# Patient Record
Sex: FEMALE | Race: WHITE | NOT HISPANIC OR LATINO | Employment: OTHER | URBAN - METROPOLITAN AREA
[De-identification: names, ages, dates, MRNs, and addresses within clinical notes are randomized per-mention and may not be internally consistent; named-entity substitution may affect disease eponyms.]

---

## 2017-01-02 DIAGNOSIS — G89.21 CHRONIC PAIN DUE TO INJURY: ICD-10-CM

## 2017-01-02 DIAGNOSIS — G82.20 PARAPLEGIA (H): Primary | ICD-10-CM

## 2017-01-02 NOTE — TELEPHONE ENCOUNTER
Lyrica 200mg   Last Written Prescription Date:  7/12/16  Last Fill Quantity: 270,   # refills: 1  Last Office Visit with [facility], [facility] or  Health prescribing provider: 11/25/16  Future Office visit:       Routing refill request to provider for review/approval because:  Drug not on the [facility], [facility] or [facility] refill protocol or controlled substance

## 2017-01-03 RX ORDER — PREGABALIN 200 MG/1
CAPSULE ORAL
Qty: 270 CAPSULE | Refills: 1 | Status: SHIPPED | OUTPATIENT
Start: 2017-01-03 | End: 2017-06-20

## 2017-01-05 ENCOUNTER — TELEPHONE (OUTPATIENT)
Dept: FAMILY MEDICINE | Facility: CLINIC | Age: 47
End: 2017-01-05

## 2017-01-05 NOTE — TELEPHONE ENCOUNTER
Reason for call:  Form  Reason for Call:  Form, our goal is to have forms completed with 72 hours, however, some forms may require a visit or additional information.    Type of letter, form or note:  medical    Who is the form from?:  [facility]    Where did the form come from: form was faxed in    What clinic location was the form placed at?: [facility] - [phone]    Where the form was placed: 's Box    What number is listed as a contact on the form?:  [phone]       Additional comments:  Fax to [phone]    created by Kelli Sortoalesia

## 2017-01-09 ENCOUNTER — TELEPHONE (OUTPATIENT)
Dept: FAMILY MEDICINE | Facility: CLINIC | Age: 47
End: 2017-01-09

## 2017-01-09 NOTE — TELEPHONE ENCOUNTER
Reason for call:  Form  Reason for Call:  Form, our goal is to have forms completed with 72 hours, however, some forms may require a visit or additional information.    Type of letter, form or note:  Medical    Who is the form from?:  [facility]     Where did the form come from: form was faxed in    What clinic location was the form placed at?: [facility] - [phone]    Where the form was placed: 's in box     What number is listed as a contact on the form?: [phone]        Additional comments: Fax back to [phone]    Call taken on 1/9/2017 at 9:55 AM by Leland Gaines

## 2017-01-18 DIAGNOSIS — Z53.9 DIAGNOSIS NOT YET DEFINED: Primary | ICD-10-CM

## 2017-01-18 DIAGNOSIS — Z00.00 ROUTINE GENERAL MEDICAL EXAMINATION AT A HEALTH CARE FACILITY: Primary | ICD-10-CM

## 2017-01-18 PROCEDURE — 99207 C MD RECERTIFICATION HHA PT: CPT | Performed by: FAMILY MEDICINE

## 2017-01-18 RX ORDER — FOLIC ACID/MV,IRON,MIN/LUTEIN 0.4-18-25
TABLET ORAL
Qty: 30 TABLET | Refills: 5 | Status: SHIPPED | OUTPATIENT
Start: 2017-01-18 | End: 2017-11-07

## 2017-01-18 NOTE — TELEPHONE ENCOUNTER
Certavite antioxidants 18mg      Last Written Prescription Date:  06/20/2016  Last Fill Quantity: 30,   # refills: 5  Last Office Visit with [facility] or  Health prescribing provider: 11/25/2016  Future Office visit:       Routing refill request to provider for review/approval because:  Drug not on the [facility], [facility] or  Health refill protocol or controlled substance

## 2017-01-25 ENCOUNTER — TRANSFERRED RECORDS (OUTPATIENT)
Dept: HEALTH INFORMATION MANAGEMENT | Facility: CLINIC | Age: 47
End: 2017-01-25

## 2017-01-25 ENCOUNTER — TELEPHONE (OUTPATIENT)
Dept: FAMILY MEDICINE | Facility: CLINIC | Age: 47
End: 2017-01-25

## 2017-01-25 DIAGNOSIS — G89.21 CHRONIC PAIN DUE TO INJURY: ICD-10-CM

## 2017-01-25 DIAGNOSIS — T83.511A URINARY TRACT INFECTION ASSOCIATED WITH INDWELLING URETHRAL CATHETER, INITIAL ENCOUNTER (H): ICD-10-CM

## 2017-01-25 DIAGNOSIS — L89.309: ICD-10-CM

## 2017-01-25 DIAGNOSIS — N39.0 URINARY TRACT INFECTION ASSOCIATED WITH INDWELLING URETHRAL CATHETER, INITIAL ENCOUNTER (H): ICD-10-CM

## 2017-01-25 DIAGNOSIS — G82.20 PARAPLEGIA (H): ICD-10-CM

## 2017-01-25 DIAGNOSIS — R30.0 DYSURIA: Primary | ICD-10-CM

## 2017-01-25 NOTE — TELEPHONE ENCOUNTER
Summary:    Patient is due/failing the following:   PAP    Action needed:   Patient needs office visit for PAP.    Type of outreach:    Sent letter.    Questions for provider review:    None                                                                                                                                    Cheryl Tapia       Chart routed to Care Team .      Panel Management Review      Patient has the following on her problem list: None      Composite cancer screening  Chart review shows that this patient is due/due soon for the following Pap Smear

## 2017-01-25 NOTE — TELEPHONE ENCOUNTER
Nurse laura:   States that patient possible has a UTI, started yesterday, cramping, odor, 99.0   call back [phone]    Huddled with SF.  Ok to place order.    Naseem Melendez RN      [phone]  Fax    Can we get a standing order for when she has symptoms  Huddled with SF>  Ok for standing order.    Orders placed and faxed.    Naseem Melendez RN

## 2017-01-25 NOTE — Clinical Note
[facility]  [address], [address]  [place] [address]  Phone: [phone]  Fax: [phone]  January 25, 2017      Agatha Canas  [address]  [place] [address]      Dear Agatha,    We care about your health and have reviewed your health plan including your medical conditions, medications, and lab results.  Based on this review, it is recommended that you follow up regarding the following health topic(s):  -Cervical Cancer Screening    We recommend you take the following action(s):  -schedule a PAP SMEAR EXAM which is due.  Please disregard this reminder if you have had this exam elsewhere within the last year.  It would be helpful for us to have a copy of your recent pap smear report to update your records.     Please call us at the [facility] - [phone] (or use [employer]) to address the above recommendations.     Thank you for trusting [facility] and we appreciate the opportunity to serve you.  We look forward to supporting your healthcare needs in the future.    Healthy Regards,    Your Health Care Team  [facility]

## 2017-01-25 NOTE — TELEPHONE ENCOUNTER
[facility] phone call message- patient request for an order or referral:    Order or referral being requested: orders  Reason for request: lab  Date needed: as soon as possible  Has the patient been seen by the PCP for this problem? YES    Additional comments: Home health Care is at the patient's home right now and is wanting to get orders to collect a urine on the patient. The patient thinks she has a UTI.    OK to leave the result message on voice mail or with a family member? YES    Call taken on 1/25/2017 at 10:07 AM by Michelle Jennings

## 2017-01-27 RX ORDER — NITROFURANTOIN 25; 75 MG/1; MG/1
100 CAPSULE ORAL 2 TIMES DAILY
Qty: 20 CAPSULE | Refills: 0 | Status: SHIPPED | OUTPATIENT
Start: 2017-01-27 | End: 2017-02-06

## 2017-01-27 NOTE — TELEPHONE ENCOUNTER
Nitrofurantoin sent to pharmacy for UTI. This is the only oral antibiotic that the bacteria is susceptible to.     Recheck UA and culture when medication course is completed to be sure this is resolved.

## 2017-01-27 NOTE — TELEPHONE ENCOUNTER
Patient called to check on the results of the UA and UC. She states she is not feeling well as a result of the symptoms.    Called [facility] for records - placed records on Providers desk for review.

## 2017-01-30 DIAGNOSIS — R30.0 DYSURIA: Primary | ICD-10-CM

## 2017-01-30 RX ORDER — OXYCODONE AND ACETAMINOPHEN 7.5; 325 MG/1; MG/1
2 TABLET ORAL EVERY 8 HOURS PRN
Qty: 180 TABLET | Refills: 0 | Status: SHIPPED | OUTPATIENT
Start: 2017-02-04 | End: 2017-02-22

## 2017-01-30 RX ORDER — METHADONE HYDROCHLORIDE 5 MG/1
TABLET ORAL
Qty: 300 TABLET | Refills: 0 | Status: SHIPPED | OUTPATIENT
Start: 2017-02-04 | End: 2017-02-22

## 2017-01-30 NOTE — TELEPHONE ENCOUNTER
Pt informed    She is requesting med refills (for February) to be mailed to [facility] in [place]      methadone (DOLOPHINE) 5 MG tablet      Last Written Prescription Date:  1/5/2017  Last Fill Quantity: 300,   # refills: 0  Last Office Visit with [facility], [facility] or  Health prescribing provider: 11/25/2016  Future Office visit:       Routing refill request to provider for review/approval because:  Drug not on the [facility], P or  Health refill protocol or controlled substance    oxyCODONE-acetaminophen (PERCOCET) 7.5-325 MG per tablet      Last Written Prescription Date:  1/5/2017  Last Fill Quantity: 180,   # refills: 0  Last Office Visit with [facility], [facility] or  Health prescribing provider: 11/25/2016  Future Office visit:       Routing refill request to provider for review/approval because:  Drug not on the [facility], P or  Health refill protocol or controlled substance

## 2017-01-30 NOTE — TELEPHONE ENCOUNTER
Nitrofurantoin mono      Last Written Prescription Date:  12/22/2016  Last Fill Quantity: 14,   # refills: 0  Last Office Visit with [facility], [facility] or  Health prescribing provider: 11/25/2016  Future Office visit:       Routing refill request to provider for review/approval because:  Drug not on the [facility], [facility] or  Health refill protocol or controlled substance

## 2017-01-31 RX ORDER — NITROFURANTOIN 25; 75 MG/1; MG/1
CAPSULE ORAL
Qty: 20 CAPSULE | Refills: 0 | OUTPATIENT
Start: 2017-01-31

## 2017-02-02 ENCOUNTER — TELEPHONE (OUTPATIENT)
Dept: FAMILY MEDICINE | Facility: CLINIC | Age: 47
End: 2017-02-02

## 2017-02-02 NOTE — TELEPHONE ENCOUNTER
Reason for call:  Form  Reason for Call:  Form, our goal is to have forms completed with 72 hours, however, some forms may require a visit or additional information.    Type of letter, form or note:  medical    Who is the form from?: [facility] [employer]    Where did the form come from: form was faxed in    What clinic location was the form placed at?: [facility] - [phone]    Where the form was placed:  in box     What number is listed as a contact on the form?: [phone]       Additional comments: Fax to [phone]    Call taken on 11/8/2016 at 3:08 PM by Daiana Barajas

## 2017-02-02 NOTE — TELEPHONE ENCOUNTER
Nettie from [facility] is calling, patient is due to have a repeat UA on Wednesday next week and the patient finishes her antibiotics on Tuesday next week wanting to know if it is ok to do the repeat UA on Wednesday? Please call Nettie at [phone].    Thank you Michelle

## 2017-02-02 NOTE — TELEPHONE ENCOUNTER
Faxed form to [facility] to complete the form - requesting wound descriptions, etc. (information we do not have).  Asked them to fax to [facility] when completed and fax copy to us when completed.

## 2017-02-03 ENCOUNTER — TELEPHONE (OUTPATIENT)
Dept: FAMILY MEDICINE | Facility: CLINIC | Age: 47
End: 2017-02-03

## 2017-02-03 ENCOUNTER — MEDICAL CORRESPONDENCE (OUTPATIENT)
Dept: HEALTH INFORMATION MANAGEMENT | Facility: CLINIC | Age: 47
End: 2017-02-03

## 2017-02-03 DIAGNOSIS — F41.9 ANXIETY: Primary | ICD-10-CM

## 2017-02-03 DIAGNOSIS — G89.21 CHRONIC PAIN DUE TO INJURY: ICD-10-CM

## 2017-02-03 NOTE — TELEPHONE ENCOUNTER
Reason for call:  Form  Reason for Call:  Form, our goal is to have forms completed with 72 hours, however, some forms may require a visit or additional information.    Type of letter, form or note:  medical    Who is the form from?: [facility]    Where did the form come from: form was faxed in    What clinic location was the form placed at?: [facility] - [phone]    Where the form was placed:  in box     What number is listed as a contact on the form?: [phone]       Additional comments: Fax to [phone]    Call taken on 11/8/2016 at 3:08 PM by Daiana Barajas

## 2017-02-03 NOTE — TELEPHONE ENCOUNTER
Panel Management Review      Patient has the following on her problem list:     Depression / Dysthymia review  PHQ-9 SCORE 10/13/2015 7/6/2016 11/25/2016   Total Score - - -   Total Score 0 5 3      Patient is due for:  None      Composite cancer screening  Chart review shows that this patient is due/due soon for the following Pap Smear  Summary:    Patient is due/failing the following:   TSH, Pap    Action needed:   Patient needs office visit for pap. and Patient needs non-fasting lab only appointment    Type of outreach:    Phone, spoke to patient.  pt states that she will schedule the appointment when she return home.     Questions for provider review:    None                                                                                                                                    Juvenal Orourke MA       Chart routed to Care Team .

## 2017-02-03 NOTE — TELEPHONE ENCOUNTER
Received completed form from [facility] re: patient's wound updates    Provider, please sign, then will fax to [facility]    Form placed in Priv[facility] inbox.

## 2017-02-03 NOTE — TELEPHONE ENCOUNTER
Venlafaxine 75 mg  Last Written Prescription Date: 07-6-16  Last Fill Quantity: 180, # refills: 1  Last Office Visit with [facility], [facility] or  [facility] prescribing provider: 11-25-16        BP Readings from Last 3 Encounters:   11/25/16 132/72   07/06/16 138/88   10/13/15 110/76     Pulse: (for Fetzima)  CREATININE   Date Value Ref Range Status   12/02/2016 1.0 0.6 - 1.0 mg/dL Final   ]    Last PHQ-9 score on record=   PHQ-9 SCORE 11/25/2016   Total Score -   Total Score 3       diazepam (VALIUM) 5 MG tablet      Last Written Prescription Date:  11-29-16  Last Fill Quantity: 60,   # refills: 0  Last Office Visit with [facility], [facility] or  [facility] prescribing provider: 11-25-16  Future Office visit:       Routing refill request to provider for review/approval because:  Drug not on the [facility], [facility] or  [facility] refill protocol or controlled substance

## 2017-02-07 DIAGNOSIS — G82.20 PARAPLEGIA (H): Primary | ICD-10-CM

## 2017-02-07 RX ORDER — VENLAFAXINE 75 MG/1
75 TABLET ORAL 2 TIMES DAILY
Qty: 180 TABLET | Refills: 0 | Status: SHIPPED | OUTPATIENT
Start: 2017-02-07 | End: 2017-05-18

## 2017-02-07 RX ORDER — DIAZEPAM 5 MG
TABLET ORAL
Qty: 60 TABLET | Refills: 0 | Status: SHIPPED | OUTPATIENT
Start: 2017-02-07 | End: 2017-04-03

## 2017-02-07 NOTE — TELEPHONE ENCOUNTER
Pt use's [facility]. Pharmacy has been selected.    please review pt request.   Lauri Quintanilla MA  February 7, 2017

## 2017-02-07 NOTE — TELEPHONE ENCOUNTER
Please call patient and find out which pharmacy she uses then flag for provider for refill.    Naseem Melendez RN

## 2017-02-07 NOTE — TELEPHONE ENCOUNTER
baclofen (LIORESAL) 20 MG tablet      Last Written Prescription Date: 11/8/2016  Last Fill Quantity: 90,  # refills: 1   Last Office Visit with [facility], [facility] or [facility] prescribing provider: 11/25/2016

## 2017-02-09 RX ORDER — BACLOFEN 20 MG/1
TABLET ORAL
Qty: 90 TABLET | Refills: 1 | Status: SHIPPED | OUTPATIENT
Start: 2017-02-09 | End: 2017-03-14

## 2017-02-09 NOTE — TELEPHONE ENCOUNTER
Routing refill request to provider for review/approval because:  Drug not on the [facility] refill protocol     Ila Santana, RN, BSN

## 2017-02-15 ENCOUNTER — TRANSFERRED RECORDS (OUTPATIENT)
Dept: HEALTH INFORMATION MANAGEMENT | Facility: CLINIC | Age: 47
End: 2017-02-15

## 2017-02-17 ENCOUNTER — TELEPHONE (OUTPATIENT)
Dept: FAMILY MEDICINE | Facility: CLINIC | Age: 47
End: 2017-02-17

## 2017-02-17 DIAGNOSIS — T83.511A URINARY TRACT INFECTION ASSOCIATED WITH INDWELLING URETHRAL CATHETER, INITIAL ENCOUNTER (H): Primary | ICD-10-CM

## 2017-02-17 DIAGNOSIS — N39.0 URINARY TRACT INFECTION ASSOCIATED WITH INDWELLING URETHRAL CATHETER, INITIAL ENCOUNTER (H): Primary | ICD-10-CM

## 2017-02-17 NOTE — TELEPHONE ENCOUNTER
Reason for call:  Results  Reason for Call:  Request for results:    Name of test or procedure: Urine test for UTI     Date of test of procedure: 2/15/17     Location of the test or procedure:  [facility] [place]     OK to leave the result message on voice mail or with a family member? YES    Phone number Patient can be reached at:  Home number on file [phone] (home)    Additional comments: She was very confused on where to get these results . She was arguing with me saying [facility] has the results . Please call and advice     Call taken on 2/17/2017 at 1:25 PM by Rhina Roberto

## 2017-02-22 ENCOUNTER — MEDICAL CORRESPONDENCE (OUTPATIENT)
Dept: HEALTH INFORMATION MANAGEMENT | Facility: CLINIC | Age: 47
End: 2017-02-22

## 2017-02-22 DIAGNOSIS — L89.309: ICD-10-CM

## 2017-02-22 DIAGNOSIS — G89.21 CHRONIC PAIN DUE TO INJURY: ICD-10-CM

## 2017-02-22 DIAGNOSIS — G82.20 PARAPLEGIA (H): ICD-10-CM

## 2017-02-22 RX ORDER — NITROFURANTOIN 25; 75 MG/1; MG/1
100 CAPSULE ORAL 2 TIMES DAILY
Qty: 20 CAPSULE | Refills: 0 | Status: SHIPPED | OUTPATIENT
Start: 2017-02-22 | End: 2017-03-04

## 2017-02-22 NOTE — TELEPHONE ENCOUNTER
Prescription sent to pharmacy for nitrofurantoin for 10 days.     If not improved with this, would need to try an IV antibiotic.

## 2017-02-22 NOTE — TELEPHONE ENCOUNTER
Nettie is calling back she states she was just speaking to a nurse about faxing over lab results and her fax is not working she would like to talk to someone about possibly getting an email from someone. Please call her at [phone], she isn't going to be in the office much longer and is hoping to speak to someone soon.    Thank you Michelle

## 2017-02-22 NOTE — TELEPHONE ENCOUNTER
Patient request refill/script for 2 medications be mailed to the [facility] in [place]    methadone (DOLOPHINE) 5 MG tablet      Last Written Prescription Date:  2/4/2017  Last Fill Quantity: 300,   # refills: 0  Last Office Visit with [facility] or  Health prescribing provider: 11/25/2016  Future Office visit:       Routing refill request to provider for review/approval because:  Drug not on the [facility], [facility] or  Health refill protocol or controlled substance      oxyCODONE-acetaminophen (PERCOCET) 7.5-325 MG per tablet      Last Written Prescription Date:  2/4/2017  Last Fill Quantity: 180,   # refills: 0  Last Office Visit with [facility], [facility] or  Health prescribing provider: 11/25/2016  Future Office visit:       Routing refill request to provider for review/approval because:  Drug not on the [facility], [facility] or  [facility] refill protocol or controlled substance

## 2017-02-22 NOTE — TELEPHONE ENCOUNTER
Reason for call:  Nettie from [facility], calls reporting there is ecoli in urine, please call [phone] if any questions or did not receive faxed results. These were faxed this am.

## 2017-02-24 RX ORDER — METHADONE HYDROCHLORIDE 5 MG/1
TABLET ORAL
Qty: 300 TABLET | Refills: 0 | Status: SHIPPED | OUTPATIENT
Start: 2017-03-04 | End: 2017-03-27

## 2017-02-24 RX ORDER — OXYCODONE AND ACETAMINOPHEN 7.5; 325 MG/1; MG/1
2 TABLET ORAL EVERY 8 HOURS PRN
Qty: 180 TABLET | Refills: 0 | Status: SHIPPED | OUTPATIENT
Start: 2017-03-04 | End: 2017-03-27

## 2017-02-27 ENCOUNTER — TELEPHONE (OUTPATIENT)
Dept: FAMILY MEDICINE | Facility: CLINIC | Age: 47
End: 2017-02-27

## 2017-02-27 ENCOUNTER — MEDICAL CORRESPONDENCE (OUTPATIENT)
Dept: HEALTH INFORMATION MANAGEMENT | Facility: CLINIC | Age: 47
End: 2017-02-27

## 2017-02-27 NOTE — TELEPHONE ENCOUNTER
Reason for call:  Form  Reason for Call:  Form, our goal is to have forms completed with 72 hours, however, some forms may require a visit or additional information.    Type of letter, form or note:  Medical    Who is the form from?: [facility]      Where did the form come from: form was faxed in    What clinic location was the form placed at?: [facility] - [phone]    Where the form was placed: 's in box     What number is listed as a contact on the form?: [phone]       Additional comments: Fax back to [phone]    Call taken on 2/27/2017 at 12:26 PM by Leland Gaines

## 2017-03-01 ENCOUNTER — MEDICAL CORRESPONDENCE (OUTPATIENT)
Dept: HEALTH INFORMATION MANAGEMENT | Facility: CLINIC | Age: 47
End: 2017-03-01

## 2017-03-01 DIAGNOSIS — G89.21 CHRONIC PAIN DUE TO INJURY: ICD-10-CM

## 2017-03-01 NOTE — TELEPHONE ENCOUNTER
IBUPROFEN 200 MG tablet     Last Written Prescription Date: 6-20-16  Last Quantity: 120, # refills: 5  Last [facility], [facility] or [facility] prescribing provider: 11-25-16       Creatinine   Date Value Ref Range Status   12/02/2016 1.0 0.6 - 1.0 mg/dL Final     Lab Results   Component Value Date    AST 16 12/02/2016     Lab Results   Component Value Date    ALT 17 12/02/2016     BP Readings from Last 3 Encounters:   11/25/16 132/72   07/06/16 138/88   10/13/15 110/76

## 2017-03-02 ENCOUNTER — TELEPHONE (OUTPATIENT)
Dept: FAMILY MEDICINE | Facility: CLINIC | Age: 47
End: 2017-03-02

## 2017-03-02 RX ORDER — IBUPROFEN 200 MG
TABLET ORAL
Qty: 120 TABLET | Refills: 5 | Status: SHIPPED | OUTPATIENT
Start: 2017-03-02 | End: 2017-09-29

## 2017-03-02 NOTE — TELEPHONE ENCOUNTER
Reason for call:  Form  Reason for Call:  Form, our goal is to have forms completed with 72 hours, however, some forms may require a visit or additional information.    Type of letter, form or note:  medical    Who is the form from?:  [facility]     Where did the form come from: form was faxed in    What clinic location was the form placed at?: [facility] - [phone]    Where the form was placed: 's Box    What number is listed as a contact on the form?:  [phone]       Additional comments:  Fax to [phone]     created by Kelli Sortoalesia

## 2017-03-02 NOTE — TELEPHONE ENCOUNTER
[facility] RN calling to see if she should do a repeat UA for patient and when PCP would like that completed. Patient will be out of medication/ MACROBID for last UA this weekend.    RN: Ligia   Telephone: [phone]    Nathalie Hayes  Reception/ Scheduling

## 2017-03-03 ENCOUNTER — TELEPHONE (OUTPATIENT)
Dept: FAMILY MEDICINE | Facility: CLINIC | Age: 47
End: 2017-03-03

## 2017-03-03 NOTE — TELEPHONE ENCOUNTER
Received form from [employer]  Phone - [phone]  Fax - [phone]  Requesting: wound evaluation and updates for surgical dressing prescriptions.    Faxed this form to [facility] to complete - asked them to fax back to us to review and Provider will sign at that time.  Then final fax to [employer].    Awaiting form to be returned to us from [facility]

## 2017-03-06 ENCOUNTER — MEDICAL CORRESPONDENCE (OUTPATIENT)
Dept: HEALTH INFORMATION MANAGEMENT | Facility: CLINIC | Age: 47
End: 2017-03-06

## 2017-03-06 ENCOUNTER — TRANSFERRED RECORDS (OUTPATIENT)
Dept: HEALTH INFORMATION MANAGEMENT | Facility: CLINIC | Age: 47
End: 2017-03-06

## 2017-03-07 ENCOUNTER — TELEPHONE (OUTPATIENT)
Dept: FAMILY MEDICINE | Facility: CLINIC | Age: 47
End: 2017-03-07

## 2017-03-07 DIAGNOSIS — Z53.9 DIAGNOSIS NOT YET DEFINED: Primary | ICD-10-CM

## 2017-03-07 PROCEDURE — G0179 MD RECERTIFICATION HHA PT: HCPCS | Performed by: FAMILY MEDICINE

## 2017-03-07 NOTE — TELEPHONE ENCOUNTER
Reason for call:  Form  Reason for Call:  Form, our goal is to have forms completed with 72 hours, however, some forms may require a visit or additional information.    Type of letter, form or note:  Medical    Who is the form from?: [facility]      Where did the form come from: form was faxed in    What clinic location was the form placed at?: [facility] - [phone]    Where the form was placed: 's in box     What number is listed as a contact on the form?: [phone]       Additional comments: Fax back to [phone]    Call taken on 3/7/2017 at 7:41 AM by Leland Gaines

## 2017-03-08 NOTE — TELEPHONE ENCOUNTER
Called [facility] to follow up on the form - did they receive it?  Phone- [phone]  Fax - [phone]    Left detailed message informing HARDEEP Sheffield - asked her to call me back.

## 2017-03-09 ENCOUNTER — TELEPHONE (OUTPATIENT)
Dept: FAMILY MEDICINE | Facility: CLINIC | Age: 47
End: 2017-03-09

## 2017-03-09 DIAGNOSIS — T83.511A URINARY TRACT INFECTION ASSOCIATED WITH INDWELLING URETHRAL CATHETER, INITIAL ENCOUNTER (H): Primary | ICD-10-CM

## 2017-03-09 DIAGNOSIS — N39.0 URINARY TRACT INFECTION ASSOCIATED WITH INDWELLING URETHRAL CATHETER, INITIAL ENCOUNTER (H): Primary | ICD-10-CM

## 2017-03-09 RX ORDER — CEFTRIAXONE 1 G/1
2000 INJECTION, POWDER, FOR SOLUTION INTRAMUSCULAR; INTRAVENOUS DAILY
Qty: 200 ML | Refills: 0 | OUTPATIENT
Start: 2017-03-09 | End: 2017-03-13

## 2017-03-09 NOTE — TELEPHONE ENCOUNTER
Ligia calling from [facility]. She faxed UA/UC results about an hour ago. She would like a call back ASAP.  Thank you,  Agatha Fair- Pt Rep.

## 2017-03-09 NOTE — TELEPHONE ENCOUNTER
Spoke with Ligia. Start Rocephin 2 g IV q 24 hours for 10 doses.     Ligia recommended sending to [facility].     They will do teaching.

## 2017-03-13 ENCOUNTER — TELEPHONE (OUTPATIENT)
Dept: FAMILY MEDICINE | Facility: CLINIC | Age: 47
End: 2017-03-13

## 2017-03-13 DIAGNOSIS — N39.0 URINARY TRACT INFECTION ASSOCIATED WITH INDWELLING URETHRAL CATHETER, INITIAL ENCOUNTER (H): ICD-10-CM

## 2017-03-13 DIAGNOSIS — T83.511A URINARY TRACT INFECTION ASSOCIATED WITH INDWELLING URETHRAL CATHETER, INITIAL ENCOUNTER (H): ICD-10-CM

## 2017-03-13 RX ORDER — CEFTRIAXONE 1 G/1
2000 INJECTION, POWDER, FOR SOLUTION INTRAMUSCULAR; INTRAVENOUS DAILY
Qty: 200 ML | Refills: 0 | Status: SHIPPED | OUTPATIENT
Start: 2017-03-13 | End: 2017-06-27

## 2017-03-13 NOTE — TELEPHONE ENCOUNTER
Orders sent to [facility]. Was not received.     Please call and check with pharmacy that this is received and that patient is working with [facility].

## 2017-03-13 NOTE — TELEPHONE ENCOUNTER
Spoke to pharmacy tech. They have received the prescription.  They contacted both patient and [facility] home care.      Clarified that the Rx is for 10 days

## 2017-03-17 DIAGNOSIS — L20.9 ATOPIC DERMATITIS, UNSPECIFIED: ICD-10-CM

## 2017-03-20 ENCOUNTER — TELEPHONE (OUTPATIENT)
Dept: FAMILY MEDICINE | Facility: CLINIC | Age: 47
End: 2017-03-20

## 2017-03-20 ENCOUNTER — MEDICAL CORRESPONDENCE (OUTPATIENT)
Dept: HEALTH INFORMATION MANAGEMENT | Facility: CLINIC | Age: 47
End: 2017-03-20

## 2017-03-20 DIAGNOSIS — I10 ESSENTIAL HYPERTENSION: Primary | ICD-10-CM

## 2017-03-20 RX ORDER — TRIAMCINOLONE ACETONIDE 5 MG/G
CREAM TOPICAL
Qty: 30 G | Refills: 1 | Status: SHIPPED | OUTPATIENT
Start: 2017-03-20 | End: 2019-05-20

## 2017-03-20 NOTE — TELEPHONE ENCOUNTER
Prescription approved per [facility] Refill Protocol.  Ila Santana, RN, BSN

## 2017-03-20 NOTE — TELEPHONE ENCOUNTER
Triamcinolone 0.5% Cream      Last Written Prescription Date: 09/23/2016  Last Fill Quantity: 30g,  # refills: 1   Last Office Visit with [facility], [facility] or [facility] prescribing provider: 11/25/2016

## 2017-03-20 NOTE — TELEPHONE ENCOUNTER
Reason for call:  Ligia from home care would like to leave her blood pressure results.  Today 174/102  Rechecked 172/98.  Ok to leave a message.

## 2017-03-21 ENCOUNTER — TELEPHONE (OUTPATIENT)
Dept: FAMILY MEDICINE | Facility: CLINIC | Age: 47
End: 2017-03-21

## 2017-03-21 RX ORDER — LISINOPRIL 10 MG/1
10 TABLET ORAL DAILY
Qty: 30 TABLET | Refills: 1 | Status: SHIPPED | OUTPATIENT
Start: 2017-03-21 | End: 2017-06-05

## 2017-03-21 NOTE — TELEPHONE ENCOUNTER
.Reason for Call:  Form, our goal is to have forms completed with 72 hours, however, some forms may require a visit or additional information.    Type of letter, form or note:  medical    Who is the form from?: Home care    Where did the form come from: form was faxed in    What clinic location was the form placed at?: [facility] - [phone]    Where the form was placed: Dr's Box    What number is listed as a contact on the form?: [phone]       Additional comments: n/a    Call taken on 3/21/2017 at 3:13 PM by Virgie Dudley

## 2017-03-21 NOTE — TELEPHONE ENCOUNTER
Called [facility] and informed Dr. Kasper message.   Also called Ligia and informed her the information.

## 2017-03-21 NOTE — TELEPHONE ENCOUNTER
Recommend start lisinopril 10 mg daily. If blood pressure still over 140/90 in one week, then increase lisinopril to 20 mg daily and notify so that new prescription can be sent.     Prescription sent to [facility]    Also recommend avoidance of ibuprofen as NSAIDs can increase blood pressure.

## 2017-03-22 ENCOUNTER — TELEPHONE (OUTPATIENT)
Dept: FAMILY MEDICINE | Facility: CLINIC | Age: 47
End: 2017-03-22

## 2017-03-22 NOTE — TELEPHONE ENCOUNTER
Reason for call:  Form  Reason for Call:  Form, our goal is to have forms completed with 72 hours, however, some forms may require a visit or additional information.    Type of letter, form or note:  Medical    Who is the form from?: [facility]       Where did the form come from: form was faxed in    What clinic location was the form placed at?: [facility] - [phone]    Where the form was placed: 's in box     What number is listed as a contact on the form?: [phone]       Additional comments: Fax back to [phone]    Call taken on 3/22/2017 at 7:42 AM by Leland Gaines

## 2017-03-23 ENCOUNTER — TELEPHONE (OUTPATIENT)
Dept: FAMILY MEDICINE | Facility: CLINIC | Age: 47
End: 2017-03-23

## 2017-03-23 ENCOUNTER — TRANSFERRED RECORDS (OUTPATIENT)
Dept: HEALTH INFORMATION MANAGEMENT | Facility: CLINIC | Age: 47
End: 2017-03-23

## 2017-03-23 ENCOUNTER — MEDICAL CORRESPONDENCE (OUTPATIENT)
Dept: HEALTH INFORMATION MANAGEMENT | Facility: CLINIC | Age: 47
End: 2017-03-23

## 2017-03-23 DIAGNOSIS — G82.20 PARAPLEGIA (H): ICD-10-CM

## 2017-03-23 DIAGNOSIS — G89.21 CHRONIC PAIN DUE TO INJURY: ICD-10-CM

## 2017-03-23 DIAGNOSIS — L89.309: ICD-10-CM

## 2017-03-23 NOTE — TELEPHONE ENCOUNTER
"Pt calling. She would like to speak to Dr. Kasper personally in regards to \"medical issues\". Would not go into detail further with me. She is aware that Dr. Kasper is not in today. Please call.   Thank you,  Agatha Fair- Pt Rep.     "

## 2017-03-23 NOTE — TELEPHONE ENCOUNTER
"Pt calling. She would like a call from Dr. Kasper personally. She states it is about \" medical issues \". Would not go into detail further with me. She is aware that SF is not in today.   Thank you,  Agatha Fair- Pt Rep.     "

## 2017-03-23 NOTE — TELEPHONE ENCOUNTER
Spoke with Larisa from home care. Reports elevated bp. States patient did mention to her that she was quite upset before Larisa's arrival. Also unsure if she took her bp medication today. Rocephin was completed yesterday, port was de-accessed today. Temp of 99.3F today, UA was dropped off with standing order. Will touch base with patient as she has request to discuss issues with provider, however provider is out today. Larisa will call upon receiving UA results to make sure we received as well. Will f/u as needed.     Jayne Pavon, RN, BSN

## 2017-03-23 NOTE — TELEPHONE ENCOUNTER
Dr. Kasper-see below. Patient requesting to speak with you personally. Patient was upset that Home Care had to call and report findings. Tried to contact patient, but unsuccessful at this time. Will try again later to try and touch base with patient.     Jayne Pavon, RN, BSN

## 2017-03-23 NOTE — TELEPHONE ENCOUNTER
"Called patient to discuss \"concerns-see other encounter from today\". Phone line was busy x3, will try again later.      Jayne Pavon, RN, BSN    "

## 2017-03-23 NOTE — TELEPHONE ENCOUNTER
Larisa from [facility] was out at Agatha's home today and cleaned her port she finished her antibiotics, her BP was 188/96 and her temp was 99.3. She collected a UA today and will be bring that to the clinic. Patient would like you to call and talk to her but Larisa also wants to talk with you please call her at [phone].    Thank you Michelle

## 2017-03-24 ENCOUNTER — TELEPHONE (OUTPATIENT)
Dept: FAMILY MEDICINE | Facility: CLINIC | Age: 47
End: 2017-03-24

## 2017-03-27 RX ORDER — OXYCODONE AND ACETAMINOPHEN 7.5; 325 MG/1; MG/1
2 TABLET ORAL EVERY 8 HOURS PRN
Qty: 180 TABLET | Refills: 0 | Status: SHIPPED | OUTPATIENT
Start: 2017-04-04 | End: 2017-04-12

## 2017-03-27 RX ORDER — METHADONE HYDROCHLORIDE 5 MG/1
TABLET ORAL
Qty: 300 TABLET | Refills: 0 | Status: SHIPPED | OUTPATIENT
Start: 2017-04-04 | End: 2017-04-12

## 2017-03-27 NOTE — TELEPHONE ENCOUNTER
Provider-patient is requesting refills of percocet and Methodone. States these are usually filled now to be sent via mail so they arrive by the  date. Does have pain contract signed.  No issues per .     methodone      Last Written Prescription Date:  3/4/17  Last Fill Quantity: 300,   # refills: 0  Last Office Visit with [facility] prescribing provider: 11/25/2016  Future Office visit:       Routing refill request to provider for review/approval because:  Drug not on the [facility] refill protocol or controlled substance    percocet      Last Written Prescription Date:  3/4/2017  Last Fill Quantity: 180,   # refills: 0  Last Office Visit with [facility] prescribing provider: 11/25/2016  Future Office visit:       Routing refill request to provider for review/approval because:  Drug not on the [facility] refill protocol or controlled substance

## 2017-03-27 NOTE — TELEPHONE ENCOUNTER
Called patient to make sure she received results of UA. States she did receive them. She is still having some pelvic pressure. No issues with the catheter. Has not had a BM in a few days though. States is not drinking much water. Denies pain, fever, nausea, vomiting, blood or odor to urine. Home care measures given for constipation. Will f/u in a couple days if no BM achieved/symptoms still present by making appt in clinic. In agreement with plan, will go to ED with new/worsening symptoms.

## 2017-03-27 NOTE — TELEPHONE ENCOUNTER
Two prescriptions completed and to be sent as requested for filling on 4/4/2017.    Jake Singleton MD  Please close encounter when call/work completed.

## 2017-03-29 ENCOUNTER — MEDICAL CORRESPONDENCE (OUTPATIENT)
Dept: HEALTH INFORMATION MANAGEMENT | Facility: CLINIC | Age: 47
End: 2017-03-29

## 2017-04-03 ENCOUNTER — TELEPHONE (OUTPATIENT)
Dept: FAMILY MEDICINE | Facility: CLINIC | Age: 47
End: 2017-04-03

## 2017-04-03 DIAGNOSIS — F41.9 ANXIETY: ICD-10-CM

## 2017-04-03 DIAGNOSIS — G89.21 CHRONIC PAIN DUE TO INJURY: ICD-10-CM

## 2017-04-03 RX ORDER — DIAZEPAM 5 MG
TABLET ORAL
Qty: 60 TABLET | Refills: 0 | Status: SHIPPED | OUTPATIENT
Start: 2017-04-03 | End: 2017-06-14

## 2017-04-03 NOTE — TELEPHONE ENCOUNTER
Script faxed.    I have contacted pharmacy and she is taking wellbutrin 12 hr tablet.     Evangelina Rdz CMA ([facility])

## 2017-04-03 NOTE — TELEPHONE ENCOUNTER
diazepam (VALIUM) 5 MG tablet      Last Written Prescription Date:  2/7/2017  Last Fill Quantity: 60,   # refills: 0  Last Office Visit with [facility], [facility] or [facility] prescribing provider: 11/25/2016  Future Office visit:       Routing refill request to provider for review/approval because:  Drug not on the [facility], [facility] or [facility] refill protocol or controlled substance      buPROPion (WELLBUTRIN SR) 150 MG 12 hr tablet       Last Written Prescription Date: 9/10/2015 pt reported  Last Fill Quantity:    # refills:    Last Office Visit with [facility], [facility] or [facility] prescribing provider:    11/25/2016       Last PHQ-9 score on record=   PHQ-9 SCORE 11/25/2016   Total Score -   Total Score 3       Lab Results   Component Value Date    AST 16 12/02/2016     Lab Results   Component Value Date    ALT 17 12/02/2016

## 2017-04-03 NOTE — TELEPHONE ENCOUNTER
Prescription for valium printed and signed.     Please verify with pharmacy what wellbutrin she is taking 24 hr tablet or the 12 hr tablet.

## 2017-04-04 RX ORDER — BUPROPION HYDROCHLORIDE 150 MG/1
150 TABLET, EXTENDED RELEASE ORAL 2 TIMES DAILY
Qty: 60 TABLET | Refills: 2 | Status: SHIPPED | OUTPATIENT
Start: 2017-04-04 | End: 2017-10-23

## 2017-04-05 ENCOUNTER — MEDICAL CORRESPONDENCE (OUTPATIENT)
Dept: HEALTH INFORMATION MANAGEMENT | Facility: CLINIC | Age: 47
End: 2017-04-05

## 2017-04-06 ENCOUNTER — TELEPHONE (OUTPATIENT)
Dept: FAMILY MEDICINE | Facility: CLINIC | Age: 47
End: 2017-04-06

## 2017-04-06 NOTE — TELEPHONE ENCOUNTER
Reason for call:  Form  Reason for Call:  Form, our goal is to have forms completed with 72 hours, however, some forms may require a visit or additional information.    Type of letter, form or note:  Medical    Who is the form from?:  [facility]     Where did the form come from: form was faxed in    What clinic location was the form placed at?: [facility] - [phone]    Where the form was placed: 's in box     What number is listed as a contact on the form?: [phone]       Additional comments: Fax back to [phone]    Call taken on 4/6/2017 at 11:22 AM by Leland Gaines

## 2017-04-10 ENCOUNTER — TELEPHONE (OUTPATIENT)
Dept: FAMILY MEDICINE | Facility: CLINIC | Age: 47
End: 2017-04-10

## 2017-04-10 NOTE — TELEPHONE ENCOUNTER
Panel Management Review      Patient has the following on her problem list:     Depression / Dysthymia review  PHQ-9 SCORE 10/13/2015 7/6/2016 11/25/2016   Total Score - - -   Total Score 0 5 3      Patient is due for:  DAP      Composite cancer screening  Chart review shows that this patient is due/due soon for the following Pap Smear  Summary:    Patient is due/failing the following:   TSH, T 4 free per Dr. Ridley    Action needed:   Patient needs non-fasting lab only appointment    Type of outreach:    Phone, spoke to patient.  Pt states that she not able to do lab at this time, maybe next time.     Questions for provider review:    None                                                                                                                                    Juvenal Orourke MA       Chart routed to Care Team .

## 2017-04-10 NOTE — TELEPHONE ENCOUNTER
Patient called stating that she needs a PA completed for both methadone and percocet.  She purchased #10 pills of each on 4/2/2017 because insurance would not cover it.  We did not receive a PA request from insurance or pharmacy re: needing a PA.  Called [facility] to verify.     ID # [id number]  Phone - [phone]    Printed previous PA form/application from encounter dated 9/13/2016.      PA submitted as urgent for both medications using previous information  Awaiting decision

## 2017-04-11 ENCOUNTER — TELEPHONE (OUTPATIENT)
Dept: FAMILY MEDICINE | Facility: CLINIC | Age: 47
End: 2017-04-11

## 2017-04-11 DIAGNOSIS — G82.20 PARAPLEGIA (H): ICD-10-CM

## 2017-04-11 DIAGNOSIS — L89.309: ICD-10-CM

## 2017-04-11 DIAGNOSIS — G89.21 CHRONIC PAIN DUE TO INJURY: ICD-10-CM

## 2017-04-11 NOTE — TELEPHONE ENCOUNTER
PA for methadone approved - effective 4/10/2017 through 4/10/2018  PA from percocet / oxycodone approved - effective 4/10/2017 through 4/10/2018  Pharmacy informed

## 2017-04-11 NOTE — TELEPHONE ENCOUNTER
Please check with the pharmacy if they can fill the remainder of the prescriptions or if they need new ones.

## 2017-04-11 NOTE — TELEPHONE ENCOUNTER
Patient would like to know if her prescriptions were mailed to her pharmacy once the PA was approved?    Thank you Michelle

## 2017-04-11 NOTE — TELEPHONE ENCOUNTER
Provider, the patient stated she filled a quantity of #10 percocet and methadone on 4/2/2017 to get her through until the PA's were completed.  The PA's are approved.    Do we need to send a new Rx for an updated quantity? Or can she just go tot he pharmacy to request the remainder of the original Rx dated 4/4/2017?

## 2017-04-11 NOTE — TELEPHONE ENCOUNTER
Notified pt that the the pharmacy was notified of PA approval. Kelli were these mailed to the pharmacy?    Uyen Trevino CMA

## 2017-04-12 ENCOUNTER — TELEPHONE (OUTPATIENT)
Dept: FAMILY MEDICINE | Facility: CLINIC | Age: 47
End: 2017-04-12

## 2017-04-12 ENCOUNTER — MEDICAL CORRESPONDENCE (OUTPATIENT)
Dept: HEALTH INFORMATION MANAGEMENT | Facility: CLINIC | Age: 47
End: 2017-04-12

## 2017-04-12 DIAGNOSIS — E03.4 HYPOTHYROIDISM DUE TO ACQUIRED ATROPHY OF THYROID: Primary | ICD-10-CM

## 2017-04-12 RX ORDER — METHADONE HYDROCHLORIDE 5 MG/1
TABLET ORAL
Qty: 200 TABLET | Refills: 0 | Status: SHIPPED | OUTPATIENT
Start: 2017-04-12 | End: 2017-04-25

## 2017-04-12 RX ORDER — OXYCODONE AND ACETAMINOPHEN 7.5; 325 MG/1; MG/1
2 TABLET ORAL EVERY 8 HOURS PRN
Qty: 120 TABLET | Refills: 0 | Status: SHIPPED | OUTPATIENT
Start: 2017-04-12 | End: 2017-04-25

## 2017-04-12 NOTE — TELEPHONE ENCOUNTER
Pharmacist stated that we need to send new scripts to the, in order for them to be filled.    methadone (DOLOPHINE) 5 MG tablet quantity needed #200  (patient picked up 10 days worth #100 on 4/2/2017)    oxyCODONE-acetaminophen (PERCOCET) 7.5-325 MG per tablet quantity needed #120  (patient picked up 10 days worth #60 on 4/2/2017)    Ideally, if we can send them electronically and then mail them this would prevent the patient from running out.

## 2017-04-12 NOTE — TELEPHONE ENCOUNTER
Left a message for Ligia, please ask Ligia for the fax number to fax the lab order.     The order printed and placed in TC inbasket.

## 2017-04-12 NOTE — TELEPHONE ENCOUNTER
If we have labs drawn this week for thyroid, then should not need thyroid labs rechecked next week.

## 2017-04-12 NOTE — TELEPHONE ENCOUNTER
Ligia from home care called back.  She will do the thyroid labs today with the patient.  Can she also do another thyroid lab check next Wednesday 4/19/2017?    Also, patient would like another UA/UC done today.  She is experiencing symptoms.  Katherine noted that there is a standing order for this. She will use the standing order.

## 2017-04-12 NOTE — TELEPHONE ENCOUNTER
Clarified with Ligia.  She wants to know if it is ok to check the patient's thyroid NEXT Wednesday, not today?

## 2017-04-13 ENCOUNTER — TELEPHONE (OUTPATIENT)
Dept: FAMILY MEDICINE | Facility: CLINIC | Age: 47
End: 2017-04-13

## 2017-04-13 ENCOUNTER — TRANSFERRED RECORDS (OUTPATIENT)
Dept: HEALTH INFORMATION MANAGEMENT | Facility: CLINIC | Age: 47
End: 2017-04-13

## 2017-04-13 NOTE — TELEPHONE ENCOUNTER
Reviewed results of urine test. No significant abnormality is seen.  Would like a urine culture from the lab. They should have a standing order.     Please let the patient know that no antibiotics at this point.     Also reviewed her blood pressure reading and see that it is still elevated. Started medication 4 days ago. Recommend that she continue with lisinopril 10 mg daily. If still elevated, then increase to 20 mg.

## 2017-04-13 NOTE — TELEPHONE ENCOUNTER
Pt informed and understood    Called [facility] in [place] [phone] to request UC results.  The  stated the UC is still in process.

## 2017-04-14 ENCOUNTER — TELEPHONE (OUTPATIENT)
Dept: FAMILY MEDICINE | Facility: CLINIC | Age: 47
End: 2017-04-14

## 2017-04-14 NOTE — TELEPHONE ENCOUNTER
Reason for Call:  Form, our goal is to have forms completed with 72 hours, however, some forms may require a visit or additional information.    Type of letter, form or note:  medical    Who is the form from?: Home care    Where did the form come from: form was faxed in    What clinic location was the form placed at?: [facility] - [phone]    Where the form was placed: Dr's Box    What number is listed as a contact on the form?: [phone]       Additional comments: N/A  Call taken on 4/14/2017 at 3:11 PM by Virgie Dudley

## 2017-04-19 ENCOUNTER — TELEPHONE (OUTPATIENT)
Dept: FAMILY MEDICINE | Facility: CLINIC | Age: 47
End: 2017-04-19

## 2017-04-19 ENCOUNTER — TRANSFERRED RECORDS (OUTPATIENT)
Dept: HEALTH INFORMATION MANAGEMENT | Facility: CLINIC | Age: 47
End: 2017-04-19

## 2017-04-19 DIAGNOSIS — E03.4 HYPOTHYROIDISM DUE TO ACQUIRED ATROPHY OF THYROID: Primary | ICD-10-CM

## 2017-04-19 LAB — TSH SERPL-ACNC: 5.58 UIU/ML (ref 0.36–3.74)

## 2017-04-19 RX ORDER — LEVOTHYROXINE SODIUM 75 UG/1
75 TABLET ORAL DAILY
Qty: 90 TABLET | Refills: 1 | Status: SHIPPED | OUTPATIENT
Start: 2017-04-19 | End: 2017-09-29

## 2017-04-19 NOTE — TELEPHONE ENCOUNTER
Please notify of results. TSH elevated at 5.558. An increased dose of levothyroxine 75 mcg daily sent to the pharmacy. Recheck labs in 6-8 weeks.     Urine culture is negative. No growth. No infection.

## 2017-04-19 NOTE — TELEPHONE ENCOUNTER
Please just have them add a free T4 to her labs.  Printed. Please call and then will need to fax.

## 2017-04-19 NOTE — TELEPHONE ENCOUNTER
Kandice from [facility] notified that it is okay to add free T4. Evangelina Rdz CMA ([facility])

## 2017-04-19 NOTE — TELEPHONE ENCOUNTER
Reason for Call:  Lab question    Detailed comments: Ash from [facility] called indicating that Home Care dropped of a specimen with request for a TSH-which was completed and a 3T4 reflux-unable to complete as they don't have guidelines.  Lab would like a return call clarifying what tests need to be run on the specimen.    Phone Number Patient can be reached at: Other phone number: [phone] ask for Ash or Kandice    Best Time: asap    Can we leave a detailed message on this number? YES    Call taken on 4/19/2017 at 11:10 AM by Melania Muller

## 2017-04-25 ENCOUNTER — TELEPHONE (OUTPATIENT)
Dept: FAMILY MEDICINE | Facility: CLINIC | Age: 47
End: 2017-04-25

## 2017-04-25 DIAGNOSIS — G89.21 CHRONIC PAIN DUE TO INJURY: ICD-10-CM

## 2017-04-25 DIAGNOSIS — G82.20 PARAPLEGIA (H): ICD-10-CM

## 2017-04-25 DIAGNOSIS — L89.309: ICD-10-CM

## 2017-04-25 RX ORDER — OXYCODONE AND ACETAMINOPHEN 7.5; 325 MG/1; MG/1
2 TABLET ORAL EVERY 8 HOURS PRN
Qty: 120 TABLET | Refills: 0 | Status: SHIPPED | OUTPATIENT
Start: 2017-05-12 | End: 2017-05-03

## 2017-04-25 RX ORDER — METHADONE HYDROCHLORIDE 5 MG/1
TABLET ORAL
Qty: 200 TABLET | Refills: 0 | Status: SHIPPED | OUTPATIENT
Start: 2017-05-12 | End: 2017-05-03

## 2017-04-25 NOTE — TELEPHONE ENCOUNTER
Reason for call:  Medication  Reason for Call:  Medication or medication refill:    Do you use a [facility]?  Name of the pharmacy and phone number for the current request:  [facility]    Name of the medication requested: methadone (DOLOPHINE) 5 MG tablet and oxyCODONE-acetaminophen (PERCOCET) 7.5-325 MG per tablet    Other request: please mail to pharmacy.  Please call patient when this is done    Can we leave a detailed message on this number? YES    Phone number patient can be reached at: Home number on file [phone] (home)    Best Time: any    Call taken on 4/25/2017 at 4:15 PM by Hillary Mcgraw

## 2017-04-25 NOTE — TELEPHONE ENCOUNTER
methadone (DOLOPHINE) 5 MG tablet      Last Written Prescription Date:  4/12/2017  Last Fill Quantity: 200,   # refills: 0  Last Office Visit with [facility], [facility] or [facility] prescribing provider: 11/25/2016  Future Office visit:       Routing refill request to provider for review/approval because:  Drug not on the [facility], [facility] or [facility] refill protocol or controlled substance      oxyCODONE-acetaminophen (PERCOCET) 7.5-325 MG per tablet      Last Written Prescription Date:  4/12/2017  Last Fill Quantity: 120,   # refills: 0  Last Office Visit with [facility], [facility] or [facility] prescribing provider: 11/25/2016  Future Office visit:       Routing refill request to provider for review/approval because:  Drug not on the [facility], [facility] or  [facility] refill protocol or controlled substance

## 2017-04-25 NOTE — TELEPHONE ENCOUNTER
Pt notified of message. Will call back to schedule. Evangelina Rdz CMA ([facility])

## 2017-04-26 ENCOUNTER — TRANSFERRED RECORDS (OUTPATIENT)
Dept: HEALTH INFORMATION MANAGEMENT | Facility: CLINIC | Age: 47
End: 2017-04-26

## 2017-04-26 ENCOUNTER — MEDICAL CORRESPONDENCE (OUTPATIENT)
Dept: HEALTH INFORMATION MANAGEMENT | Facility: CLINIC | Age: 47
End: 2017-04-26

## 2017-04-28 ENCOUNTER — TELEPHONE (OUTPATIENT)
Dept: FAMILY MEDICINE | Facility: CLINIC | Age: 47
End: 2017-04-28

## 2017-04-28 DIAGNOSIS — R30.0 DYSURIA: Primary | ICD-10-CM

## 2017-04-28 RX ORDER — SULFAMETHOXAZOLE/TRIMETHOPRIM 800-160 MG
1 TABLET ORAL 2 TIMES DAILY
Qty: 14 TABLET | Refills: 0 | Status: SHIPPED | OUTPATIENT
Start: 2017-04-28 | End: 2017-05-24

## 2017-04-28 NOTE — TELEPHONE ENCOUNTER
Pt calling. She would like to know if you reviewed the UA results yet and if you are going to call anything in for her? She states the results were faxed to you on Wednesday. Please call.   Thank you,  Agatha Fair- Pt Rep.

## 2017-04-28 NOTE — TELEPHONE ENCOUNTER
Urine culture is still pending and likely not back until the weekend.     Please triage for symptoms and see if she may need antibiotics. Reviewed UA and it isn't clear that these are needed.

## 2017-04-28 NOTE — TELEPHONE ENCOUNTER
Dr. Kasper-please review and advise if you would like to prescribe medication or would like to wait for culture results first.     S-(situation): UTI concerns    B-(background): UA back, patient still having symptoms    A-(assessment): pressure in pelvic area, unbearable; states this is always her first sign of a UTI. Intermittent. Cramping in stomach and back. Lots of sediment in urine and strong odor. States her temp has been running 99.8F.  Denies dizziness, changes to stool, blood in urine or other color, increased weakness/fatigue, dehydration, nausea, vomiting    R-(recommendations): will have provider review information. Patient states if nothing is needed, she is ok with that, but just doesn't want it to turn sepsis as she feels this will happen if something was missed.    Jayne Pavon, RN, BSN

## 2017-05-01 ENCOUNTER — TELEPHONE (OUTPATIENT)
Dept: FAMILY MEDICINE | Facility: CLINIC | Age: 47
End: 2017-05-01

## 2017-05-01 NOTE — TELEPHONE ENCOUNTER
Reason for call:  Form  Reason for Call:  Form, our goal is to have forms completed with 72 hours, however, some forms may require a visit or additional information.    Type of letter, form or note:  Medical    Who is the form from?: [facility]      Where did the form come from: form was faxed in    What clinic location was the form placed at?: [facility] [address] - [phone]    Where the form was placed: 's in box     What number is listed as a contact on the form?: [phone]        Additional comments: Fax back to [phone]    Call taken on 5/1/2017 at 9:24 AM by Leland Gaines

## 2017-05-02 ENCOUNTER — TELEPHONE (OUTPATIENT)
Dept: FAMILY MEDICINE | Facility: CLINIC | Age: 47
End: 2017-05-02

## 2017-05-02 DIAGNOSIS — Z53.9 DIAGNOSIS NOT YET DEFINED: Primary | ICD-10-CM

## 2017-05-02 PROCEDURE — G0179 MD RECERTIFICATION HHA PT: HCPCS | Performed by: FAMILY MEDICINE

## 2017-05-02 NOTE — TELEPHONE ENCOUNTER
Received a fax back from [employer] requesting a letter of necessity (why pt is required to use  dressing aquacel) pertaining to the prescription form dated 2/3/2017 - this form is in TC inbox for review, if needed.    Provider, please fax letter to [employer] at [phone]

## 2017-05-02 NOTE — TELEPHONE ENCOUNTER
Another form received from [facility] to be completed  Re: new medication added - sulfamethoxazole-trimethoprim oral for UTI    Form placed in providers inbox  Fax back to [phone]

## 2017-05-02 NOTE — TELEPHONE ENCOUNTER
Reason for call:  Form  Reason for Call:  Form, our goal is to have forms completed with 72 hours, however, some forms may require a visit or additional information.    Type of letter, form or note:  Medical    Who is the form from?: [facility] [employer] [facility]      Where did the form come from: form was faxed in    What clinic location was the form placed at?: [facility] - [phone]    Where the form was placed: 's in box     What number is listed as a contact on the form?: [phone]       Additional comments: Fax back to [phone]    Call taken on 5/2/2017 at 9:55 AM by Leland Gaines

## 2017-05-02 NOTE — TELEPHONE ENCOUNTER
Reviewed results of urine culture.    Urine culture shows MRSA and is susceptible to bactrim.  Please finish full course of medication.

## 2017-05-02 NOTE — TELEPHONE ENCOUNTER
This form is requesting information about the patient's wound location, size, drainage, etc.  Faxed form to [facility] at [phone]  to complete and asked them to fax it back to us when complete for the Provider to sign. Then we can fax it to [facility].

## 2017-05-02 NOTE — TELEPHONE ENCOUNTER
Forms completed by [facility] - placed in providers inbox to complete.    Fax to [facility]

## 2017-05-03 ENCOUNTER — TELEPHONE (OUTPATIENT)
Dept: FAMILY MEDICINE | Facility: CLINIC | Age: 47
End: 2017-05-03

## 2017-05-03 DIAGNOSIS — L89.309: ICD-10-CM

## 2017-05-03 DIAGNOSIS — G89.21 CHRONIC PAIN DUE TO INJURY: ICD-10-CM

## 2017-05-03 DIAGNOSIS — G82.20 PARAPLEGIA (H): ICD-10-CM

## 2017-05-03 RX ORDER — OXYCODONE AND ACETAMINOPHEN 7.5; 325 MG/1; MG/1
2 TABLET ORAL EVERY 8 HOURS PRN
Qty: 180 TABLET | Refills: 0 | Status: SHIPPED | OUTPATIENT
Start: 2017-05-03 | End: 2017-05-23

## 2017-05-03 RX ORDER — METHADONE HYDROCHLORIDE 5 MG/1
TABLET ORAL
Qty: 300 TABLET | Refills: 0 | Status: SHIPPED | OUTPATIENT
Start: 2017-05-03 | End: 2017-05-23

## 2017-05-03 NOTE — TELEPHONE ENCOUNTER
Reason for call:  Patient is calling to let us know that her percocet and methadone both have wrong dates and quantities.  Quantity of the methadone is suppose to be 300 and the percocet should be 180.  She uses [facility] in [place].

## 2017-05-04 ENCOUNTER — TELEPHONE (OUTPATIENT)
Dept: FAMILY MEDICINE | Facility: CLINIC | Age: 47
End: 2017-05-04

## 2017-05-12 ENCOUNTER — TELEPHONE (OUTPATIENT)
Dept: FAMILY MEDICINE | Facility: CLINIC | Age: 47
End: 2017-05-12

## 2017-05-12 NOTE — TELEPHONE ENCOUNTER
Form received and placed in provider's box in the nurses station. Evangelina Rdz CMA ([facility])

## 2017-05-18 DIAGNOSIS — F41.9 ANXIETY: ICD-10-CM

## 2017-05-18 NOTE — TELEPHONE ENCOUNTER
effexor    Last Written Prescription Date: 02/07/17  Last Fill Quantity: 180, # refills: 0  Last Office Visit with [facility], [facility] or  [facility] prescribing provider: 05/24/17   Next 5 appointments (look out 90 days)     May 24, 2017 10:40 AM CDT   Office Visit with Dipti Kasper MD   [facility] [place] ([facility])    [address]  [place] [address] [phone]                   BP Readings from Last 3 Encounters:   11/25/16 132/72   07/06/16 138/88   10/13/15 110/76     Pulse: (for F[facility])  Creatinine   Date Value Ref Range Status   12/02/2016 1.0 0.6 - 1.0 mg/dL Final   ]    Last PHQ-9 score on record=   PHQ-9 SCORE 11/25/2016   Total Score -   Total Score 3

## 2017-05-19 RX ORDER — VENLAFAXINE 75 MG/1
TABLET ORAL
Qty: 180 TABLET | Refills: 0 | Status: SHIPPED | OUTPATIENT
Start: 2017-05-19 | End: 2017-08-31

## 2017-05-22 ENCOUNTER — TELEPHONE (OUTPATIENT)
Dept: FAMILY MEDICINE | Facility: CLINIC | Age: 47
End: 2017-05-22

## 2017-05-22 NOTE — TELEPHONE ENCOUNTER
Reason for Call:  Other appointment    Detailed comments: patient is calling asking to speak to Kelli about her appointment she has on wednesday    Phone Number Patient can be reached at: Home number on file [phone] (home) or Cell number on file:    Telephone Information:   Mobile [phone]       Best Time: anytime    Can we leave a detailed message on this number? YES    Call taken on 5/22/2017 at 3:11 PM by Michelle Jennings
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
## 2017-05-23 ENCOUNTER — TELEPHONE (OUTPATIENT)
Dept: FAMILY MEDICINE | Facility: CLINIC | Age: 47
End: 2017-05-23

## 2017-05-23 DIAGNOSIS — G82.20 PARAPLEGIA (H): ICD-10-CM

## 2017-05-23 DIAGNOSIS — G89.21 CHRONIC PAIN DUE TO INJURY: ICD-10-CM

## 2017-05-23 DIAGNOSIS — L89.309: ICD-10-CM

## 2017-05-23 RX ORDER — OXYCODONE AND ACETAMINOPHEN 7.5; 325 MG/1; MG/1
2 TABLET ORAL EVERY 8 HOURS PRN
Qty: 180 TABLET | Refills: 0 | Status: SHIPPED | OUTPATIENT
Start: 2017-06-03 | End: 2017-06-27

## 2017-05-23 RX ORDER — METHADONE HYDROCHLORIDE 5 MG/1
TABLET ORAL
Qty: 300 TABLET | Refills: 0 | Status: SHIPPED | OUTPATIENT
Start: 2017-06-03 | End: 2017-06-27

## 2017-05-23 NOTE — TELEPHONE ENCOUNTER
Reason for call:  Pt calling to request her rx's for percocet and methadone be mailed to the [facility] in [place].  Please call with questions and ok to leave message.  thanks

## 2017-05-23 NOTE — TELEPHONE ENCOUNTER
oxyCODONE-acetaminophen (PERCOCET) 7.5-325 MG per tablet      Last Written Prescription Date:  5/3/2017  Last Fill Quantity: 180,   # refills: 0  Last Office Visit with [facility], [facility] or [facility] prescribing provider: 11/25/2016  Future Office visit:       Routing refill request to provider for review/approval because:  Drug not on the [facility], [facility] or  [facility] refill protocol or controlled substance    methadone (DOLOPHINE) 5 MG tablet      Last Written Prescription Date:  5/3/2017  Last Fill Quantity: 300,   # refills: 0  Last Office Visit with [facility], [facility] or [facility] prescribing provider: 11/25/2016  Future Office visit:       Routing refill request to provider for review/approval because:  Drug not on the [facility], [facility] or  [place] refill protocol or controlled substance

## 2017-05-24 ENCOUNTER — TRANSFERRED RECORDS (OUTPATIENT)
Dept: HEALTH INFORMATION MANAGEMENT | Facility: CLINIC | Age: 47
End: 2017-05-24

## 2017-05-24 ENCOUNTER — TELEPHONE (OUTPATIENT)
Dept: FAMILY MEDICINE | Facility: CLINIC | Age: 47
End: 2017-05-24

## 2017-05-24 DIAGNOSIS — Z87.440 H/O RECURRENT URINARY TRACT INFECTION: Primary | ICD-10-CM

## 2017-05-24 DIAGNOSIS — Z97.8 CHRONIC INDWELLING FOLEY CATHETER: ICD-10-CM

## 2017-05-24 DIAGNOSIS — R30.0 DYSURIA: ICD-10-CM

## 2017-05-24 RX ORDER — SULFAMETHOXAZOLE/TRIMETHOPRIM 800-160 MG
1 TABLET ORAL 2 TIMES DAILY
Qty: 14 TABLET | Refills: 0 | Status: SHIPPED | OUTPATIENT
Start: 2017-05-24 | End: 2017-06-27

## 2017-05-24 NOTE — LETTER
[facility]  [address]  [place] [address]  [phone]      May 26, 2017      Agatha Canas  [address]   [place] [address]        Dear Agatha,    A referral to urology has been placed for you.  You can call at your leisure to schedule an appointment with them at [phone].  Have a great day!      Sincerely,    [facility] Support Staff / kusum

## 2017-05-24 NOTE — TELEPHONE ENCOUNTER
Reason for call:  Patient reporting a symptom    Symptom or request: UTI sx's     Duration (how long have symptoms been present): states last few days    Have you been treated for this before? Yes    Additional comments: Home Care nurse calls to report these sx's, states Agatha has been having recurrent issues with this, Celena is asking for a call back to discuss.  Also  is bringing in a UA.   Phone Number patient can be reached at:  Other phone number:  Home Care Nurse Ligia, [phone]    Best Time:  any    Can we leave a detailed message on this number:  YES    Call taken on 5/24/2017 at 10:50 AM by Sheryl Barker

## 2017-05-24 NOTE — TELEPHONE ENCOUNTER
Spoke with pt. Advised her prescriptions were approved and I mailed them to the [facility] in [place], [place].    Uyen Ramirez CMA
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
## 2017-05-24 NOTE — TELEPHONE ENCOUNTER
Received results of UA today.     Patient has a few white blood cells. Will placed back on antibiotic per last culture results and will await this culture.     Please notify.

## 2017-05-24 NOTE — TELEPHONE ENCOUNTER
Left message on machine to call back to discuss. Patient cancelled today's appt for med check/anxiety/depression/chronic pain f/u. LOV 11/25/16.     Jayne Pavon, RN, BSN

## 2017-05-25 NOTE — TELEPHONE ENCOUNTER
Patient called back form the missed call yesterday.  Informed her of the note from Provider.      Continued to read the note of the report from the home nurse Ligia; without having pre-read it.      Patient stated that the perspective of Ligia is not true.  Patient also has not been given a recent urology referral and would consider it.

## 2017-05-31 ENCOUNTER — TELEPHONE (OUTPATIENT)
Dept: FAMILY MEDICINE | Facility: CLINIC | Age: 47
End: 2017-05-31

## 2017-05-31 ENCOUNTER — MEDICAL CORRESPONDENCE (OUTPATIENT)
Dept: HEALTH INFORMATION MANAGEMENT | Facility: CLINIC | Age: 47
End: 2017-05-31

## 2017-05-31 NOTE — TELEPHONE ENCOUNTER
Urine culture results are back and MRSA is back in the urine. Should respond to the bactrim that she is on. Complete the course.     Would like her to see urology - referral placed previously.

## 2017-05-31 NOTE — TELEPHONE ENCOUNTER
Left message asking patient to return call.    Please inform patient of message from Provider below.  Patient can call to schedule a urology consult -  [facility] [phone]

## 2017-06-01 ENCOUNTER — TELEPHONE (OUTPATIENT)
Dept: FAMILY MEDICINE | Facility: CLINIC | Age: 47
End: 2017-06-01

## 2017-06-01 NOTE — TELEPHONE ENCOUNTER
Reason for call:  Form  Reason for Call:  Form, our goal is to have forms completed with 72 hours, however, some forms may require a visit or additional information.    Type of letter, form or note:  medical    Who is the form from?:  [facility]    Where did the form come from: form was faxed in    What clinic location was the form placed at?: [facility] - [phone]    Where the form was placed: 's Box    What number is listed as a contact on the form?:  [phone]       Additional comments:  Fax to [phone]    created by Kelli Sortoalesia

## 2017-06-01 NOTE — TELEPHONE ENCOUNTER
Called patient and informed her of message below. Patient states she is going to see a Urologist at [facility] in [place] and will call if she needs a referral. No further questions or concerns at this time. Robin Nick MA

## 2017-06-02 ENCOUNTER — TELEPHONE (OUTPATIENT)
Dept: FAMILY MEDICINE | Facility: CLINIC | Age: 47
End: 2017-06-02

## 2017-06-02 NOTE — TELEPHONE ENCOUNTER
Reason for Call:  Medication or medication refill:    Do you use a [facility]?  Name of the pharmacy and phone number for the current request:  Will pick at the     Name of the medication requested: methadone (DOLOPHINE) 5 MG tablet and   oxyCODONE-acetaminophen (PERCOCET) 7.5-325 MG per tablet  Other request: patient is wondering if they will be ready for her to  today.  Please call    Can we leave a detailed message on this number? YES    Phone number patient can be reached at: Home number on file [phone] (home)    Best Time: any    Call taken on 6/2/2017 at 10:56 AM by Hillary Mcgraw

## 2017-06-02 NOTE — TELEPHONE ENCOUNTER
Received a call from the pharmacist.  Provider, can you give verbal ok it patient can fill the methadone and percocet today (instead of tomorrow) when they are written for?  She takes the methadone at 5am and the pharmacy is not open at that hour.

## 2017-06-02 NOTE — TELEPHONE ENCOUNTER
Reason for call:  Form  Reason for Call:  Form, our goal is to have forms completed with 72 hours, however, some forms may require a visit or additional information.    Type of letter, form or note:  medical    Who is the form from?: [facility]    Where did the form come from: form was faxed in    What clinic location was the form placed at?: [facility] - [phone]    Where the form was placed: 's Box    What number is listed as a contact on the form?:  [phone]       Additional comments:  Fax to [phone]    created by Kelli Sortoalesia

## 2017-06-05 ENCOUNTER — TELEPHONE (OUTPATIENT)
Dept: FAMILY MEDICINE | Facility: CLINIC | Age: 47
End: 2017-06-05

## 2017-06-05 NOTE — TELEPHONE ENCOUNTER
Spoke with patient.   Informed of message below from provider.   She states and understanding and says that she feels a little better currently.   Would like an appt to see provider to follow up medications and follow up with her urine if needed.   Declined wanting to be seen at an urgent care or ED  Scheduled for 6/27/17- earliest available for her mom to bring her.   No further questions or concerns.   Alfreda Calderón RN

## 2017-06-05 NOTE — TELEPHONE ENCOUNTER
Reason for call:  Please call johnnie busch [facility] [phone]  Just finished antibiotics on 6-1 and called and said having a lot of pressure and pain and sediments in her urine and sick to her stomach. Do you want another UA done?

## 2017-06-05 NOTE — TELEPHONE ENCOUNTER
Received fax back - please add valid frequency of change per day, please note that PRN is not acceptable,  then intial and date changes.    Required for insurance to consider this valid.    Form placed in provider inbox

## 2017-06-05 NOTE — TELEPHONE ENCOUNTER
Agatha Canas is a 47 year old female   [facility], home care nurse calls.   Patient called her this am with complaint of abdominal pressure and mild pain. Urine continues to have sediment and patient reports not feeling well this weekend.  Denies fevers, chills or blood in urine.   Home care nurse is wondering if a different antibiotic is needed or if UA should be repeated?  She has not been out to see patient yet, but patient is really good at knowing her symptoms.   Will route to provider to review.   Pharmacy- [facility], [place] Nicole Haataja, RN

## 2017-06-05 NOTE — TELEPHONE ENCOUNTER
Contacted Ligia, patient's home care nurse and informed of message below from provider.   She is asking that we contact patient to let her know Dr. Kasper's recommendations.   Left message for patient to return call at her earliest convenience.   Alfreda Calderón RN

## 2017-06-05 NOTE — TELEPHONE ENCOUNTER
Based on the culture results received, the infection should be cleared with the current antibiotic.     Recommend that she be seen if having abdominal pain at this time and repeat of urine. May require urgent care visit or ED visit in her area if unable to be seen in clinic.

## 2017-06-07 ENCOUNTER — TELEPHONE (OUTPATIENT)
Dept: FAMILY MEDICINE | Facility: CLINIC | Age: 47
End: 2017-06-07

## 2017-06-07 NOTE — TELEPHONE ENCOUNTER
Reason for call:  Form  Reason for Call:  Form, our goal is to have forms completed with 72 hours, however, some forms may require a visit or additional information.    Type of letter, form or note:  medical    Who is the form from?: [employer]/ [employer]     Where did the form come from: form was faxed in    What clinic location was the form placed at?: [facility] [phone]    Where the form was placed:  in box     What number is listed as a contact on the form?: [phone]       Additional comments: Fax to [phone]    Call taken on 11/8/2016 at 3:08 PM by Daiana Barajas

## 2017-06-08 NOTE — TELEPHONE ENCOUNTER
"Received the form back from [facility].    Due to current insurance requirements, \"PRN, ARROW, as needed, as listed above, or left blank\" are no longer acceptable.    Please cross out your origonal note, indicate the quanttity and frequency of change that is medically necessary (daily) for your patient.  Please initial and date changes  Fax back to [phone]    Form placed in providers inbox to update.  "

## 2017-06-14 DIAGNOSIS — F41.9 ANXIETY: ICD-10-CM

## 2017-06-14 DIAGNOSIS — G89.21 CHRONIC PAIN DUE TO INJURY: ICD-10-CM

## 2017-06-14 NOTE — TELEPHONE ENCOUNTER
Pending Prescriptions:                       Disp   Refills    diazepam (VALIUM) 5 MG tablet             60 tab*0            Sig: TAKE ONE TABLET BY MOUTH EVERY 12 HOURS AS NEEDED           FOR ANXIETY.          Last Written Prescription Date:  4.3.17  Last Fill Quantity: 60,   # refills: 0  [facility] Office Visit [facility] prescribing provider: 11.25.16  Future Office visit:    Next 5 appointments (look out 90 days)     Jun 27, 2017 11:20 AM CDT   Office Visit with Dipti Kasper MD   [facility] ([facility])    [address], [address]  [facility] [address]   [phone]                   Routing refill request to provider for review/approval because:  Drug not on the [facility], [facility] or  [facility] refill protocol or controlled substance

## 2017-06-15 RX ORDER — DIAZEPAM 5 MG
TABLET ORAL
Qty: 60 TABLET | Refills: 0 | Status: SHIPPED | OUTPATIENT
Start: 2017-06-15 | End: 2017-06-27 | Stop reason: DRUGHIGH

## 2017-06-20 ENCOUNTER — TELEPHONE (OUTPATIENT)
Dept: FAMILY MEDICINE | Facility: CLINIC | Age: 47
End: 2017-06-20

## 2017-06-20 DIAGNOSIS — G82.20 PARAPLEGIA (H): ICD-10-CM

## 2017-06-20 DIAGNOSIS — G89.21 CHRONIC PAIN DUE TO INJURY: ICD-10-CM

## 2017-06-20 RX ORDER — PREGABALIN 200 MG/1
CAPSULE ORAL
Qty: 270 CAPSULE | Refills: 1 | Status: SHIPPED | OUTPATIENT
Start: 2017-06-20 | End: 2017-12-04

## 2017-06-20 NOTE — TELEPHONE ENCOUNTER
LYRICA 200 MG capsule      Last Written Prescription Date:  1/3/2017  Last Fill Quantity: 270,   # refills: 1  Last Office Visit with [facility] prescribing provider: 11/25/2016  Future Office visit:    Next 5 appointments (look out 90 days)     Jun 27, 2017 11:20 AM CDT   Office Visit with Di[facility]i [facility] MD   [facility]    [address] [place] [address], [address]  [facility] [id number] [phone]                   Routing refill request to provider for review/approval because:  Drug not on the [facility], [facility] or [facility] refill protocol or controlled substance

## 2017-06-21 NOTE — TELEPHONE ENCOUNTER
Patient is calling checking on status of this.  She is out of the medication.  She would like Kelli to call her today

## 2017-06-22 ENCOUNTER — TELEPHONE (OUTPATIENT)
Dept: FAMILY MEDICINE | Facility: CLINIC | Age: 47
End: 2017-06-22

## 2017-06-22 DIAGNOSIS — S31.809S: ICD-10-CM

## 2017-06-22 DIAGNOSIS — G82.20 PARAPLEGIA (H): Primary | ICD-10-CM

## 2017-06-22 NOTE — TELEPHONE ENCOUNTER
Reason for Call: Request for an order or referral:Order   Order or referral being requested: Order     Date needed: as soon as possible    Has the patient been seen by the PCP for this problem? Not Applicable    Additional comments: [employer] Therapeutic mattress     Phone number Patient can be reached at:  Other phone number:  Contact Coral from [employer], Fax#[phone]    Best Time:  Anytime    Can we leave a detailed message on this number?  n/a    Call taken on 6/22/2017 at 12:51 PM by Virgie Dudley

## 2017-06-23 ENCOUNTER — TELEPHONE (OUTPATIENT)
Dept: FAMILY MEDICINE | Facility: CLINIC | Age: 47
End: 2017-06-23

## 2017-06-23 DIAGNOSIS — Z53.9 DIAGNOSIS NOT YET DEFINED: Primary | ICD-10-CM

## 2017-06-23 PROCEDURE — G0179 MD RECERTIFICATION HHA PT: HCPCS | Performed by: FAMILY MEDICINE

## 2017-06-23 NOTE — TELEPHONE ENCOUNTER
Reason for call:  Form  Reason for Call:  Form, our goal is to have forms completed with 72 hours, however, some forms may require a visit or additional information.    Type of letter, form or note:  medical    Who is the form from?: [facility]    Where did the form come from: form was faxed in    What clinic location was the form placed at?: [facility] - [phone]    Where the form was placed:  in box     What number is listed as a contact on the form?: [phone]       Additional comments: Fax to [phone]  Call taken on 11/8/2016 at 3:08 PM by Daiana Barajas

## 2017-06-23 NOTE — TELEPHONE ENCOUNTER
Form placed in provider's box in nurse's station. Evangelina Rdz CMA ([facility])

## 2017-06-25 ENCOUNTER — MEDICAL CORRESPONDENCE (OUTPATIENT)
Dept: HEALTH INFORMATION MANAGEMENT | Facility: CLINIC | Age: 47
End: 2017-06-25

## 2017-06-26 ENCOUNTER — TELEPHONE (OUTPATIENT)
Dept: FAMILY MEDICINE | Facility: CLINIC | Age: 47
End: 2017-06-26

## 2017-06-26 NOTE — TELEPHONE ENCOUNTER
Reason for call:  Form  Reason for Call:  Form, our goal is to have forms completed with 72 hours, however, some forms may require a visit or additional information.    Type of letter, form or note:  medical    Who is the form from?: [employer]    Where did the form come from: form was faxed in    What clinic location was the form placed at?: [facility] - [phone]    Where the form was placed: 's Box    What number is listed as a contact on the form?:  [phone]       Additional comments:  Fax to [phone]    This form is requesting information about the patient's wound location, size, drainage, etc.  Faxed form to [facility] Care staff at [phone]  to complete  Asked them to fax it back to us when complete for the Provider to sign.   Then we can fax it to [employer].      created by Kelli Hardy

## 2017-06-27 ENCOUNTER — TELEPHONE (OUTPATIENT)
Dept: FAMILY MEDICINE | Facility: CLINIC | Age: 47
End: 2017-06-27

## 2017-06-27 ENCOUNTER — OFFICE VISIT (OUTPATIENT)
Dept: FAMILY MEDICINE | Facility: CLINIC | Age: 47
End: 2017-06-27
Payer: COMMERCIAL

## 2017-06-27 VITALS
RESPIRATION RATE: 20 BRPM | SYSTOLIC BLOOD PRESSURE: 110 MMHG | TEMPERATURE: 100.1 F | HEART RATE: 84 BPM | DIASTOLIC BLOOD PRESSURE: 60 MMHG

## 2017-06-27 DIAGNOSIS — E03.4 HYPOTHYROIDISM DUE TO ACQUIRED ATROPHY OF THYROID: ICD-10-CM

## 2017-06-27 DIAGNOSIS — G89.21 CHRONIC PAIN DUE TO INJURY: ICD-10-CM

## 2017-06-27 DIAGNOSIS — G89.4 CHRONIC PAIN SYNDROME: ICD-10-CM

## 2017-06-27 DIAGNOSIS — N39.0 URINARY TRACT INFECTION ASSOCIATED WITH INDWELLING URETHRAL CATHETER, INITIAL ENCOUNTER (H): Primary | ICD-10-CM

## 2017-06-27 DIAGNOSIS — G82.20 PARAPLEGIA (H): ICD-10-CM

## 2017-06-27 DIAGNOSIS — S31.809S: ICD-10-CM

## 2017-06-27 DIAGNOSIS — T83.511A URINARY TRACT INFECTION ASSOCIATED WITH INDWELLING URETHRAL CATHETER, INITIAL ENCOUNTER (H): Primary | ICD-10-CM

## 2017-06-27 DIAGNOSIS — F41.8 ANXIETY ASSOCIATED WITH DEPRESSION: ICD-10-CM

## 2017-06-27 LAB
ALBUMIN UR-MCNC: NEGATIVE MG/DL
AMORPH CRY #/AREA URNS HPF: ABNORMAL /HPF
APPEARANCE UR: CLEAR
BACTERIA #/AREA URNS HPF: ABNORMAL /HPF
BILIRUB UR QL STRIP: NEGATIVE
COLOR UR AUTO: YELLOW
GLUCOSE UR STRIP-MCNC: NEGATIVE MG/DL
HGB UR QL STRIP: ABNORMAL
KETONES UR STRIP-MCNC: NEGATIVE MG/DL
LEUKOCYTE ESTERASE UR QL STRIP: ABNORMAL
MUCOUS THREADS #/AREA URNS LPF: PRESENT /LPF
NITRATE UR QL: POSITIVE
NON-SQ EPI CELLS #/AREA URNS LPF: ABNORMAL /LPF
PH UR STRIP: 5.5 PH (ref 5–7)
RBC #/AREA URNS AUTO: ABNORMAL /HPF (ref 0–2)
SP GR UR STRIP: <=1.005 (ref 1–1.03)
TSH SERPL DL<=0.005 MIU/L-ACNC: 3.49 MU/L (ref 0.4–4)
URN SPEC COLLECT METH UR: ABNORMAL
UROBILINOGEN UR STRIP-ACNC: 0.2 EU/DL (ref 0.2–1)
WBC #/AREA URNS AUTO: ABNORMAL /HPF (ref 0–2)

## 2017-06-27 PROCEDURE — 87086 URINE CULTURE/COLONY COUNT: CPT | Performed by: FAMILY MEDICINE

## 2017-06-27 PROCEDURE — 87088 URINE BACTERIA CULTURE: CPT | Performed by: FAMILY MEDICINE

## 2017-06-27 PROCEDURE — 87186 SC STD MICRODIL/AGAR DIL: CPT | Performed by: FAMILY MEDICINE

## 2017-06-27 PROCEDURE — 84443 ASSAY THYROID STIM HORMONE: CPT | Performed by: FAMILY MEDICINE

## 2017-06-27 PROCEDURE — 36415 COLL VENOUS BLD VENIPUNCTURE: CPT | Performed by: FAMILY MEDICINE

## 2017-06-27 PROCEDURE — 99215 OFFICE O/P EST HI 40 MIN: CPT | Performed by: FAMILY MEDICINE

## 2017-06-27 PROCEDURE — 81001 URINALYSIS AUTO W/SCOPE: CPT | Performed by: FAMILY MEDICINE

## 2017-06-27 RX ORDER — DIAZEPAM 2 MG
TABLET ORAL
Qty: 30 TABLET | Refills: 0 | Status: SHIPPED | OUTPATIENT
Start: 2017-06-27 | End: 2017-09-05

## 2017-06-27 RX ORDER — OXYCODONE AND ACETAMINOPHEN 7.5; 325 MG/1; MG/1
2 TABLET ORAL EVERY 8 HOURS PRN
Qty: 180 TABLET | Refills: 0 | Status: SHIPPED | OUTPATIENT
Start: 2017-07-02 | End: 2017-07-27

## 2017-06-27 RX ORDER — METHADONE HYDROCHLORIDE 5 MG/1
TABLET ORAL
Qty: 300 TABLET | Refills: 0 | Status: SHIPPED | OUTPATIENT
Start: 2017-07-02 | End: 2017-07-27

## 2017-06-27 RX ORDER — DOXYCYCLINE HYCLATE 100 MG
100 TABLET ORAL 2 TIMES DAILY
Qty: 28 TABLET | Refills: 0 | Status: SHIPPED | OUTPATIENT
Start: 2017-06-27 | End: 2017-08-09

## 2017-06-27 ASSESSMENT — ANXIETY QUESTIONNAIRES
7. FEELING AFRAID AS IF SOMETHING AWFUL MIGHT HAPPEN: SEVERAL DAYS
3. WORRYING TOO MUCH ABOUT DIFFERENT THINGS: SEVERAL DAYS
GAD7 TOTAL SCORE: 4
2. NOT BEING ABLE TO STOP OR CONTROL WORRYING: NOT AT ALL
5. BEING SO RESTLESS THAT IT IS HARD TO SIT STILL: SEVERAL DAYS
6. BECOMING EASILY ANNOYED OR IRRITABLE: NOT AT ALL
1. FEELING NERVOUS, ANXIOUS, OR ON EDGE: NOT AT ALL

## 2017-06-27 ASSESSMENT — PAIN SCALES - GENERAL: PAINLEVEL: SEVERE PAIN (6)

## 2017-06-27 ASSESSMENT — PATIENT HEALTH QUESTIONNAIRE - PHQ9: 5. POOR APPETITE OR OVEREATING: SEVERAL DAYS

## 2017-06-27 NOTE — TELEPHONE ENCOUNTER
Reason for call:  Form  Reason for Call:  Form, our goal is to have forms completed with 72 hours, however, some forms may require a visit or additional information.    Type of letter, form or note:  medical    Who is the form from?: [facility]    Where did the form come from: form was faxed in    What clinic location was the form placed at?: [facility] - [phone]    Where the form was placed:  in box     What number is listed as a contact on the form?: [phone]       Additional comments: Fax to [phone]    Call taken on 11/8/2016 at 3:08 PM by Daiana Barajas

## 2017-06-27 NOTE — TELEPHONE ENCOUNTER
[facility] called to check on the status of this request below.  Please call back at [phone] to advise.

## 2017-06-27 NOTE — NURSING NOTE
"Chief Complaint   Patient presents with     Recheck Medication     Panel Management     Pap, SHYLA, PHQ        Initial /60  Pulse 84  Temp 100.1  F (37.8  C) (Temporal)  Resp 20 Estimated body mass index is 22.81 kg/(m^2) as calculated from the following:    Height as of 5/3/11: 5' 8\" (1.727 m).    Weight as of 9/30/11: 150 lb (68 kg).  Medication Reconciliation: complete     Uyen Ramirez CMA  "

## 2017-06-27 NOTE — MR AVS SNAPSHOT
After Visit Summary   6/27/2017    Agatha Canas    MRN: [id number]           Patient Information     Date Of Birth          1970        Visit Information        Provider Department      6/27/2017 11:20 AM Dipti Kasper MD [facility]        Today's Diagnoses     Dysuria    -  1    Nonspecific finding on examination of urine        Urinary tract infection associated with indwelling urethral catheter, initial encounter (H)        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        Paraplegia (H)        Chronic pain syndrome        Anxiety associated with depression        Open wound of buttock, complicated, unspecified laterality, sequela        Recurrent UTI        Hypothyroidism due to acquired atrophy of thyroid          Care Instructions      Please schedule with your urologist.     Wean down the Valium as discussed.           Follow-ups after your visit        Who to contact     If you have questions or need follow up information about today's clinic visit or your schedule please contact [facility] directly at [phone].  Normal or non-critical lab and imaging results will be communicated to you by [employer], letter or phone within 4 business days after the clinic has received the results. If you do not hear from us within 7 days, please contact the clinic through [employer] or phone. If you have a critical or abnormal lab result, we will notify you by phone as soon as possible.  Submit refill requests through [employer] or call your pharmacy and they will forward the refill request to us. Please allow 3 business days for your refill to be completed.          Additional Information About Your Visit        MyChart Information     [employer] gives you secure access to your electronic health record. If you see a primary care provider, you can also send messages to your care team and make appointments. If you have questions, please call your primary care clinic.  If you do not have a  primary care provider, please call [phone] and they will assist you.        Care EveryWhere ID     This is your Care EveryWhere ID. This could be used by other organizations to access your [facility] medical records  [id number]        Your Vitals Were     Pulse Temperature Respirations             84 100.1  F (37.8  C) (Temporal) 20          Blood Pressure from Last 3 Encounters:   06/27/17 110/60   11/25/16 132/72   07/06/16 138/88    Weight from Last 3 Encounters:   09/30/11 150 lb (68 kg)   06/27/11 145 lb (65.8 kg)   10/18/10 171 lb 1.6 oz (77.6 kg)              We Performed the Following     *UA reflex to Microscopic and Culture ([facility] (except [place] and [place])     TSH with free T4 reflex     Urine Culture Aerobic Bacterial     Urine Microscopic          Today's Medication Changes          These changes are accurate as of: 6/27/17 12:24 PM.  If you have any questions, ask your nurse or doctor.               Start taking these medicines.        Dose/Directions    doxycycline 100 MG tablet   Commonly known as:  VIBRA-TABS   Used for:  Urinary tract infection associated with indwelling urethral catheter, initial encounter (H)   Started by:  Dipti Kasper MD        Dose:  100 mg   Take 1 tablet (100 mg) by mouth 2 times daily   Quantity:  28 tablet   Refills:  0         These medicines have changed or have updated prescriptions.        Dose/Directions    diazepam 2 MG tablet   Commonly known as:  VALIUM   This may have changed:    - medication strength  - additional instructions   Used for:  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   Changed by:  Dipti Kasper MD        2 mg daily for 2 weeks then once daily as needed.   Quantity:  30 tablet   Refills:  0         Stop taking these medicines if you haven't already. Please contact your care team if you have questions.     cefTRIAXone 1 GM vial   Commonly known as:  ROCEPHIN   Stopped by:  Dipti Kasper MD           nitrofurantoin  (macrocrystal-monohydrate) 100 MG capsule   Commonly known as:  MACROBID   Stopped by:  Dipti Kasper MD           ondansetron 4 MG tablet   Commonly known as:  ZOFRAN   Stopped by:  Dipti Kasper MD           sulfamethoxazole-trimethoprim 800-160 MG per tablet   Commonly known as:  BACTRIM DS/SEPTRA DS   Stopped by:  Dipti Kasper MD           terbinafine 1 % cream   Commonly known as:  lamISIL AT   Stopped by:  Dipti Kasper MD                Where to get your medicines      These medications were sent to [facility] - [place] - [address]  [address], [place] [address]     Phone:  [phone]     doxycycline 100 MG tablet         Some of these will need a paper prescription and others can be bought over the counter.  Ask your nurse if you have questions.     Bring a paper prescription for each of these medications     diazepam 2 MG tablet    methadone 5 MG tablet    oxyCODONE-acetaminophen 7.5-325 MG per tablet                Primary Care Provider Office Phone # Fax #    Dipti Kasper [phone]       [facility] [id number] [place] [address]        Equal Access to Services     LILIAM EUBANKS AH: Hadii aad ku hadasho Soomaali, waaxda luqadaha, qaybta kaalmada adeegyada, waxay idiin hayaan adeeg shelia latresa ah. So [facility] [phone].    ATENCIÓN: Si habla español, tiene a amaya disposición servicios gratuitos de asistencia lingüística. Llame al [phone].    We comply with applicable federal civil rights laws and [place] laws. We do not discriminate on the basis of race, color, national origin, age, disability sex, sexual orientation or gender identity.            Thank you!     Thank you for choosing [facility]  for your care. Our goal is always to provide you with excellent care. Hearing back from our patients is one way we can continue to improve our services. Please take a few minutes to complete the written survey that  you may receive in the mail after your visit with us. Thank you!             Your Updated Medication List - Protect others around you: Learn how to safely use, store and throw away your medicines at www.disposemymeds.org.          This list is accurate as of: 6/27/17 12:24 PM.  Always use your most recent med list.                   Brand Name Dispense Instructions for use Diagnosis    baclofen 20 MG tablet    LIORESAL    90 tablet    TAKE 1 TABLET (20 MG) BY MOUTH 3 TIMES DAILY    Paraplegia (H)       buPROPion 150 MG 12 hr tablet    WELLBUTRIN SR    60 tablet    Take 1 tablet (150 mg) by mouth 2 times daily    Anxiety       CALCIUM 600+D 600-200 MG-UNIT Tabs   Generic drug:  calcium carbonate-vitamin D           CERTAVITE/ANTIOXIDANTS Tabs tablet   Generic drug:  multivitamin, therapeutic with minerals     30 tablet    TAKE ONE TABLET BY MOUTH DAILY    Routine general medical examination at a health care facility       CRANBERRY PO      twice daily        diazepam 2 MG tablet    VALIUM    30 tablet    2 mg daily for 2 weeks then once daily as needed.    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       doxycycline 100 MG tablet    VIBRA-TABS    28 tablet    Take 1 tablet (100 mg) by mouth 2 times daily    Urinary tract infection associated with indwelling urethral catheter, initial encounter (H)       FISH OIL PO      once daily        hydrocortisone 0.2 % cream    WESTCORT    15 g    Apply sparingly to affected area twice daily as needed. For up to two weeks.    Eczema, unspecified type       ibuprofen 200 MG tablet    ibuprofen    120 tablet    TAKE 1 TABLET (200 MG) BY MOUTH EVERY 4 HOURS AS NEEDED FOR PAIN    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       levothyroxine 75 MCG tablet    SYNTHROID/LEVOTHROID    90 tablet    Take 1 tablet (75 mcg) by mouth daily    Hypothyroidism due to acquired atrophy of thyroid       lisinopril 10 MG tablet    PRINIVIL/ZESTRIL    30 tablet    TAKE 1 TABLET (10 MG) BY MOUTH DAILY    Essential hypertension        * methadone 10 MG tablet    DOLOPHINE     Take 5 mg by mouth Takes 10 pills a day        * methadone 5 MG tablet   Start taking on:  7/2/2017    DOLOPHINE    300 tablet    20 mg po qam, 15 mg at 1 pm daily and 15 mg at bedtime. To last 30 days.    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    Paraplegia (H)       nicotine 21 MG/24HR 24 hr patch    NICODERM CQ    30 patch    Place 1 patch onto the skin every 24 hours    Tobacco use disorder       nystatin cream    MYCOSTATIN    60 g    APPLY TOPICALLY 3 TIMES DAILY FOR 14 DAYS    Yeast infection of the skin       order for DME     200 each     LARGE DISPOSABLE UNDERPAD to use as needed.    Urinary incontinence       * order for DME     1 each    Equipment being ordered:irrigation kit for bladder catheter    UTI symptoms       * order for DME     2 each    Equipment being ordered: urinary catheter - recommend silicone catheter not latex due to sensitivity. 2 per month    Latex sensitivity, Urinary incontinence, unspecified incontinence type       * order for DME     28 each    Equipment being ordered: AG Batool 4X8, 28 per month.    Open wound of buttock, complicated, unspecified laterality, sequela       * order for DME     28 each    Equipment being ordered: aquafill silver 4x10, 28 per month    Open wound of buttock, complicated, unspecified laterality, sequela       * order for DME     28 each    Equipment being ordered: Aquaphil AG? 4x10? For 28 day supply monthly    Open wound of buttock, complicated, unspecified laterality, sequela       * order for DME     1 each    Therapeutic mattress    Paraplegia (H), Open wound of buttock, complicated, unspecified laterality, sequela       oxybutynin 10 MG 24 hr tablet    DITROPAN XL    90 tablet    Take 1 tablet (10 mg) by mouth daily     Urinary incontinence, unspecified type       oxyCODONE-acetaminophen 7.5-325 MG per tablet   Start taking on:  7/2/2017    PERCOCET    180 tablet    Take 2 tablets by mouth every 8 hours as needed for pain Max 6 per day    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, Paraplegia (H)       Pregabalin 200 MG capsule    LYRICA    270 capsule    TAKE ONE CAPSULE THREE TIMES A DAY    Paraplegia (H), 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       * triamcinolone 0.1 % cream    KENALOG    30 g    Apply  topically 3 times daily. Apply sparingly to affected area.    Dyshidrotic eczema       * triamcinolone 0.5 % cream    KENALOG    30 g    APPLY SPARINGLY TO AFFECTED AREA THREE TIMES DAILY.    Atopic dermatitis, unspecified       venlafaxine 75 MG tablet    EFFEXOR    180 tablet    TAKE 1 TABLET (75 MG) BY MOUTH 2 TIMES DAILY    Anxiety       * Notice:  This list has 10 medication(s) that are the same as other medications prescribed for you. Read the directions carefully, and ask your doctor or other care provider to review them with you.

## 2017-06-27 NOTE — TELEPHONE ENCOUNTER
Received a fax from [facility].  Note states that insurance will only cover doxycycline mono 100.  Is this ok with Provider?    Please call pharmacy

## 2017-06-28 LAB
BACTERIA SPEC CULT: ABNORMAL
MICRO REPORT STATUS: ABNORMAL
MICROORGANISM SPEC CULT: ABNORMAL
SPECIMEN SOURCE: ABNORMAL

## 2017-06-28 ASSESSMENT — ANXIETY QUESTIONNAIRES: GAD7 TOTAL SCORE: 4

## 2017-06-28 ASSESSMENT — PATIENT HEALTH QUESTIONNAIRE - PHQ9: SUM OF ALL RESPONSES TO PHQ QUESTIONS 1-9: 8

## 2017-06-29 ENCOUNTER — TELEPHONE (OUTPATIENT)
Dept: FAMILY MEDICINE | Facility: CLINIC | Age: 47
End: 2017-06-29

## 2017-06-29 DIAGNOSIS — T83.511A URINARY TRACT INFECTION ASSOCIATED WITH INDWELLING URETHRAL CATHETER, INITIAL ENCOUNTER (H): Primary | ICD-10-CM

## 2017-06-29 DIAGNOSIS — N39.0 URINARY TRACT INFECTION ASSOCIATED WITH INDWELLING URETHRAL CATHETER, INITIAL ENCOUNTER (H): Primary | ICD-10-CM

## 2017-06-29 RX ORDER — NITROFURANTOIN 25; 75 MG/1; MG/1
100 CAPSULE ORAL 2 TIMES DAILY
Qty: 20 CAPSULE | Refills: 0 | Status: SHIPPED | OUTPATIENT
Start: 2017-06-29 | End: 2017-07-09

## 2017-06-29 NOTE — TELEPHONE ENCOUNTER
Urine culture shows E.coli.  Need a change in antibiotic.     Have sent a prescription for nitrofurantoin 100 mg bid x 10 days.     Stop doxycycline.

## 2017-06-30 NOTE — TELEPHONE ENCOUNTER
Called [facility] [phone]  to follow up on status of form.     They stated they received the form and will have the Nurse that sees patient call us later to inform us on the status of the form.

## 2017-07-05 ENCOUNTER — TELEPHONE (OUTPATIENT)
Dept: FAMILY MEDICINE | Facility: CLINIC | Age: 47
End: 2017-07-05

## 2017-07-05 NOTE — TELEPHONE ENCOUNTER
Reason for call:  Form  Reason for Call:  Form, our goal is to have forms completed with 72 hours, however, some forms may require a visit or additional information.    Type of letter, form or note:  medical    Who is the form from?: [facility]    Where did the form come from: form was faxed in    What clinic location was the form placed at?: [facility] - [phone]    Where the form was placed:  in box     What number is listed as a contact on the for [phone]       Additional comments: Fax to [phone]    Call taken on 11/8/2016 at 3:08 PM by Daiana Barajas

## 2017-07-05 NOTE — TELEPHONE ENCOUNTER
Reason for call:  Form  Reason for Call:  Form, our goal is to have forms completed with 72 hours, however, some forms may require a visit or additional information.    Type of letter, form or note:  medical    Who is the form from?: [employer]- [employer]    Where did the form come from: form was faxed in    What clinic location was the form placed at?: [facility] - [phone]    Where the form was placed:  in box     What number is listed as a contact on the form?: [phone]       Additional comments: Fax to [phone]    Call taken on 11/8/2016 at 3:08 PM by Daiana Barajas

## 2017-07-08 ENCOUNTER — HEALTH MAINTENANCE LETTER (OUTPATIENT)
Age: 47
End: 2017-07-08

## 2017-07-10 NOTE — TELEPHONE ENCOUNTER
LOV: 11/25/16    Prescription approved per [facility] Refill Protocol.  Ila Santana, RN, BSN     We hope that we have addressed all of your medical concerns. The examination and treatment you received in the Emergency Department were for an emergent problem and were not intended as complete care. It is important that you follow up with your healthcare provider(s) for ongoing care. If your symptoms worsen or do not improve as expected, and you are unable to reach your usual health care provider(s), you should return to the Emergency Department. Today's healthcare is undergoing tremendous change, and patient satisfaction surveys are one of the many tools to assess the quality of medical care. You may receive a survey from the [employer] regarding your experience in the Emergency Department. I hope that your experience has been completely positive, particularly the medical care that I provided. As such, please participate in the survey; anything less than excellent does not meet my expectations or intentions. [address] [facility] and [address] [place] [address] participate in nationally recognized quality of care measures. If your blood pressure is greater than 120/80, as reported below, we urge that you seek medical care to address the potential of high blood pressure, commonly known as hypertension. Hypertension can be hereditary or can be caused by certain medical conditions, pain, stress, or \"white coat syndrome. \"       Please make an appointment with your health care provider(s) for follow up of your Emergency Department visit. VITALS:   Patient Vitals for the past 8 hrs:   Temp Pulse Resp BP SpO2   07/09/17 2126 98.3 °F (36.8 °C) 100 20 (!) 155/97 98 %          Thank you for allowing us to provide you with medical care today. We realize that you have many choices for your emergency care needs. Please choose us in the future for any continued health care needs. Mari Jordan, [address] [facility] [address] Office: [phone]           Back Pain: Care Instructions  Your Care Instructions    Back pain has many possible causes. It is often related to problems with muscles and ligaments of the back. It may also be related to problems with the nerves, discs, or bones of the back. Moving, lifting, standing, sitting, or sleeping in an awkward way can strain the back. Sometimes you don't notice the injury until later. Arthritis is another common cause of back pain. Although it may hurt a lot, back pain usually improves on its own within several weeks. Most people recover in 12 weeks or less. Using good home treatment and being careful not to stress your back can help you feel better sooner. Follow-up care is a key part of your treatment and safety. Be sure to make and go to all appointments, and call your doctor if you are having problems. Its also a good idea to know your test results and keep a list of the medicines you take. How can you care for yourself at home? · Sit or lie in positions that are most comfortable and reduce your pain. Try one of these positions when you lie down:  ¨ Lie on your back with your knees bent and supported by large pillows. ¨ Lie on the floor with your legs on the seat of a sofa or chair. Gareld Bertha on your side with your knees and hips bent and a pillow between your legs. ¨ Lie on your stomach if it does not make pain worse. · Do not sit up in bed, and avoid soft couches and twisted positions. Bed rest can help relieve pain at first, but it delays healing. Avoid bed rest after the first day of back pain. · Change positions every 30 minutes. If you must sit for long periods of time, take breaks from sitting. Get up and walk around, or lie in a comfortable position. · Try using a heating pad on a low or medium setting for 15 to 20 minutes every 2 or 3 hours. Try a warm shower in place of one session with the heating pad.   · You can also try an ice pack for 10 to 15 minutes every 2 to 3 hours. Put a thin cloth between the ice pack and your skin. · Take pain medicines exactly as directed. ¨ If the doctor gave you a prescription medicine for pain, take it as prescribed. ¨ If you are not taking a prescription pain medicine, ask your doctor if you can take an over-the-counter medicine. · Take short walks several times a day. You can start with 5 to 10 minutes, 3 or 4 times a day, and work up to longer walks. Walk on level surfaces and avoid hills and stairs until your back is better. · Return to work and other activities as soon as you can. Continued rest without activity is usually not good for your back. · To prevent future back pain, do exercises to stretch and strengthen your back and stomach. Learn how to use good posture, safe lifting techniques, and proper body mechanics. When should you call for help? Call your doctor now or seek immediate medical care if:  · You have new or worsening numbness in your legs. · You have new or worsening weakness in your legs. (This could make it hard to stand up.)  · You lose control of your bladder or bowels. Watch closely for changes in your health, and be sure to contact your doctor if:  · Your pain gets worse. · You are not getting better after 2 weeks. Where can you learn more? Go to http://eusebio-jess.info/. Enter B945 in the search box to learn more about \"Back Pain: Care Instructions. \"  Current as of: March 21, 2017  Content Version: 11.3  © 2208-0910 [employer]. Care instructions adapted under license by [employer] (which disclaims liability or warranty for this information). If you have questions about a medical condition or this instruction, always ask your healthcare professional. [place] [address] any warranty or liability for your use of this information.     Back Pain, Emergency or Urgent Symptoms: Care Instructions  Your Care Instructions  Many people have back pain at one time or another. In most cases, pain gets better with self-care that includes over-the-counter pain medicine, ice, heat, and exercises. Unless you have symptoms of a severe injury or heart attack, you may be able to give yourself a few days before you call a doctor. But some back problems are very serious. Do not ignore symptoms that need to be checked right away. Follow-up care is a key part of your treatment and safety. Be sure to make and go to all appointments, and call your doctor if you are having problems. It's also a good idea to know your test results and keep a list of the medicines you take. How can you care for yourself at home? · Sit or lie in positions that are most comfortable and that reduce your pain. Try one of these positions when you lie down:  ¨ Lie on your back with your knees bent and supported by large pillows. ¨ Lie on the floor with your legs on the seat of a sofa or chair. Clement Vignesh on your side with your knees and hips bent and a pillow between your legs. ¨ Lie on your stomach if it does not make pain worse. · Do not sit up in bed, and avoid soft couches and twisted positions. Bed rest can help relieve pain at first, but it delays healing. Avoid bed rest after the first day. · Change positions every 30 minutes. If you must sit for long periods of time, take breaks from sitting. Get up and walk around, or lie flat. · Try using a heating pad on a low or medium setting, for 15 to 20 minutes every 2 or 3 hours. Try a warm shower in place of one session with the heating pad. You can also buy single-use heat wraps that last up to 8 hours. You can also try ice or cold packs on your back for 10 to 20 minutes at a time, several times a day. (Put a thin cloth between the ice pack and your skin.) This reduces pain and makes it easier to be active and exercise. · Take pain medicines exactly as directed. ¨ If the doctor gave you a prescription medicine for pain, take it as prescribed.   ¨ If you are not taking a prescription pain medicine, ask your doctor if you can take an over-the-counter medicine. When should you call for help? Call 911 anytime you think you may need emergency care. For example, call if:  · You are unable to move a leg at all. · You have back pain with severe belly pain. · You have symptoms of a heart attack. These may include:  ¨ Chest pain or pressure, or a strange feeling in the chest.  ¨ Sweating. ¨ Shortness of breath. ¨ Nausea or vomiting. ¨ Pain, pressure, or a strange feeling in the back, neck, jaw, or upper belly or in one or both shoulders or arms. ¨ Lightheadedness or sudden weakness. ¨ A fast or irregular heartbeat. After you call 911, the  may tell you to chew 1 adult-strength or 2 to 4 low-dose aspirin. Wait for an ambulance. Do not try to drive yourself. Call your doctor now or seek immediate medical care if:  · You have new or worse symptoms in your arms, legs, chest, belly, or buttocks. Symptoms may include:  ¨ Numbness or tingling. ¨ Weakness. ¨ Pain. · You lose bladder or bowel control. · You have back pain and:  ¨ You have injured your back while lifting or doing some other activity. Call if the pain is severe, has not gone away after 1 or 2 days, and you cannot do your normal daily activities. ¨ You have had a back injury before that needed treatment. ¨ Your pain has lasted longer than 4 weeks. ¨ You have had weight loss you cannot explain. ¨ You are age 48 or older. ¨ You have cancer now or have had it before. Watch closely for changes in your health, and be sure to contact your doctor if you are not getting better as expected. Where can you learn more? Go to http://eusebio-jess.info/. Enter J507 in the search box to learn more about \"Back Pain, Emergency or Urgent Symptoms: Care Instructions. \"  Current as of: March 20, 2017  Content Version: 11.3  © 0616-0752 [email or web address].  Care instructions adapted under license by [employer] (which disclaims liability or warranty for this information). If you have questions about a medical condition or this instruction, always ask your healthcare professional. [address] any warranty or liability for your use of this information. Numbness and Tingling: Care Instructions  Your Care Instructions  Many things can cause numbness or tingling. Swelling may put pressure on a nerve. This could cause you to lose feeling or have a pins-and-needles sensation on part of your body. Nerves may be damaged from trauma, toxins, or diseases, such as diabetes or multiple sclerosis (MS). Sometimes, though, the cause is not clear. If there is no clear reason for your symptoms, and you are not having any other symptoms, your doctor may suggest watching and waiting for a while to see if the numbness or tingling goes away on its own. Your doctor may want you to have blood or nerve tests to find the cause of your symptoms. Follow-up care is a key part of your treatment and safety. Be sure to make and go to all appointments, and call your doctor if you are having problems. It's also a good idea to know your test results and keep a list of the medicines you take. How can you care for yourself at home? · If your doctor prescribes medicine, take it exactly as directed. Call your doctor if you think you are having a problem with your medicine. · If you have any swelling, put ice or a cold pack on the area for 10 to 20 minutes at a time. Put a thin cloth between the ice and your skin. When should you call for help? Call 911 anytime you think you may need emergency care. For example, call if:  · You have weakness, numbness, or tingling in both legs. · You lose bowel or bladder control. · You have symptoms of a stroke.  These may include:  ¨ Sudden numbness, tingling, weakness, or loss of movement in your face, arm, or leg, especially on only one side of your body. ¨ Sudden vision changes. ¨ Sudden trouble speaking. ¨ Sudden confusion or trouble understanding simple statements. ¨ Sudden problems with walking or balance. ¨ A sudden, severe headache that is different from past headaches. Watch closely for changes in your health, and be sure to contact your doctor if you have any problems, or if:  · You do not get better as expected. Where can you learn more? Go to http://eusebio-jess.info/. Enter C145 in the search box to learn more about \"Numbness and Tingling: Care Instructions. \"  Current as of: October 14, 2016  Content Version: 11.3  © 1272-4537 [employer]. Care instructions adapted under license by [employer] (which disclaims liability or warranty for this information). If you have questions about a medical condition or this instruction, always ask your healthcare professional. [address] any warranty or liability for your use of this information. Skin Abscess: Care Instructions  Your Care Instructions    A skin abscess is a bacterial infection that forms a pocket of pus. A boil is a kind of skin abscess. The doctor may have cut an opening in the abscess so that the pus can drain out. You may have gauze in the cut so that the abscess will stay open and keep draining. You may need antibiotics. You will need to follow up with your doctor to make sure the infection has gone away. The doctor has checked you carefully, but problems can develop later. If you notice any problems or new symptoms, get medical treatment right away. Follow-up care is a key part of your treatment and safety. Be sure to make and go to all appointments, and call your doctor if you are having problems. It's also a good idea to know your test results and keep a list of the medicines you take. How can you care for yourself at home?   · Apply warm and dry compresses, a heating pad set on low, or a hot water bottle 3 or 4 times a day for pain. Keep a cloth between the heat source and your skin. · If your doctor prescribed antibiotics, take them as directed. Do not stop taking them just because you feel better. You need to take the full course of antibiotics. · Take pain medicines exactly as directed. ¨ If the doctor gave you a prescription medicine for pain, take it as prescribed. ¨ If you are not taking a prescription pain medicine, ask your doctor if you can take an over-the-counter medicine. · Keep your bandage clean and dry. Change the bandage whenever it gets wet or dirty, or at least one time a day. · If the abscess was packed with gauze:  ¨ Keep follow-up appointments to have the gauze changed or removed. If the doctor instructed you to remove the gauze, gently pull out all of the gauze when your doctor tells you to. ¨ After the gauze is removed, soak the area in warm water for 15 to 20 minutes 2 times a day, until the wound closes. When should you call for help? Call your doctor now or seek immediate medical care if:  · You have signs of worsening infection, such as:  ¨ Increased pain, swelling, warmth, or redness. ¨ Red streaks leading from the infected skin. ¨ Pus draining from the wound. ¨ A fever. Watch closely for changes in your health, and be sure to contact your doctor if:  · You do not get better as expected. Where can you learn more? Go to http://eusebio-jess.info/. Enter H669 in the search box to learn more about \"Skin Abscess: Care Instructions. \"  Current as of: October 13, 2016  Content Version: 11.3  © 0096-7670 [employer]. Care instructions adapted under license by [employer] (which disclaims liability or warranty for this information).  If you have questions about a medical condition or this instruction, always ask your healthcare professional. [address]  any warranty or liability for your use of this information. MyChart Activation    Thank you for requesting access to Meilimei. Please follow the instructions below to securely access and download your online medical record. Meilimei allows you to send messages to your doctor, view your test results, renew your prescriptions, schedule appointments, and more. How Do I Sign Up? 1. In your internet browser, go to [email or web address]  2. Click on the First Time User? Click Here link in the Sign In box. You will be redirect to the New Member Sign Up page. 3. Enter your Meilimei Access Code exactly as it appears below. You will not need to use this code after youve completed the sign-up process. If you do not sign up before the expiration date, you must request a new code. Meilimei Access Code: [email or web address]  Expires: 10/7/2017 11:14 PM (This is the date your Meilimei access code will )    4. Enter the last four digits of your Social Security Number (xxxx) and Date of Birth (mm/dd/yyyy) as indicated and click Submit. You will be taken to the next sign-up page. 5. Create a Meilimei ID. This will be your Meilimei login ID and cannot be changed, so think of one that is secure and easy to remember. 6. Create a Meilimei password. You can change your password at any time. 7. Enter your Password Reset Question and Answer. This can be used at a later time if you forget your password. 8. Enter your e-mail address. You will receive e-mail notification when new information is available in [address]. 9. Click Sign Up. You can now view and download portions of your medical record. 10. Click the Download Summary menu link to download a portable copy of your medical information. Additional Information    If you have questions, please visit the Frequently Asked Questions section of the Meilimei website at [email or web address] Remember, Meilimei is NOT to be used for urgent needs. For medical emergencies, dial 911.

## 2017-07-11 DIAGNOSIS — R32 URINARY INCONTINENCE, UNSPECIFIED TYPE: ICD-10-CM

## 2017-07-11 RX ORDER — OXYBUTYNIN CHLORIDE 10 MG/1
10 TABLET, EXTENDED RELEASE ORAL DAILY
Qty: 90 TABLET | Refills: 2 | Status: SHIPPED | OUTPATIENT
Start: 2017-07-11 | End: 2018-04-03

## 2017-07-11 NOTE — TELEPHONE ENCOUNTER
Prescription approved per [facility] Refill Protocol.  Susan Lopez, RN, BSN

## 2017-07-11 NOTE — TELEPHONE ENCOUNTER
oxybutynin (DITROPAN XL) 10 MG 24 hr tablet      Last Written Prescription Date: 12/30/2016  Last Fill Quantity: 90,  # refills: 1   Last Office Visit with FMMARIIA, MARIAJOSEP or [facility] prescribing provider: 6/27/2017

## 2017-07-12 ENCOUNTER — TELEPHONE (OUTPATIENT)
Dept: FAMILY MEDICINE | Facility: CLINIC | Age: 47
End: 2017-07-12

## 2017-07-12 NOTE — TELEPHONE ENCOUNTER
Reason for call:  Form  Reason for Call:  Form, our goal is to have forms completed with 72 hours, however, some forms may require a visit or additional information.    Type of letter, form or note:  medical    Who is the form from?: [facility] [employer]    Where did the form come from: form was faxed in    What clinic location was the form placed at?: [facility] - [phone]    Where the form was placed:  in box     What number is listed as a contact on the form?: [phone]       Additional comments: Fax to [phone]    Call taken on 11/8/2016 at 3:08 PM by Daiana Barajas

## 2017-07-12 NOTE — TELEPHONE ENCOUNTER
Called [facility] to see if this form is a duplicate to the one received on 6/26/2017 (that needed to be completed by [facility] first)  This is not a duplicate form.  Orders are for: Tape, hypafix non woven fabric and abd pad, sterile dressing 5x5    Faxed form to [facility] [phone] - to complete and fax back to PCP to sign.  Then will fax to [facility].

## 2017-07-13 ENCOUNTER — TELEPHONE (OUTPATIENT)
Dept: FAMILY MEDICINE | Facility: CLINIC | Age: 47
End: 2017-07-13

## 2017-07-13 NOTE — TELEPHONE ENCOUNTER
Reason for call:  Form  Reason for Call:  Form, our goal is to have forms completed with 72 hours, however, some forms may require a visit or additional information.    Type of letter, form or note:  Medical    Who is the form from?: [facility]      Where did the form come from: form was faxed in    What clinic location was the form placed at?: [facility] - [phone]    Where the form was placed: 's in box     What number is listed as a contact on the form?: [phone]       Additional comments: Fax back to [phone]    Call taken on 7/13/2017 at 9:43 AM by Leland Gaines

## 2017-07-14 NOTE — TELEPHONE ENCOUNTER
[facility] calling to let you know that they received the form but patients nurse is out until Monday.   fyi

## 2017-07-21 ENCOUNTER — TELEPHONE (OUTPATIENT)
Dept: FAMILY MEDICINE | Facility: CLINIC | Age: 47
End: 2017-07-21

## 2017-07-21 ENCOUNTER — TRANSFERRED RECORDS (OUTPATIENT)
Dept: HEALTH INFORMATION MANAGEMENT | Facility: CLINIC | Age: 47
End: 2017-07-21

## 2017-07-21 NOTE — TELEPHONE ENCOUNTER
Reason for call:  [facility]. [facility] [phone]  Reported today signs of uti. Went to do a ua collection using standing order. agustina

## 2017-07-24 ENCOUNTER — TELEPHONE (OUTPATIENT)
Dept: FAMILY MEDICINE | Facility: CLINIC | Age: 47
End: 2017-07-24

## 2017-07-24 NOTE — TELEPHONE ENCOUNTER
Reason for call:  Form  Reason for Call:  Form, our goal is to have forms completed with 72 hours, however, some forms may require a visit or additional information.    Type of letter, form or note:  Medical    Who is the form from?:  [facility]     Where did the form come from: form was faxed in    What clinic location was the form placed at?: [facility] - [phone]    Where the form was placed: 's in box     What number is listed as a contact on the form?: [phone]       Additional comments: Fax back to [phone]    Call taken on 7/24/2017 at 12:33 PM by Leland Gaines

## 2017-07-24 NOTE — TELEPHONE ENCOUNTER
Urine results show no red or white blood cells in the urine.  Do not see if there is a culture pending or not.     Please notify of results.     Please see if she has scheduled with urology yet.

## 2017-07-25 ENCOUNTER — TELEPHONE (OUTPATIENT)
Dept: FAMILY MEDICINE | Facility: CLINIC | Age: 47
End: 2017-07-25

## 2017-07-25 NOTE — TELEPHONE ENCOUNTER
You placed a referral for patient to urology on 5/26/17.  Patient has not scheduled as of yet.      Please review and forward to team if follow up with the patient is needed.     Thank you!  Chrissy/Clinic Referrals Dyad II

## 2017-07-25 NOTE — TELEPHONE ENCOUNTER
Called [facility] to f/u on the forms.    They will have darren call us tomorrow about the status of the wound forms.

## 2017-07-27 ENCOUNTER — TELEPHONE (OUTPATIENT)
Dept: FAMILY MEDICINE | Facility: CLINIC | Age: 47
End: 2017-07-27

## 2017-07-27 DIAGNOSIS — G82.20 PARAPLEGIA (H): ICD-10-CM

## 2017-07-27 DIAGNOSIS — G89.21 CHRONIC PAIN DUE TO INJURY: ICD-10-CM

## 2017-07-27 NOTE — TELEPHONE ENCOUNTER
methadone (DOLOPHINE) 5 MG tablet      Last Written Prescription Date:  7/2/2017  Last Fill Quantity: 300,   # refills: 0  Last Office Visit with [facility] or  Health prescribing provider: 6/27/2017  Future Office visit:       Routing refill request to provider for review/approval because:  Drug not on the [facility], [facility] or  [employer] refill protocol or controlled substance    oxyCODONE-acetaminophen (PERCOCET) 7.5-325 MG per tablet      Last Written Prescription Date:  7/2/2017  Last Fill Quantity: 180,   # refills: 0  Last Office Visit with [facility], [facility] or [facility] prescribing provider: 6/27/2017  Future Office visit:       Routing refill request to provider for review/approval because:  Drug not on the [facility], [facility] or  [employer] refill protocol or controlled substance

## 2017-07-27 NOTE — TELEPHONE ENCOUNTER
Reason for call:  Medication  Reason for Call:  Medication or medication refill:    Do you use a [facility]?  Name of the pharmacy and phone number for the current request:  [facility] [place]/ she said it is on file    Name of the medication requested: percocet, methodone    Other request:     Can we leave a detailed message on this number? YES    Phone number patient can be reached at: Home number on file [phone] (home)    Best Time: any    Call taken on 7/27/2017 at 11:33 AM by Daiana Barajas

## 2017-07-27 NOTE — TELEPHONE ENCOUNTER
Faxed the forms to them again yesterday with an urgent request.    Spoke to Ligia, patient's nurse.  She stated she faxed it to us 2 hours ago.  We have not received it yet.  Asked her to fax it to us again.

## 2017-07-28 RX ORDER — METHADONE HYDROCHLORIDE 5 MG/1
TABLET ORAL
Qty: 300 TABLET | Refills: 0 | Status: SHIPPED | OUTPATIENT
Start: 2017-09-01 | End: 2017-09-26

## 2017-07-28 RX ORDER — OXYCODONE AND ACETAMINOPHEN 7.5; 325 MG/1; MG/1
2 TABLET ORAL EVERY 8 HOURS PRN
Qty: 180 TABLET | Refills: 0 | Status: SHIPPED | OUTPATIENT
Start: 2017-09-01 | End: 2017-09-26

## 2017-07-28 RX ORDER — METHADONE HYDROCHLORIDE 5 MG/1
TABLET ORAL
Qty: 300 TABLET | Refills: 0 | Status: SHIPPED | OUTPATIENT
Start: 2017-08-01 | End: 2018-02-15

## 2017-07-28 RX ORDER — OXYCODONE AND ACETAMINOPHEN 7.5; 325 MG/1; MG/1
2 TABLET ORAL EVERY 8 HOURS PRN
Qty: 180 TABLET | Refills: 0 | Status: SHIPPED | OUTPATIENT
Start: 2017-08-01 | End: 2018-01-17

## 2017-07-28 NOTE — TELEPHONE ENCOUNTER
Spoke to pt - informed her that I will drop these scripts in the post office box at the post office so hopefully they get to the pharmacy at least a day earlier than if we were to mail them from our clinic mail box.

## 2017-07-28 NOTE — TELEPHONE ENCOUNTER
Patient is calling asking to speak to Kelli about this message, please give her a call.      Thank you Michelle

## 2017-07-28 NOTE — TELEPHONE ENCOUNTER
Reason for call:  Pt is calling because she wants to know if the medication listed below has been mailed out yet. She would like a call back from kusum. Please advise.

## 2017-07-28 NOTE — TELEPHONE ENCOUNTER
Forms received - placed in Providers inbox to complete  Then fax to [facility]

## 2017-08-02 ENCOUNTER — MEDICAL CORRESPONDENCE (OUTPATIENT)
Dept: HEALTH INFORMATION MANAGEMENT | Facility: CLINIC | Age: 47
End: 2017-08-02

## 2017-08-02 ENCOUNTER — TELEPHONE (OUTPATIENT)
Dept: FAMILY MEDICINE | Facility: CLINIC | Age: 47
End: 2017-08-02

## 2017-08-02 ENCOUNTER — TRANSFERRED RECORDS (OUTPATIENT)
Dept: HEALTH INFORMATION MANAGEMENT | Facility: CLINIC | Age: 47
End: 2017-08-02

## 2017-08-02 DIAGNOSIS — T83.511A URINARY TRACT INFECTION ASSOCIATED WITH INDWELLING URETHRAL CATHETER, INITIAL ENCOUNTER (H): Primary | ICD-10-CM

## 2017-08-02 DIAGNOSIS — N39.0 URINARY TRACT INFECTION ASSOCIATED WITH INDWELLING URETHRAL CATHETER, INITIAL ENCOUNTER (H): Primary | ICD-10-CM

## 2017-08-02 RX ORDER — SULFAMETHOXAZOLE/TRIMETHOPRIM 800-160 MG
1 TABLET ORAL 2 TIMES DAILY
Qty: 20 TABLET | Refills: 0 | Status: SHIPPED | OUTPATIENT
Start: 2017-08-02 | End: 2018-01-17

## 2017-08-02 NOTE — TELEPHONE ENCOUNTER
Left message asking patient to return call.  Please inform patient of message from Provider below.

## 2017-08-02 NOTE — TELEPHONE ENCOUNTER
Please notify that received results from [facility] in [place].     Urinalysis shows likely infection.     Prescription for Bactrim sent to [facility]     Please be sure that culture is being performed for this sample.

## 2017-08-03 ENCOUNTER — TELEPHONE (OUTPATIENT)
Dept: FAMILY MEDICINE | Facility: CLINIC | Age: 47
End: 2017-08-03

## 2017-08-03 DIAGNOSIS — S31.809S: ICD-10-CM

## 2017-08-03 DIAGNOSIS — G82.20 PARAPLEGIA (H): ICD-10-CM

## 2017-08-03 NOTE — TELEPHONE ENCOUNTER
Patient stated we should be receiving a fax regarding a new bed for her from the supply company. They also faxed one two weeks ago, and she is wondering if we needed more information.I advised SF is not in today.

## 2017-08-04 NOTE — TELEPHONE ENCOUNTER
Reviewed document. There is a DME prescription from 6/22/2017 which can be reprinted, signed by another provider if willing and then faxed to number in document.     Can also do that on Monday if needed.

## 2017-08-04 NOTE — TELEPHONE ENCOUNTER
Re-printed and signed by     Faxed DME order and letter from [facility] back to them at [phone]

## 2017-08-08 DIAGNOSIS — I10 ESSENTIAL HYPERTENSION: ICD-10-CM

## 2017-08-08 DIAGNOSIS — G82.20 PARAPLEGIA (H): ICD-10-CM

## 2017-08-08 NOTE — TELEPHONE ENCOUNTER
LISINOPRIL        Last Written Prescription Date: 6/6/17  Last Fill Quantity: 30, # refills: 1  Last Office Visit with [facility], [facility] or [facility] prescribing provider: 6/27/17       Potassium   Date Value Ref Range Status   12/02/2016 4.2 3.6 - 5.2 mmol/L Final     Creatinine   Date Value Ref Range Status   12/02/2016 1.0 0.6 - 1.0 mg/dL Final     BP Readings from Last 3 Encounters:   06/27/17 110/60   11/25/16 132/72   07/06/16 138/88       BACLOFEN      Last Written Prescription Date: 6/6/17  Last Fill Quantity: 90,  # refills: 1   Last Office Visit with [facility], [facility] or [facility] prescribing provider: 6/27/17

## 2017-08-09 ENCOUNTER — TELEPHONE (OUTPATIENT)
Dept: FAMILY MEDICINE | Facility: CLINIC | Age: 47
End: 2017-08-09

## 2017-08-09 ENCOUNTER — MEDICAL CORRESPONDENCE (OUTPATIENT)
Dept: HEALTH INFORMATION MANAGEMENT | Facility: CLINIC | Age: 47
End: 2017-08-09

## 2017-08-09 DIAGNOSIS — G82.20 PARAPLEGIA (H): ICD-10-CM

## 2017-08-09 DIAGNOSIS — N39.0 URINARY TRACT INFECTION ASSOCIATED WITH INDWELLING URETHRAL CATHETER, INITIAL ENCOUNTER (H): ICD-10-CM

## 2017-08-09 DIAGNOSIS — T83.511A URINARY TRACT INFECTION ASSOCIATED WITH INDWELLING URETHRAL CATHETER, INITIAL ENCOUNTER (H): ICD-10-CM

## 2017-08-09 RX ORDER — LISINOPRIL 10 MG/1
TABLET ORAL
Qty: 90 TABLET | Refills: 1 | Status: SHIPPED | OUTPATIENT
Start: 2017-08-09 | End: 2017-11-16 | Stop reason: DRUGHIGH

## 2017-08-09 RX ORDER — BACLOFEN 20 MG/1
TABLET ORAL
Qty: 90 TABLET | Refills: 1 | Status: SHIPPED | OUTPATIENT
Start: 2017-08-09 | End: 2017-09-29

## 2017-08-09 RX ORDER — BACLOFEN 20 MG/1
TABLET ORAL
Qty: 90 TABLET | Refills: 1 | OUTPATIENT
Start: 2017-08-09

## 2017-08-09 RX ORDER — DOXYCYCLINE HYCLATE 100 MG
100 TABLET ORAL 2 TIMES DAILY
Qty: 28 TABLET | Refills: 0 | Status: SHIPPED | OUTPATIENT
Start: 2017-08-09 | End: 2017-08-11

## 2017-08-09 NOTE — TELEPHONE ENCOUNTER
Pt calling to check the status of the baclofen medication refill.  She is out of the medication.

## 2017-08-09 NOTE — TELEPHONE ENCOUNTER
Pt following up on medication doxycycline (VIBRA-TABS) 100 MG tablet states out of medication and needing it filled. Please contact pt in regards [phone]

## 2017-08-09 NOTE — TELEPHONE ENCOUNTER
Lisinopril:  Prescription approved per [facility] Refill Protocol.    Baclofen:  Routing refill request to provider for review/approval because:  Drug not on the [facility] refill protocol     Ila Santana RN, BSN

## 2017-08-09 NOTE — TELEPHONE ENCOUNTER
doxycycline (VIBRA-TABS) 100 MG tablet      Last Written Prescription Date: 6/27/2017  Last Fill Quantity: 280,  # refills: 0   Last Office Visit with [facility], [facility] or [facility] prescribing provider: 6/27/2017

## 2017-08-09 NOTE — TELEPHONE ENCOUNTER
Pt calling for follow up on medication below. Please advise and contact pt in regards [phone]

## 2017-08-09 NOTE — TELEPHONE ENCOUNTER
Reason for call:  Form  Reason for Call:  Form, our goal is to have forms completed with 72 hours, however, some forms may require a visit or additional information.    Type of letter, form or note:  medical    Who is the form from?: [employer]/     Where did the form come from: form was faxed in    What clinic location was the form placed at?: [facility] - [phone]    Where the form was placed:  in box     What number is listed as a contact on the form?: [phone]       Additional comments: Fax to [phone]    Call taken on 11/8/2016 at 3:08 PM by Daiana Barajas

## 2017-08-09 NOTE — TELEPHONE ENCOUNTER
Routing refill request to provider for review/approval because:  Appears to be a break in medication - should have been out around 7/17/17  Cannot approve with associated diagnosis.     Ila Santana, RN, BSN

## 2017-08-10 ENCOUNTER — TELEPHONE (OUTPATIENT)
Dept: FAMILY MEDICINE | Facility: CLINIC | Age: 47
End: 2017-08-10

## 2017-08-10 NOTE — TELEPHONE ENCOUNTER
Reason for Call:  Other prescription    Detailed comments: Ligia calling from [facility] states needing to speak with [place] or nurse regarding pt UTI. Ligia states needs to know what medication pt is supposed to be on for this UTI. Ligia states pharmacy called and said they have an antibiotic for pt and its different than the one pt currently is taking.  Please advise and contact Ligia [phone]    Phone Number Patient can be reached at: Home number on file [phone] (home)    Best Time: ANY    Can we leave a detailed message on this number? YES    Call taken on 8/10/2017 at 1:42 PM by Tanisha Lara

## 2017-08-10 NOTE — TELEPHONE ENCOUNTER
Reason for call:  Form  Reason for Call:  Form, our goal is to have forms completed with 72 hours, however, some forms may require a visit or additional information.    Type of letter, form or note:  Medical    Who is the form from?:  [facility]     Where did the form come from: form was faxed in    What clinic location was the form placed at?: [facility] - [phone]    Where the form was placed: 's in box     What number is listed as a contact on the form?: [phone]       Additional comments: Fax back to [phone]    Call taken on 8/10/2017 at 7:25 AM by Leland Gaines

## 2017-08-10 NOTE — TELEPHONE ENCOUNTER
Spoke to pharmacy.  They were wondering (due to insurance reasons) if they can use the monohydrate version instead of the hyclate    Per KL, yes.  Pharmacy informed

## 2017-08-10 NOTE — TELEPHONE ENCOUNTER
Spoke with Ligia from [facility].   Informed the patient has  been taking Bactrim for her UTI since 08/02/2017. She currently has one day left of the Bactrim.    Pharmacy called the patient and stated they had a refill for Doxycycline available for her since 08/09/2017.     Please review and clarify if you would like the patient taking Doxycyline at this time. Informed that [facility]ia from [facility] will have the patient continue with Bactrim until this has been clarified.     Please review and advise on what antibiotic the patient should have taking for her UTI. Snehal Geiger, HARDEEP, BSN

## 2017-08-11 ENCOUNTER — TELEPHONE (OUTPATIENT)
Dept: FAMILY MEDICINE | Facility: CLINIC | Age: 47
End: 2017-08-11

## 2017-08-11 DIAGNOSIS — N39.0 URINARY TRACT INFECTION ASSOCIATED WITH INDWELLING URETHRAL CATHETER, INITIAL ENCOUNTER (H): Primary | ICD-10-CM

## 2017-08-11 DIAGNOSIS — S31.809S: ICD-10-CM

## 2017-08-11 DIAGNOSIS — G82.20 PARAPLEGIA (H): Primary | ICD-10-CM

## 2017-08-11 DIAGNOSIS — T83.511A URINARY TRACT INFECTION ASSOCIATED WITH INDWELLING URETHRAL CATHETER, INITIAL ENCOUNTER (H): Primary | ICD-10-CM

## 2017-08-11 RX ORDER — CEPHALEXIN 500 MG/1
500 CAPSULE ORAL 3 TIMES DAILY
Qty: 30 CAPSULE | Refills: 0 | Status: SHIPPED | OUTPATIENT
Start: 2017-08-11 | End: 2017-09-26

## 2017-08-11 NOTE — TELEPHONE ENCOUNTER
Margaret from [facility] called and they would like a rx for a hospital bed faxed to them at [phone].

## 2017-08-11 NOTE — TELEPHONE ENCOUNTER
Clarified with patient (through multiple encounters) that she should not fill/take the doxycycline and bactrim.  She needs to get the keflex filled and take that.  She understood.

## 2017-08-11 NOTE — TELEPHONE ENCOUNTER
Agatha states that she was taking sulfamethoxitmp and never has taken bactrim.  Please call her back.

## 2017-08-11 NOTE — TELEPHONE ENCOUNTER
Received the culture results.     Bacteria is resistant to bactrim - she should stop taking that.     Change to Keflex 500 mg three times per day for 10 days.     Prescription sent to the pharmacy.

## 2017-08-15 ENCOUNTER — TELEPHONE (OUTPATIENT)
Dept: FAMILY MEDICINE | Facility: CLINIC | Age: 47
End: 2017-08-15

## 2017-08-15 DIAGNOSIS — S31.809S: ICD-10-CM

## 2017-08-15 DIAGNOSIS — G82.20 PARAPLEGIA (H): Primary | ICD-10-CM

## 2017-08-15 NOTE — TELEPHONE ENCOUNTER
New order generated--appeared to have an order on 8/11/2017 which specified a hospital bed and not just a mattress.    Jake Singleton MD  Please close encounter when call/work completed.

## 2017-08-15 NOTE — TELEPHONE ENCOUNTER
Reason for call:  Other  Reason for Call: Request for an order or referral:    Order or referral being requested: A full hospital bed , Not just the mattress     Date needed: as soon as possible    Has the patient been seen by the PCP for this problem? YES    Additional comments:A order was placed with [facility] for a hospital bed.But that was just the mattress she needs the full bed   Phone number Patient can be reached at:  Home number on file [phone] (home)    Best Time:  anytime    Can we leave a detailed message on this number?  YES    Call taken on 8/15/2017 at 12:47 PM by Rhina Roberto

## 2017-08-16 ENCOUNTER — TELEPHONE (OUTPATIENT)
Dept: FAMILY MEDICINE | Facility: CLINIC | Age: 47
End: 2017-08-16

## 2017-08-16 NOTE — TELEPHONE ENCOUNTER
Reason for call:  Form  Reason for Call:  Form, our goal is to have forms completed with 72 hours, however, some forms may require a visit or additional information.    Type of letter, form or note:  medical    Who is the form from?:  [employer] - Medicare Customer Equipment Eligibility    Where did the form come from: form was faxed in    What clinic location was the form placed at?: [facility] - [phone]    Where the form was placed: 's Box    What number is listed as a contact on the form?:  [phone]       Additional comments:  Fax to [phone]    created by Kelli Hardy

## 2017-08-17 ENCOUNTER — TELEPHONE (OUTPATIENT)
Dept: FAMILY MEDICINE | Facility: CLINIC | Age: 47
End: 2017-08-17

## 2017-08-17 NOTE — TELEPHONE ENCOUNTER
Reason for call:  Form  Reason for Call:  Form, our goal is to have forms completed with 72 hours, however, some forms may require a visit or additional information.    Type of letter, form or note:  medical    Who is the form from?: [facility]    Where did the form come from: form was faxed in    What clinic location was the form placed at?: [facility] - [phone]    Where the form was placed:  in box     What number is listed as a contact on the form?: [phone]       Additional comments: Fax to [phone]    Call taken on 11/8/2016 at 3:08 PM by Daiana Barajas

## 2017-08-24 ENCOUNTER — MEDICAL CORRESPONDENCE (OUTPATIENT)
Dept: HEALTH INFORMATION MANAGEMENT | Facility: CLINIC | Age: 47
End: 2017-08-24

## 2017-08-25 ENCOUNTER — TELEPHONE (OUTPATIENT)
Dept: FAMILY MEDICINE | Facility: CLINIC | Age: 47
End: 2017-08-25

## 2017-08-25 NOTE — TELEPHONE ENCOUNTER
Reason for call:  Form  Reason for Call:  Form, our goal is to have forms completed with 72 hours, however, some forms may require a visit or additional information.    Type of letter, form or note:  Medical    Who is the form from?: [facility]      Where did the form come from: form was faxed in    What clinic location was the form placed at?: [facility] - [phone]    Where the form was placed: 's in box     What number is listed as a contact on the form?: [phone]       Additional comments: Fax back to [phone]    Call taken on 8/25/2017 at 8:04 AM by Leland Gaines

## 2017-08-28 DIAGNOSIS — G82.20 PARAPLEGIA (H): ICD-10-CM

## 2017-08-28 DIAGNOSIS — G89.21 CHRONIC PAIN DUE TO INJURY: ICD-10-CM

## 2017-08-28 RX ORDER — OXYCODONE AND ACETAMINOPHEN 7.5; 325 MG/1; MG/1
2 TABLET ORAL EVERY 8 HOURS PRN
Qty: 180 TABLET | Refills: 0 | Status: CANCELLED | OUTPATIENT
Start: 2017-08-28

## 2017-08-28 RX ORDER — METHADONE HYDROCHLORIDE 5 MG/1
TABLET ORAL
Qty: 300 TABLET | Refills: 0 | Status: CANCELLED | OUTPATIENT
Start: 2017-09-01

## 2017-08-28 NOTE — TELEPHONE ENCOUNTER
Called pharmacy to verify.  They do have methadone and percocet on file.    Pt informed of this also.

## 2017-08-28 NOTE — TELEPHONE ENCOUNTER
When I sent the August refill also sent one for September. Please check that this is at the pharmacy.

## 2017-08-28 NOTE — TELEPHONE ENCOUNTER
Pt is calling to check the status of this. Would like this done ASAP so they can get in the mail. Pt states Jayne Garcia has done this in the past.  Please call when done.   Thank you,  Agatha Fair- Pt Rep.

## 2017-08-28 NOTE — TELEPHONE ENCOUNTER
Mail to pharmacy      methadone (DOLOPHINE) 5 MG tablet      Last Written Prescription Date:  9/1/2017  Last Fill Quantity: 300,   # refills: 0  Last Office Visit with [facility], [facility] or  [facility] prescribing provider: 6/27/2017  Future Office visit:       Routing refill request to provider for review/approval because:  Drug not on the [facility], [facility] or  [facility] refill protocol or controlled substance      oxyCODONE-acetaminophen (PERCOCET) 7.5-325 MG per tablet      Last Written Prescription Date:  9/1/2017  Last Fill Quantity: 180,   # refills: 0  Last Office Visit with [facility], [facility] or  [facility] prescribing provider: 6/27/2017  Future Office visit:       Routing refill request to provider for review/approval because:  Drug not on the [facility], [facility] or  [facility] refill protocol or controlled substance

## 2017-08-28 NOTE — TELEPHONE ENCOUNTER
Reason for Call:  Medication or medication refill:    Do you use a [facility]?  Name of the pharmacy and phone number for the current request:  [facility]    Name of the medication requested: methadone (DOLOPHINE) 5 MG tablet & oxyCODONE-acetaminophen (PERCOCET) 7.5-325 MG per tablet    Other request: please mail to pharmacy today    Can we leave a detailed message on this number? YES    Phone number patient can be reached at: Home number on file [phone] (home) or Cell number on file:    Telephone Information:   Mobile [phone]       Best Time: anytime    Call taken on 8/28/2017 at 10:09 AM by Michelle Jennings

## 2017-08-30 ENCOUNTER — TELEPHONE (OUTPATIENT)
Dept: FAMILY MEDICINE | Facility: CLINIC | Age: 47
End: 2017-08-30

## 2017-08-30 DIAGNOSIS — Z53.9 DIAGNOSIS NOT YET DEFINED: Primary | ICD-10-CM

## 2017-08-30 PROCEDURE — G0179 MD RECERTIFICATION HHA PT: HCPCS | Performed by: FAMILY MEDICINE

## 2017-08-30 NOTE — TELEPHONE ENCOUNTER
Reason for call:  Form  Reason for Call:  Form, our goal is to have forms completed with 72 hours, however, some forms may require a visit or additional information.    Type of letter, form or note:  medical    Who is the form from?: [facility]    Where did the form come from: form was faxed in    What clinic location was the form placed at?: [facility] - [phone]    Where the form was placed:  in box     What number is listed as a contact on the form?: [phone]       Additional comments: Fax to [phone]    Call taken on 11/8/2016 at 3:08 PM by Daiana Barajas

## 2017-08-31 DIAGNOSIS — F41.9 ANXIETY: ICD-10-CM

## 2017-08-31 NOTE — TELEPHONE ENCOUNTER
Pending Prescriptions:                       Disp   Refills    venlafaxine (EFFEXOR) 75 MG tablet [Pharm*180 ta*0            Sig: TAKE 1 TABLET (75 MG) BY MOUTH 2 TIMES DAILY        Last Written Prescription Date: 5/19/2017  Last Fill Quantity: 180, # refills: 0  Last Office Visit with [facility], [facility] or [facility] prescribing provider: 6/27/2017        BP Readings from Last 3 Encounters:   06/27/17 110/60   11/25/16 132/72   07/06/16 138/88     Pulse: (for Fetzima)  Creatinine   Date Value Ref Range Status   12/02/2016 1.0 0.6 - 1.0 mg/dL Final   ]    Last PHQ-9 score on record=   PHQ-9 SCORE 6/27/2017   Total Score -   Total Score 8

## 2017-09-05 ENCOUNTER — TELEPHONE (OUTPATIENT)
Dept: FAMILY MEDICINE | Facility: CLINIC | Age: 47
End: 2017-09-05

## 2017-09-05 DIAGNOSIS — G89.21 CHRONIC PAIN DUE TO INJURY: ICD-10-CM

## 2017-09-05 RX ORDER — VENLAFAXINE 75 MG/1
TABLET ORAL
Qty: 180 TABLET | Refills: 0 | Status: SHIPPED | OUTPATIENT
Start: 2017-09-05 | End: 2017-12-13

## 2017-09-05 NOTE — TELEPHONE ENCOUNTER
diazepam (VALIUM) 2 MG tablet          Last Written Prescription Date:  6/27/17  Last Fill Quantity: 30,   # refills: 0  Last Office Visit with [facility], [facility] or [facility] prescribing provider: 6/27/17  Future Office visit:       Routing refill request to provider for review/approval because:  Drug not on the [facility], [facility] or [facility] refill protocol or controlled substance    Patient requesting one week supply

## 2017-09-05 NOTE — TELEPHONE ENCOUNTER
Prescription approved per [facility] Refill Protocol.  Ila Santana, RN, BSN

## 2017-09-05 NOTE — LETTER
[facility]  [address], [address]  [place] [address]  Phone: [phone]  Fax: [phone]  September 5, 2017      Agatha Canas  [address] [place] [address]  [place] [address]      Dear Agatha,    We care about your health and have reviewed your health plan including your medical conditions, medications, and lab results.  Based on this review, it is recommended that you follow up regarding the following health topic(s):  -Cervical Cancer Screening    We recommend you take the following action(s):  -schedule a PAP SMEAR EXAM which is due.  Please disregard this reminder if you have had this exam elsewhere within the last year.  It would be helpful for us to have a copy of your recent pap smear report to update your records.     Please call us at the [facility] - [phone] (or use [employer]) to address the above recommendations.     Thank you for trusting [facility] and we appreciate the opportunity to serve you.  We look forward to supporting your healthcare needs in the future.    Healthy Regards,    Your Health Care Team  [facility]

## 2017-09-06 ENCOUNTER — TELEPHONE (OUTPATIENT)
Dept: FAMILY MEDICINE | Facility: CLINIC | Age: 47
End: 2017-09-06

## 2017-09-06 RX ORDER — DIAZEPAM 2 MG
TABLET ORAL
Qty: 30 TABLET | Refills: 0 | Status: SHIPPED | OUTPATIENT
Start: 2017-09-06 | End: 2017-09-26 | Stop reason: DRUGHIGH

## 2017-09-06 NOTE — TELEPHONE ENCOUNTER
Reason for call:  Form  Reason for Call:  Form, our goal is to have forms completed with 72 hours, however, some forms may require a visit or additional information.    Type of letter, form or note:  medical    Who is the form from?: [facility] [employer]    Where did the form come from: form was faxed in    What clinic location was the form placed at?: [facility] - [phone]    Where the form was placed:  in box     What number is listed as a contact on the form?:[phone]       Additional comments: Fax to [phone]    Call taken on 11/8/2016 at 3:08 PM by Daiana Barajas

## 2017-09-11 ENCOUNTER — TELEPHONE (OUTPATIENT)
Dept: FAMILY MEDICINE | Facility: CLINIC | Age: 47
End: 2017-09-11

## 2017-09-11 NOTE — TELEPHONE ENCOUNTER
Pt calling. She states her Annabellaa needs PA. Se is needing this refilled tomorrow. Please do ASAP. She states you can call [phone] and do it over the phone urgently.   Thank you,  Agatha Fair- Pt Rep.

## 2017-09-12 ENCOUNTER — TELEPHONE (OUTPATIENT)
Dept: FAMILY MEDICINE | Facility: CLINIC | Age: 47
End: 2017-09-12

## 2017-09-12 NOTE — TELEPHONE ENCOUNTER
Reason for Call:  Other prescription    Detailed comments: patient states her pharmacy told her that her prescription for Lyrica is still coming through as needing a PA. Patient would like to talk to Kelli about this.    Phone Number Patient can be reached at: Home number on file [phone] (home) or Cell number on file:    Telephone Information:   Mobile [phone]       Best Time: anytime    Can we leave a detailed message on this number? YES    Call taken on 9/12/2017 at 1:05 PM by Michelle Jennings

## 2017-09-12 NOTE — TELEPHONE ENCOUNTER
Called to do urgent PA over the phone.  Informed insurance that Pt has tried and failed gabapentin.    ID # [id number]     PA approved for one year.  Pt can get 30 day supply at a time.    Pharmacy informed  Pt informed

## 2017-09-20 ENCOUNTER — TRANSFERRED RECORDS (OUTPATIENT)
Dept: HEALTH INFORMATION MANAGEMENT | Facility: CLINIC | Age: 47
End: 2017-09-20

## 2017-09-21 ENCOUNTER — TELEPHONE (OUTPATIENT)
Dept: FAMILY MEDICINE | Facility: CLINIC | Age: 47
End: 2017-09-21

## 2017-09-21 ENCOUNTER — MEDICAL CORRESPONDENCE (OUTPATIENT)
Dept: HEALTH INFORMATION MANAGEMENT | Facility: CLINIC | Age: 47
End: 2017-09-21

## 2017-09-21 DIAGNOSIS — G89.21 CHRONIC PAIN DUE TO INJURY: ICD-10-CM

## 2017-09-21 DIAGNOSIS — F41.9 ANXIETY: ICD-10-CM

## 2017-09-21 RX ORDER — DIAZEPAM 5 MG
TABLET ORAL
Qty: 60 TABLET | Refills: 0 | OUTPATIENT
Start: 2017-09-21

## 2017-09-21 NOTE — TELEPHONE ENCOUNTER
Reason for call:  Form  Reason for Call:  Form, our goal is to have forms completed with 72 hours, however, some forms may require a visit or additional information.    Type of letter, form or note:  Medical    Who is the form from?: [facility]      Where did the form come from: form was faxed in    What clinic location was the form placed at?: [facility] - [phone]    Where the form was placed: 's in box     What number is listed as a contact on the form?: [phone]       Additional comments: Fax back to [phone]    Call taken on 9/21/2017 at 7:22 AM by Leland Gaines

## 2017-09-21 NOTE — TELEPHONE ENCOUNTER
"Spoke with pt to let her know that Valium will not be able to be increased per Jayne Garcia CNP, since the plan per Dr. Kasper's OV on 6/27/2017:  Discussed that valium and oxycodone are no longer recommended together.   Discussed referral back to pain management.   She was fine with weaning off the valium at this time.   She is willing to adjust medication but would like to remain here for this.   Discussed needed q3 month visit in the clinic.   She will comply.   Reduce valium to 2 mg daily for 2 weeks then as needed for the next 2 weeks.   All questions invited, asked and answered to the patient's apparent satisfaction.  Patient agrees to plan.    Pt also states she only has 3-4 of the Valium left that was prescribed for her on 9/6.  The sig for this was to take QD for 2 weeks than as needed.  Pt reports that she has needed to take 4-6 of her valium at times due to the pain.    I explained to pt that Dr. Kasper isn't comfortable prescribing Valium and Oxycodone together and reminded her of their conversation on 6/27/2017.    Pt became upset with me and said I was interrupting her and she didn't feel comfortable talking to me.  I did my best to hear the pt out before I spoke.  I would speak when pt was done talking and she would start talking and say, \"see you are interrupting me\".  She was demanding to talk to Dr. Kasper.  I let her know that Dr. Kasper is out of the office this week.  She screamed some swear words at me and hung up.    Ana Martin RN    "

## 2017-09-21 NOTE — TELEPHONE ENCOUNTER
Verified pt. Picked up the last 9/6 RX of 2 mg from pharmacy on 9/7.    Call pt. She is to be weaning down, is taking more than prescribed and we will not be filling at this point.   My need ED eval. For concern for withdrawal symptoms.    Janye Garcia

## 2017-09-21 NOTE — TELEPHONE ENCOUNTER
Patient is requesting that she goes back up to 5 mg of diazapam.  She is totally out.  Pharmacy as listed.  Ok to leave a detailed message.

## 2017-09-21 NOTE — TELEPHONE ENCOUNTER
diazepam (VALIUM) 5 MG tablet (Discontinued)      Last Written Prescription Date:  6/15/2017  Last Fill Quantity: 60,   # refills: 0  Last Office Visit with [facility], [facility] or [facility] prescribing provider: 6/27/2017  Future Office visit:    Next 5 appointments (look out 90 days)     Sep 26, 2017 12:20 PM CDT   Telephone Visit with Dipti Kasper MD   [facility] [place] [facility]    [address]  [place] [id number] [phone]                   Routing refill request to provider for review/approval because:  Drug not on the [facility], [facility] or [facility] refill protocol or controlled substance

## 2017-09-22 ENCOUNTER — TELEPHONE (OUTPATIENT)
Dept: FAMILY MEDICINE | Facility: CLINIC | Age: 47
End: 2017-09-22

## 2017-09-22 DIAGNOSIS — R30.0 DYSURIA: Primary | ICD-10-CM

## 2017-09-22 RX ORDER — NITROFURANTOIN 25; 75 MG/1; MG/1
100 CAPSULE ORAL 2 TIMES DAILY
Qty: 20 CAPSULE | Refills: 0 | Status: SHIPPED | OUTPATIENT
Start: 2017-09-22 | End: 2017-10-02

## 2017-09-22 NOTE — TELEPHONE ENCOUNTER
Outside UA and UC with sensitivities faxed to our clinic. RX of Macrobid X 10 days sent to pharmacy.     Pt. With low appetite, some stomach cramping. Advised to push fluids/PO, if worse to ED this weekend.   Pt. Agrees to plan.   Jayne Garcia

## 2017-09-26 ENCOUNTER — MEDICAL CORRESPONDENCE (OUTPATIENT)
Dept: HEALTH INFORMATION MANAGEMENT | Facility: CLINIC | Age: 47
End: 2017-09-26

## 2017-09-26 ENCOUNTER — TELEPHONE (OUTPATIENT)
Dept: FAMILY MEDICINE | Facility: CLINIC | Age: 47
End: 2017-09-26

## 2017-09-26 ENCOUNTER — VIRTUAL VISIT (OUTPATIENT)
Dept: FAMILY MEDICINE | Facility: CLINIC | Age: 47
End: 2017-09-26
Payer: COMMERCIAL

## 2017-09-26 DIAGNOSIS — G89.4 CHRONIC PAIN SYNDROME: ICD-10-CM

## 2017-09-26 DIAGNOSIS — G89.21 CHRONIC PAIN DUE TO INJURY: ICD-10-CM

## 2017-09-26 DIAGNOSIS — Z79.899 CONTROLLED SUBSTANCE AGREEMENT SIGNED: ICD-10-CM

## 2017-09-26 DIAGNOSIS — G82.20 PARAPLEGIA (H): Primary | ICD-10-CM

## 2017-09-26 DIAGNOSIS — F41.9 ANXIETY: ICD-10-CM

## 2017-09-26 DIAGNOSIS — G89.4 CHRONIC PAIN SYNDROME: Primary | ICD-10-CM

## 2017-09-26 DIAGNOSIS — G82.20 PARAPLEGIA (H): ICD-10-CM

## 2017-09-26 PROCEDURE — 99442 ZZC PHYSICIAN TELEPHONE EVALUATION 11-20 MIN: CPT | Performed by: FAMILY MEDICINE

## 2017-09-26 RX ORDER — OXYCODONE AND ACETAMINOPHEN 7.5; 325 MG/1; MG/1
2 TABLET ORAL EVERY 8 HOURS PRN
Qty: 180 TABLET | Refills: 0 | Status: SHIPPED | OUTPATIENT
Start: 2017-10-31 | End: 2018-01-17

## 2017-09-26 RX ORDER — METHADONE HYDROCHLORIDE 5 MG/1
TABLET ORAL
Qty: 300 TABLET | Refills: 0 | Status: SHIPPED | OUTPATIENT
Start: 2017-10-31 | End: 2018-02-15

## 2017-09-26 RX ORDER — DIAZEPAM 5 MG
2.5-5 TABLET ORAL EVERY 12 HOURS PRN
Qty: 60 TABLET | Refills: 1 | Status: SHIPPED | OUTPATIENT
Start: 2017-09-26 | End: 2018-01-17

## 2017-09-26 RX ORDER — OXYCODONE AND ACETAMINOPHEN 7.5; 325 MG/1; MG/1
2 TABLET ORAL EVERY 8 HOURS PRN
Qty: 180 TABLET | Refills: 0 | Status: SHIPPED | OUTPATIENT
Start: 2017-10-01 | End: 2017-11-24

## 2017-09-26 RX ORDER — METHADONE HYDROCHLORIDE 5 MG/1
TABLET ORAL
Qty: 300 TABLET | Refills: 0 | Status: SHIPPED | OUTPATIENT
Start: 2017-10-01 | End: 2017-11-24

## 2017-09-26 NOTE — TELEPHONE ENCOUNTER
Letter is requesting order for: OT assessment at patient's home.   Letter placed at Providers desk to review.  Please fax order to [phone].    No Provider signature is required on this specific letter.

## 2017-09-26 NOTE — TELEPHONE ENCOUNTER
Reason for Call:  Form, our goal is to have forms completed with 72 hours, however, some forms may require a visit or additional information.    Type of letter, form or note:  Referral Request    Who is the form from?: [facility] (if other please explain)    Where did the form come from: form was faxed in    What clinic location was the form placed at?: [facility] - [phone]  [facility] - [phone]    Where the form was placed: Kelli's box    What number is listed as a contact on the form?: [phone]       Additional comments: Requesting orders if you agree with OT assessment being done at patient's home.    Call taken on 9/26/2017 at 10:06 AM by Sana Trevino

## 2017-09-26 NOTE — MR AVS SNAPSHOT
After Visit Summary   9/26/2017    Agatha Canas    MRN: [id number]           Patient Information     Date Of Birth          1970        Visit Information        Provider Department      9/26/2017 12:20 PM Dipti Kasper MD [facility]        Today's Diagnoses     Chronic pain syndrome    -  1    Controlled substance agreement signed        Anxiety        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        Paraplegia (H)           Follow-ups after your visit        Who to contact     If you have questions or need follow up information about today's clinic visit or your schedule please contact [facility] directly at [phone].  Normal or non-critical lab and imaging results will be communicated to you by [place] [employer], letter or phone within 4 business days after the clinic has received the results. If you do not hear from us within 7 days, please contact the clinic through [place] [employer] or phone. If you have a critical or abnormal lab result, we will notify you by phone as soon as possible.  Submit refill requests through [employer] or call your pharmacy and they will forward the refill request to us. Please allow 3 business days for your refill to be completed.          Additional Information About Your Visit        MyChart Information     [employer] gives you secure access to your electronic health record. If you see a primary care provider, you can also send messages to your care team and make appointments. If you have questions, please call your primary care clinic.  If you do not have a primary care provider, please call [phone] and they will assist you.        Care EveryWhere ID     This is your Care EveryWhere ID. This could be used by other organizations to access your [place] medical records  [id number]         Blood Pressure from Last 3 Encounters:   06/27/17 110/60   11/25/16 132/72   07/06/16 138/88    Weight from Last 3 Encounters:   09/30/11 150 lb (68 kg)   06/27/11 145 lb  (65.8 kg)   10/18/10 171 lb 1.6 oz (77.6 kg)              Today, you had the following     No orders found for display         Today's Medication Changes          These changes are accurate as of: 9/26/17 11:59 PM.  If you have any questions, ask your nurse or doctor.               These medicines have changed or have updated prescriptions.        Dose/Directions    diazepam 5 MG tablet   Commonly known as:  VALIUM   This may have changed:    - medication strength  - how much to take  - how to take this  - when to take this  - reasons to take this  - additional instructions   Used for:  Anxiety, 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   Changed by:  Dipti Kasper MD        Dose:  2.5-5 mg   Take 0.5-1 tablets (2.5-5 mg) by mouth every 12 hours as needed for other (spasticity) TAKE ONE TABLET BY MOUTH EVERY 12 HOURS AS NEEDED FOR ANXIETY.   Quantity:  60 tablet   Refills:  1       * methadone 10 MG tablet   Commonly known as:  DOLOPHINE   This may have changed:  Another medication with the same name was added. Make sure you understand how and when to take each.   Changed by:  Dipti Kasper MD        Dose:  5 mg   Take 5 mg by mouth Takes 10 pills a day   Refills:  0       * methadone 5 MG tablet   Commonly known as:  DOLOPHINE   This may have changed:  Another medication with the same name was added. Make sure you understand how and when to take each.   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 To last 30 days.   Quantity:  300 tablet   Refills:  0       * methadone 5 MG tablet   Commonly known as:  DOLOPHINE   This may have changed:  You were already taking a medication with the same name, and this prescription was added. Make sure you understand how and when to take each.   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 To last 30 days.   Quantity:  300 tablet   Refills:  0       * methadone 5 MG tablet   Commonly known as:  DOLOPHINE   This may have changed:  You were already taking a medication with the same name, and this prescription was added. Make sure you understand how and when to take each.   Used for:  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, Paraplegia (H)   Changed by:  Dipti Kasper MD        Start taking on:  10/31/2017   20 mg po qam, 15 mg at 1 pm daily and 15 mg at bedtime. To last 30 days.   Quantity:  300 tablet   Refills:  0       * oxyCODONE-acetaminophen 7.5-325 MG per tablet   Commonly known as:  PERCOCET   This may have changed:  Another medication with the same name was added. Make sure you understand how and when to take each.   Used for:  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, Paraplegia (H)   Changed by:  Dipti Kasper MD        Dose:  2 tablet   Take 2 tablets by mouth every 8 hours as needed for pain Max 6 per day   Quantity:  180 tablet   Refills:  0       * oxyCODONE-acetaminophen 7.5-325 MG per tablet   Commonly known as:  PERCOCET   This may have changed:  You were already taking a medication with the same name, and this prescription was added. Make sure you understand how and when to take each.   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 Make sure you understand how and when to take each.   Used for:  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, Paraplegia (H)   Changed by:  Dipti Kasper MD        Dose:  2 tablet   Start taking on:  10/31/2017   Take 2 tablets by mouth  every 8 hours as needed for pain Max 6 per day   Quantity:  180 tablet   Refills:  0       * Notice:  This list has 7 medication(s) that are the same as other medications prescribed for you. Read the directions carefully, and ask your doctor or other care provider to review them with you.         Where to get your medicines      Some of these will need a paper prescription and others can be bought over the counter.  Ask your nurse if you have questions.     Bring a paper prescription for each of these medications     diazepam 5 MG tablet    methadone 5 MG tablet    methadone 5 MG tablet    oxyCODONE-acetaminophen 7.5-325 MG per tablet    oxyCODONE-acetaminophen 7.5-325 MG per tablet                Primary Care Provider Office Phone # Fax #    Dipti Kasper [phone] [address] [facility] [address]        Equal Access to Services     LILIAM EUBANKS : Hadii lorene seayo Soyola, waaxda luqadaha, qaybta kaalmada adeegyasawyer, maximus duncan . So [facility] [phone].    ATENCIÓN: Si habla español, tiene a amaya disposición servicios gratuitos de asistencia lingüística. Mahesh al [phone].    We comply with applicable federal civil rights laws and [place] laws. We do not discriminate on the basis of race, color, national origin, age, disability, sex, sexual orientation, or gender identity.            Thank you!     Thank you for choosing [facility]  for your care. Our goal is always to provide you with excellent care. Hearing back from our patients is one way we can continue to improve our services. Please take a few minutes to complete the written survey that you may receive in the mail after your visit with us. Thank you!             Your Updated Medication List - Protect others around you: Learn how to safely use, store and throw away your medicines at www.disposemymeds.org.          This list is accurate as of: 9/26/17 11:59 PM.  Always use your  most recent med list.                   Brand Name Dispense Instructions for use Diagnosis    baclofen 20 MG tablet    LIORESAL    90 tablet    TAKE 1 TABLET (20 MG) BY MOUTH 3 TIMES DAILY    Paraplegia (H)       buPROPion 150 MG 12 hr tablet    WELLBUTRIN SR    60 tablet    Take 1 tablet (150 mg) by mouth 2 times daily    Anxiety       CALCIUM 600+D 600-200 MG-UNIT Tabs   Generic drug:  calcium carbonate-vitamin D           CERTAVITE/ANTIOXIDANTS Tabs tablet   Generic drug:  multivitamin, therapeutic with minerals     30 tablet    TAKE ONE TABLET BY MOUTH DAILY    Routine general medical examination at a health care facility       CRANBERRY PO      twice daily        diazepam 5 MG tablet    VALIUM    60 tablet    Take 0.5-1 tablets (2.5-5 mg) by mouth every 12 hours as needed for other (spasticity) TAKE ONE TABLET BY MOUTH EVERY 12 HOURS AS NEEDED FOR ANXIETY.    Anxiety, 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       FISH OIL PO      once daily        hydrocortisone 0.2 % cream    WESTCORT    15 g    Apply sparingly to affected area twice daily as needed. For up to two weeks.    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 To last 30 days.    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 To last 30 days.    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 To last 30 days.    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    Paraplegia (H)       nicotine 21 MG/24HR 24 hr patch    NICODERM CQ    30 patch    Place 1 patch onto the skin every 24 hours    Tobacco use disorder       nitroFURantoin (macrocrystal-monohydrate) 100 MG capsule    MACROBID    20 capsule    Take 1 capsule (100 mg) by mouth 2 times daily for 10 days    Dysuria       nystatin cream    MYCOSTATIN    60 g    APPLY TOPICALLY 3 TIMES DAILY FOR 14 DAYS    Yeast infection of the skin       order for DME     200 each     LARGE DISPOSABLE UNDERPAD to use as needed.    Urinary incontinence       * order for DME     1 each    Equipment being ordered:irrigation kit for bladder catheter    UTI symptoms       * order for DME     2 each    Equipment being ordered: urinary catheter - recommend silicone catheter not latex due to sensitivity. 2 per month    Latex sensitivity, Urinary incontinence, unspecified incontinence type       * order for DME     28 each    Equipment being ordered: AG Batool 4X8, 28 per month.    Open wound of buttock, complicated, unspecified laterality, sequela       * order for DME     28 each    Equipment being ordered: aquafill silver 4x10, 28 per month    Open wound of buttock, complicated, unspecified laterality, sequela       * order for DME     28 each    Equipment being ordered: Aquaphil AG? 4x10? For 28 day supply monthly    Open wound of buttock, complicated, unspecified laterality, sequela       * order for DME     1 each    Therapeutic mattress    Paraplegia (H), Open wound  of buttock, complicated, unspecified laterality, sequela       * order for DME     1 each    Order for hospital bed.    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, Paraplegia (H)       * oxyCODONE-acetaminophen 7.5-325 MG per tablet   Start taking on:  10/1/2017    PERCOCET    180 tablet    Take 2 tablets by mouth every 8 hours as needed for pain Max 6 per day    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, Paraplegia (H)       * oxyCODONE-acetaminophen 7.5-325 MG per tablet   Start taking on:  10/31/2017    PERCOCET    180 tablet    Take 2 tablets by mouth every 8 hours as needed for pain Max 6 per day    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 Apply sparingly to affected area.    Dyshidrotic eczema       * triamcinolone 0.5 % cream    KENALOG    30 g    APPLY SPARINGLY TO AFFECTED AREA THREE TIMES DAILY.    Atopic dermatitis, unspecified       venlafaxine 75 MG tablet    EFFEXOR    180 tablet    TAKE 1 TABLET (75 MG) BY MOUTH 2 TIMES DAILY    Anxiety       * Notice:  This  list has 17 medication(s) that are the same as other medications prescribed for you. Read the directions carefully, and ask your doctor or other care provider to review them with you.

## 2017-09-26 NOTE — PROGRESS NOTES
"Agatha Canas is a 47 year old female who is being evaluated via a telephone visit.      The patient has been notified of following:     \"This telephone visit will be conducted via a call between you and your physician/provider. We have found that certain health care needs can be provided without the need for a physical exam.  This service lets us provide the care you need with a short phone conversation.  If a prescription is necessary we can send it directly to your pharmacy.  If lab work is needed we can place an order for that and you can then stop by our lab to have the test done at a later time.    We will bill your insurance company for this service.  Please check with your medical insurance if this type of visit is covered. You may be responsible for the cost of this type of visit if insurance coverage is denied.  The typical cost is $30 (10min), $59 (11-20min) and $85 (21-30min).  Most often these visits are shorter than 10 minutes.    If during the course of the call the physician/provider feels a telephone visit is not appropriate, you will not be charged for this service.\"       Consent has been obtained for this service by 2 care team members: yes. See the scanned image in the medical record.    Agatha Canas complains of  Recheck Medication (wants to owen off medication )      I have reviewed and updated the patient's Past Medical History, Social History, Family History and Medication List.    ALLERGIES  Penicillins and Clindamycin    Juvenal Orourke MA   (MA signature)    Additional provider notes: spoke with Agatha. She was weaning off 2 episodes of spasticity. For that, she took 4 mg of diazepam to get rid of the spasticity. She is worried about weaning off the diazepam because of these very painful episodes of the spasticity. She would like to have the 5 mg dose of the diazepam available.     She is not having any other concerns with her pain. Otherwise things are going well. She has no " other concerns. She thinks she may move back to the area at some point but it isn't clear.     Reports that her lesion on her buttocks is finally close to closing.     Assessment/Plan:  1. Chronic pain syndrome    2. Controlled substance agreement signed    3. Anxiety    4. 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    5. Paraplegia (H)      Patient with chronic pain.   Reviewed my last notes with her regarding our last conversation.   Discussed again the concerns with being on both benzodiazepines and narcotics at the same time.   That was the purpose of trying to wean.   Had discussed that if she couldn't wean off the diazepam, then would recommend being seen again by pain management.   It would make sense to check in with pain management as she has not been there in some time.   May be other adjustments needed.   Will refill for 2 months and then anticipate that she will have appointment with pain management.   She requests to schedule this herself.     All questions invited, asked and answered to the patient's apparent satisfaction.  Patient agrees to plan.      Total time of call between patient and provider was 16 minutes

## 2017-10-05 ENCOUNTER — TELEPHONE (OUTPATIENT)
Dept: FAMILY MEDICINE | Facility: CLINIC | Age: 47
End: 2017-10-05

## 2017-10-05 NOTE — TELEPHONE ENCOUNTER
Reason for call:  Form  Reason for Call:  Form, our goal is to have forms completed with 72 hours, however, some forms may require a visit or additional information.    Type of letter, form or note:  medical    Who is the form from?:  [facility]    Where did the form come from: form was faxed in    What clinic location was the form placed at?: [facility] - [phone]    Where the form was placed: 's Box    What number is listed as a contact on the form?:  [phone]       Additional comments:  Fax to [phone]    created by Kelli Sortoalesia

## 2017-10-06 ENCOUNTER — TELEPHONE (OUTPATIENT)
Dept: FAMILY MEDICINE | Facility: CLINIC | Age: 47
End: 2017-10-06

## 2017-10-06 DIAGNOSIS — F41.9 ANXIETY: Primary | ICD-10-CM

## 2017-10-06 RX ORDER — HYDROXYZINE HYDROCHLORIDE 25 MG/1
25 TABLET, FILM COATED ORAL EVERY 8 HOURS PRN
Qty: 20 TABLET | Refills: 1 | Status: SHIPPED | OUTPATIENT
Start: 2017-10-06 | End: 2018-01-17

## 2017-10-06 NOTE — TELEPHONE ENCOUNTER
Reason for Call:  Medication or medication refill:    Do you use a [facility]?  Name of the pharmacy and phone number for the current request:  [facility] [place]    Name of the medication requested: anxiety medication    Other request: patient wanted to let you know that she has not taken diazepam since she last spoke with you. She states the reason why she wants to be put on anti anxiety is because her anxiety level is up.    Can we leave a detailed message on this number? YES    Phone number patient can be reached at: Home number on file [phone] (home)    Best Time: any    Call taken on 10/6/2017 at 1:34 PM by Promise Cantu

## 2017-10-06 NOTE — TELEPHONE ENCOUNTER
Prescription sent for hydroxyzine 25 mg that she can take for anxiety up to 2-3 times per day but anticipate less than that.  She cannot take the diazepam with this medication. Fine with her other medications.

## 2017-10-06 NOTE — TELEPHONE ENCOUNTER
Pt stated she forgot to fill the medication diazepam that was sent to the pharmacy 9/26/2017.  Recently, though, she has had more anxiety than usual and her heart rate increases as a result.    Can she get an anti anxiety medication (that works with all her other meds)?  She is willing to do a telephone visit, if needed.  Any chance the telephone visit can happen tonight? Otherwise, she is willing to wait until Monday if she has to.

## 2017-10-09 ENCOUNTER — TRANSFERRED RECORDS (OUTPATIENT)
Dept: HEALTH INFORMATION MANAGEMENT | Facility: CLINIC | Age: 47
End: 2017-10-09

## 2017-10-10 ENCOUNTER — TELEPHONE (OUTPATIENT)
Dept: FAMILY MEDICINE | Facility: CLINIC | Age: 47
End: 2017-10-10

## 2017-10-10 NOTE — TELEPHONE ENCOUNTER
Received a fax from [employer] requesting: letter of medical necessity to support why patient was required to receive 40 of the dressing aquacel. (per [employer] form completed on 5/2/2017 - placed at Providers desk to review).  Please provide letter - fax to [phone]    AS[facility] III PHONE- [phone]

## 2017-10-10 NOTE — LETTER
October 10, 2017      Agatha Canas  [address]  [facility] [address]        To Whom It May Concern,     The above named requires 40 of the Dressing Aquacel per month due to size of her wound on her buttock and frequent changes.       Sincerely,            Dipti Kasper MD

## 2017-10-12 ENCOUNTER — TELEPHONE (OUTPATIENT)
Dept: FAMILY MEDICINE | Facility: CLINIC | Age: 47
End: 2017-10-12

## 2017-10-12 NOTE — TELEPHONE ENCOUNTER
Reason for call:  Form  Reason for Call:  Form, our goal is to have forms completed with 72 hours, however, some forms may require a visit or additional information.    Type of letter, form or note:  medical    Who is the form from?: [facility]    Where did the form come from: form was faxed in    What clinic location was the form placed at?: [facility] - [phone]    Where the form was placed:  in box     What number is listed as a contact on the form?: [phone]       Additional comments: Fax to [phone]    Call taken on 11/8/2016 at 3:08 PM by Daiana Barajas

## 2017-10-12 NOTE — TELEPHONE ENCOUNTER
Reason for call:  Form  Reason for Call:  Form, our goal is to have forms completed with 72 hours, however, some forms may require a visit or additional information.    Type of letter, form or note:  medical    Who is the form from?: [facility]    Where did the form come from: form was faxed in    What clinic location was the form placed at?: [facility] - [phone]    Where the form was placed:  in box     What number is listed as a contact on the form?: [phone]       Additional comments: Fax to [phone]    Call taken on 11/8/2016 at 3:08 PM by Daiana Barajas

## 2017-10-12 NOTE — LETTER
October 13, 2017      Agatha BE[place] Missael  [address] [facility] [address]        To Whom It May Concern,       This patient has 8.6 x 4.4 x 0.6 cm decubitus ulcer right buttocks. She has been treated for this for the last few years.  This decubitus ulcer has moderate discharge. 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  This is complicated by paraplegia and limited mobility.    The dressing is changed daily and sometimes more often due to the discharge. Due to this, requesting 40 dressings per month as this is the average use.     Please let me know what questions you have.     Sincerely,        Dipti Kasper MD

## 2017-10-13 ENCOUNTER — TELEPHONE (OUTPATIENT)
Dept: FAMILY MEDICINE | Facility: CLINIC | Age: 47
End: 2017-10-13

## 2017-10-13 ENCOUNTER — MEDICAL CORRESPONDENCE (OUTPATIENT)
Dept: HEALTH INFORMATION MANAGEMENT | Facility: CLINIC | Age: 47
End: 2017-10-13

## 2017-10-13 NOTE — TELEPHONE ENCOUNTER
Reason for call:  Form  Reason for Call:  Form, our goal is to have forms completed with 72 hours, however, some forms may require a visit or additional information.    Type of letter, form or note:  medical    Who is the form from?: [facility][note 3]    Where did the form come from: form was faxed in    What clinic location was the form placed at?: [facility] - [phone]    Where the form was placed:  in box     What number is listed as a contact on the form?: [phone]       Additional comments: Fax to [phone]    Call taken on 11/8/2016 at 3:08 PM by Daiana Barajas

## 2017-10-20 ENCOUNTER — TELEPHONE (OUTPATIENT)
Dept: FAMILY MEDICINE | Facility: CLINIC | Age: 47
End: 2017-10-20

## 2017-10-20 NOTE — TELEPHONE ENCOUNTER
Reason for call:  Form  Reason for Call:  Form, our goal is to have forms completed with 72 hours, however, some forms may require a visit or additional information.    Type of letter, form or note:  medical    Who is the form from?:    [facility]  Where did the form come from: form was faxed in    What clinic location was the form placed at?: [facility] - [phone]    Where the form was placed: 's Box    What number is listed as a contact on the form?:  [phone]       Additional comments:  Fax to [phone]    created by Kelli Sortoalesia

## 2017-10-23 ENCOUNTER — MEDICAL CORRESPONDENCE (OUTPATIENT)
Dept: HEALTH INFORMATION MANAGEMENT | Facility: CLINIC | Age: 47
End: 2017-10-23

## 2017-10-23 ENCOUNTER — TELEPHONE (OUTPATIENT)
Dept: FAMILY MEDICINE | Facility: CLINIC | Age: 47
End: 2017-10-23

## 2017-10-23 DIAGNOSIS — F41.9 ANXIETY: ICD-10-CM

## 2017-10-23 NOTE — TELEPHONE ENCOUNTER
Received message from [facility] on Agatha. BP elevated at 152/96.  blood pressure was good at last visit here.     Request recheck of blood pressure again within one week. Notify of blood pressure at that time. Will consider increase of lisinopril if still elevated.

## 2017-10-23 NOTE — TELEPHONE ENCOUNTER
Home care called back, wants to know if Dr. Kasper would like a repeat UA this Wednesday when home care sees patient again. Routing to PCP.

## 2017-10-24 ENCOUNTER — TELEPHONE (OUTPATIENT)
Dept: FAMILY MEDICINE | Facility: CLINIC | Age: 47
End: 2017-10-24

## 2017-10-24 ENCOUNTER — MEDICAL CORRESPONDENCE (OUTPATIENT)
Dept: HEALTH INFORMATION MANAGEMENT | Facility: CLINIC | Age: 47
End: 2017-10-24

## 2017-10-24 NOTE — TELEPHONE ENCOUNTER
Reason for call:  Form  Reason for Call:  Form, our goal is to have forms completed with 72 hours, however, some forms may require a visit or additional information.    Type of letter, form or note:  medical    Who is the form from?: [facility]    Where did the form come from: form was faxed in    What clinic location was the form placed at?: [facility] - [phone]    Where the form was placed:  in box     What number is listed as a contact on the form?: [phone]       Additional comments: Fax to [phone]    Call taken on 11/8/2016 at 3:08 PM by Daiana Barajas

## 2017-10-24 NOTE — TELEPHONE ENCOUNTER
Reason for call:  Form  Reason for Call:  Form, our goal is to have forms completed with 72 hours, however, some forms may require a visit or additional information.    Type of letter, form or note:  Medical    Who is the form from?:[facility]       Where did the form come from: form was faxed in    What clinic location was the form placed at?: [facility] - [phone]    Where the form was placed: 's in box     What number is listed as a contact on the form?: [phone]       Additional comments: Fax back to [phone]    Call taken on 10/24/2017 at 7:26 AM by Leland Gaines

## 2017-10-25 NOTE — TELEPHONE ENCOUNTER
buPROPion (WELLBUTRIN SR) 150 MG 12 hr tablet  Routing refill request to provider for review/approval because:  A break in medication, should have needed refills around 07/2017  Completed a virtual visit with SF 09/26/2017    PHQ-9 score:    PHQ-9 SCORE 6/27/2017   Total Score -   Total Score 8     Snehal Geiger RN, BSN

## 2017-10-26 ENCOUNTER — TELEPHONE (OUTPATIENT)
Dept: FAMILY MEDICINE | Facility: CLINIC | Age: 47
End: 2017-10-26

## 2017-10-26 NOTE — TELEPHONE ENCOUNTER
Reason for call:  Form  Reason for Call:  Form, our goal is to have forms completed with 72 hours, however, some forms may require a visit or additional information.    Type of letter, form or note:  medical    Who is the form from?: [facility]    Where did the form come from: form was faxed in    What clinic location was the form placed at?: [facility] - [phone]    Where the form was placed:  in box     What number is listed as a contact on the form?: [phone]       Additional comments: Fax to [phone]    Call taken on 11/8/2016 at 3:08 PM by Daiana Barajas

## 2017-10-27 DIAGNOSIS — Z53.9 DIAGNOSIS NOT YET DEFINED: Primary | ICD-10-CM

## 2017-10-27 PROCEDURE — G0179 MD RECERTIFICATION HHA PT: HCPCS | Performed by: FAMILY MEDICINE

## 2017-10-27 RX ORDER — BUPROPION HYDROCHLORIDE 150 MG/1
150 TABLET, EXTENDED RELEASE ORAL 2 TIMES DAILY
Qty: 60 TABLET | Refills: 2 | Status: SHIPPED | OUTPATIENT
Start: 2017-10-27 | End: 2018-04-11

## 2017-11-06 ENCOUNTER — MEDICAL CORRESPONDENCE (OUTPATIENT)
Dept: HEALTH INFORMATION MANAGEMENT | Facility: CLINIC | Age: 47
End: 2017-11-06

## 2017-11-07 DIAGNOSIS — Z00.00 ROUTINE GENERAL MEDICAL EXAMINATION AT A HEALTH CARE FACILITY: ICD-10-CM

## 2017-11-09 NOTE — TELEPHONE ENCOUNTER
Requested Prescriptions   Pending Prescriptions Disp Refills     multivitamin, therapeutic with minerals (CERTAVITE/ANTIOXIDANTS) TABS tablet 30 tablet 5     Sig: Take 1 tablet by mouth daily    Vitamin Supplements (Adult) Protocol Passed    11/7/2017  3:51 PM       Passed - High dose Vitamin D not ordered       Passed - Recent or future visit with authorizing provider's specialty    Patient had office visit in the last year or has a visit in the next 30 days with authorizing provider.  See chart review.   Last OV 6/27/17           Passed - Patient is age 18 or older        Routing refill request to provider for review/approval because:  A break in medication-last prescribed 4/30/2012    Susan Ordoñez, RN, BSN

## 2017-11-10 RX ORDER — MULTIPLE VITAMINS W/ MINERALS TAB 9MG-400MCG
1 TAB ORAL DAILY
Qty: 30 TABLET | Refills: 5 | Status: SHIPPED | OUTPATIENT
Start: 2017-11-10 | End: 2018-10-01

## 2017-11-14 ENCOUNTER — TELEPHONE (OUTPATIENT)
Dept: FAMILY MEDICINE | Facility: CLINIC | Age: 47
End: 2017-11-14

## 2017-11-14 NOTE — TELEPHONE ENCOUNTER
Reason for call:  Form  Reason for Call:  Form, our goal is to have forms completed with 72 hours, however, some forms may require a visit or additional information.    Type of letter, form or note:  medical    Who is the form from?: [facility] [employer]    Where did the form come from: form was faxed in    What clinic location was the form placed at?: [facility] - [phone]    Where the form was placed:  in box     What number is listed as a contact on the form?: [phone]       Additional comments: Fax to [phone]    Call taken on 11/8/2016 at 3:08 PM by Daiana Barajas

## 2017-11-15 ENCOUNTER — TRANSFERRED RECORDS (OUTPATIENT)
Dept: HEALTH INFORMATION MANAGEMENT | Facility: CLINIC | Age: 47
End: 2017-11-15

## 2017-11-15 ENCOUNTER — MEDICAL CORRESPONDENCE (OUTPATIENT)
Dept: HEALTH INFORMATION MANAGEMENT | Facility: CLINIC | Age: 47
End: 2017-11-15

## 2017-11-16 ENCOUNTER — TELEPHONE (OUTPATIENT)
Dept: FAMILY MEDICINE | Facility: CLINIC | Age: 47
End: 2017-11-16

## 2017-11-16 DIAGNOSIS — I10 BENIGN ESSENTIAL HYPERTENSION: Primary | ICD-10-CM

## 2017-11-16 DIAGNOSIS — T83.511A URINARY TRACT INFECTION ASSOCIATED WITH INDWELLING URETHRAL CATHETER, INITIAL ENCOUNTER (H): ICD-10-CM

## 2017-11-16 DIAGNOSIS — N39.0 URINARY TRACT INFECTION ASSOCIATED WITH INDWELLING URETHRAL CATHETER, INITIAL ENCOUNTER (H): ICD-10-CM

## 2017-11-16 RX ORDER — CEPHALEXIN 500 MG/1
500 CAPSULE ORAL 3 TIMES DAILY
Qty: 30 CAPSULE | Refills: 0 | Status: SHIPPED | OUTPATIENT
Start: 2017-11-16 | End: 2018-01-17

## 2017-11-16 RX ORDER — LISINOPRIL 20 MG/1
20 TABLET ORAL DAILY
Qty: 90 TABLET | Refills: 1 | Status: SHIPPED | OUTPATIENT
Start: 2017-11-16 | End: 2018-04-11

## 2017-11-16 NOTE — TELEPHONE ENCOUNTER
Appears to be UTI present. Order for keflex sent to the pharmacy.     Please be sure that urine culture is being performed.     Reviewed most recent blood pressure reading sent from home health. Recommend increase in lisinopril to 20 mg daily. Check bmp in one month. Order placed.

## 2017-11-16 NOTE — TELEPHONE ENCOUNTER
Left detailed message informing Ligia  Asked her to call the hospital to ensure the UC is being completed..

## 2017-11-16 NOTE — TELEPHONE ENCOUNTER
Ligia from [facility] calling to let you know that she faxed UA results to you today. Her phone # [phone].  Thank you,  Agatha Fair- Pt Rep.

## 2017-11-20 ENCOUNTER — TELEPHONE (OUTPATIENT)
Dept: FAMILY MEDICINE | Facility: CLINIC | Age: 47
End: 2017-11-20

## 2017-11-20 NOTE — TELEPHONE ENCOUNTER
Urine culture results obtained.     Urine culture shows E.coli. Patient started on Keflex. This should take care of the infection. If continued symptoms, let me know. Consider switching to nitrofurantoin.     Reviewed blood pressure. Still elevated. Recheck in one week. Sooner if needed.

## 2017-11-24 ENCOUNTER — TELEPHONE (OUTPATIENT)
Dept: FAMILY MEDICINE | Facility: CLINIC | Age: 47
End: 2017-11-24

## 2017-11-24 DIAGNOSIS — G89.21 CHRONIC PAIN DUE TO INJURY: ICD-10-CM

## 2017-11-24 DIAGNOSIS — G82.20 PARAPLEGIA (H): ICD-10-CM

## 2017-11-24 RX ORDER — OXYCODONE AND ACETAMINOPHEN 7.5; 325 MG/1; MG/1
2 TABLET ORAL EVERY 8 HOURS PRN
Qty: 180 TABLET | Refills: 0 | Status: SHIPPED | OUTPATIENT
Start: 2017-11-24 | End: 2017-12-22

## 2017-11-24 RX ORDER — METHADONE HYDROCHLORIDE 5 MG/1
TABLET ORAL
Qty: 300 TABLET | Refills: 0 | Status: SHIPPED | OUTPATIENT
Start: 2017-11-24 | End: 2017-12-22

## 2017-11-24 NOTE — TELEPHONE ENCOUNTER
Reason for Call:  Medication or medication refill:    Do you use a [facility]?  Name of the pharmacy and phone number for the current request:  [facility] [place]    Name of the medication requested: methadone and percocet    Other request: please mail to [facility] pharmacy    Can we leave a detailed message on this number? YES    Phone number patient can be reached at: Home number on file [phone] (home)    Best Time: any    Call taken on 11/24/2017 at 2:01 PM by Priscilla Mobley

## 2017-11-24 NOTE — TELEPHONE ENCOUNTER
New prescriptions written and to be mailed. Last prescription was dated 10/1/2017 for 30 day prescription. 6+ weeks after the last prescription.    To be mailed as requested.    Jake Singleton MD  Please close encounter when call/work completed.

## 2017-11-29 ENCOUNTER — TELEPHONE (OUTPATIENT)
Dept: FAMILY MEDICINE | Facility: CLINIC | Age: 47
End: 2017-11-29

## 2017-11-29 NOTE — TELEPHONE ENCOUNTER
Larisa the Home Care nurse called and states that this patient is on antibiotics for a UTI and is showing signs of a low grade fever 99.5.  She has burning pain and yeast on groin and buttocks area.  Please call Larisa this morning or the patient this afternoon.

## 2017-11-29 NOTE — TELEPHONE ENCOUNTER
SF: Please review and advise. Home Care nurse is concerned about a yeasty rash on the bottox and groin area that is very red. Patient started Nystatin cream last night and is feeling some relief from this burning rash. Urine looks clear today. Blood pressure was a little high this morning right after she took her medication - Larisa was unable to get into the patients chart to see the exact results, and last week the blood pressure was perfect. Heart rate was around  today. Lungs sound clear. Please review and advise if you would like the patient seen in clinic, or consider E-visit? Please inform patient of recommendations as Larisa [facility] nurse is unavailable this afternoon. Pharmacy selected in case needed.     Spoke with Larisa from [facility].   Agatha Canas is a 47 year old female who calls with ongoing UTI symptoms.    NURSING ASSESSMENT:  Description:  Patient started Keflex 11/20/2017. Urine looked clear today. Worsening pain with rash that feels burning, has a yeasty rash on her buttox and in her groin that is very red. Temperature was 99.5F. Patient is concerned that her temperature may have been higher last night. Started using nystatin cream to the bottox and groin area and this is making the rash feel better. Blood pressure was a little high this morning right after she took her medication - Larisa was unable to get into the patients chart to see the exact results, and last week the blood pressure was perfect. Heart rate was around  today. Lungs sound clear.   Onset/duration:  ongoing  Pain scale (0-10)   9/10 last night before the cream for the rash  LMP/preg/breast feeding:  No LMP recorded. Patient is not currently having periods (Reason: Irregular Periods).  Last exam/Treatment:  09/26/2017  Allergies:   Allergies   Allergen Reactions     Penicillins Hives     Pt states it was quite a long time ago so she doesn't exactly remember     Clindamycin Hives and Rash      NURSING PLAN: Routed to provider Yes    RECOMMENDED DISPOSITION:  TBd  Will comply with recommendation: N/A  If further questions/concerns or if symptoms do not improve, worsen or new symptoms develop, call your PCP or [facility] as soon as possible.    NOTES:  Disposition was determined by the first positive assessment question, therefore all previous assessment questions were negative    Guideline used:  Telephone Triage Protocols for Nurses, Fifth Edition, Donna Chase  Urination Painful  Rash, Adult  Nursing Judgment  Routing to  to review and advise    Snehal Geiger RN, BSN

## 2017-12-04 DIAGNOSIS — G82.20 PARAPLEGIA (H): ICD-10-CM

## 2017-12-04 DIAGNOSIS — G89.21 CHRONIC PAIN DUE TO INJURY: ICD-10-CM

## 2017-12-06 RX ORDER — BACLOFEN 20 MG/1
TABLET ORAL
Qty: 90 TABLET | Refills: 3 | Status: SHIPPED | OUTPATIENT
Start: 2017-12-06 | End: 2018-04-03

## 2017-12-06 RX ORDER — PREGABALIN 200 MG/1
CAPSULE ORAL
Qty: 270 CAPSULE | Refills: 3 | Status: SHIPPED | OUTPATIENT
Start: 2017-12-06 | End: 2018-05-25

## 2017-12-06 NOTE — TELEPHONE ENCOUNTER
2/11/2020          To Whom It May Concern:    Luz Nunn is currently under my medical care and may not return to work at this time. Please excuse Sanjeev Stern for 3 days. She may return to work on 2/14/20.     If you require additional information p Routing refill request to provider for review/approval because:  Drug not on the [facility] refill protocol     Ana Martin RN

## 2017-12-13 DIAGNOSIS — F41.9 ANXIETY: ICD-10-CM

## 2017-12-14 NOTE — TELEPHONE ENCOUNTER
Requested Prescriptions   Pending Prescriptions Disp Refills     venlafaxine (EFFEXOR) 75 MG tablet [Pharmacy Med Name: VENLAFAXINE HCL 75 MG TAB 75 TAB] 180 tablet 0     Sig: TAKE 1 TABLET (75 MG) BY MOUTH 2 TIMES DAILY    Serotonin-Norepinephrine Reuptake Inhibitors  Failed    12/14/2017  4:08 PM       Failed - PHQ-9 score of less than 5 in past 6 months    Please review PHQ-9 score.          Failed - Normal serum creatinine on file in past 12 months    Recent Labs   Lab Test 12/02/16   CR  1.0            Passed - Blood pressure under 140/90    BP Readings from Last 3 Encounters:   06/27/17 110/60   11/25/16 132/72   07/06/16 138/88                Passed - Recent or future visit with authorizing provider's specialty    Patient had office visit in the last year or has a visit in the next 30 days with authorizing provider.  See chart review.              Passed - Patient is age 18 or older       Passed - No active pregnancy on record       Passed - No positive pregnancy test in past 12 months       Passed - Recent (6 mo) or future visit with authorizing provider's specialty    Patient had office visit in the last 6 months or has a visit in the next 30 days with authorizing provider.  See chart review.             Routing refill request to provider for review/approval because:  Pt reports not taking medication on 9/26/17.    Ana Martin RN

## 2017-12-15 RX ORDER — VENLAFAXINE 75 MG/1
TABLET ORAL
Qty: 180 TABLET | Refills: 0 | Status: SHIPPED | OUTPATIENT
Start: 2017-12-15 | End: 2018-03-07

## 2017-12-21 ENCOUNTER — TELEPHONE (OUTPATIENT)
Dept: FAMILY MEDICINE | Facility: CLINIC | Age: 47
End: 2017-12-21

## 2017-12-21 DIAGNOSIS — G82.20 PARAPLEGIA (H): Primary | ICD-10-CM
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
## 2017-12-21 NOTE — TELEPHONE ENCOUNTER
Ligia is a nurse calling from [facility] and wants to give an update on Agatha and why she had missed the appointment with . I guess Mom is her PCA and she is responsible for taking her to her appointments.  Please advise and is okay to leave a message.

## 2017-12-22 ENCOUNTER — TELEPHONE (OUTPATIENT)
Dept: FAMILY MEDICINE | Facility: CLINIC | Age: 47
End: 2017-12-22

## 2017-12-22 ENCOUNTER — CARE COORDINATION (OUTPATIENT)
Dept: CARE COORDINATION | Facility: CLINIC | Age: 47
End: 2017-12-22

## 2017-12-22 DIAGNOSIS — G82.20 PARAPLEGIA (H): ICD-10-CM

## 2017-12-22 DIAGNOSIS — G89.21 CHRONIC PAIN DUE TO INJURY: ICD-10-CM

## 2017-12-22 RX ORDER — OXYCODONE AND ACETAMINOPHEN 7.5; 325 MG/1; MG/1
2 TABLET ORAL EVERY 8 HOURS PRN
Qty: 180 TABLET | Refills: 0 | Status: SHIPPED | OUTPATIENT
Start: 2017-12-22 | End: 2018-01-17

## 2017-12-22 RX ORDER — METHADONE HYDROCHLORIDE 5 MG/1
TABLET ORAL
Qty: 300 TABLET | Refills: 0 | Status: SHIPPED | OUTPATIENT
Start: 2017-12-22 | End: 2018-01-17

## 2017-12-22 NOTE — TELEPHONE ENCOUNTER
Percocet       Last Written Prescription Date:  11/24/17  Last Fill Quantity: 180,   # refills: 0  Last Office Visit: 6/27/17  Future Office visit:    Next 5 appointments (look out 90 days)     Leland 10, 2018 11:20 AM CST   [facility]    [address], [address]  [facility] [address]   [phone]                   Routing refill request to provider for review/approval because:  Drug not on the [facility], [facility] or [facility] refill protocol or controlled substance      Methadone       Last Written Prescription Date:  11/24/17  Last Fill Quantity: 300,   # refills: 0  Last Office Visit: 6/27/17  Future Office visit:    Next 5 appointments (look out 90 days)     Leland 10, 2018 11:20 AM CST   [facility]   [facility]    [address], [address]  [facility] [address]   [phone]                   Routing refill request to provider for review/approval because:  Drug not on the [facility], [facility] or [facility] refill protocol or controlled substance

## 2017-12-22 NOTE — TELEPHONE ENCOUNTER
Reason for call:  Medication  Reason for Call:  Medication or medication refill:    Do you use a [facility]?  Name of the pharmacy and phone number for the current request:  [facility] [place]    Name of the medication requested: percocet and methodone    Other request:     Can we leave a detailed message on this number? YES    Phone number patient can be reached at: Home number on file [phone] (home)    Best Time: any      Call taken on 12/22/2017 at 12:11 PM by Daiana Barajas

## 2017-12-22 NOTE — TELEPHONE ENCOUNTER
Prescription printed in my office. Please have another provider sign if willing.       Will need to see patient prior to further refills.
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
## 2017-12-23 ENCOUNTER — MEDICAL CORRESPONDENCE (OUTPATIENT)
Dept: HEALTH INFORMATION MANAGEMENT | Facility: CLINIC | Age: 47
End: 2017-12-23

## 2017-12-27 ENCOUNTER — CARE COORDINATION (OUTPATIENT)
Dept: CARE COORDINATION | Facility: CLINIC | Age: 47
End: 2017-12-27
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
## 2017-12-27 NOTE — LETTER
Health Care Home - Access Care Plan    About Me  Patient Name:  Agatha Canas    YOB: 1970  Age:                             47 year old   P[place]avi MRN:            [id number] Telephone Information:     Home Phone [phone]   Mobile [phone]       Address:    [address] [place] [address]  [place] [address] Email address:  [email or web address]      Emergency Contact(s)  Name Relationship Lgl Grd Work Phone Home Phone Mobile Phone   1. ABBY GREER Relative   [phone]    2. NONE PER PT                  Health Maintenance:      My Access Plan  Medical Emergency 911   Questions or concerns during clinic hours Primary Clinic Line, I will call the clinic directly: Primary Clinic: [facility] [place] [phone]   24 Hour Appointment Line [phone] or  [phone]) (toll free)   24 Hour Nurse Line [phone] (toll free)   Questions or concerns outside clinic hours 24 Hour Appointment Line, I will call the after-hours on-call line: [facility] [phone] or [phone]) (toll-free)   Preferred Urgent Care Preferred Urgent Care: Other   Preferred Hospital Preferred Hospital: Other   Preferred Pharmacy [id number] [place] [place] - [address] AT [address]     Behavioral Health Crisis Line The National Suicide Prevention Lifeline at 1-874.743.4853 or 911     My Care Team Members  Patient Care Team       Relationship Specialty Notifications Start End    Dipti Kasper MD PCP - General   7/22/08     Phone: [phone] Fax: [phone] [address] [place] [address]        My Medical and Care Information  Problem List   Patient Active Problem List   Diagnosis     Paraplegia (H)     Injury, other and unspecified, other specified sites, including multiple     Acute posthemorrhagic anemia     Open fracture of ischium (H)     Open fracture of sacrum and coccyx (H)     Cellulitis and abscess     Open  fracture of lumbar spine with spinal cord injury (H)     Prolonged depressive reaction     Other and unspecified alcohol dependence, unspecified drinking behavior     Cellulitis of leg     Constipation     Chronic pain syndrome     Uterovaginal prolapse, incomplete     Other disorder of menstruation and other abnormal bleeding from female genital tract     CARDIOVASCULAR SCREENING; LDL GOAL LESS THAN 160     Health Care Home     Dermatitis     Rosacea     Anxiety associated with depression     PTSD (post-traumatic stress disorder)     Urinary incontinence     Hypothyroidism     Tobacco use disorder     Chantel gangrene     Colostomy status (H)     Open wound of buttock, complicated     Controlled substance agreement signed     Anxiety     Latex sensitivity     Recurrent UTI     Benign essential hypertension      Current Medications and Allergies:  See printed Medication Report

## 2017-12-27 NOTE — LETTER
[facility]   [id number] [place] [address]    [place] [address]      December 27, 2017    Agatha Canas  [address] [place] [id number]      Dear Agatha,    I am covering for Danielle Segovia, the clinic care coordinator- who works with Dipti Kasper MD, at [facility] and I wanted to introduce myself and provide you with my contact information so that you can call me with questions or concerns about your health care. Below is a description of clinic care coordination and how I can further assist you.     The clinic care coordinator is a registered nurse and/or  who understand the health care system. The goal of clinic care coordination is to help you manage your health and improve access to the [facility] system in the most efficient manner. The registered nurse can assist you in meeting your health care goals by providing education, coordinating services, and strengthening the communication among your providers. The  can assist you with financial, behavioral, psychosocial, chemical dependency, counseling, and/or psychiatric resources.    Please feel free to contact me at [phone], with any questions or concerns. We at [facility] are focused on providing you with the highest-quality healthcare experience possible and that all starts with you.     Sincerely,     Agatha Pretty on behalf of Danielle Segovia [facility]  , Clinic Care Coordination  Clinics:  [place], [place], [place]  [phone]      Enclosed: I have enclosed a copy of a 24 Hour Access Plan. This has helpful phone numbers for you to call when needed. Please keep this in an easy to access place to use as needed.

## 2017-12-28 ENCOUNTER — TELEPHONE (OUTPATIENT)
Dept: FAMILY MEDICINE | Facility: CLINIC | Age: 47
End: 2017-12-28

## 2017-12-28 DIAGNOSIS — Z53.9 DIAGNOSIS NOT YET DEFINED: Primary | ICD-10-CM

## 2017-12-28 PROCEDURE — G0179 MD RECERTIFICATION HHA PT: HCPCS | Performed by: FAMILY MEDICINE

## 2017-12-28 NOTE — TELEPHONE ENCOUNTER
Reason for call:  Form  Reason for Call:  Form, our goal is to have forms completed with 72 hours, however, some forms may require a visit or additional information.    Type of letter, form or note:  Medical    Who is the form from?: [facility]      Where did the form come from: form was faxed in    What clinic location was the form placed at?: [facility] - [phone]    Where the form was placed: 's in box     What number is listed as a contact on the form?: [phone]       Additional comments: Fax back to [phone]    Call taken on 12/28/2017 at 7:47 AM by Leland Gaines

## 2018-01-05 NOTE — PROGRESS NOTES
SUBJECTIVE:                                                    Agatha Canas is a 47 year old female who presents to clinic today for the following health issues:      HPI    Hypertension Follow-up      Outpatient blood pressures are not being checked.     Low Salt Diet: low salt    Depression and Anxiety Follow-Up    Status since last visit: Improved     Other associated symptoms :Anxiety     Complicating factors:     Significant life event: No     Current substance abuse: None      PHQ-9 Score and MyChart F/U Questions 7/6/2016 11/25/2016 6/27/2017   Total Score 5 3 8   Q9: Suicide Ideation Not at all Not at all Not at all     SHYLA-7 SCORE 7/6/2016 11/25/2016 6/27/2017   Total Score - - -   Total Score 4 2 4       PHQ-9  English  PHQ-9   Any Language  GAD7  Suicide Assessment Five-step Evaluation and Treatment (SAFE-T)    Chronic Pain Follow-Up       Type / Location of Pain: legs , lower back   Analgesia/pain control:       Recent changes:  worse      Overall control: Inadequate pain control  Activity level/function:      Daily activities:  Able to do moderate activities    Work:  Patient does not work   Adverse effects:  No  Adherance    Taking medication as directed?  Yes    Participating in other treatments: no -   Risk Factors:    Sleep:  Fair    Mood/anxiety:  controlled    Recent family or social stressors:  none noted    Other aggravating factors: prolonged sitting and prolonged standing     Chronic Pain: She didn't realize how much the valium helped until it was taken away. Has been in more pain since stopping. Wants to increase percocet by 2 and decrease some of her other pain medications. States that generic percocet brands do not work for her. Has been on vistaril in the past by her pain clinic and would like to consider this again as a PRN to see if it helps prolong her pain pills. Would like her oxycodone to be every 4-6 hours instead of waiting 8 hours in between doses. She is afraid to go back  "to a pain clinic because they might take her medications away. When she left the pain clinic she states \"I was on \"8 pills and vistaril.\"   Would like to go off Lyrica and go back on Gabapentin sometime in the future because Lyrica does not work \"unless you take it at the exact time\" everyday.   She is wanting to discuss a change in the medication. Consider weaning the methadone and increase the percocet   She feels that she has more pain since the valium was discontinued.   She still has baclofen for her muscle spasms.       PHQ-9 SCORE 7/6/2016 11/25/2016 6/27/2017   Total Score - - -   Total Score 5 3 8     SHYLA-7 SCORE 7/6/2016 11/25/2016 6/27/2017   Total Score - - -   Total Score 4 2 4     Encounter-Level CSA - 10/13/2015:          Controlled Substance Agreement - Scan on 10/19/2015 10:40 AM : CONTROLLED SUBSTANCE AGREEMENT (below)              URI: Has had a few days of head cold type symptoms with congestion and drainage. Had ill contact with her niece.     : Has stomach cramps, low back pain, and believes she has a UTI. Had a few low grade subjective fevers.     Thumb: R thumb nail has fungus. She has trimmed her nail back farther and farther to try and get rid of it.     Home: Things are better at home with her mom. Brother is out of the picture now so her mother is \"nicer\". Getting the care she needs at home.    Problem list and histories reviewed & adjusted, as indicated.  Additional history: as documented      BP Readings from Last 3 Encounters:   01/17/18 112/60   06/27/17 110/60   11/25/16 132/72    Wt Readings from Last 3 Encounters:   09/30/11 68 kg (150 lb)   06/27/11 65.8 kg (145 lb)   10/18/10 77.6 kg (171 lb 1.6 oz)           ROS:  C: NEGATIVE for fever, chills, change in weight  I: NEGATIVE for worrisome rashes, moles or lesions  E: NEGATIVE for vision changes or irritation  ENT/MOUTH: POSITIVE for nasal congestion, postnasal drainage and rhinorrhea-clear  R: NEGATIVE for significant cough or " SOB  B: NEGATIVE for masses, tenderness or discharge  CV: NEGATIVE for chest pain, palpitations or peripheral edema  GI: NEGATIVE for nausea, heartburn, or change in bowel habits. POSITIVE for abdominal cramps  : NEGATIVE for frequency, dysuria, or hematuria  M: POSITIVE for low back pain  N: NEGATIVE for weakness, dizziness or paresthesias  E: NEGATIVE for temperature intolerance, skin/hair changes  H: NEGATIVE for bleeding problems  P: NEGATIVE for changes in mood or affect     The information in this document, created by Ysabel Johnson the medical scribe for me, accurately reflects the services I personally performed and the decisions made by me. I have reviewed and approved this document for accuracy prior to leaving the patient care area.  12:36 PM January 17, 2018  OBJECTIVE:     /60  Pulse 141  Temp 98  F (36.7  C) (Temporal)  Resp 20  SpO2 97%  There is no height or weight on file to calculate BMI.  GENERAL: healthy, alert and no distress. In a wheelchair wearing a mask due to cold  HENT: ear canals and TM's normal, nose without ulcers or lesions. Patient refused mouth exam.  NECK: no adenopathy, no asymmetry, masses, or scars and thyroid normal to palpation  RESP: lungs clear to auscultation - no rales, rhonchi or wheezes  ABDOMEN: Patient refused abdominal exam  CV: regular rate and rhythm, normal S1 S2, no S3 or S4, no murmur, click or rub, no peripheral edema and peripheral pulses strong  MS: no gross musculoskeletal defects noted, no edema  SKIN: Thumb nail L hand, thickened yellow nail present.  PSYCH: mentation appears normal, affect normal/bright    Diagnostic Test Results:  Urinalysis - patient failed to leave a sample    ASSESSMENT/PLAN:           1. Chronic pain syndrome  Patient on controlled substance agreement.   Discussed with her at length that a change in methadone and percocet would not happen.   Recommend that this be managed with pain management.   She asks multiple times  "about making a change with this. She doesn't want to return to pain management.   She is concerned that \"everything will change\"  She would like to add vistaril to her regimen as needed.   Discussed that would like her to be seen by Pain Management since she has been on her current regiment for several years without specialty evaluation.   Discussed that this must be done. This needs to be done within 3 months for initial evaluation.   She expresses understanding.   - hydrOXYzine (VISTARIL) 25 MG capsule; Take 1 capsule (25 mg) by mouth 3 times daily as needed for itching  Dispense: 90 capsule; Refill: 0  - PAIN MANAGEMENT REFERRAL    2. Paraplegia (H)  See above for plan  - methadone (DOLOPHINE) 5 MG tablet; 20 mg po qam, 15 mg at 1 pm daily and 15 mg at bedtime. To last 30 days.  Dispense: 300 tablet; Refill: 0  - oxyCODONE-acetaminophen (PERCOCET) 7.5-325 MG per tablet; Take 2 tablets by mouth every 6 hours as needed for pain Max 6 per day  Dispense: 180 tablet; Refill: 0  - PAIN MANAGEMENT REFERRAL    3. Muscle spasticity  Referral given  Can continue with baclofen.   - PAIN MANAGEMENT REFERRAL    4. Abdominal cramping  Has abdominal cramping with past UTI.   Recommend UA be done today.   She left without completing.   She can complete with home health.   - *UA reflex to Microscopic and Culture ([facility] (except [place] [facility] [place]); Future    5. Urinary catheter in place  See above.   - *UA reflex to Microscopic and Culture ([facility] [place] [facility] [place]); Future    6. Recurrent UTI  Patient has history of recurrent UTI. See above.   - *UA reflex to Microscopic and Culture ([facility] [place] [facility] [place]); Future    7. Onychomycosis  Patient with thickened yellow nail. Will give topical solution to apply daily.  Discussed this may be expensive and will call back as needed.   - Efinaconazole 10 % SOLN; Externally apply topically daily "  Dispense: 8 mL; Refill: 3    8. Need for prophylactic vaccination and inoculation against influenza  Completed    9. Screening for malignant neoplasm of cervix  Future    10. 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  Patient on controlled substance agreement.  See above.   - methadone (DOLOPHINE) 5 MG tablet; 20 mg po qam, 15 mg at 1 pm daily and 15 mg at bedtime. To last 30 days.  Dispense: 300 tablet; Refill: 0  - oxyCODONE-acetaminophen (PERCOCET) 7.5-325 MG per tablet; Take 2 tablets by mouth every 6 hours as needed for pain Max 6 per day  Dispense: 180 tablet; Refill: 0    FUTURE APPOINTMENTS:       - Follow-up visit in 3 months     All questions invited, asked and answered to the patient's apparent satisfaction.  Patient agrees to plan.     >50% of this 45 minute visit spent in counseling and plan of care for the above diagnoses      STANLEY DAVILA MD, MD  [facility]

## 2018-01-09 ENCOUNTER — TELEPHONE (OUTPATIENT)
Dept: FAMILY MEDICINE | Facility: CLINIC | Age: 48
End: 2018-01-09

## 2018-01-09 NOTE — TELEPHONE ENCOUNTER
Reason for call:  Form  Reason for Call:  Form, our goal is to have forms completed with 72 hours, however, some forms may require a visit or additional information.    Type of letter, form or note:  medical    Who is the form from?: [facility] [employer]    Where did the form come from: form was faxed in    What clinic location was the form placed at?: [facility] - [phone]    Where the form was placed:  in box     What number is listed as a contact on the form?: [phone]       Additional comments: Fax to [phone]    Call taken on 11/8/2016 at 3:08 PM by Daiana Barajas

## 2018-01-15 NOTE — TELEPHONE ENCOUNTER
Received form back from [facility] requesting corrections.  Please provider the frequency of use/daily for each item.  Note that PRN is not valid per insurance.     initial, date and refax.    Form placed in Providers in-box to be completed.

## 2018-01-17 ENCOUNTER — OFFICE VISIT (OUTPATIENT)
Dept: FAMILY MEDICINE | Facility: CLINIC | Age: 48
End: 2018-01-17
Payer: COMMERCIAL

## 2018-01-17 VITALS
OXYGEN SATURATION: 97 % | RESPIRATION RATE: 20 BRPM | SYSTOLIC BLOOD PRESSURE: 112 MMHG | HEART RATE: 141 BPM | DIASTOLIC BLOOD PRESSURE: 60 MMHG | TEMPERATURE: 98 F

## 2018-01-17 DIAGNOSIS — R10.9 ABDOMINAL CRAMPING: ICD-10-CM

## 2018-01-17 DIAGNOSIS — Z96.0 URINARY CATHETER IN PLACE: ICD-10-CM

## 2018-01-17 DIAGNOSIS — Z12.4 SCREENING FOR MALIGNANT NEOPLASM OF CERVIX: ICD-10-CM

## 2018-01-17 DIAGNOSIS — G82.20 PARAPLEGIA (H): ICD-10-CM

## 2018-01-17 DIAGNOSIS — N39.0 RECURRENT UTI: ICD-10-CM

## 2018-01-17 DIAGNOSIS — B35.1 ONYCHOMYCOSIS: ICD-10-CM

## 2018-01-17 DIAGNOSIS — G89.4 CHRONIC PAIN SYNDROME: Primary | ICD-10-CM

## 2018-01-17 DIAGNOSIS — M62.838 MUSCLE SPASTICITY: ICD-10-CM

## 2018-01-17 DIAGNOSIS — G89.21 CHRONIC PAIN DUE TO INJURY: ICD-10-CM

## 2018-01-17 DIAGNOSIS — Z23 NEED FOR PROPHYLACTIC VACCINATION AND INOCULATION AGAINST INFLUENZA: ICD-10-CM

## 2018-01-17 PROCEDURE — 90471 IMMUNIZATION ADMIN: CPT | Performed by: FAMILY MEDICINE

## 2018-01-17 PROCEDURE — 99215 OFFICE O/P EST HI 40 MIN: CPT | Mod: 25 | Performed by: FAMILY MEDICINE

## 2018-01-17 PROCEDURE — 90686 IIV4 VACC NO PRSV 0.5 ML IM: CPT | Performed by: FAMILY MEDICINE

## 2018-01-17 RX ORDER — OXYCODONE AND ACETAMINOPHEN 7.5; 325 MG/1; MG/1
2 TABLET ORAL EVERY 6 HOURS PRN
Qty: 180 TABLET | Refills: 0 | Status: SHIPPED | OUTPATIENT
Start: 2018-01-21 | End: 2018-02-15

## 2018-01-17 RX ORDER — OXYCODONE AND ACETAMINOPHEN 7.5; 325 MG/1; MG/1
2 TABLET ORAL EVERY 8 HOURS PRN
Qty: 180 TABLET | Refills: 0 | Status: SHIPPED | OUTPATIENT
Start: 2018-01-21 | End: 2018-01-17

## 2018-01-17 RX ORDER — HYDROXYZINE PAMOATE 25 MG/1
25 CAPSULE ORAL 3 TIMES DAILY PRN
Qty: 90 CAPSULE | Refills: 0 | Status: SHIPPED | OUTPATIENT
Start: 2018-01-17 | End: 2019-03-18

## 2018-01-17 RX ORDER — METHADONE HYDROCHLORIDE 5 MG/1
TABLET ORAL
Qty: 300 TABLET | Refills: 0 | Status: SHIPPED | OUTPATIENT
Start: 2018-01-21 | End: 2018-02-15

## 2018-01-17 ASSESSMENT — ANXIETY QUESTIONNAIRES
5. BEING SO RESTLESS THAT IT IS HARD TO SIT STILL: SEVERAL DAYS
1. FEELING NERVOUS, ANXIOUS, OR ON EDGE: SEVERAL DAYS
2. NOT BEING ABLE TO STOP OR CONTROL WORRYING: SEVERAL DAYS
GAD7 TOTAL SCORE: 7
6. BECOMING EASILY ANNOYED OR IRRITABLE: SEVERAL DAYS
7. FEELING AFRAID AS IF SOMETHING AWFUL MIGHT HAPPEN: SEVERAL DAYS
IF YOU CHECKED OFF ANY PROBLEMS ON THIS QUESTIONNAIRE, HOW DIFFICULT HAVE THESE PROBLEMS MADE IT FOR YOU TO DO YOUR WORK, TAKE CARE OF THINGS AT HOME, OR GET ALONG WITH OTHER PEOPLE: SOMEWHAT DIFFICULT
3. WORRYING TOO MUCH ABOUT DIFFERENT THINGS: SEVERAL DAYS

## 2018-01-17 ASSESSMENT — PAIN SCALES - GENERAL: PAINLEVEL: MODERATE PAIN (5)

## 2018-01-17 ASSESSMENT — PATIENT HEALTH QUESTIONNAIRE - PHQ9
SUM OF ALL RESPONSES TO PHQ QUESTIONS 1-9: 12
5. POOR APPETITE OR OVEREATING: SEVERAL DAYS

## 2018-01-17 NOTE — MR AVS SNAPSHOT
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 Patient with paraplegia, has spasticity and chronic pain    Do you have any specific questions for the pain specialist?patient has been on current regimen for several years. Unsure if this is reasonable regimen.     Are there any red flags that may impact the assessment or management of the patient? None    For any questions, contact the [facility] [place] at [phone].     **ANY DIAGNOSTIC TESTS THAT ARE NOT IN EPIC SHOULD BE SENT TO THE PAIN CENTER**    REGARDING OPIOID MEDICATIONS:  We will always address appropriateness of opioid pain medications, but we generally will not automatically take on a prescribing role. When we do take on prescribing of opioids for chronic pain, it is in collaboration with the referring physician for an intermediate period of time (months), with an expectation that the primary physician or provider will assume the prescribing role if medications are effective at stable doses with demonstrated compliance.  Therefore,  please do not assume that your prescribing responsibilities end on the day of pain clinic consultation.  Is this agreeable to you? YES    Please be aware that coverage of these services is subject to the terms and limitations of your health insurance plan.  Call member services at your health plan with any benefit or coverage questions.      Please bring the following with you to your appointment:    (1) Any X-Rays, CTs or MRIs which have been performed.  Contact the facility where they were done to arrange for  prior to your scheduled appointment.    (2) List of current medications   (3) This referral request   (4) Any documents/labs given to you for this referral                  Who to contact     If you have questions or need follow up information about today's clinic visit or your schedule please contact [facility] directly at [phone].  Normal or non-critical lab and imaging results will be communicated to you by MyChart, letter or phone within 4 business days after the clinic has received the results. If you do not hear from us within 7 days, please contact the clinic through MyChart or phone. If you have a critical or abnormal lab result, we will notify you by phone as soon as possible.  Submit refill requests through [employer] or call your pharmacy and they will forward the refill request to us. Please allow 3 business days for your refill to be completed.          Additional Information About Your Visit        [employer] Information     [employer] gives you secure access to your electronic health record. If you see a primary care provider, you can also send messages to your care team and make appointments. If you have questions, please call your primary care clinic.  If you do not have a primary care provider, please call [phone] and they will assist you.        Care EveryWhere ID     This is your Care EveryWhere ID. This could be used by other organizations to access your [place]  medical records  [id number]        Your Vitals Were     Pulse Temperature Respirations Pulse Oximetry          141 98  F (36.7  C) (Temporal) 20 97%         Blood Pressure from Last 3 Encounters:   01/17/18 112/60   06/27/17 110/60   11/25/16 132/72    Weight from Last 3 Encounters:   09/30/11 150 lb (68 kg)   06/27/11 145 lb (65.8 kg)   10/18/10 171 lb 1.6 oz (77.6 kg)              We Performed the Following     FLU VAC, SPLIT VIRUS IM > 3 YO (QUADRIVALENT) [96239]     PAIN MANAGEMENT REFERRAL     Vaccine Administration, Initial [63960]          Today's Medication Changes          These changes are accurate as of: 1/17/18 11:59 PM.  If you have any questions, ask your nurse or doctor.               Start taking these medicines.        Dose/Directions    Efinaconazole 10 % Soln   Used for:  Onychomycosis   Started by:  Dipti Kasper MD        Externally apply topically daily   Quantity:  8 mL   Refills:  3       hydrOXYzine 25 MG capsule   Commonly known as:  VISTARIL   Used for:  Chronic pain syndrome   Started by:  Dipti Kasper MD        Dose:  25 mg   Take 1 capsule (25 mg) by mouth 3 times daily as needed for itching   Quantity:  90 capsule   Refills:  0       oxyCODONE-acetaminophen 7.5-325 MG per tablet   Commonly known as:  PERCOCET   Used for:  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, Paraplegia (H)   Started by:  Dipti Kasper MD        Dose:  2 tablet   Start taking on:  1/21/2018   Take 2 tablets by mouth every 6 hours as needed for pain Max 6 per day   Quantity:  180 tablet   Refills:  0         Stop taking these medicines if you haven't already. Please contact your care team if you have questions.     diazepam 5 MG tablet   Commonly known as:  VALIUM   Stopped by:  Dipti Kasper MD           hydrOXYzine 25 MG tablet   Commonly known as:  ATARAX   Stopped by:  Dipti Kasper MD           sulfamethoxazole-trimethoprim 800-160 MG per tablet   Commonly known as:  BACTRIM DS/SEPTRA DS   Stopped  by:  Dipti Kasper MD                Where to get your medicines      These medications were sent to [id number] - [place], [place] - [address]  [address] [address]     Phone:  [phone]     Efinaconazole 10 % Soln    hydrOXYzine 25 MG capsule         Some of these will need a paper prescription and others can be bought over the counter.  Ask your nurse if you have questions.     Bring a paper prescription for each of these medications     methadone 5 MG tablet    oxyCODONE-acetaminophen 7.5-325 MG per tablet                Primary Care Provider Office Phone # Fax #    Dipti Kasper [phone] [address] [facility] [address]        Equal Access to Services     MAURICE EUBANKS AH: Hadii lorene thompson hadasho Soomaali, waaxda luqadaha, qaybta kaalmada adeegyada, waxmanpreet rodgersin hayildefonso duncan . So [facility] [phone].    ATENCIÓN: Si habla español, tiene a amaya disposición servicios gratuitos de asistencia lingüística. [facility] [phone].    We comply with applicable federal civil rights laws and [place] laws. We do not discriminate on the basis of race, color, national origin, age, disability, sex, sexual orientation, or gender identity.            Thank you!     Thank you for choosing [facility]  for your care. Our goal is always to provide you with excellent care. Hearing back from our patients is one way we can continue to improve our services. Please take a few minutes to complete the written survey that you may receive in the mail after your visit with us. Thank you!             Your Updated Medication List - Protect others around you: Learn how to safely use, store and throw away your medicines at www.disposemymeds.org.          This list is accurate as of: 1/17/18 11:59 PM.  Always use your most recent med list.                   Brand Name Dispense Instructions for use Diagnosis    baclofen 20 MG tablet    LIORESAL    90 tablet     TAKE 1 TABLET (20 MG) BY MOUTH 3 TIMES DAILY    Paraplegia (H)       buPROPion 150 MG 12 hr tablet    WELLBUTRIN SR    60 tablet    Take 1 tablet (150 mg) by mouth 2 times daily    Anxiety       CALCIUM 600+D 600-200 MG-UNIT Tabs   Generic drug:  calcium carbonate-vitamin D           CRANBERRY PO      twice daily        Efinaconazole 10 % Soln     8 mL    Externally apply topically daily    Onychomycosis       FISH OIL PO      once daily        hydrocortisone 0.2 % cream    WESTCORT    15 g    Apply sparingly to affected area twice daily as needed. For up to two weeks.    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       levothyroxine 75 MCG tablet    SYNTHROID/LEVOTHROID    90 tablet    TAKE 1 TABLET (75 MCG) BY MOUTH DAILY    Hypothyroidism due to acquired atrophy of thyroid       lisinopril 20 MG tablet    PRINIVIL/ZESTRIL    90 tablet    Take 1 tablet (20 mg) by mouth daily    Benign essential hypertension       LYRICA 200 MG capsule   Generic drug:  Pregabalin     270 capsule    TAKE ONE CAPSULE THREE TIMES A DAY    Paraplegia (H), 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       * methadone 10 MG tablet    DOLOPHINE     Take 5 mg by mouth Takes 10 pills a day        * methadone 5 MG tablet    DOLOPHINE    300 tablet    20 mg po qam, 15 mg at 1 pm daily and 15 mg at bedtime. To last 30 days.    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 To last 30 days.    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 To last 30 days.    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    Paraplegia (H)       multivitamin, therapeutic with minerals Tabs tablet     30 tablet    Take 1 tablet by mouth daily    Routine general medical examination at a health care facility       nicotine 21 MG/24HR 24 hr patch    NICODERM CQ    30 patch    Place 1 patch onto the skin every 24 hours    Tobacco use disorder       nystatin cream    MYCOSTATIN    60 g    APPLY TOPICALLY 3 TIMES DAILY FOR 14 DAYS    Yeast infection of the skin       order for DME     200 each     LARGE DISPOSABLE UNDERPAD to use as needed.    Urinary incontinence       * order for DME     1 each    Equipment being ordered:irrigation kit for bladder catheter    UTI symptoms       * order for DME     2 each    Equipment being ordered: urinary catheter - recommend silicone catheter not latex due to sensitivity. 2 per month    Latex sensitivity, Urinary incontinence, unspecified incontinence type       * order for DME     28 each    Equipment being ordered: AG Batool 4X8, 28 per month.    Open wound of buttock, complicated, unspecified laterality, sequela       * order for DME     28 each    Equipment being ordered: aquafill silver 4x10, 28 per month    Open wound of buttock, complicated, unspecified laterality, sequela       * order for DME     28 each    Equipment being ordered: Aquaphil AG? 4x10? For 28 day supply monthly    Open wound of buttock, complicated, unspecified laterality, sequela       * order for DME     1 each    Therapeutic mattress    Paraplegia (H), Open wound of buttock, complicated, unspecified laterality, sequela       * order for DME     1 each    Order for hospital bed.    Paraplegia (H), Open wound of buttock, complicated, unspecified laterality, sequela        * order for DME     1 Units    Full  Hospital bed    Paraplegia (H), Open wound of buttock, complicated, unspecified laterality, sequela       oxybutynin 10 MG 24 hr tablet    DITROPAN XL    90 tablet    Take 1 tablet (10 mg) by mouth daily    Urinary incontinence, unspecified type       oxyCODONE-acetaminophen 7.5-325 MG per tablet   Start taking on:  1/21/2018    PERCOCET    180 tablet    Take 2 tablets by mouth every 6 hours as needed for pain Max 6 per day    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, Paraplegia (H)       * triamcinolone 0.1 % cream    KENALOG    30 g    Apply  topically 3 times daily. Apply sparingly to affected area.    Dyshidrotic eczema       * triamcinolone 0.5 % cream    KENALOG    30 g    APPLY SPARINGLY TO AFFECTED AREA THREE TIMES DAILY.    Atopic dermatitis, unspecified       venlafaxine 75 MG tablet    EFFEXOR    180 tablet    TAKE 1 TABLET (75 MG) BY MOUTH 2 TIMES DAILY    Anxiety       * Notice:  This list has 14 medication(s) that are the same as other medications prescribed for you. Read the directions carefully, and ask your doctor or other care provider to review them with you.

## 2018-01-17 NOTE — NURSING NOTE
"Chief Complaint   Patient presents with     Recheck Medication     Panel Management     vitals, pap, urine drug screen, phq9       Initial /60  Pulse 141  Temp 98  F (36.7  C) (Temporal)  Resp 20  SpO2 97% Estimated body mass index is 22.81 kg/(m^2) as calculated from the following:    Height as of 5/3/11: 5' 8\" (1.727 m).    Weight as of 9/30/11: 150 lb (68 kg).  Medication Reconciliation: complete     Nallely Rojo MA       "

## 2018-01-17 NOTE — NURSING NOTE
Patient was unable leave a urine sample and would prefer to have it done with home nurse. Sent home urine cup with the patient.     Juvenal Orourke MA

## 2018-01-17 NOTE — PROGRESS NOTES
Injectable Influenza Immunization Documentation    1.  Is the person to be vaccinated sick today?  Yes- no fever, just a cold per okay SF     2. Does the person to be vaccinated have an allergy to a component   of the vaccine?   No  Egg Allergy Algorithm Link    3. Has the person to be vaccinated ever had a serious reaction   to influenza vaccine in the past?   No    4. Has the person to be vaccinated ever had Guillain-Barré syndrome?   No    Form completed by Juvenal Orourke MA

## 2018-01-19 ENCOUNTER — TELEPHONE (OUTPATIENT)
Dept: FAMILY MEDICINE | Facility: CLINIC | Age: 48
End: 2018-01-19

## 2018-01-19 DIAGNOSIS — B35.1 ONYCHOMYCOSIS: Primary | ICD-10-CM

## 2018-01-19 ASSESSMENT — ANXIETY QUESTIONNAIRES: GAD7 TOTAL SCORE: 7

## 2018-01-19 NOTE — TELEPHONE ENCOUNTER
Prior Authorization Retail Medication Request  Medication/Dose: jublia solution 10 %    Provider, do you want to change the medication or start a PA?  Please advise on previously Tried and Failed Therapies:      Insurance ID (if provided): [id number]  Insurance Phone (if provided): [phone]

## 2018-01-19 NOTE — TELEPHONE ENCOUNTER
Unable to use oral medications due to the other current medications that she is using. Topical is needed option.

## 2018-01-19 NOTE — TELEPHONE ENCOUNTER
Reason for call:  Patient states the solution that was prescribed yesterday would cost her 1,300.00. She was wwondering if there would be something cheaper.

## 2018-01-22 NOTE — TELEPHONE ENCOUNTER
PA Initiation    Medication: jublia solution 10 %  Insurance Company: [facility] - Phone [phone] Fax [phone]  Pharmacy Filling the Rx: [facility] - [address]  Filling Pharmacy Phone: [phone]  Filling Pharmacy Fax: [phone]  Start Date: 1/22/2018

## 2018-01-26 ENCOUNTER — TRANSFERRED RECORDS (OUTPATIENT)
Dept: HEALTH INFORMATION MANAGEMENT | Facility: CLINIC | Age: 48
End: 2018-01-26

## 2018-01-26 ENCOUNTER — TELEPHONE (OUTPATIENT)
Dept: FAMILY MEDICINE | Facility: CLINIC | Age: 48
End: 2018-01-26

## 2018-01-26 DIAGNOSIS — N39.0 URINARY TRACT INFECTION ASSOCIATED WITH INDWELLING URETHRAL CATHETER, INITIAL ENCOUNTER (H): ICD-10-CM

## 2018-01-26 DIAGNOSIS — T83.511A URINARY TRACT INFECTION ASSOCIATED WITH INDWELLING URETHRAL CATHETER, INITIAL ENCOUNTER (H): ICD-10-CM

## 2018-01-26 RX ORDER — CEPHALEXIN 500 MG/1
500 CAPSULE ORAL 3 TIMES DAILY
Qty: 30 CAPSULE | Refills: 0 | Status: SHIPPED | OUTPATIENT
Start: 2018-01-26 | End: 2018-02-02 | Stop reason: ALTCHOICE

## 2018-01-26 NOTE — TELEPHONE ENCOUNTER
RN - please triage this patient with fever and possible UTI. She has indwelling catheter. She is paraplegic. Make sure there are not signs that she needs to be seen.

## 2018-01-26 NOTE — TELEPHONE ENCOUNTER
"Agatha Canas is a 47 year old female who I called for possible UTI.    NURSING ASSESSMENT:  Description:  I spoke with pt who states she does not feel good. The urine looks dark yellowish orange with sediment. Strong odor. \"When I flex my stomach the urine comes out everywhere including on my wound\".   Onset/duration:  4 weeks  Precip. factors:  Paraplegia, indwelling catheter  Associated symptoms: no cough, runny nose or other cold like symptoms  Pain scale (0-10)   Spasticity that is very painful.  Last exam/Treatment:  1/17/2018 OV  Allergies:   Allergies   Allergen Reactions     Penicillins Hives     Pt states it was quite a long time ago so she doesn't exactly remember     Clindamycin Hives and Rash       NURSING PLAN: Routed to provider Yes    RECOMMENDED DISPOSITION: Provider to review  Will comply with recommendation: will wait for a callback  If further questions/concerns or if symptoms do not improve, worsen or new symptoms develop, call your PCP or [facility] as soon as possible.      Guideline used: Urinary catheter  Telephone Triage Protocols for Nurses, Fifth Edition, Donna Ordoñez RN    "

## 2018-01-26 NOTE — TELEPHONE ENCOUNTER
Received results from UA.     Will hold off on treatment until culture results unless patient having fever, or vomiting.     If any concerns, let me know.

## 2018-01-26 NOTE — TELEPHONE ENCOUNTER
Per SF, called home care at [phone] to inform them of message below.    HARDEEP Strong informed.

## 2018-01-26 NOTE — TELEPHONE ENCOUNTER
Called [facility] at [phone] and per representative this case is still pending and their turn around time is 10 days.

## 2018-02-01 RX ORDER — CICLOPIROX OLAMINE 7.7 MG/G
CREAM TOPICAL 2 TIMES DAILY
Qty: 30 G | Refills: 2 | Status: SHIPPED | OUTPATIENT
Start: 2018-02-01 | End: 2020-08-18

## 2018-02-01 NOTE — TELEPHONE ENCOUNTER
PRIOR AUTHORIZATION DENIED    Medication: jublia solution 10 % - denied    Denial Date: 1/31/2018    Denial Rational: Patient must try/fail Terbenafine 250        Appeal Information:

## 2018-02-02 ENCOUNTER — TELEPHONE (OUTPATIENT)
Dept: FAMILY MEDICINE | Facility: CLINIC | Age: 48
End: 2018-02-02

## 2018-02-02 DIAGNOSIS — T83.511D URINARY TRACT INFECTION ASSOCIATED WITH INDWELLING URETHRAL CATHETER, SUBSEQUENT ENCOUNTER: Primary | ICD-10-CM

## 2018-02-02 DIAGNOSIS — N39.0 URINARY TRACT INFECTION ASSOCIATED WITH INDWELLING URETHRAL CATHETER, SUBSEQUENT ENCOUNTER: Primary | ICD-10-CM

## 2018-02-02 RX ORDER — NITROFURANTOIN 25; 75 MG/1; MG/1
100 CAPSULE ORAL 2 TIMES DAILY
Qty: 14 CAPSULE | Refills: 0 | Status: SHIPPED | OUTPATIENT
Start: 2018-02-02 | End: 2018-04-10

## 2018-02-02 NOTE — TELEPHONE ENCOUNTER
Patient asked about phone visit to discuss some issues. Ok to assist with scheduling a phone visit.     Please locate results of urine culture.  This was done through her Home Health. They usually fax results. This may be on my desk. Please email scan to me if available.     If not able to locate, please check with home health if they have received results.

## 2018-02-02 NOTE — TELEPHONE ENCOUNTER
You placed a referral for patient to  pain clinic on 1/19/18.  Patient has not scheduled as of yet.      Please review and forward to team if follow up with the patient is needed.     Thank you!  Chrissy/Clinic Referrals Dyad II

## 2018-02-02 NOTE — TELEPHONE ENCOUNTER
Reason for call:  Form  Reason for Call:  Form, our goal is to have forms completed with 72 hours, however, some forms may require a visit or additional information.    Type of letter, form or note:  medical    Who is the form from?: [facility] [employer]    Where did the form come from: form was faxed in    What clinic location was the form placed at?: [facility] - [phone]    Where the form was placed:  in box     What number is listed as a contact on the form?: [phone]       Additional comments: Fax to [phone]    Call taken on 11/8/2016 at 3:08 PM by Daiana Barajas

## 2018-02-02 NOTE — TELEPHONE ENCOUNTER
Reviewed results.     She is to stop the cephalexin (keflex)    Start nitrofurantoin 100 mg bid for 7 days.

## 2018-02-02 NOTE — TELEPHONE ENCOUNTER
"  Summary:    Patient is due/failing the following:   Bmp, urine drug screen, FOLLOW UP, PAP and PHYSICAL    Action needed:   Patient needs office visit for physical and pap. Due now.  Patient needs 3 month office visit for follow up on medications per last visit with Dr Kasper. Due 4/2018, please see if willing to schedule.  Patient needs non-fasting lab only appointment if not doing at appt. Orders placed.    Type of outreach:    Phone, spoke to patient.  Pt declined scheduling appts at this time.    Questions for provider review:    Would like to know if she can do a phone visit about the work being done on her teeth and \"other stuff\". Pt was also asking about her urine culture results. I informed her that as of right now I do not see any results for this but she would like provider to \"double check\" and let her know. Pt states OK to leave detailed message if she does not answer.                                                                                                                                    Jyoti Faria CMA ([facility])       Chart routed to Care Team .        Panel Management Review      Patient has the following on her problem list:     Depression / Dysthymia review    Measure:  Needs PHQ-9 score of 4 or less during index window.  Administer PHQ-9 and if score is 5 or more, send encounter to provider for next steps.    5 - 7 month window range:     PHQ-9 SCORE 11/25/2016 6/27/2017 1/17/2018   Total Score - - -   Total Score 3 8 12       If PHQ-9 recheck is 5 or more, route to provider for next steps.    Patient is due for:  None    Hypertension   Last three blood pressure readings:  BP Readings from Last 3 Encounters:   01/17/18 112/60   06/27/17 110/60   11/25/16 132/72     Blood pressure: Passed    HTN Guidelines:  Age 18-59 BP range:  Less than 140/90  Age 60-85 with Diabetes:  Less than 140/90  Age 60-85 without Diabetes:  less than 150/90      Composite cancer screening  Chart review shows " that this patient is due/due soon for the following Pap Smear

## 2018-02-02 NOTE — TELEPHONE ENCOUNTER
Called pt and informed. Pt will call back to schedule the phone visit next month to discuss some issues.     Found the urine culture report and scanned to media.

## 2018-02-07 ENCOUNTER — TELEPHONE (OUTPATIENT)
Dept: FAMILY MEDICINE | Facility: CLINIC | Age: 48
End: 2018-02-07

## 2018-02-07 NOTE — TELEPHONE ENCOUNTER
Reason for call:  Form  Reason for Call:  Form, our goal is to have forms completed with 72 hours, however, some forms may require a visit or additional information.    Type of letter, form or note:  Medical    Who [facility] the [facility] [employer]      Where did the form come from: form was faxed in    What clinic location was the form placed at?: [facility] - [phone]    Where the form was placed: 's in box     What number is listed as a contact on the form?: [phone]       Additional comments: Fax back to [phone]    Call taken on 2/7/2018 at 3:51 PM by Leland Gaines

## 2018-02-15 ENCOUNTER — TELEPHONE (OUTPATIENT)
Dept: FAMILY MEDICINE | Facility: CLINIC | Age: 48
End: 2018-02-15

## 2018-02-15 DIAGNOSIS — G82.20 PARAPLEGIA (H): ICD-10-CM

## 2018-02-15 DIAGNOSIS — G89.21 CHRONIC PAIN DUE TO INJURY: ICD-10-CM

## 2018-02-15 RX ORDER — OXYCODONE AND ACETAMINOPHEN 7.5; 325 MG/1; MG/1
2 TABLET ORAL EVERY 6 HOURS PRN
Qty: 180 TABLET | Refills: 0 | Status: SHIPPED | OUTPATIENT
Start: 2018-02-20 | End: 2018-05-01

## 2018-02-15 RX ORDER — OXYCODONE AND ACETAMINOPHEN 7.5; 325 MG/1; MG/1
2 TABLET ORAL EVERY 6 HOURS PRN
Qty: 180 TABLET | Refills: 0 | Status: SHIPPED | OUTPATIENT
Start: 2018-03-20 | End: 2018-05-01

## 2018-02-15 RX ORDER — METHADONE HYDROCHLORIDE 5 MG/1
TABLET ORAL
Qty: 300 TABLET | Refills: 0 | Status: SHIPPED | OUTPATIENT
Start: 2018-03-20 | End: 2018-05-01

## 2018-02-15 RX ORDER — METHADONE HYDROCHLORIDE 5 MG/1
TABLET ORAL
Qty: 300 TABLET | Refills: 0 | Status: SHIPPED | OUTPATIENT
Start: 2018-02-20 | End: 2018-05-01

## 2018-02-15 RX ORDER — METHADONE HYDROCHLORIDE 5 MG/1
TABLET ORAL
Qty: 300 TABLET | Refills: 0 | Status: SHIPPED | OUTPATIENT
Start: 2018-04-19 | End: 2018-05-01

## 2018-02-15 RX ORDER — OXYCODONE AND ACETAMINOPHEN 7.5; 325 MG/1; MG/1
2 TABLET ORAL EVERY 6 HOURS PRN
Qty: 180 TABLET | Refills: 0 | Status: SHIPPED | OUTPATIENT
Start: 2018-04-19 | End: 2018-05-01

## 2018-02-15 NOTE — TELEPHONE ENCOUNTER
methadone (DOLOPHINE) 5 MG tablet      Last Written Prescription Date:  1/21/2018  Last Fill Quantity: 300,   # refills: 0  Last Office Visit: 1/17/2018  Future Office visit:       Routing refill request to provider for review/approval because:  Drug not on the [facility], [facility] or [facility] refill protocol or controlled substance      oxyCODONE-acetaminophen (PERCOCET) 7.5-325 MG per tablet      Last Written Prescription Date:  1/21/2018  Last Fill Quantity: 180,   # refills: 0  Last Office Visit: 1/17/2018  Future Office visit:       Routing refill request to provider for review/approval because:  Drug not on the [facility], [facility] or [facility] refill protocol or controlled substance

## 2018-02-15 NOTE — TELEPHONE ENCOUNTER
Reason for Call:  Medication or medication refill:    Do you use a [facility]?  Name of the pharmacy and phone number for the current request:  [facility]    Name of the medication requested: Percocet and Methadone     Other request: says that Kelli can help her if Majo is not in     Can we leave a detailed message on this number? YES    Phone number patient can be reached at: Home number on file [phone] (home)    Best Time: any    Call taken on 2/15/2018 at 1:06 PM by Kim Chapman

## 2018-02-15 NOTE — TELEPHONE ENCOUNTER
Left detailed message informing patient.   Mailed 6 scripts to [facility] pharmacy in [place]

## 2018-02-19 ENCOUNTER — TELEPHONE (OUTPATIENT)
Dept: FAMILY MEDICINE | Facility: CLINIC | Age: 48
End: 2018-02-19

## 2018-02-19 NOTE — TELEPHONE ENCOUNTER
Reason for call:  Form  Reason for Call:  Form, our goal is to have forms completed with 72 hours, however, some forms may require a visit or additional information.    Type of letter, form or note:  Medical    Who is the form from?: [facility]      Where did the form come from: form was faxed in    What clinic location was the form placed at?: [facility] - [phone]    Where the form was placed: 's in box     What number is listed as a contact on the form?: [phone]       Additional comments: Fax back to [phone]    Call taken on 2/19/2018 at 8:19 AM by Leland Gaines

## 2018-02-20 ENCOUNTER — MEDICAL CORRESPONDENCE (OUTPATIENT)
Dept: HEALTH INFORMATION MANAGEMENT | Facility: CLINIC | Age: 48
End: 2018-02-20

## 2018-02-20 ENCOUNTER — TELEPHONE (OUTPATIENT)
Dept: FAMILY MEDICINE | Facility: CLINIC | Age: 48
End: 2018-02-20

## 2018-02-20 NOTE — TELEPHONE ENCOUNTER
Reason for Call:  Other prescription    Detailed comments: patient is calling asking to speak to a nurse about a couple of her medications. Patient wouldn't give any other information.    Phone Number Patient can be reached at: Home number on file [phone] (home) or Cell number on file:    Telephone Information:   Mobile [phone]       Best Time: anytime    Can we leave a detailed message on this number? YES    Call taken on 2/20/2018 at 10:30 AM by Michelle Jennings

## 2018-02-20 NOTE — TELEPHONE ENCOUNTER
See if pharmacy can fill earlier if given ok to fill by me.     Otherwise, she will need to wait or see if she can pay out of pocket.

## 2018-02-20 NOTE — TELEPHONE ENCOUNTER
pt stated she dropped two medications on the ground and they got wet, unable to use: lisinopril and lyrica.    I called the pharmacy to see if they had refills avail - yes, insurance will allow a refill of lyrica on 2/21 and lisinopril on 2/24    Provider, please advise for patient what can be done/taken until pt can refill lisinopril in four days.

## 2018-02-20 NOTE — TELEPHONE ENCOUNTER
Pharmacy stated she can pay out of pocket for them if needed.  Pt informed and she stated she has a couple pills yet and she should be fine.

## 2018-02-21 ENCOUNTER — TELEPHONE (OUTPATIENT)
Dept: FAMILY MEDICINE | Facility: CLINIC | Age: 48
End: 2018-02-21

## 2018-02-21 ENCOUNTER — TRANSFERRED RECORDS (OUTPATIENT)
Dept: HEALTH INFORMATION MANAGEMENT | Facility: CLINIC | Age: 48
End: 2018-02-21

## 2018-02-21 ENCOUNTER — MEDICAL CORRESPONDENCE (OUTPATIENT)
Dept: HEALTH INFORMATION MANAGEMENT | Facility: CLINIC | Age: 48
End: 2018-02-21

## 2018-02-21 DIAGNOSIS — N39.0 URINARY TRACT INFECTION ASSOCIATED WITH CATHETERIZATION OF URINARY TRACT, UNSPECIFIED INDWELLING URINARY CATHETER TYPE, INITIAL ENCOUNTER (H): Primary | ICD-10-CM

## 2018-02-21 DIAGNOSIS — T83.511A URINARY TRACT INFECTION ASSOCIATED WITH CATHETERIZATION OF URINARY TRACT, UNSPECIFIED INDWELLING URINARY CATHETER TYPE, INITIAL ENCOUNTER (H): Primary | ICD-10-CM

## 2018-02-21 RX ORDER — CEPHALEXIN 500 MG/1
500 CAPSULE ORAL 3 TIMES DAILY
Qty: 30 CAPSULE | Refills: 0 | Status: SHIPPED | OUTPATIENT
Start: 2018-02-21 | End: 2018-03-03

## 2018-02-21 NOTE — TELEPHONE ENCOUNTER
Larisa from [facility] calling. She will be bringing in a UA for Agatha. She is having UTI sx- low grade fever, low back pain, abdominal cramping, and sediments in urine. She has continued to have these sx even since being on antibiotics for the last UTI. Please call her at [phone] when you have results.   Thank you,  Agatha Fair- Pt Rep.

## 2018-02-22 ENCOUNTER — TELEPHONE (OUTPATIENT)
Dept: FAMILY MEDICINE | Facility: CLINIC | Age: 48
End: 2018-02-22

## 2018-02-22 NOTE — TELEPHONE ENCOUNTER
Reason for call:  Form  Reason for Call:  Form, our goal is to have forms completed with 72 hours, however, some forms may require a visit or additional information.    Type of letter, form or note:  Medical    Who is the form from?: [facility]      Where did the form come from: form was faxed in    What clinic location was the form placed at?: [facility] - [phone]    Where the form was placed: 's in box     What number is listed as a contact on the form?: [phone]       Additional comments: Fax back to [phone]    Call taken on 2/22/2018 at 7:27 AM by Leland Gaines

## 2018-02-23 ENCOUNTER — MEDICAL CORRESPONDENCE (OUTPATIENT)
Dept: HEALTH INFORMATION MANAGEMENT | Facility: CLINIC | Age: 48
End: 2018-02-23

## 2018-02-23 NOTE — TELEPHONE ENCOUNTER
Requested results [facility] for the UC results.  They will fax to us today.

## 2018-02-26 ENCOUNTER — TELEPHONE (OUTPATIENT)
Dept: FAMILY MEDICINE | Facility: CLINIC | Age: 48
End: 2018-02-26

## 2018-02-26 NOTE — TELEPHONE ENCOUNTER
Reason for call:  Form  Reason for Call:  Form, our goal is to have forms completed with 72 hours, however, some forms may require a visit or additional information.    Type of letter, form or note:  Medical    Who is the form from?: [facility]      Where did the form come from: form was faxed in    What clinic location was the form placed at?: [facility] - [phone]    Where the form was placed: 's in box     What number is listed as a contact on the form?: [phone]       Additional comments: Fax back to [phone]    Call taken on 2/26/2018 at 9:02 AM by Leland Gaines

## 2018-02-28 ENCOUNTER — TELEPHONE (OUTPATIENT)
Dept: FAMILY MEDICINE | Facility: CLINIC | Age: 48
End: 2018-02-28

## 2018-02-28 DIAGNOSIS — Z53.9 DIAGNOSIS NOT YET DEFINED: Primary | ICD-10-CM

## 2018-02-28 PROCEDURE — G0180 MD CERTIFICATION HHA PATIENT: HCPCS | Performed by: FAMILY MEDICINE

## 2018-02-28 NOTE — TELEPHONE ENCOUNTER
Reason for call:  Form  Reason for Call:  Form, our goal is to have forms completed with 72 hours, however, some forms may require a visit or additional information.    Type of letter, form or note:  Medical    Who is the form from?:  [facility]     Where did the form come from: form was faxed in    What clinic location was the form placed at?: [facility] - [phone]    Where the form was placed: 's in box     What number is listed as a contact on the form?: [phone]       Additional comments: Fax back to [phone]    Call taken on 2/28/2018 at 5:00 PM by Leland Gaines

## 2018-03-05 ENCOUNTER — TELEPHONE (OUTPATIENT)
Dept: FAMILY MEDICINE | Facility: CLINIC | Age: 48
End: 2018-03-05

## 2018-03-05 NOTE — TELEPHONE ENCOUNTER
Reason for call:  Form  Reason for Call:  Form, our goal is to have forms completed with 72 hours, however, some forms may [employer] a visit or additional information.    Type of letter, form or note:  Medical    Who is the form from?: [employer]      Where did the form come from: form was faxed in    What clinic location was the form placed at?: [facility] - [phone]    Where the form was placed: 's in box     What number is listed as a contact on the form?: [phone]       Additional comments: Fax back to [phone]    Call taken on 3/5/2018 at 3:34 PM by Leland Gaines

## 2018-03-06 ENCOUNTER — TELEPHONE (OUTPATIENT)
Dept: FAMILY MEDICINE | Facility: CLINIC | Age: 48
End: 2018-03-06

## 2018-03-06 NOTE — TELEPHONE ENCOUNTER
Reason for call:  Form  Reason for Call:  Form, our goal is to have forms completed with 72 hours, however, some forms may require a visit or additional information.    Type of letter, form or note:  medical    Who is the form from?: [facility] [employer]    Where did the form come from: form was faxed in    What clinic location was the form placed at?: [facility] [address] - [phone]    Where the form was placed:  in box     What number is listed as a contact on the form?: [phone]       Additional comments: Fax to [phone]    Call taken on 11/8/2016 at 3:08 PM by Daiana Barajas

## 2018-03-07 ENCOUNTER — TELEPHONE (OUTPATIENT)
Dept: FAMILY MEDICINE | Facility: CLINIC | Age: 48
End: 2018-03-07

## 2018-03-07 DIAGNOSIS — F41.9 ANXIETY: ICD-10-CM

## 2018-03-07 DIAGNOSIS — R32 URINARY INCONTINENCE, UNSPECIFIED TYPE: Primary | ICD-10-CM

## 2018-03-07 RX ORDER — VENLAFAXINE 75 MG/1
TABLET ORAL
Qty: 180 TABLET | Refills: 0 | Status: SHIPPED | OUTPATIENT
Start: 2018-03-07 | End: 2018-05-16

## 2018-03-07 NOTE — TELEPHONE ENCOUNTER
Ligia from [facility] calling. She would like to know if they can get orders for a different type of catheter due to difficult and painful insertion and removal. Please call her at [phone].  Thank you,  Agatha Fair- Pt Rep.

## 2018-03-07 NOTE — TELEPHONE ENCOUNTER
Spoke with Ligia.  Pt is currently using 16 Kittitian all silicone (with a little lip on it) and this is not working well.  She requests that PCP fax a prescription/order for a 16 Kittitian silicone coated or non silicone - with NO LIP on it.  She had no more specific details to clarify the order.  Please fax to [employer] at [phone]

## 2018-03-07 NOTE — TELEPHONE ENCOUNTER
venlafaxine (EFFEXOR) 75 MG tablet  Prescription approved per [facility] Refill Protocol.  PHQ-9 SCORE 11/25/2016 6/27/2017 1/17/2018   Total Score - - -   Total Score 3 8 12     Per LOV 01/17/2018 to follow up for chronic pain in 3 months.   Please assist with scheduling.    Snehal Geiger RN, BSN

## 2018-03-08 NOTE — TELEPHONE ENCOUNTER
Left message asking patient to return call.    Please inform patient of message from RN below.   Please assist in scheduling patient.

## 2018-03-12 NOTE — TELEPHONE ENCOUNTER
Received a note from [employer] - please specify what type of catheter is needed and if it needs to be silicone or non silicone./  Please fax back to [employer] a [phone].    Forms placed in Providers inbox to reference and review.

## 2018-03-14 ENCOUNTER — TRANSFERRED RECORDS (OUTPATIENT)
Dept: HEALTH INFORMATION MANAGEMENT | Facility: CLINIC | Age: 48
End: 2018-03-14

## 2018-03-14 ENCOUNTER — TELEPHONE (OUTPATIENT)
Dept: FAMILY MEDICINE | Facility: CLINIC | Age: 48
End: 2018-03-14

## 2018-03-14 NOTE — TELEPHONE ENCOUNTER
Reason for call:  Form  Reason for Call:  Form, our goal is to have forms completed with 72 hours, however, some forms may require a visit or additional information.    Type of letter, form or note:  Medical    Who is the form from?: [facility]    Where did the form come from: form was faxed in    What clinic location was the form placed at?: [facility] - [phone]    Where the form was placed: 's in box     What number is listed as a contact on the form?: [phone]       Additional comments: Fax back to [phone]    Call taken on 3/14/2018 at 4:38 PM by Leland Gaines

## 2018-03-19 ENCOUNTER — TELEPHONE (OUTPATIENT)
Dept: FAMILY MEDICINE | Facility: CLINIC | Age: 48
End: 2018-03-19

## 2018-03-19 NOTE — TELEPHONE ENCOUNTER
Reason for call:  Form  Reason for Call:  Form, our goal is to have forms completed with 72 hours, however, some forms may require a visit or additional information.    Type of letter, form or note:  Medical    Who is the form from[facility] [employer]      Where did the form come from: form was faxed in    What clinic location was the form placed at?: [facility] - [phone]    Where the form was placed: 's in box     What number is listed as a contact on the form?: [phone]       Additional comments: Fax back to [phone]    Call taken on 3/19/2018 at 12:43 PM by Leland Gaines

## 2018-03-21 ENCOUNTER — TELEPHONE (OUTPATIENT)
Dept: FAMILY MEDICINE | Facility: CLINIC | Age: 48
End: 2018-03-21

## 2018-03-21 DIAGNOSIS — T83.511A URINARY TRACT INFECTION ASSOCIATED WITH INDWELLING URETHRAL CATHETER, INITIAL ENCOUNTER (H): Primary | ICD-10-CM

## 2018-03-21 DIAGNOSIS — N39.0 URINARY TRACT INFECTION ASSOCIATED WITH INDWELLING URETHRAL CATHETER, INITIAL ENCOUNTER (H): Primary | ICD-10-CM

## 2018-03-21 RX ORDER — CEFDINIR 300 MG/1
300 CAPSULE ORAL 2 TIMES DAILY
Qty: 20 CAPSULE | Refills: 0 | Status: SHIPPED | OUTPATIENT
Start: 2018-03-21 | End: 2018-04-10

## 2018-03-21 NOTE — TELEPHONE ENCOUNTER
Reason for call:  Patient reporting a symptom    Symptom or request: had ua and culture done, never followed up and his having increased sx today.     Duration (how long have symptoms been present):     Have you been treated for this before? Yes    Additional comments: call to  with johnnie busch [facility] [phone] wants call asap    Phone Number patient can be reached at:  Home number on file [phone] (home)    Best Time:  any    Can we leave a detailed message on this number:  YES    Call taken on 3/21/2018 at 10:33 AM by Promise Cantu

## 2018-03-21 NOTE — TELEPHONE ENCOUNTER
Spoke with Ligia.  She stated patient had UA and UC last week.  Has Provider reviewed the results?  Asked her to to fax the results again, in case we did not get them last week.    Patient has 99.0 degree temp (unusual for patient), cramping in lower abdomin, low back pain.      Once results are received in clinic, Provider please review and send Rx as appropriate.

## 2018-03-26 DIAGNOSIS — G82.20 PARAPLEGIA (H): ICD-10-CM

## 2018-03-26 DIAGNOSIS — G89.21 CHRONIC PAIN DUE TO INJURY: ICD-10-CM

## 2018-03-26 RX ORDER — OXYCODONE AND ACETAMINOPHEN 7.5; 325 MG/1; MG/1
2 TABLET ORAL EVERY 6 HOURS PRN
Qty: 180 TABLET | Refills: 0 | Status: CANCELLED | OUTPATIENT
Start: 2018-04-19

## 2018-03-26 RX ORDER — METHADONE HYDROCHLORIDE 5 MG/1
TABLET ORAL
Qty: 300 TABLET | Refills: 0 | Status: CANCELLED | OUTPATIENT
Start: 2018-04-19

## 2018-03-26 NOTE — TELEPHONE ENCOUNTER
methadone (DOLOPHINE) 5 MG tablet      Last Written Prescription Date:  4/19/2018  Last Fill Quantity: 300,   # refills: 0  Last Office Visit: 1/17/2018  Future Office visit:       Routing refill request to provider for review/approval because:  Drug not on the [facility], [facility] or [facility] refill protocol or controlled substance    oxyCODONE-acetaminophen (PERCOCET) 7.5-325 MG per tablet      Last Written Prescription Date:  4/19/2018  Last Fill Quantity: 180,   # refills: 0  Last Office Visit: 1/17/2018  Future Office visit:       Routing refill request to provider for review/approval because:  Drug not on the [facility], [facility] or [facility] refill protocol or controlled substance

## 2018-03-26 NOTE — TELEPHONE ENCOUNTER
Patient statess he would like done asap and a call asap from Kelli would not give any other information

## 2018-03-26 NOTE — TELEPHONE ENCOUNTER
Spoke to pt.  She realized that the pharmacy already had her march and April prescriptions on file.  She scheduled a phone visit appt to discuss a referral for pain mgmt clinic closer to her home.

## 2018-03-26 NOTE — TELEPHONE ENCOUNTER
Reason for Call:  Medication or medication refill:    Do you use a [facility]?  Name of the pharmacy and phone number for the current request:  [facility]    Name of the medication requested: methadone (DOLOPHINE) 5 MG tablet & oxyCODONE-acetaminophen (PERCOCET) 7.5-325 MG per tablet    Other request: Please mail hard copies to [facility]. Also patient is asking to speak to Kelli MCCLELLAND    Can we leave a detailed message on this number? YES    Phone number patient can be reached at: Home number on file [phone] (home) or Cell number on file:    Telephone Information:   Mobile [phone]       Best Time: anytime    Call taken on 3/26/2018 at 8:31 AM by Michelle Jennings

## 2018-03-28 ENCOUNTER — TELEPHONE (OUTPATIENT)
Dept: FAMILY MEDICINE | Facility: CLINIC | Age: 48
End: 2018-03-28

## 2018-03-28 NOTE — TELEPHONE ENCOUNTER
Pt called to check on the status of the PA.  Appears that it has not been submitted by the PA pool yet.    I have faxed the PA to pt's insurance [phone] as an urgent request.    Awaiting decsion

## 2018-03-28 NOTE — TELEPHONE ENCOUNTER
PA approved for methadone.  Effective 3/28/2018 through 3/28/2019    Patient informed  Pharmacy informed

## 2018-03-28 NOTE — TELEPHONE ENCOUNTER
Please submit PA asap.  She has been consistently approved for this.  See media tab, encounter dated 3/14/2016 for details.

## 2018-03-28 NOTE — TELEPHONE ENCOUNTER
Needing to continue--stable on current doses of Methadone without adverse effects.    Can use same PA from before.    Jake Singleton MD

## 2018-03-28 NOTE — TELEPHONE ENCOUNTER
PA approved for percocet.  Effective 3/28/2018 through 3/28/2019    Patient informed  Pharmacy informed

## 2018-03-28 NOTE — TELEPHONE ENCOUNTER
Spoke to pt.  PA needed.  Once approved, please call pharmacy to inform them.    Prior Authorization Retail Medication Request  Medication/Dose:methadone 5 mg tablets    Provider, do you want to change the medication or start a PA?  Please advise on previously Tried and Failed Therapies:      Insurance ID (if provided): [id number]  Insurance Phone (if provided): [phone]

## 2018-03-28 NOTE — TELEPHONE ENCOUNTER
Prior Authorization Retail Medication Request  Medication/Dose:oxyCODONE-acetaminophen (PERCOCET) 7.5-325 MG per tablet    Provider, do you want to change the medication or start a PA?  Please advise on previously Tried and Failed Therapies:      Insurance ID (if provided): [id number]  Insurance Phone (if provided): [phone]

## 2018-03-28 NOTE — TELEPHONE ENCOUNTER
Patient is requesting that Kelli call her about her prior authorizations on her medications. Please call at [phone]. Thank you. Prior authorizations are in bin.

## 2018-03-28 NOTE — TELEPHONE ENCOUNTER
Please submit PA asap.  She has been consistently approved for this.  See media tab, encounter dated 4/10/2017 for details.

## 2018-03-28 NOTE — TELEPHONE ENCOUNTER
Pt called to check on the status of the PA.  Appears that it has not been submitted by the PA pool yet.    I have faxed the PA to pt's insurance [phone] as an urgent request.    Awaiting decision

## 2018-04-03 DIAGNOSIS — R32 URINARY INCONTINENCE, UNSPECIFIED TYPE: ICD-10-CM

## 2018-04-03 DIAGNOSIS — G82.20 PARAPLEGIA (H): ICD-10-CM

## 2018-04-05 RX ORDER — OXYBUTYNIN CHLORIDE 10 MG/1
10 TABLET, EXTENDED RELEASE ORAL DAILY
Qty: 90 TABLET | Refills: 2 | Status: SHIPPED | OUTPATIENT
Start: 2018-04-05 | End: 2019-01-19

## 2018-04-05 RX ORDER — BACLOFEN 20 MG/1
TABLET ORAL
Qty: 90 TABLET | Refills: 3 | Status: SHIPPED | OUTPATIENT
Start: 2018-04-05 | End: 2018-08-02

## 2018-04-05 NOTE — TELEPHONE ENCOUNTER
Baclofen:  Routing refill request to provider for review/approval because:  Drug not on the [facility] refill protocol   Ditropan:  Prescription approved per [facility] Refill Protocol.    Next 5 appointments (look out 90 days)     Apr 10, 2018 12:20 PM CDT   Telephone Visit with Dipti Kasper MD   [facility] ([facility]    [id number]  [place] [facility] [address]   [phone]                Ila Santana, RN, BSN

## 2018-04-10 ENCOUNTER — VIRTUAL VISIT (OUTPATIENT)
Dept: FAMILY MEDICINE | Facility: CLINIC | Age: 48
End: 2018-04-10
Payer: COMMERCIAL

## 2018-04-10 DIAGNOSIS — N39.0 RECURRENT UTI: Primary | ICD-10-CM

## 2018-04-10 DIAGNOSIS — G89.4 CHRONIC PAIN SYNDROME: ICD-10-CM

## 2018-04-10 PROCEDURE — 99442 ZZC PHYSICIAN TELEPHONE EVALUATION 11-20 MIN: CPT | Performed by: FAMILY MEDICINE

## 2018-04-10 ASSESSMENT — ANXIETY QUESTIONNAIRES
GAD7 TOTAL SCORE: 4
6. BECOMING EASILY ANNOYED OR IRRITABLE: NOT AT ALL
2. NOT BEING ABLE TO STOP OR CONTROL WORRYING: SEVERAL DAYS
5. BEING SO RESTLESS THAT IT IS HARD TO SIT STILL: NOT AT ALL
3. WORRYING TOO MUCH ABOUT DIFFERENT THINGS: SEVERAL DAYS
IF YOU CHECKED OFF ANY PROBLEMS ON THIS QUESTIONNAIRE, HOW DIFFICULT HAVE THESE PROBLEMS MADE IT FOR YOU TO DO YOUR WORK, TAKE CARE OF THINGS AT HOME, OR GET ALONG WITH OTHER PEOPLE: SOMEWHAT DIFFICULT
1. FEELING NERVOUS, ANXIOUS, OR ON EDGE: SEVERAL DAYS
7. FEELING AFRAID AS IF SOMETHING AWFUL MIGHT HAPPEN: NOT AT ALL

## 2018-04-10 ASSESSMENT — PATIENT HEALTH QUESTIONNAIRE - PHQ9: 5. POOR APPETITE OR OVEREATING: SEVERAL DAYS

## 2018-04-10 NOTE — MR AVS SNAPSHOT
After Visit Summary   4/10/2018    Agatha Canas    MRN: [id number]           Patient Information     Date Of Birth          1970        Visit Information        Provider Department      4/10/2018 12:20 PM Dipti Kasper MD [facility]        Today's Diagnoses     Recurrent UTI    -  1    Chronic pain syndrome           Follow-ups after your visit        Future tests that were ordered for you today     Open Future Orders        Priority Expected Expires Ordered    UA with Microscopic Routine 4/11/2018 5/10/2018 4/10/2018    Urine Culture Aerobic Bacterial Routine 4/11/2018 5/10/2018 4/10/2018            Who to contact     If you have questions or need follow up information about today's clinic visit or your schedule please contact [facility] directly at [phone].  Normal or non-critical lab and imaging results will be communicated to you by MyChart, letter or phone within 4 business days after the clinic has received the results. If you do not hear from us within 7 days, please contact the clinic through MyChart or phone. If you have a critical or abnormal lab result, we will notify you by phone as soon as possible.  Submit refill requests through [employer] or call your pharmacy and they will forward the refill request to us. Please allow 3 business days for your refill to be completed.          Additional Information About Your Visit        MyChart Information     [employer] gives you secure access to your electronic health record. If you see a primary care provider, you can also send messages to your care team and make appointments. If you have questions, please call your primary care clinic.  If you do not have a primary care provider, please call [phone] and they will assist you.        Care EveryWhere ID     This is your Care EveryWhere ID. This could be used by other organizations to access your [facility] medical records  [id number]         Blood  Pressure from Last 3 Encounters:   01/17/18 112/60   06/27/17 110/60   11/25/16 132/72    Weight from Last 3 Encounters:   09/30/11 150 lb (68 kg)   06/27/11 145 lb (65.8 kg)   10/18/10 171 lb 1.6 oz (77.6 kg)                 Today's Medication Changes          These changes are accurate as of 4/10/18 11:59 PM.  If you have any questions, ask your nurse or doctor.               Stop taking these medicines if you haven't already. Please contact your care team if you have questions.     Efinaconazole 10 % Soln   Stopped by:  Dipti Kasper MD           hydrocortisone 0.2 % cream   Commonly known as:  WESTCORT   Stopped by:  Dipti Kasper MD                    Primary Care Provider Office Phone # Fax #    Dipti Kasper [phone] [address] [facility] [address]        Equal Access to Services     [facility]: Hadii aad ku hadasho Soomaali, waaxda luqadaha, qaybta kaalmada adeegyada, waxay idiin hayaan adeeg nabeelarachristiana la'ildefonso . So [facility] [phone].    ATENCIÓN: Si habla español, tiene a amaya disposición servicios gratuitos de asistencia lingüística. Llame al [phone].    We comply with applicable federal civil rights laws and [place] laws. We do not discriminate on the basis of race, color, national origin, age, disability, sex, sexual orientation, or gender identity.            Thank you!     Thank you for choosing [facility]  for your care. Our goal is always to provide you with excellent care. Hearing back from our patients is one way we can continue to improve our services. Please take a few minutes to complete the written survey that you may receive in the mail after your visit with us. Thank you!             Your Updated Medication List - Protect others around you: Learn how to safely use, store and throw away your medicines at www.disposemymeds.org.          This list is accurate as of 4/10/18 11:59 PM.  Always use your most recent med list.                    Brand Name Dispense Instructions for use Diagnosis    baclofen 20 MG tablet    LIORESAL    90 tablet    TAKE 1 TABLET (20 MG) BY MOUTH 3 TIMES DAILY    Paraplegia (H)       CALCIUM 600+D 600-200 MG-UNIT Tabs   Generic drug:  calcium carbonate-vitamin D           ciclopirox 0.77 % cream    LOPROX    30 g    Apply topically 2 times daily To toenails    Onychomycosis       CRANBERRY PO      twice daily        FISH OIL PO      once daily        hydrOXYzine 25 MG capsule    VISTARIL    90 capsule    Take 1 capsule (25 mg) by mouth 3 times daily as needed for itching    Chronic pain syndrome       ibuprofen 200 MG tablet    ADVIL/MOTRIN    120 tablet    TAKE 1 TABLET (200 MG) BY MOUTH EVERY 4 HOURS AS NEEDED FOR PAIN    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       levothyroxine 75 MCG tablet    SYNTHROID/LEVOTHROID    90 tablet    TAKE 1 TABLET (75 MCG) BY MOUTH DAILY    Hypothyroidism due to acquired atrophy of thyroid       LYRICA 200 MG capsule   Generic drug:  Pregabalin     270 capsule    TAKE ONE CAPSULE THREE TIMES A DAY    Paraplegia (H), 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       * methadone 5 MG tablet    DOLOPHINE    300 tablet    20 mg po qam, 15 mg at 1 pm daily and 15 mg at bedtime. To last 30 days.    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 To last 30 days.    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, Paraplegia (H)       * methadone 5 MG tablet   Start taking on:  4/19/2018    DOLOPHINE    300 tablet    20 mg po qam, 15 mg at 1 pm daily and 15 mg at bedtime. To last 30 days.    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    Paraplegia (H)       multivitamin, therapeutic with minerals Tabs tablet     30 tablet    Take 1 tablet by mouth daily     Routine general medical examination at a health care facility       nicotine 21 MG/24HR 24 hr patch    NICODERM CQ    30 patch    Place 1 patch onto the skin every 24 hours    Tobacco use disorder       nystatin cream    MYCOSTATIN    60 g    APPLY TOPICALLY 3 TIMES DAILY FOR 14 DAYS    Yeast infection of the skin       order for DME     200 each     LARGE DISPOSABLE UNDERPAD to use as needed.    Urinary incontinence       order for DME     1 each    Equipment being ordered:irrigation kit for bladder catheter    UTI symptoms       * order for DME     28 each    Equipment being ordered: AG Batool 4X8, 28 per month.    Open wound of buttock, complicated, unspecified laterality, sequela       * order for DME     28 each    Equipment being ordered: aquafill silver 4x10, 28 per month    Open wound of buttock, complicated, unspecified laterality, sequela       * order for DME     28 each    Equipment being ordered: Aquaphil AG? 4x10? For 28 day supply monthly    Open wound of buttock, complicated, unspecified laterality, sequela       * order for DME     1 each    Therapeutic mattress    Paraplegia (H), Open wound of buttock, complicated, unspecified laterality, sequela       * order for DME     1 each    Order for hospital bed.    Paraplegia (H), Open wound of buttock, complicated, unspecified laterality, sequela       * order for DME     1 Units    Full  Hospital bed    Paraplegia (H), Open wound of buttock, complicated, unspecified laterality, sequela       * order for DME     1 each    Equipment being ordered: 16 french silicone coated  - with NO LIP    Urinary incontinence, unspecified type       oxybutynin 10 MG 24 hr tablet    DITROPAN XL    90 tablet    Take 1 tablet (10 mg) by mouth daily    Urinary incontinence, unspecified type       * oxyCODONE-acetaminophen 7.5-325 MG per tablet    PERCOCET    180 tablet    Take 2 tablets by mouth every 6 hours as needed for pain Max 6 per day    Chronic pain due to  injury, Paraplegia (H)       * oxyCODONE-acetaminophen 7.5-325 MG per tablet    PERCOCET    180 tablet    Take 2 tablets by mouth every 6 hours as needed for pain Max 6 per day    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, Paraplegia (H)       * oxyCODONE-acetaminophen 7.5-325 MG per tablet   Start taking on:  4/19/2018    PERCOCET    180 tablet    Take 2 tablets by mouth every 6 hours as needed for pain Max 6 per day    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, Paraplegia (H)       * triamcinolone 0.1 % cream    KENALOG    30 g    Apply  topically 3 times daily. Apply sparingly to affected area.    Dyshidrotic eczema       * triamcinolone 0.5 % cream    KENALOG    30 g    APPLY SPARINGLY TO AFFECTED AREA THREE TIMES DAILY.    Atopic dermatitis, unspecified       venlafaxine 75 MG tablet    EFFEXOR    180 tablet    TAKE 1 TABLET (75 MG) BY MOUTH 2 TIMES DAILY    Anxiety       * Notice:  This list has 15 medication(s) that are the same as other medications prescribed for you. Read the directions carefully, and ask your doctor or other care provider to review them with you.

## 2018-04-10 NOTE — PROGRESS NOTES
"  Agatha Canas is a 48 year old female who is being evaluated via a telephone visit.      The patient has been notified of following:     \"This telephone visit will be conducted via a call between you and your physician/provider. We have found that certain health care needs can be provided without the need for a physical exam.  This service lets us provide the care you need with a short phone conversation.  If a prescription is necessary we can send it directly to your pharmacy.  If lab work is needed we can place an order for that and you can then stop by our lab to have the test done at a later time.    We will bill your insurance company for this service.  Please check with your medical insurance if this type of visit is covered. You may be responsible for the cost of this type of visit if insurance coverage is denied.  The typical cost is $30 (10min), $59 (11-20min) and $85 (21-30min).  Most often these visits are shorter than 10 minutes.    If during the course of the call the physician/provider feels a telephone visit is not appropriate, you will not be charged for this service.\"       Consent has been obtained for this service by care team member: yes.   See the scanned image in the medical record.    Agatha Canas complains of  Referral (discuss about pain clinic referral )      I have reviewed and updated the patient's Past Medical History, Social History, Family History and Medication List.    ALLERGIES  Penicillins and Clindamycin    Juvenal Orourke MA   (MA signature)    Additional provider notes: last visit was 1/17/2018 for chronic pain.     1. UTIs - history of recurrent UTIs. Has suprapubic cath. Was on macrobid and feels that it didn't work. \"It never works\".  Started on omnicef on 3/21/2018 and is completed. Urine has been dark at this time. Has some lower abdominal pressure. Reports that she saw urology about her recurrent infections but doesn't remember a plan. She thinks she had a fever " "last week.     2. Teeth - end of June, will be having her bottom teeth pulled. \"I only have 4 teeth left on the bottom'. Reports that she will no be getting implants or dentures. She reports that it is too expensive. She is worried about this.     3. Chronic pain - discussed at the last visit that she would have to see a pain clinic within 3 months of the last visit. Reports that she made calls yesterday and setting up \"a couple appointments at a couple places\". First one was June 15th.  Vistaril was started last time. Still reports having pain. Reports that she \"can't take this anymore\".  \"something has to be done\".     Assessment/Plan:  (N39.0) Recurrent UTI  (primary encounter diagnosis)  Comment: patient with recurrent UTIs.   Has indwelling cath.   Has urologist that she sees but reports that this has not been addressed.   Recommend UA and culture.   Recommend evaluation with urology for further work up.   Patient reports she will reach out.   Plan: UA with Microscopic, Urine Culture Aerobic         Bacterial, CANCELED: UA with Microscopic,         CANCELED: Urine Culture Aerobic Bacterial            (G89.4) Chronic pain syndrome  Comment: patient with continued pain and reports this is not well controlled.   She has an appointment with a pain clinic near her.   Discussed the importance of this.   Her appointment is not until June. Will refill until that time.   Plan: All questions invited, asked and answered to the patient's apparent satisfaction.  Patient agrees to plan.          Total time of call between patient and provider was 19 minutes             "

## 2018-04-11 ENCOUNTER — TELEPHONE (OUTPATIENT)
Dept: FAMILY MEDICINE | Facility: CLINIC | Age: 48
End: 2018-04-11

## 2018-04-11 DIAGNOSIS — I10 BENIGN ESSENTIAL HYPERTENSION: ICD-10-CM

## 2018-04-11 DIAGNOSIS — G82.20 PARAPLEGIA (H): ICD-10-CM

## 2018-04-11 DIAGNOSIS — F41.9 ANXIETY: ICD-10-CM

## 2018-04-11 DIAGNOSIS — G89.21 CHRONIC PAIN DUE TO INJURY: ICD-10-CM

## 2018-04-11 RX ORDER — LISINOPRIL 20 MG/1
TABLET ORAL
Qty: 90 TABLET | Refills: 1 | Status: SHIPPED | OUTPATIENT
Start: 2018-04-11 | End: 2018-10-17

## 2018-04-11 RX ORDER — BUPROPION HYDROCHLORIDE 150 MG/1
TABLET, EXTENDED RELEASE ORAL
Qty: 60 TABLET | Refills: 2 | Status: SHIPPED | OUTPATIENT
Start: 2018-04-11 | End: 2018-11-09

## 2018-04-11 ASSESSMENT — ANXIETY QUESTIONNAIRES: GAD7 TOTAL SCORE: 4

## 2018-04-11 ASSESSMENT — PATIENT HEALTH QUESTIONNAIRE - PHQ9: SUM OF ALL RESPONSES TO PHQ QUESTIONS 1-9: 5

## 2018-04-11 NOTE — TELEPHONE ENCOUNTER
lisinopril (PRINIVIL/ZESTRIL) 20 MG tablet  Creatinine   Date Value Ref Range Status   12/02/2016 1.0 0.6 - 1.0 mg/dL Final   ]  Potassium   Date Value Ref Range Status   12/02/2016 4.2 3.6 - 5.2 mmol/L Final   ]  Routing refill request to provider for review/approval because:  Labs not current:  Creatinine and potassium ([facility] future order dated 11/16/2017)    buPROPion (WELLBUTRIN SR) 150 MG 12 hr tablet  SHYLA-7 SCORE 6/27/2017 1/17/2018 4/10/2018   Total Score - - -   Total Score 4 7 4     PHQ-9 SCORE 6/27/2017 1/17/2018 4/10/2018   Total Score - - -   Total Score 8 12 5     Routing refill request to provider for review/approval because:  A break in medication, should have needed refills 02/2018      Snehal Geiger, RN, BSN

## 2018-04-22 ENCOUNTER — MEDICAL CORRESPONDENCE (OUTPATIENT)
Dept: HEALTH INFORMATION MANAGEMENT | Facility: CLINIC | Age: 48
End: 2018-04-22

## 2018-04-25 ENCOUNTER — TRANSFERRED RECORDS (OUTPATIENT)
Dept: HEALTH INFORMATION MANAGEMENT | Facility: CLINIC | Age: 48
End: 2018-04-25

## 2018-04-25 ENCOUNTER — TELEPHONE (OUTPATIENT)
Dept: FAMILY MEDICINE | Facility: CLINIC | Age: 48
End: 2018-04-25

## 2018-04-25 DIAGNOSIS — T83.511A URINARY TRACT INFECTION ASSOCIATED WITH INDWELLING URETHRAL CATHETER, INITIAL ENCOUNTER (H): ICD-10-CM

## 2018-04-25 DIAGNOSIS — N39.0 URINARY TRACT INFECTION ASSOCIATED WITH INDWELLING URETHRAL CATHETER, INITIAL ENCOUNTER (H): ICD-10-CM

## 2018-04-25 RX ORDER — CEFDINIR 300 MG/1
300 CAPSULE ORAL 2 TIMES DAILY
Qty: 20 CAPSULE | Refills: 0 | Status: SHIPPED | OUTPATIENT
Start: 2018-04-25 | End: 2018-04-26

## 2018-04-25 NOTE — TELEPHONE ENCOUNTER
Received UA results. Possible UTI. The culture is still pending. Sent a prescription for omnicef to the pharmacy. Will notify when culture results are received.

## 2018-04-25 NOTE — TELEPHONE ENCOUNTER
Reason for Call:  Request for results:    Name of test or procedure: urine    Date of test of procedure: today    Location of the test or procedure: home care    OK to leave the result message on voice mail or with a family member? YES    Phone number Patient can be reached at:  Other phone number:  [facility] [phone]    Additional comments: Larisa wants to make sure you received the results.    Call taken on 4/25/2018 at 4:35 PM by Promise Cantu

## 2018-04-25 NOTE — TELEPHONE ENCOUNTER
"Larisa informed results were received. Patient informed as well.  Katrina Orellana CMA        Pasted from other encounter: \"Received UA results. Possible UTI. The culture is still pending. Sent a prescription for omnicef to the pharmacy. Will notify when culture results are received. \"  "

## 2018-04-26 ENCOUNTER — TELEPHONE (OUTPATIENT)
Dept: FAMILY MEDICINE | Facility: CLINIC | Age: 48
End: 2018-04-26

## 2018-04-26 DIAGNOSIS — N39.0 URINARY TRACT INFECTION ASSOCIATED WITH CATHETERIZATION OF URINARY TRACT, UNSPECIFIED INDWELLING URINARY CATHETER TYPE, SUBSEQUENT ENCOUNTER: Primary | ICD-10-CM

## 2018-04-26 DIAGNOSIS — G89.21 CHRONIC PAIN DUE TO INJURY: ICD-10-CM

## 2018-04-26 DIAGNOSIS — T83.511D URINARY TRACT INFECTION ASSOCIATED WITH CATHETERIZATION OF URINARY TRACT, UNSPECIFIED INDWELLING URINARY CATHETER TYPE, SUBSEQUENT ENCOUNTER: Primary | ICD-10-CM

## 2018-04-26 DIAGNOSIS — G82.20 PARAPLEGIA (H): ICD-10-CM

## 2018-04-26 RX ORDER — CEPHALEXIN 500 MG/1
500 CAPSULE ORAL 3 TIMES DAILY
Qty: 30 CAPSULE | Refills: 0 | Status: SHIPPED | OUTPATIENT
Start: 2018-04-26 | End: 2018-05-16

## 2018-04-26 NOTE — TELEPHONE ENCOUNTER
Prior Authorization Retail Medication Request  Medication/Dose:   cefdinir (OMNICEF) 300 MG capsule    Insurance will only cover 20 capsules in 60 days      Provider, do you want to change the medication or start a PA?  Please advise on previously Tried and Failed Therapies:      Insurance ID (if provided): [id number]  Insurance Phone (if provided): [phone] [facility]

## 2018-05-01 ENCOUNTER — TELEPHONE (OUTPATIENT)
Dept: FAMILY MEDICINE | Facility: CLINIC | Age: 48
End: 2018-05-01

## 2018-05-01 RX ORDER — OXYCODONE AND ACETAMINOPHEN 7.5; 325 MG/1; MG/1
2 TABLET ORAL EVERY 6 HOURS PRN
Qty: 180 TABLET | Refills: 0 | Status: SHIPPED | OUTPATIENT
Start: 2018-06-18 | End: 2018-10-12

## 2018-05-01 RX ORDER — OXYCODONE AND ACETAMINOPHEN 7.5; 325 MG/1; MG/1
2 TABLET ORAL EVERY 6 HOURS PRN
Qty: 180 TABLET | Refills: 0 | Status: SHIPPED | OUTPATIENT
Start: 2018-07-17 | End: 2018-08-17

## 2018-05-01 RX ORDER — METHADONE HYDROCHLORIDE 5 MG/1
TABLET ORAL
Qty: 300 TABLET | Refills: 0 | Status: SHIPPED | OUTPATIENT
Start: 2018-05-19 | End: 2018-09-14

## 2018-05-01 RX ORDER — METHADONE HYDROCHLORIDE 5 MG/1
TABLET ORAL
Qty: 300 TABLET | Refills: 0 | Status: SHIPPED | OUTPATIENT
Start: 2018-06-18 | End: 2018-10-12

## 2018-05-01 RX ORDER — NITROFURANTOIN 25; 75 MG/1; MG/1
100 CAPSULE ORAL 2 TIMES DAILY
Qty: 14 CAPSULE | Refills: 0 | Status: SHIPPED | OUTPATIENT
Start: 2018-05-01 | End: 2018-07-10

## 2018-05-01 RX ORDER — OXYCODONE AND ACETAMINOPHEN 7.5; 325 MG/1; MG/1
2 TABLET ORAL EVERY 6 HOURS PRN
Qty: 180 TABLET | Refills: 0 | Status: SHIPPED | OUTPATIENT
Start: 2018-05-19 | End: 2018-09-14

## 2018-05-01 RX ORDER — METHADONE HYDROCHLORIDE 5 MG/1
TABLET ORAL
Qty: 300 TABLET | Refills: 0 | Status: SHIPPED | OUTPATIENT
Start: 2018-07-17 | End: 2018-08-17

## 2018-05-01 NOTE — TELEPHONE ENCOUNTER
Reason for Call:  Form, our goal is to have forms completed with 72 hours, however, some forms may require a visit or additional information.    Type of letter, form or note:  medical    Who is the form from?: [employer] (if other please explain)    Where did the form come from: form was faxed in    What clinic location was the form placed at?: [facility] - [phone]    Where the form was placed: [employer]    What number is listed as a contact on the form?: [phone]       Additional comments: Please review, sign, and fax back to [phone]    Call taken on 5/1/2018 at 4:20 PM by Sana Trevino

## 2018-05-01 NOTE — TELEPHONE ENCOUNTER
Reason for call:  Form  Reason for Call:  Form, our goal is to have forms completed with 72 hours, however, some forms may require a visit or additional information.    Type of letter, form or note:  Medical    Who is the form from?: [facility]      Where did the form come from: form was faxed in    What clinic location was the form placed at?: [facility] - [phone]    Where the form was placed: 's in box     What number is listed as a contact on the form?: [phone]       Additional comments: Fax back to [phone]    Call taken on 5/1/2018 at 8:58 AM by Leland Gaines

## 2018-05-01 NOTE — TELEPHONE ENCOUNTER
Pt informed.    Re: going to Pain clinic and urology.  Patient stated she is having her teeth done and does not want to go to the appts without teeth.      I Provider willing to write more scripts to get her through the time she will not be seeing [facility]  I asked her the time frame she may have the teeth situation completed and will go to pain clinic.  She did not have a timeframe.

## 2018-05-01 NOTE — TELEPHONE ENCOUNTER
Printed next 3 months of prescriptions.  Will be willing to do this one further time. Anticipate 6 months should give her time to have this completed.

## 2018-05-03 ENCOUNTER — TELEPHONE (OUTPATIENT)
Dept: FAMILY MEDICINE | Facility: CLINIC | Age: 48
End: 2018-05-03

## 2018-05-03 NOTE — TELEPHONE ENCOUNTER
Pt calling to report that her LYRICA 200 MG capsule was accidentally thrown in the trash and needs to get more.    Called [facility] - last fill date for Lyrica was 4/11/2018  Insurance will allow it to be filled again on 5/4/2018, tomorrow.    Pt informed.  All is good.

## 2018-05-07 ENCOUNTER — TELEPHONE (OUTPATIENT)
Dept: FAMILY MEDICINE | Facility: CLINIC | Age: 48
End: 2018-05-07

## 2018-05-07 NOTE — TELEPHONE ENCOUNTER
Reason for call:  Form  Reason for Call:  Form, our goal is to have forms completed with 72 hours, however, some forms may require a visit or additional information.    Type of letter, form or note:  Medical    Who is the form from?:  [facility] [employer]     Where did the form come from: form was faxed in    What clinic location was the form placed at?: [facility] - [phone]    Where the form was placed: 's in box     What number is listed as a contact on the form?: [phone]       Additional comments: Fax back to [phone]    Call taken on 5/7/2018 at 9:23 AM by Leland Gaines

## 2018-05-12 ENCOUNTER — TELEPHONE (OUTPATIENT)
Dept: FAMILY MEDICINE | Facility: CLINIC | Age: 48
End: 2018-05-12

## 2018-05-14 ENCOUNTER — TELEPHONE (OUTPATIENT)
Dept: FAMILY MEDICINE | Facility: CLINIC | Age: 48
End: 2018-05-14

## 2018-05-14 NOTE — TELEPHONE ENCOUNTER
Reviewed information below. She will need an office visit or phone visit for further discussion.     Any patient that is on chronic narcotics does need a visit every 3 months so this can be done together if she would like.

## 2018-05-14 NOTE — TELEPHONE ENCOUNTER
SF-Pt wanted to let you know since stopping Valium 6 months ago her heart pounds and feels like it is from anxiety. She is wondering if you would give her something to help with anxiety. Her LOV states to F/U in 3 months however she did not feel like she is due for a OV. Please review and advise.    Agatha Canas is a 48 year old female who calls with anxiety.    NURSING ASSESSMENT:  Description:  I spoke with pt who states she was taken off Valium 6 months ago and when she first stopped her heart would pound, it got better but now worse again, it is there most of the time in the morning but pounds all day long. No Chest pain. Does not feel lightheaded. Home nurse states B/P is and pulse are good per pt. No confusion or inability to function. Pt states she knows it is anxiety.  Onset/duration:  January  Precip. factors:  Anxiety  Associated symptoms:  See above  Pain scale (0-10)   Thing was said about pain, denies chest pain  Last exam/Treatment:  LOV 1/17/2018  Allergies:   Allergies   Allergen Reactions     Penicillins Hives     Pt states it was quite a long time ago so she doesn't exactly remember     Clindamycin Hives and Rash       NURSING PLAN: Routed to provider Yes    RECOMMENDED DISPOSITION:  Wait to hear from SF, informed pt she will need some kind of visit, OV or phone visit.  Will comply with recommendation: N/A  If further questions/concerns or if symptoms do not improve, worsen or new symptoms develop, call your PCP or [facility] as soon as possible.      Guideline used: Anxiety  Telephone Triage Protocols for Nurses, Fifth Edition, Donna Ordoñez RN

## 2018-05-14 NOTE — TELEPHONE ENCOUNTER
Reason for Call:  Other call back    Detailed comments: Pt calling with a question about her medications. She didn't want to specify what med. Please advise.    Phone Number Patient can be reached at: Home number on file [phone] (home)    Best Time: any     Can we leave a detailed message on this number? YES    Call taken on 5/14/2018 at 10:17 AM by Jessica Romo

## 2018-05-16 ENCOUNTER — VIRTUAL VISIT (OUTPATIENT)
Dept: FAMILY MEDICINE | Facility: CLINIC | Age: 48
End: 2018-05-16
Payer: COMMERCIAL

## 2018-05-16 DIAGNOSIS — F41.9 ANXIETY: ICD-10-CM

## 2018-05-16 PROCEDURE — 99442 ZZC PHYSICIAN TELEPHONE EVALUATION 11-20 MIN: CPT | Performed by: FAMILY MEDICINE

## 2018-05-16 RX ORDER — VENLAFAXINE 75 MG/1
TABLET ORAL
Qty: 240 TABLET | Refills: 0 | Status: SHIPPED | OUTPATIENT
Start: 2018-05-16 | End: 2018-08-29

## 2018-05-16 ASSESSMENT — ANXIETY QUESTIONNAIRES
7. FEELING AFRAID AS IF SOMETHING AWFUL MIGHT HAPPEN: NOT AT ALL
1. FEELING NERVOUS, ANXIOUS, OR ON EDGE: MORE THAN HALF THE DAYS
3. WORRYING TOO MUCH ABOUT DIFFERENT THINGS: SEVERAL DAYS
2. NOT BEING ABLE TO STOP OR CONTROL WORRYING: SEVERAL DAYS
IF YOU CHECKED OFF ANY PROBLEMS ON THIS QUESTIONNAIRE, HOW DIFFICULT HAVE THESE PROBLEMS MADE IT FOR YOU TO DO YOUR WORK, TAKE CARE OF THINGS AT HOME, OR GET ALONG WITH OTHER PEOPLE: VERY DIFFICULT
6. BECOMING EASILY ANNOYED OR IRRITABLE: SEVERAL DAYS
GAD7 TOTAL SCORE: 7
5. BEING SO RESTLESS THAT IT IS HARD TO SIT STILL: SEVERAL DAYS

## 2018-05-16 ASSESSMENT — PATIENT HEALTH QUESTIONNAIRE - PHQ9: 5. POOR APPETITE OR OVEREATING: SEVERAL DAYS

## 2018-05-16 NOTE — MR AVS SNAPSHOT
After Visit Summary   5/16/2018    Agatha Canas    MRN: [id number]           Patient Information     Date Of Birth          1970        Visit Information        Provider Department      5/16/2018 12:00 PM Dipti Kasper MD [facility]        Today's Diagnoses     Anxiety           Follow-ups after your visit        Who to contact     If you have questions or need follow up information about today's clinic visit or your schedule please contact [facility] directly at [phone].  Normal or non-critical lab and imaging results will be communicated to you by MyChart, letter or phone within 4 business days after the clinic has received the results. If you do not hear from us within 7 days, please contact the clinic through [employer] or phone. If you have a critical or abnormal lab result, we will notify you by phone as soon as possible.  Submit refill requests through [employer] or call your pharmacy and they will forward the refill request to us. Please allow 3 business days for your refill to be completed.          Additional Information About Your Visit        MyChart Information     [employer] gives you secure access to your electronic health record. If you see a primary care provider, you can also send messages to your care team and make appointments. If you have questions, please call your primary care clinic.  If you do not have a primary care provider, please call [phone] and they will assist you.        Care EveryWhere ID     This is your Care EveryWhere ID. This could be used by other organizations to access your [facility] medical records  [id number]         Blood Pressure from Last 3 Encounters:   01/17/18 112/60   06/27/17 110/60   11/25/16 132/72    Weight from Last 3 Encounters:   09/30/11 150 lb (68 kg)   06/27/11 145 lb (65.8 kg)   10/18/10 171 lb 1.6 oz (77.6 kg)              Today, you had the following     No orders found for display         Today's  Medication Changes          These changes are accurate as of 5/16/18  9:52 PM.  If you have any questions, ask your nurse or doctor.               These medicines have changed or have updated prescriptions.        Dose/Directions    * order for DME   This may have changed:  Another medication with the same name was removed. Continue taking this medication, and follow the directions you see here.   Used for:  Paraplegia (H), Open wound of buttock, complicated, unspecified laterality, sequela   Changed by:  Dipti Kasper MD        Therapeutic mattress   Quantity:  1 each   Refills:  0       * order for DME   This may have changed:  Another medication with the same name was removed. Continue taking this medication, and follow the directions you see here.   Used for:  Paraplegia (H), Open wound of buttock, complicated, unspecified laterality, sequela   Changed by:  Dipti Kasper MD        Order for hospital bed.   Quantity:  1 each   Refills:  0       * order for DME   This may have changed:  Another medication with the same name was removed. Continue taking this medication, and follow the directions you see here.   Used for:  Urinary incontinence, unspecified type   Changed by:  Dipti Kasper MD        Equipment being ordered: 16 Palestinian silicone coated  - with NO LIP   Quantity:  1 each   Refills:  3       venlafaxine 75 MG tablet   Commonly known as:  EFFEXOR   This may have changed:  additional instructions   Used for:  Anxiety   Changed by:  Dipti Kasper MD        2 po qam and 1 po qpm.   Quantity:  240 tablet   Refills:  0       * Notice:  This list has 3 medication(s) that are the same as other medications prescribed for you. Read the directions carefully, and ask your doctor or other care provider to review them with you.         Where to get your medicines      These medications were sent to [id number] - [place], [place] - [address]  [address], [place] [address]     Phone:   [phone]     venlafaxine 75 MG tablet                Primary Care Provider Office Phone # Fax #    Dipti MOORE MD Majo [phone] [address], [place] [address]        Equal Access to Services     ENRRIQUEMAURICE CHA : Hadii aad ku hadarleneo Soomaali, waaxda luqadaha, qaybta kaalmada adeegyada, waxay idiin hayhueyn ademadalyn bass lathongjammie buckner. So [facility] [phone].    ATENCIÓN: Si habla español, tiene a amaya disposición servicios gratuitos de asistencia lingüística. Llame al [phone].    We comply with applicable federal civil rights laws and [place] laws. We do not discriminate on the basis of race, color, national origin, age, disability, sex, sexual orientation, or gender identity.            Thank you!     Thank you for choosing [facility]  for your care. Our goal is always to provide you with excellent care. Hearing back from our patients is one way we can continue to improve our services. Please take a few minutes to complete the written survey that you may receive in the mail after your visit with us. Thank you!             Your Updated Medication List - Protect others around you: Learn how to safely use, store and throw away your medicines at www.disposemymeds.org.          This list is accurate as of 5/16/18  9:52 PM.  Always use your most recent med list.                   Brand Name Dispense Instructions for use Diagnosis    baclofen 20 MG tablet    LIORESAL    90 tablet    TAKE 1 TABLET (20 MG) BY MOUTH 3 TIMES DAILY    Paraplegia (H)       buPROPion 150 MG 12 hr tablet    WELLBUTRIN SR    60 tablet    TAKE 1 TABLET (150 MG) BY MOUTH 2 TIMES DAILY    Anxiety       CALCIUM 600+D 600-200 MG-UNIT Tabs   Generic drug:  calcium carbonate-vitamin D           ciclopirox 0.77 % cream    LOPROX    30 g    Apply topically 2 times daily To toenails    Onychomycosis       CRANBERRY PO      twice daily        FISH OIL PO      once daily        hydrOXYzine 25 MG capsule    VISTARIL     90 capsule    Take 1 capsule (25 mg) by mouth 3 times daily as needed for itching    Chronic pain syndrome       ibuprofen 200 MG tablet    ADVIL/MOTRIN    120 tablet    TAKE 1 TABLET (200 MG) BY MOUTH EVERY 4 HOURS AS NEEDED FOR PAIN    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       levothyroxine 75 MCG tablet    SYNTHROID/LEVOTHROID    90 tablet    TAKE 1 TABLET (75 MCG) BY MOUTH DAILY    Hypothyroidism due to acquired atrophy of thyroid       lisinopril 20 MG tablet    PRINIVIL/ZESTRIL    90 tablet    TAKE 1 TABLET (20 MG) BY MOUTH DAILY    Benign essential hypertension       LYRICA 200 MG capsule   Generic drug:  Pregabalin     270 capsule    TAKE ONE CAPSULE THREE TIMES A DAY    Paraplegia (H), 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       * methadone 5 MG tablet   Start taking on:  5/19/2018    DOLOPHINE    300 tablet    20 mg po qam, 15 mg at 1 pm daily and 15 mg at bedtime. To last 30 days.    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, Paraplegia (H)       * methadone 5 MG tablet   Start taking on:  6/18/2018    DOLOPHINE    300 tablet    20 mg po qam, 15 mg at 1 pm daily and 15 mg at bedtime. To last 30 days.    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, Paraplegia (H)       * methadone 5 MG tablet   Start taking on:  7/17/2018    DOLOPHINE    300 tablet    20 mg po qam, 15 mg at 1 pm daily and 15 mg at bedtime. To last 30 days.    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    Paraplegia (H)       multivitamin, therapeutic with minerals Tabs tablet     30 tablet    Take 1 tablet by mouth daily    Routine general medical examination at a health care facility       nicotine 21 MG/24HR 24 hr patch    NICODERM CQ    30 patch    Place 1 patch onto the skin every 24 hours    Tobacco use disorder       nitroFURantoin (macrocrystal-monohydrate) 100 MG capsule    MACROBID    14 capsule    Take 1  capsule (100 mg) by mouth 2 times daily    Urinary tract infection associated with catheterization of urinary tract, unspecified indwelling urinary catheter type, subsequent encounter       nystatin cream    MYCOSTATIN    60 g    APPLY TOPICALLY 3 TIMES DAILY FOR 14 DAYS    Yeast infection of the skin       order for DME     200 each     LARGE DISPOSABLE UNDERPAD to use as needed.    Urinary incontinence       order for DME     1 each    Equipment being ordered:irrigation kit for bladder catheter    UTI symptoms       * order for DME     28 each    Equipment being ordered: AG Batool 4X8, 28 per month.    Open wound of buttock, complicated, unspecified laterality, sequela       * order for DME     28 each    Equipment being ordered: aquafill silver 4x10, 28 per month    Open wound of buttock, complicated, unspecified laterality, sequela       * order for DME     28 each    Equipment being ordered: Aquaphil AG? 4x10? For 28 day supply monthly    Open wound of buttock, complicated, unspecified laterality, sequela       * order for DME     1 each    Therapeutic mattress    Paraplegia (H), Open wound of buttock, complicated, unspecified laterality, sequela       * order for DME     1 each    Order for hospital bed.    Paraplegia (H), Open wound of buttock, complicated, unspecified laterality, sequela       * order for DME     1 each    Equipment being ordered: 16 french silicone coated  - with NO LIP    Urinary incontinence, unspecified type       oxybutynin 10 MG 24 hr tablet    DITROPAN XL    90 tablet    Take 1 tablet (10 mg) by mouth daily    Urinary incontinence, unspecified type       * oxyCODONE-acetaminophen 7.5-325 MG per tablet   Start taking on:  5/19/2018    PERCOCET    180 tablet    Take 2 tablets by mouth every 6 hours as needed for pain Max 6 per day    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, Paraplegia (H)       * oxyCODONE-acetaminophen 7.5-325 MG per tablet   Start taking on:  6/18/2018    PERCOCET    180  tablet    Take 2 tablets by mouth every 6 hours as needed for pain Max 6 per day    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, Paraplegia (H)       * oxyCODONE-acetaminophen 7.5-325 MG per tablet   Start taking on:  7/17/2018    PERCOCET    180 tablet    Take 2 tablets by mouth every 6 hours as needed for pain Max 6 per day    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, Paraplegia (H)       * triamcinolone 0.1 % cream    KENALOG    30 g    Apply  topically 3 times daily. Apply sparingly to affected area.    Dyshidrotic eczema       * triamcinolone 0.5 % cream    KENALOG    30 g    APPLY SPARINGLY TO AFFECTED AREA THREE TIMES DAILY.    Atopic dermatitis, unspecified       venlafaxine 75 MG tablet    EFFEXOR    240 tablet    2 po qam and 1 po qpm.    Anxiety       * Notice:  This list has 14 medication(s) that are the same as other medications prescribed for you. Read the directions carefully, and ask your doctor or other care provider to review them with you.

## 2018-05-16 NOTE — PROGRESS NOTES
"Agatha Canas is a 48 year old female who is being evaluated via a telephone visit.      The patient has been notified of following:     \"This telephone visit will be conducted via a call between you and your physician/provider. We have found that certain health care needs can be provided without the need for a physical exam.  This service lets us provide the care you need with a short phone conversation.  If a prescription is necessary we can send it directly to your pharmacy.  If lab work is needed we can place an order for that and you can then stop by our lab to have the test done at a later time.    We will bill your insurance company for this service.  Please check with your medical insurance if this type of visit is covered. You may be responsible for the cost of this type of visit if insurance coverage is denied.  The typical cost is $30 (10min), $59 (11-20min) and $85 (21-30min).  Most often these visits are shorter than 10 minutes.    If during the course of the call the physician/provider feels a telephone visit is not appropriate, you will not be charged for this service.\"       Consent has been obtained for this service by care team member: yes.   See the scanned image in the medical record.    Agatha Canas complains of  Anxiety      I have reviewed and updated the patient's Past Medical History, Social History, Family History and Medication List.    ALLERGIES  Penicillins and Clindamycin    Sindhu Danielle CMA   (MA signature)    Additional provider notes: spoke with Agatha. Had recently been in the hospital for spasticity - Sunday.  Had urinalysis, CT scan, CXR and all was normal per patient. Had some dilaudid at that time. Had also been given a muscle relaxant.     Wants to speak about anxiety today. Had been on diazepam but that was stopped. Since being off that , she has noted some palpitations and sweating. She is also on wellbutrin  mg twice daily and venlafaxine 75 mg bid. "     She feels like the anxiety has kept her from leaving the house.     She got diarrhea from the vistaril (hydroxyzine) and has taken this only a couple times.         Assessment/Plan:  (F41.9) Anxiety  Comment: patient with increasing anxiety. Currently taking wellbutrin 150 mg twice daily and effexor 75 mg twice daily. Not controlled. She had been on valium in the past. Discussed that this is not an option with her being on narcotics. Has not been able to tolerate the hydroxyzine.   Recommend an increase of effexor to 150 mg in the morning and 75 mg in the evening.   Follow up in one month - can be telephone visit or Evisit.   Follow up sooner if needed.   Plan: venlafaxine (EFFEXOR) 75 MG tablet            All questions invited, asked and answered to the patient's apparent satisfaction.  Patient agrees to plan.        Total time of call between patient and provider was 14 minutes

## 2018-05-17 ASSESSMENT — PATIENT HEALTH QUESTIONNAIRE - PHQ9: SUM OF ALL RESPONSES TO PHQ QUESTIONS 1-9: 6

## 2018-05-17 ASSESSMENT — ANXIETY QUESTIONNAIRES: GAD7 TOTAL SCORE: 7

## 2018-05-23 ENCOUNTER — MEDICAL CORRESPONDENCE (OUTPATIENT)
Dept: HEALTH INFORMATION MANAGEMENT | Facility: CLINIC | Age: 48
End: 2018-05-23

## 2018-05-24 ENCOUNTER — TELEPHONE (OUTPATIENT)
Dept: FAMILY MEDICINE | Facility: CLINIC | Age: 48
End: 2018-05-24

## 2018-05-24 NOTE — TELEPHONE ENCOUNTER
Reason for call:  Form  Reason for Call:  Form, our goal is to have forms completed with 72 hours, however, some forms may require a visit or additional information.    Type of letter, form or note:  Medical    Who is the form from?: [facility]      Where did the form come from: form was faxed in    What clinic location was the form placed at?: [facility] - [phone]    Where the form was placed: 's in box     What number is listed as a contact on the form?: [phone]       Additional comments: Fax back to [phone]    Call taken on 5/24/2018 at 7:48 AM by Leland Gaines

## 2018-05-25 ENCOUNTER — TELEPHONE (OUTPATIENT)
Dept: FAMILY MEDICINE | Facility: CLINIC | Age: 48
End: 2018-05-25

## 2018-05-25 RX ORDER — PREGABALIN 200 MG/1
CAPSULE ORAL
Qty: 270 CAPSULE | Refills: 3 | Status: SHIPPED | OUTPATIENT
Start: 2018-05-25 | End: 2018-06-15

## 2018-05-25 NOTE — TELEPHONE ENCOUNTER
Reason for Call:  Medication or medication refill:    Do you use a [facility]?  Name of the pharmacy and phone number for the current request:  [facility]    Name of the medication requested: LYRICA 200 MG capsule    Other request: patient needs this medication by tomorrow    Can we leave a detailed message on this number? YES    Phone number patient can be reached at: Home number on file [phone] (home) or Cell number on file:    Telephone Information:   Mobile [phone]       Best Time: anytime    Call taken on 5/25/2018 at 1:38 PM by Michelle Jennings

## 2018-05-25 NOTE — TELEPHONE ENCOUNTER
Patient lost her Lyrica. The earliest the pharmacy can fill is 5/29 and patient does not have enough to get until 5/29. Please advise.

## 2018-06-11 ENCOUNTER — TELEPHONE (OUTPATIENT)
Dept: FAMILY MEDICINE | Facility: CLINIC | Age: 48
End: 2018-06-11

## 2018-06-11 NOTE — TELEPHONE ENCOUNTER
Reason for call:  Form  Reason for Call:  Form, our goal is to have forms completed with 72 hours, however, some forms may require a visit or additional information.    Type of letter, form or note:  Medical    Who is the form from?: [facility]      Where did the form come from: form was faxed in    What clinic location was the form placed at?: [facility] - [phone]    Where the form was placed: 's in box     What number is listed as a contact on the form?: [phone]       Additional comments: Fax back to [phone]    Call taken on 6/11/2018 at 6:04 PM by Leland Gaines

## 2018-06-15 ENCOUNTER — TELEPHONE (OUTPATIENT)
Dept: FAMILY MEDICINE | Facility: CLINIC | Age: 48
End: 2018-06-15

## 2018-06-15 DIAGNOSIS — G82.20 PARAPLEGIA (H): ICD-10-CM

## 2018-06-15 DIAGNOSIS — G89.21 CHRONIC PAIN DUE TO INJURY: ICD-10-CM

## 2018-06-15 RX ORDER — PREGABALIN 200 MG/1
CAPSULE ORAL
Qty: 10 CAPSULE | Refills: 0 | Status: SHIPPED | OUTPATIENT
Start: 2018-06-15 | End: 2018-07-10 | Stop reason: ALTCHOICE

## 2018-06-15 NOTE — TELEPHONE ENCOUNTER
Reason for Call:  Other call back    Detailed comments: Patient would not give any information just wanted to have Kelli call her back.  Sorry    Phone Number Patient can be reached at: Home number on file [phone] (home)    Best Time: any    Can we leave a detailed message on this number? YES    Call taken on 6/15/2018 at 11:59 AM by Hillary Mcgraw

## 2018-06-15 NOTE — TELEPHONE ENCOUNTER
Spoke to patient.  She stated that a few of her lyrica pills got wet and the powder dissolved ou of the pill.  She has enough pills to get her through 6/18/2018.  Insurance will not let her fill earlier than 6/21/2018.  She is wondering if Provider can do anything to help her get the three extra pills from the pharmacy.    Please review and advise.

## 2018-06-15 NOTE — TELEPHONE ENCOUNTER
Spoke to [facility].  They stated the provider could write send an Rx for #10 capsules Lyrica to bridge patient until the next fill date (for the official Rx dated 6/21/2018).  Patient can pay out of pocket for these 10 capsules.    Estimated Cost = $90 for #10.    Provider, are you willing to do this?

## 2018-06-22 ENCOUNTER — TELEPHONE (OUTPATIENT)
Dept: FAMILY MEDICINE | Facility: CLINIC | Age: 48
End: 2018-06-22

## 2018-06-22 ENCOUNTER — TRANSFERRED RECORDS (OUTPATIENT)
Dept: HEALTH INFORMATION MANAGEMENT | Facility: CLINIC | Age: 48
End: 2018-06-22

## 2018-06-22 DIAGNOSIS — R30.0 DYSURIA: Primary | ICD-10-CM

## 2018-06-22 NOTE — TELEPHONE ENCOUNTER
Reason for Call: Request for an order or referral:    Order or referral being requested: order    Date needed: as soon as possible    Has the patient been seen by the PCP for this problem? YES    Additional comments: please send order for a UA to [facility] / Please call patient when order has been placed    Phone number Patient can be reached at:  Home number on file [phone] (home) or Cell number on file:    Telephone Information:   Mobile [phone]       Best Time:  anytime    Can we leave a detailed message on this number?  YES    Call taken on 6/22/2018 at 11:19 AM by Michelle Jennings

## 2018-06-22 NOTE — TELEPHONE ENCOUNTER
Orders faxed to [facility] in [place] per home health nurse.

## 2018-06-22 NOTE — TELEPHONE ENCOUNTER
Please call and give a verbal order for UA with micro and UC for diagnosis of Dysuria. Future orders are in EPIC if they can access.    Authorize under Dr. Dipti Kasper.    Jake Singleton MD  Please close encounter when call/work completed.

## 2018-06-22 NOTE — TELEPHONE ENCOUNTER
Reason for Call:  Other prescription    Detailed comments: patient states a PA needs to be done on Lyrica    Phone Number Patient can be reached at: Home number on file [phone] (home) or Cell number on file:    Telephone Information:   Mobile [phone]       Best Time: anytime    Can we leave a detailed message on this number? YES    Call taken on 6/22/2018 at 11:22 AM by Michelle Jennings

## 2018-06-22 NOTE — TELEPHONE ENCOUNTER
Spoke with patient she states she was told by her insurance that she needs to have Prior Authorization done for Lyrica, I called her pharmacy [facility][phone] they state that the claim went through as a paid claim they do not see that PA is required they did not receive rejection code.    Closing encounter  Padmini Rangel RT (R)

## 2018-06-25 ENCOUNTER — TELEPHONE (OUTPATIENT)
Dept: FAMILY MEDICINE | Facility: CLINIC | Age: 48
End: 2018-06-25

## 2018-06-25 DIAGNOSIS — N39.0 URINARY TRACT INFECTION ASSOCIATED WITH INDWELLING URETHRAL CATHETER, INITIAL ENCOUNTER (H): Primary | ICD-10-CM

## 2018-06-25 DIAGNOSIS — T83.511A URINARY TRACT INFECTION ASSOCIATED WITH INDWELLING URETHRAL CATHETER, INITIAL ENCOUNTER (H): Primary | ICD-10-CM

## 2018-06-25 RX ORDER — CEPHALEXIN 500 MG/1
500 CAPSULE ORAL 3 TIMES DAILY
Qty: 30 CAPSULE | Refills: 0 | Status: SHIPPED | OUTPATIENT
Start: 2018-06-25 | End: 2018-07-10

## 2018-06-28 ENCOUNTER — TELEPHONE (OUTPATIENT)
Dept: FAMILY MEDICINE | Facility: CLINIC | Age: 48
End: 2018-06-28

## 2018-06-28 NOTE — TELEPHONE ENCOUNTER
[facility] called stating patient was told to stop antibiotics for UTI, but patient is now experiencing UTI symptoms. Please return call: [phone]

## 2018-06-28 NOTE — TELEPHONE ENCOUNTER
Left message asking pt to callback. Please triage symptoms and clarify when pt started and stopped antibiotics. Urine was recently tested and negative for UTI so pt was told today to stop antibiotics.     Received a call from Ligia with concerns for possible UTI. I spoke with Ligia from [facility] who states pt has a low grade temperature (99.2), complaining of low back pain, abdominal cramping. Some sediment in Urine but otherwise the urine looks ok. Ligia is not aware when she started or stopped the antibiotic.  From our notes it appears she was notified to stop antibiotic until this morning.     Susan Ordoñez, RN, BSN

## 2018-06-28 NOTE — TELEPHONE ENCOUNTER
Agatha Canas is a 48 year old female who calls with low back pain and stomach cramping.    NURSING ASSESSMENT:  Description:  Pt has a suprapubic catheter.  She was started on Keflex on Monday for a possible UTI.  She was told this am to stop the Keflex due to UC was negative.  Pt felt her symptoms improved from being on Keflex but seems like they are worsening again.  Onset/duration:  today  Precip. factors:  unknown  Associated symptoms:  Intermittent LBP, lower abd cramping, sediment in urine, elevated temp (99.2).  Denies hematuria, nausea, vomiting.  Pt has a SP cath so denies dysuria, urinary frequency/urgency.  Improves/worsens symptoms:  Symptoms improved but seem to be getting worse again.  Pain scale (0-10)   4/10    Allergies:   Allergies   Allergen Reactions     Penicillins Hives     Pt states it was quite a long time ago so she doesn't exactly remember     Clindamycin Hives and Rash       NURSING PLAN: Routed to provider Yes    RECOMMENDED DISPOSITION:  TBD.    Will comply with recommendation: Yes  If further questions/concerns or if symptoms do not improve, worsen or new symptoms develop, call your PCP or [facility] as soon as possible.      Guideline used: abdominal pain, adult, back pain  Telephone Triage Protocols for Nurses, Fifth Edition, Donna Martin RN

## 2018-07-02 ENCOUNTER — TELEPHONE (OUTPATIENT)
Dept: FAMILY MEDICINE | Facility: CLINIC | Age: 48
End: 2018-07-02

## 2018-07-02 NOTE — TELEPHONE ENCOUNTER
Reason for call:  Form  Reason for Call:  Form, our goal is to have forms completed with 72 hours, however, some forms may require a visit or additional information.    Type of letter, form or note:  medical    Who is the form from?: [facility]    Where did the form come from: form was faxed in    What clinic location was the form placed at?: [facility] - [phone]    Where the form was placed:  in box     What number is listed as a contact on the form?:[phone]       Additional comments: Fax to [phone]    Call taken on 11/8/2016 at 3:08 PM by Daiana Barajas

## 2018-07-05 ENCOUNTER — TELEPHONE (OUTPATIENT)
Dept: FAMILY MEDICINE | Facility: CLINIC | Age: 48
End: 2018-07-05

## 2018-07-05 NOTE — TELEPHONE ENCOUNTER
Reason for Call:  Form, our goal is to have forms completed with 72 hours, however, some forms may require a visit or additional information.    Type of letter, form or note:  medical     Who is the form from?: [employer] (if other please explain)    Where did the form come from: form was faxed in    What clinic location was the form placed at?: [facility] - [phone]    Where the form was placed: [facility]    What number is listed as a contact on the form?: [phone]       Additional comments: Please complete, sign and fax to: [phone]    Call taken on 7/5/2018 at 1:29 PM by Sana Trevino

## 2018-07-05 NOTE — TELEPHONE ENCOUNTER
Form is requesting details of patient's wounds.  Faxed form to [employer] [facility] to complete and fax back to us asap    Awaiting form from [employer]

## 2018-07-09 ENCOUNTER — TELEPHONE (OUTPATIENT)
Dept: FAMILY MEDICINE | Facility: CLINIC | Age: 48
End: 2018-07-09

## 2018-07-09 NOTE — TELEPHONE ENCOUNTER
Reason for call:  Form  Reason for Call:  Form, our goal is to have forms completed with 72 hours, however, some forms may require a visit or additional information.    Type of letter, form or note:  medical    Who is the form from?: [facility] [employer]    Where did the form come from: form was faxed in    What clinic location was the form placed at?: [facility] - [phone]    Where the form was placed:  in box     What number is listed as a contact on the form?: [phone]       Additional comments: Fax to [phone]    Call taken on 11/8/2016 at 3:08 PM by Daiana Barajas

## 2018-07-10 ENCOUNTER — VIRTUAL VISIT (OUTPATIENT)
Dept: FAMILY MEDICINE | Facility: CLINIC | Age: 48
End: 2018-07-10
Payer: COMMERCIAL

## 2018-07-10 DIAGNOSIS — G89.4 CHRONIC PAIN SYNDROME: Primary | ICD-10-CM

## 2018-07-10 PROCEDURE — 99442 ZZC PHYSICIAN TELEPHONE EVALUATION 11-20 MIN: CPT | Performed by: FAMILY MEDICINE

## 2018-07-10 RX ORDER — GABAPENTIN 300 MG/1
CAPSULE ORAL
Qty: 90 CAPSULE | Refills: 0 | Status: SHIPPED | OUTPATIENT
Start: 2018-07-10 | End: 2018-07-17 | Stop reason: DRUGHIGH

## 2018-07-10 NOTE — PROGRESS NOTES
"Agatha Canas is a 48 year old female who is being evaluated via a telephone visit.      The patient has been notified of following:     \"This telephone visit will be conducted via a call between you and your physician/provider. We have found that certain health care needs can be provided without the need for a physical exam.  This service lets us provide the care you need with a short phone conversation.  If a prescription is necessary we can send it directly to your pharmacy.  If lab work is needed we can place an order for that and you can then stop by our lab to have the test done at a later time.    We will bill your insurance company for this service.  Please check with your medical insurance if this type of visit is covered. You may be responsible for the cost of this type of visit if insurance coverage is denied.  The typical cost is $30 (10min), $59 (11-20min) and $85 (21-30min).  Most often these visits are shorter than 10 minutes.    If during the course of the call the physician/provider feels a telephone visit is not appropriate, you will not be charged for this service.\"       Consent has been obtained for this service by care team member: yes.   See the scanned image in the medical record.    Agatha Canas complains of  Recheck Medication      I have reviewed and updated the patient's Past Medical History, Social History, Family History and Medication List.    ALLERGIES  Penicillins and Clindamycin    Nallely Rojo MA    (MA signature)    Additional provider notes: \"I want to touch base with you\"    Reports that she has had the last couple of teeth to be pulled. She reports that she had an infection and then had been a delay. She doesn't want to come to the office without teeth.     She feels that the Lyrica has \"made her pain worse\". Had worked for many years but now no longer working.   Would like to go back to gabapentin.  She has alternated between these two over the years. Stopped " gabapentin in 2011.         Assessment/Plan:  (G89.4) Chronic pain syndrome  (primary encounter diagnosis)  Comment: not comfortable on the lyrica. Would like to go back to gabapentin.   Plan: gabapentin (NEURONTIN) 300 MG capsule          Start gabapentin 300 mg and gradually increase to TID  Will recheck by telephone visit in 3-4 weeks. Sooner if needed.     All questions invited, asked and answered to the patient's apparent satisfaction.  Patient agrees to plan.        Total time of call between patient and provider was 12 minutes

## 2018-07-10 NOTE — MR AVS SNAPSHOT
After Visit Summary   7/10/2018    Agatha Canas    MRN: [id number]           Patient Information     Date Of Birth          1970        Visit Information        Provider Department      7/10/2018 12:30 PM Dipti Kasper MD [facility]        Today's Diagnoses     Chronic pain syndrome    -  1       Follow-ups after your visit        Who to contact     If you have questions or need follow up information about today's clinic visit or your schedule please contact [facility] directly at [phone].  Normal or non-critical lab and imaging results will be communicated to you by Exositehart, letter or phone within 4 business days after the clinic has received the results. If you do not hear from us within 7 days, please contact the clinic through [employer] or phone. If you have a critical or abnormal lab result, we will notify you by phone as soon as possible.  Submit refill requests through Central Desktop or call your pharmacy and they will forward the refill request to us. Please allow 3 business days for your refill to be completed.          Additional Information About Your Visit        MyChart Information     Central Desktop gives you secure access to your electronic health record. If you see a primary care provider, you can also send messages to your care team and make appointments. If you have questions, please call your primary care clinic.  If you do not have a primary care provider, please call [phone] and they will assist you.        Care EveryWhere ID     This is your Care EveryWhere ID. This could be used by other organizations to access your [place] medical records  [id number]         Blood Pressure from Last 3 Encounters:   01/17/18 112/60   06/27/17 110/60   11/25/16 132/72    Weight from Last 3 Encounters:   09/30/11 150 lb (68 kg)   06/27/11 145 lb (65.8 kg)   10/18/10 171 lb 1.6 oz (77.6 kg)              Today, you had the following     No orders found for  display         Today's Medication Changes          These changes are accurate as of 7/10/18  1:06 PM.  If you have any questions, ask your nurse or doctor.               Start taking these medicines.        Dose/Directions    gabapentin 300 MG capsule   Commonly known as:  NEURONTIN   Used for:  Chronic pain syndrome   Started by:  Dipti Kasper MD        Take 1 tablet (300 mg) every night for 1-3 days, then 1 tablet twice daily for 1-3 days, then 1 tablet three times daily   Quantity:  90 capsule   Refills:  0         Stop taking these medicines if you haven't already. Please contact your care team if you have questions.     cephALEXin 500 MG capsule   Commonly known as:  KEFLEX   Stopped by:  Dipti Kasper MD           nitroFURantoin (macrocrystal-monohydrate) 100 MG capsule   Commonly known as:  MACROBID   Stopped by:  Dipti Kasper MD           Pregabalin 200 MG capsule   Commonly known as:  LYRICA   Stopped by:  Dipti Kasper MD                Where to get your medicines      These medications were sent to [facility] - [place] - [address]  [address], [place] [address]     Phone:  [phone]     gabapentin 300 MG capsule                Primary Care Provider Office Phone # Fax #    Dipti Kasper [phone] [address] [facility] [address]        Equal Access to Services     [facility] AH: Hadii aad ku hadasho Soomaali, waaxda luqadaha, qaybta kaalmada adeegyada, maximus campbell hayildefonso duncan . So [facility] [phone].    ATENCIÓN: Si habla español, tiene a amaya disposición servicios gratuitos de asistencia lingüística. Mahesh al [phone].    We comply with applicable federal civil rights laws and [place] laws. We do not discriminate on the basis of race, color, national origin, age, disability, sex, sexual orientation, or gender identity.            Thank you!     Thank you for choosing [facility]  for your  care. Our goal is always to provide you with excellent care. Hearing back from our patients is one way we can continue to improve our services. Please take a few minutes to complete the written survey that you may receive in the mail after your visit with us. Thank you!             Your Updated Medication List - Protect others around you: Learn how to safely use, store and throw away your medicines at www.disposemymeds.org.          This list is accurate as of 7/10/18  1:06 PM.  Always use your most recent med list.                   Brand Name Dispense Instructions for use Diagnosis    baclofen 20 MG tablet    LIORESAL    90 tablet    TAKE 1 TABLET (20 MG) BY MOUTH 3 TIMES DAILY    Paraplegia (H)       buPROPion 150 MG 12 hr tablet    WELLBUTRIN SR    60 tablet    TAKE 1 TABLET (150 MG) BY MOUTH 2 TIMES DAILY    Anxiety       CALCIUM 600+D 600-200 MG-UNIT Tabs   Generic drug:  calcium carbonate-vitamin D           ciclopirox 0.77 % cream    LOPROX    30 g    Apply topically 2 times daily To toenails    Onychomycosis       CRANBERRY PO      twice daily        FISH OIL PO      once daily        gabapentin 300 MG capsule    NEURONTIN    90 capsule    Take 1 tablet (300 mg) every night for 1-3 days, then 1 tablet twice daily for 1-3 days, then 1 tablet three times daily    Chronic pain syndrome       hydrOXYzine 25 MG capsule    VISTARIL    90 capsule    Take 1 capsule (25 mg) by mouth 3 times daily as needed for itching    Chronic pain syndrome       ibuprofen 200 MG tablet    ADVIL/MOTRIN    120 tablet    TAKE 1 TABLET (200 MG) BY MOUTH EVERY 4 HOURS AS NEEDED FOR PAIN    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       levothyroxine 75 MCG tablet    SYNTHROID/LEVOTHROID    90 tablet    TAKE 1 TABLET (75 MCG) BY MOUTH DAILY    Hypothyroidism due to acquired atrophy of thyroid       lisinopril 20 MG tablet    PRINIVIL/ZESTRIL    90 tablet    TAKE 1 TABLET (20 MG) BY MOUTH DAILY    Benign essential hypertension       * methadone 5 MG  tablet    DOLOPHINE    300 tablet    20 mg po qam, 15 mg at 1 pm daily and 15 mg at bedtime. To last 30 days.    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 To last 30 days.    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, Paraplegia (H)       * methadone 5 MG tablet   Start taking on:  7/17/2018    DOLOPHINE    300 tablet    20 mg po qam, 15 mg at 1 pm daily and 15 mg at bedtime. To last 30 days.    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    Paraplegia (H)       multivitamin, therapeutic with minerals Tabs tablet     30 tablet    Take 1 tablet by mouth daily    Routine general medical examination at a health care facility       nicotine 21 MG/24HR 24 hr patch    NICODERM CQ    30 patch    Place 1 patch onto the skin every 24 hours    Tobacco use disorder       nystatin cream    MYCOSTATIN    60 g    APPLY TOPICALLY 3 TIMES DAILY FOR 14 DAYS    Yeast infection of the skin       order for DME     200 each     LARGE DISPOSABLE UNDERPAD to use as needed.    Urinary incontinence       order for DME     1 each    Equipment being ordered:irrigation kit for bladder catheter    UTI symptoms       * order for DME     28 each    Equipment being ordered: AG Batool 4X8, 28 per month.    Open wound of buttock, complicated, unspecified laterality, sequela       * order for DME     28 each    Equipment being ordered: aquafill silver 4x10, 28 per month    Open wound of buttock, complicated, unspecified laterality, sequela       * order for DME     28 each    Equipment being ordered: Aquaphil AG? 4x10? For 28 day supply monthly    Open wound of buttock, complicated, unspecified laterality, sequela       * order for DME     1 each    Therapeutic mattress     Paraplegia (H), Open wound of buttock, complicated, unspecified laterality, sequela       * order for DME     1 each    Order for hospital bed.    Paraplegia (H), Open wound of buttock, complicated, unspecified laterality, sequela       * order for DME     1 each    Equipment being ordered: 16 french silicone coated  - with NO LIP    Urinary incontinence, unspecified type       oxybutynin 10 MG 24 hr tablet    DITROPAN XL    90 tablet    Take 1 tablet (10 mg) by mouth daily    Urinary incontinence, unspecified type       * oxyCODONE-acetaminophen 7.5-325 MG per tablet    PERCOCET    180 tablet    Take 2 tablets by mouth every 6 hours as needed for pain Max 6 per day    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, Paraplegia (H)       * oxyCODONE-acetaminophen 7.5-325 MG per tablet    PERCOCET    180 tablet    Take 2 tablets by mouth every 6 hours as needed for pain Max 6 per day    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, Paraplegia (H)       * oxyCODONE-acetaminophen 7.5-325 MG per tablet   Start taking on:  7/17/2018    PERCOCET    180 tablet    Take 2 tablets by mouth every 6 hours as needed for pain Max 6 per day    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, Paraplegia (H)       * triamcinolone 0.1 % cream    KENALOG    30 g    Apply  topically 3 times daily. Apply sparingly to affected area.    Dyshidrotic eczema       * triamcinolone 0.5 % cream    KENALOG    30 g    APPLY SPARINGLY TO AFFECTED AREA THREE TIMES DAILY.    Atopic dermatitis, unspecified       venlafaxine 75 MG tablet    EFFEXOR    240 tablet    2 po qam and 1 po qpm.    Anxiety       * Notice:  This list has 14 medication(s) that are the same as other medications prescribed for you. Read the directions carefully, and ask your doctor or other care provider to review them with you.

## 2018-07-11 ENCOUNTER — TELEPHONE (OUTPATIENT)
Dept: FAMILY MEDICINE | Facility: CLINIC | Age: 48
End: 2018-07-11

## 2018-07-11 DIAGNOSIS — G89.4 CHRONIC PAIN SYNDROME: ICD-10-CM

## 2018-07-11 NOTE — TELEPHONE ENCOUNTER
Reason for Call:  Medication or medication refill:    Do you use a [facility]?  Name of the pharmacy and phone number for the current request:      Name of the medication requested: gabapentin (NEURONTIN) 300 MG capsule    Other request: pt states that she needs to be on a much higher dose. Please call and advise. Thank you.    Can we leave a detailed message on this number? YES    Phone number patient can be reached at: Cell number on file:    Telephone Information:   Mobile [phone]       Best Time: any    Call taken on 7/11/2018 at 11:19 AM by Yojana Chaves

## 2018-07-11 NOTE — TELEPHONE ENCOUNTER
Left message for pt. Please inform pt with the following below message from Dr. Kasper.    Left message for [facility]. Please verify if [facility] received form for the wound care.

## 2018-07-11 NOTE — TELEPHONE ENCOUNTER
She needs to increase over a few days.      Can take the gabapentin 300 mg twice daily today, then 300 mg three times daily tomorrow, then can increase to 300 mg 2 tablets three times per day.  That is the dose that she was on in the past.     I realize there will likely be some discomfort in the transition from the lyrica to the gabapentin.  Don't expect it to be increased for more than a few days.

## 2018-07-11 NOTE — TELEPHONE ENCOUNTER
Spoke with patient. She is wondering what is her maximum dosage she can take of the Gabapentin?     Also patient's dressing/ wound [facility]       Provider please advise.

## 2018-07-11 NOTE — TELEPHONE ENCOUNTER
Usually the dose is 1800 mg total daily.  Sometimes we go up to twice that.  Needs to be discussed with me prior to going any further than 1800 mg daily.     Please verify that [facility] received the fax/forms recently for wound care.

## 2018-07-13 ENCOUNTER — TELEPHONE (OUTPATIENT)
Dept: FAMILY MEDICINE | Facility: CLINIC | Age: 48
End: 2018-07-13

## 2018-07-13 NOTE — TELEPHONE ENCOUNTER
Reason for Call:  Form, our goal is to have forms completed with 72 hours, however, some forms may require a visit or additional information.    Type of letter, form or note:  [facility]    Who is the form from?: Home care    Where did the form come from: form was faxed in    What clinic location was the form placed at?: [facility] - [phone]    Where the form was placed: [facility]    What number is listed as a contact on the form?: [phone]       Additional comments: none    Call taken on 7/13/2018 at 1:24 PM by Jessica Romo

## 2018-07-13 NOTE — TELEPHONE ENCOUNTER
Patient is taking 3 pills 3 times a day and adding one or two to the three 3 times a day, she added 2 pills to one of the 3 that she takes during the day.  So 11 pills in one day.  3300 mg.  Provider please review/advise.    [employer] supply received and shipped out yesterday.  Liborio Khan, [facility]

## 2018-07-13 NOTE — TELEPHONE ENCOUNTER
Called patient. Discussed her dose.     Recommend taking 600 mg three times per day. Discussed that she needs to give it some time to work. Understand that it is uncomfortable but do not recommend increasing further at this time.     Call Tuesday with update. Can consider further increase at that time.     Patient agrees.

## 2018-07-13 NOTE — TELEPHONE ENCOUNTER
Reason for Call:  Form, our goal is to have forms completed with 72 hours, however, some forms may require a visit or additional information.    Type of letter, form or note:  [facility]    Who is the form from?: Home care    Where did the form come from: form was faxed in    What clinic location was the form placed at?: [facility] - [phone]    Where the form was placed: [facility]    What number is listed as a contact on the form?: [phone]       Additional comments: review, sign and return     Call taken on 7/13/2018 at 8:46 AM by Jessica Romo

## 2018-07-13 NOTE — TELEPHONE ENCOUNTER
Reason for Call:  Form, our goal is to have forms completed with 72 hours, however, some forms may require a visit or additional information.    Type of letter, form or note:  [facility]    Who is the form from?: [facility] (if other please explain)    Where did the form come from: form was faxed in    What clinic location was the form placed at?: [facility] - [phone]    Where the form was placed: 's Box    What number is listed as a contact on the form?: [phone]       Additional comments: none    Call taken on 7/13/2018 at 8:51 AM by Jessica Romo

## 2018-07-17 ENCOUNTER — TRANSFERRED RECORDS (OUTPATIENT)
Dept: HEALTH INFORMATION MANAGEMENT | Facility: CLINIC | Age: 48
End: 2018-07-17

## 2018-07-17 ENCOUNTER — TELEPHONE (OUTPATIENT)
Dept: FAMILY MEDICINE | Facility: CLINIC | Age: 48
End: 2018-07-17

## 2018-07-17 DIAGNOSIS — G82.20 PARAPLEGIA (H): ICD-10-CM

## 2018-07-17 DIAGNOSIS — G89.4 CHRONIC PAIN SYNDROME: Primary | ICD-10-CM

## 2018-07-17 DIAGNOSIS — S31.809S OPEN WOUND OF BUTTOCK WITH COMPLICATION, UNSPECIFIED LATERALITY, SEQUELA: Primary | ICD-10-CM

## 2018-07-17 RX ORDER — GABAPENTIN 600 MG/1
900 TABLET ORAL 3 TIMES DAILY
Qty: 135 TABLET | Refills: 1 | Status: SHIPPED | OUTPATIENT
Start: 2018-07-17 | End: 2018-08-06 | Stop reason: ALTCHOICE

## 2018-07-17 RX ORDER — GABAPENTIN 300 MG/1
900 CAPSULE ORAL 3 TIMES DAILY
Qty: 270 CAPSULE | Refills: 3 | Status: SHIPPED | OUTPATIENT
Start: 2018-07-17 | End: 2018-07-17 | Stop reason: DRUGHIGH

## 2018-07-17 NOTE — TELEPHONE ENCOUNTER
Pt stated he mother threw away her prescription for gabapentin and is wondering if [facility] will send a new Rx to [facility] for her?    Also, she is requesting quantity increase of gabapentin.  Instead of 2 tablets three times daily, can she have 3 tablets three times daily.  She will schedule a phone visit today, if Provider prefers this.

## 2018-07-17 NOTE — TELEPHONE ENCOUNTER
Reason for Call:  Medication or medication refill:    Do you use a [facility]?  Name of the pharmacy and phone number for the current request:  [facility]    Name of the medication requested: Neurontin    Other request: patient is calling to  on how she is doing on this medication.   Wants to discuss directly with Dr Kasper. Please call     Can we leave a detailed message on this number? YES    Phone number patient can be reached at: Home number on file [phone] (home)    Best Time: any    Call taken on 7/17/2018 at 9:46 AM by Promise Cantu

## 2018-07-17 NOTE — TELEPHONE ENCOUNTER
Reason for Call: Request for an order or referral:    Order or referral being requested: orders    Date needed: as soon as possible    Has the patient been seen by the PCP for this problem? YES    Additional comments: Patient needs more orders sent [facility] in [place] for supplies for her open sore.  They are changing it 3 times daily.  They are currently only getting enough supplies to change once a day.  They would like enough to change 3 times a day    Phone number Patient can be reached at:  Other phone number:  [phone]    Best Time:  any    Can we leave a detailed message on this number?  YES    Call taken on 7/17/2018 at 10:08 AM by Hillary Mcgraw

## 2018-07-17 NOTE — TELEPHONE ENCOUNTER
Pt calling, requesting a call back from Dr. Kasper or Kelli regarding her medication - she didn't say what.

## 2018-07-17 NOTE — TELEPHONE ENCOUNTER
"New prescription sent.     She is not to increase the dose further at this time. She cannot take additional doses \"when needed\".  Will not be making any increases in dose in the near future.      If this is discarded or out early, this is a medicine that will not be replaced.   "

## 2018-07-17 NOTE — TELEPHONE ENCOUNTER
insurance will only cover a quantity of 8 per day.  Pharmacist stated that we could change the prescription and insurance will cover it today:  Gabapentin 600 mg tablets - take 1.5 tablets three times per day.     Also, pt is wondering if this is the maximum dose that Provider is willing to prescribe?  Pt is wondering if pt feels like she needs an extra one at times, is that an option?

## 2018-07-18 DIAGNOSIS — E03.4 HYPOTHYROIDISM DUE TO ACQUIRED ATROPHY OF THYROID: ICD-10-CM

## 2018-07-19 NOTE — TELEPHONE ENCOUNTER
Called [facility] [phone] to clarify the items that pt gets per month now, to care for wounds - changing them one time per day.      Need to order same supplies (3 times the amount noted below) to accommodate three changes per day.    1. hyperfix nonwoven tape - 4 rolls  2. ABD pads sterile dressing 5X9 - 30 each  3. duoderm 6X6 - 5 each  4. Sponge drain IV sterile  4x4 - 75 pack  5. aquacal ag 4x5 - 30 each  6. microcyn wound care spray - 1 bottle    Fax order to [facility] at [phone]

## 2018-07-20 RX ORDER — LEVOTHYROXINE SODIUM 75 UG/1
TABLET ORAL
Qty: 30 TABLET | Refills: 0 | Status: SHIPPED | OUTPATIENT
Start: 2018-07-20 | End: 2018-08-20

## 2018-07-20 NOTE — TELEPHONE ENCOUNTER
"Requested Prescriptions   Pending Prescriptions Disp Refills     levothyroxine (SYNTHROID/LEVOTHROID) 75 MCG tablet 90 tablet 2    Thyroid Protocol Failed    7/18/2018  3:46 PM       Failed - Normal TSH on file in past 12 months    Recent Labs   Lab Test  06/27/17   1224   TSH  3.49           Passed - Patient is 12 years or older       Passed - Recent (12 mo) or future (30 days) visit within the authorizing provider's specialty    Patient had office visit in the last 12 months or has a visit in the next 30 days with authorizing provider or within the authorizing provider's specialty.  See \"Patient Info\" tab in inbasket, or \"Choose Columns\" in Meds & Orders section of the refill encounter.           Passed - No active pregnancy on record    If patient is pregnant or has had a positive pregnancy test, please check TSH.         Passed - No positive pregnancy test in past 12 months    If patient is pregnant or has had a positive pregnancy test, please check TSH.          levothyroxine (SYNTHROID/LEVOTHROID) 75 MCG tablet  Medication is being filled for 1 time refill only due to:  Future labs ordered TSH labs.    Please assist with scheduling.    Snehal Geiger, RN, BSN       "

## 2018-07-23 ENCOUNTER — TELEPHONE (OUTPATIENT)
Dept: FAMILY MEDICINE | Facility: CLINIC | Age: 48
End: 2018-07-23

## 2018-07-24 ENCOUNTER — TELEPHONE (OUTPATIENT)
Dept: FAMILY MEDICINE | Facility: CLINIC | Age: 48
End: 2018-07-24

## 2018-07-24 DIAGNOSIS — S31.809A WOUND OF BUTTOCK, UNSPECIFIED LATERALITY, INITIAL ENCOUNTER: Primary | ICD-10-CM

## 2018-07-24 NOTE — TELEPHONE ENCOUNTER
Requests reviewed and can give a verbal order for the following:    Order generated.  Fax order as requested.    Jake Singleton MD  Please close encounter when call/work completed.

## 2018-07-24 NOTE — TELEPHONE ENCOUNTER
Provider, please review and give verbal ok.    Then call cailin to give order from provider:  C[facility] (Other) [phone]

## 2018-07-24 NOTE — TELEPHONE ENCOUNTER
Reason for Call: Request for an order or referral:    Order or referral being requested:  Order for more supplies for cleaning open wound on buttocks so that she can clean it 3 x daily.  This is per [facility]    Date needed: as soon as possible    Has the patient been seen by the PCP for this problem? YES    Additional comments:  Margaret From [facility] calling.  Please fax order to her at [phone]    Phone number Patient can be reached at:  Other phone number:  [phone]    Best Time:  any    Can we leave a detailed message on this number?  YES    Call taken on 7/24/2018 at 11:41 AM by Priscilla Mobley

## 2018-07-26 ENCOUNTER — MEDICAL CORRESPONDENCE (OUTPATIENT)
Dept: HEALTH INFORMATION MANAGEMENT | Facility: CLINIC | Age: 48
End: 2018-07-26

## 2018-08-01 ENCOUNTER — TELEPHONE (OUTPATIENT)
Dept: FAMILY MEDICINE | Facility: CLINIC | Age: 48
End: 2018-08-01

## 2018-08-01 DIAGNOSIS — E03.4 HYPOTHYROIDISM DUE TO ACQUIRED ATROPHY OF THYROID: Primary | ICD-10-CM

## 2018-08-01 NOTE — TELEPHONE ENCOUNTER
Reason for call:  Other  Reason for Call: Request for an order or referral:    Order or referral being requested: labs for a thyroid    Date needed: as soon as possible    Has the patient been seen by the PCP for this problem? YES    Additional comments: none    Phone number Home Care can be reached at:  [phone]    Best Time:  anytime    Can we leave a detailed message on this number?  YES    Call taken on 8/1/2018 at 3:58 PM by Leland Gaines

## 2018-08-01 NOTE — TELEPHONE ENCOUNTER
[facility] (HOME CARE) [phone]    spoke to Shyanne - faxed the lab order to her at [phone]

## 2018-08-02 ENCOUNTER — TELEPHONE (OUTPATIENT)
Dept: FAMILY MEDICINE | Facility: CLINIC | Age: 48
End: 2018-08-02

## 2018-08-02 DIAGNOSIS — G89.4 CHRONIC PAIN SYNDROME: ICD-10-CM

## 2018-08-02 DIAGNOSIS — G89.21 CHRONIC PAIN DUE TO INJURY: ICD-10-CM

## 2018-08-02 DIAGNOSIS — G82.20 PARAPLEGIA (H): ICD-10-CM

## 2018-08-02 RX ORDER — BACLOFEN 20 MG/1
TABLET ORAL
Qty: 90 TABLET | Refills: 3 | Status: SHIPPED | OUTPATIENT
Start: 2018-08-02 | End: 2018-11-23

## 2018-08-02 RX ORDER — IBUPROFEN 200 MG
TABLET ORAL
Qty: 120 TABLET | Refills: 1 | Status: SHIPPED | OUTPATIENT
Start: 2018-08-02 | End: 2018-09-25

## 2018-08-02 NOTE — TELEPHONE ENCOUNTER
"Ibuprofen    Routing refill request to provider for review/approval because:  Labs not current:  Alt, Ast, CBC, Creatinine.    Susan Ordoñez, RN, BSN      Requested Prescriptions   Pending Prescriptions Disp Refills     ibuprofen (ADVIL/MOTRIN) 200 MG tablet 120 tablet 1    NSAID Medications Failed    8/2/2018 10:54 AM       Failed - Normal ALT on file in past 12 months    Recent Labs   Lab Test 12/02/16   ALT  17            Failed - Normal AST on file in past 12 months    Recent Labs   Lab Test 12/02/16   AST  16            Failed - Normal CBC on file in past 12 months    Recent Labs   Lab Test 09/23/13   WBC  12.8   RBC  5.59   HGB  13.3   HCT  42.2   PLT  461*       For [facility] ONLY: [id number] = WBC, [id number] = RBC         Failed - Normal serum creatinine on file in past 12 months    Recent Labs   Lab Test 12/02/16   CR  1.0            Passed - Blood pressure under 140/90 in past 12 months    BP Readings from Last 3 Encounters:   01/17/18 112/60   06/27/17 110/60   11/25/16 132/72                Passed - Recent (12 mo) or future (30 days) visit within the authorizing provider's specialty    Patient had office visit in the last 12 months or has a visit in the next 30 days with authorizing provider or within the authorizing provider's specialty.  See \"Patient Info\" tab in inbasket, or \"Choose Columns\" in Meds & Orders section of the refill encounter.           Passed - Patient is age 6-64 years       Passed - No active pregnancy on record       Passed - No positive pregnancy test in past 12 months              "

## 2018-08-02 NOTE — TELEPHONE ENCOUNTER
Baclofen    Routing refill request to provider for review/approval because:  Drug not on the [facility] refill protocol     Susan Ordoñez RN, BSN

## 2018-08-03 NOTE — TELEPHONE ENCOUNTER
Spoke to pt.  She stated she would like to change the gabapentin medication to capsules instead of tablets.  She states it seems that the capsules are more effective, give her better relief.  Provider, please review and advise.

## 2018-08-03 NOTE — TELEPHONE ENCOUNTER
Patient called clinic asking for a call back from Kelli or Dr. Kasper regarding this medication and her Neurontin. She said she didn't want to get into too much detail as it's too much to talk about. Please return patient's call.

## 2018-08-06 RX ORDER — GABAPENTIN 300 MG/1
900 CAPSULE ORAL 3 TIMES DAILY
Qty: 270 CAPSULE | Refills: 1 | Status: SHIPPED | OUTPATIENT
Start: 2018-08-06 | End: 2018-08-07

## 2018-08-07 ENCOUNTER — TELEPHONE (OUTPATIENT)
Dept: FAMILY MEDICINE | Facility: CLINIC | Age: 48
End: 2018-08-07

## 2018-08-07 RX ORDER — PREGABALIN 200 MG/1
200 CAPSULE ORAL 3 TIMES DAILY
Qty: 90 CAPSULE | Refills: 1 | Status: SHIPPED | OUTPATIENT
Start: 2018-08-07 | End: 2018-09-25

## 2018-08-07 NOTE — TELEPHONE ENCOUNTER
Left message asking patient to return call.    Please inform her of note from Provider and clarify her situation.

## 2018-08-07 NOTE — TELEPHONE ENCOUNTER
Called patient and advised her of the message. She stated that she is in so much pain that she wants something else done. She is asking if you can put her back on Lyrica because she had less pain when she was on that.     Provider please advise.     Zoraida Maldonado MA

## 2018-08-07 NOTE — TELEPHONE ENCOUNTER
Reason for Call:  Other prescription    Detailed comments: patient states she spoke to someone this morning about Gabapentin and she would like to talk to them again.    Phone Number Patient can be reached at: Home number on file [phone] (home) or Cell number on file:    Telephone Information:   Mobile [phone]       Best Time: anytime    Can we leave a detailed message on this number? YES    Call taken on 8/7/2018 at 2:25 PM by Michelle Jennings

## 2018-08-07 NOTE — TELEPHONE ENCOUNTER
Spoke to pt.  She would like the Rx for lyrica sent to [facility].    Once Rx is sent, please call [facility] to ask them to run the prescription through to see if a PA is needed and pursue asap.

## 2018-08-07 NOTE — TELEPHONE ENCOUNTER
Reason for Call:  Other prescription     Detailed comments: patient states she spoke to someone this morning about Gabapentin and she would like to talk to them again.     Phone Number Patient can be reached at: Home number on file [phone] (home) or Cell number on file:        Telephone Information:   Mobile [phone]         Best Time: anytime     Can we leave a detailed message on this number? YES     Call taken on 8/7/2018 at 2:25 PM by Michelle Jennings

## 2018-08-07 NOTE — TELEPHONE ENCOUNTER
I am fine with changing back to Adcadekaty is she would like. I see that has called back. I will hold off on sending the Lyrica until the reason for call below is noted.

## 2018-08-08 ENCOUNTER — TELEPHONE (OUTPATIENT)
Dept: FAMILY MEDICINE | Facility: CLINIC | Age: 48
End: 2018-08-08

## 2018-08-08 NOTE — TELEPHONE ENCOUNTER
Reason for Call:  Form, our goal is to have forms completed with 72 hours, however, some forms may require a visit or additional information.    Type of letter, form or note:  medical    Who is the form from?: [employer] (if other please explain)    Where did the form come from: form was faxed in    What clinic location was the form placed at?: [facility] - [phone]    Where the form was placed: [facility]    What number is listed as a contact on the form?: [phone]       Additional comments: Please sign and fax to: [phone]    Call taken on 8/8/2018 at 4:55 PM by Sana Trevino

## 2018-08-08 NOTE — TELEPHONE ENCOUNTER
Urgent PA request      Prior Authorization Retail Medication Request  Medication/Dose:  Pregabalin (LYRICA) 200 MG capsule    Provider, do you want to change the medication or start a PA?  Please advise on previously Tried and Failed Therapies:      Insurance ID (if provided):  [id number]  Insurance Phone (if provided):   [phone]  \[facility]\""

## 2018-08-08 NOTE — TELEPHONE ENCOUNTER
Called to initiate urgent PA.    PA approved for 12 months.    [facility] confirmed that the prescription is covered by insurance.    They will have it ready for pt to  today.    Pt informed

## 2018-08-09 ENCOUNTER — TELEPHONE (OUTPATIENT)
Dept: FAMILY MEDICINE | Facility: CLINIC | Age: 48
End: 2018-08-09

## 2018-08-09 ENCOUNTER — TRANSFERRED RECORDS (OUTPATIENT)
Dept: HEALTH INFORMATION MANAGEMENT | Facility: CLINIC | Age: 48
End: 2018-08-09

## 2018-08-09 DIAGNOSIS — G82.20 PARAPLEGIA (H): ICD-10-CM

## 2018-08-09 DIAGNOSIS — S31.809S OPEN WOUND OF BUTTOCK WITH COMPLICATION, UNSPECIFIED LATERALITY, SEQUELA: ICD-10-CM

## 2018-08-09 DIAGNOSIS — G89.4 CHRONIC PAIN SYNDROME: Primary | ICD-10-CM

## 2018-08-09 NOTE — TELEPHONE ENCOUNTER
Reason for Call: Request for an order or referral:    Order or referral being requested: referral    Date needed: as soon as possible    Has the patient been seen by the PCP for this problem? YES    Additional comments: Patient would like referral sent to [facility] pain clinic    Phone number Patient can be reached at:  Home number on file [phone] (home)    Best Time:  any    Can we leave a detailed message on this number?  YES    Call taken on 8/9/2018 at 10:33 AM by Hillary Mcgraw

## 2018-08-17 DIAGNOSIS — G82.20 PARAPLEGIA (H): ICD-10-CM

## 2018-08-17 DIAGNOSIS — G89.21 CHRONIC PAIN DUE TO INJURY: ICD-10-CM

## 2018-08-17 RX ORDER — METHADONE HYDROCHLORIDE 5 MG/1
TABLET ORAL
Qty: 300 TABLET | Refills: 0 | Status: SHIPPED | OUTPATIENT
Start: 2018-08-17 | End: 2018-10-12

## 2018-08-17 RX ORDER — OXYCODONE AND ACETAMINOPHEN 7.5; 325 MG/1; MG/1
2 TABLET ORAL EVERY 6 HOURS PRN
Qty: 180 TABLET | Refills: 0 | Status: SHIPPED | OUTPATIENT
Start: 2018-08-17 | End: 2018-10-12

## 2018-08-17 NOTE — TELEPHONE ENCOUNTER
Reason for Call:  Medication or medication refill:    Do you use a [facility]?  Name of the pharmacy and phone number for the current request:  [facility]    Name of the medication requested: methadone and percocet    Other request: will need refill. Declined calling pharmacy.     Can we leave a detailed message on this number? YES    Phone number patient can be reached at: Home number on file [phone] (home)    Best Time: any    Call taken on 8/17/2018 at 10:59 AM by Promise Cantu

## 2018-08-17 NOTE — TELEPHONE ENCOUNTER
Patient calling very upset that no has yet called her with her refill request. Patient wants to talk with Kelli due to she handels her prescriptions per patient    Nathalie Hayes  Reception/ Scheduling

## 2018-08-17 NOTE — TELEPHONE ENCOUNTER
Pt stated the pharmacy did not tell her that there were no refills of these Rx available.  So, she will run out over the weekend.    I informed her that I will put the scripts in the mail now and they will be picked up tomorrow and realistically get to the pharmacy Monday or Tuesday.    She was ok with this.    Also, she stated that the pain clinic has not reached out to her to get an appt scheduled.  I called them to request that they reach out to the pt to get an appt scheduled:   [facility] [phone]      They did not received the referral, so I faxed it to them at [phone].  They will call Agatha to get scheduled.

## 2018-08-20 ENCOUNTER — TELEPHONE (OUTPATIENT)
Dept: FAMILY MEDICINE | Facility: CLINIC | Age: 48
End: 2018-08-20

## 2018-08-20 DIAGNOSIS — E03.4 HYPOTHYROIDISM DUE TO ACQUIRED ATROPHY OF THYROID: ICD-10-CM

## 2018-08-20 NOTE — TELEPHONE ENCOUNTER
Reason for Call:  Form, our goal is to have forms completed with 72 hours, however, some forms may require a visit or additional information.    Type of letter, form or note:  medical    Who is the form from?: [employer] (if other please explain)    Where did the form come from: form was faxed in    What clinic location was the form placed at?: [facility] - [phone]    Where the form was placed: [facility]    What number is listed as a contact on the form?: [phone]       Additional comments: please complete form,sign,date and return to fax [phone]    Call taken on 8/20/2018 at 10:13 AM by Tanisha Lara

## 2018-08-21 ENCOUNTER — TELEPHONE (OUTPATIENT)
Dept: FAMILY MEDICINE | Facility: CLINIC | Age: 48
End: 2018-08-21

## 2018-08-21 RX ORDER — LEVOTHYROXINE SODIUM 75 UG/1
TABLET ORAL
Qty: 90 TABLET | Refills: 3 | Status: SHIPPED | OUTPATIENT
Start: 2018-08-21 | End: 2019-09-17

## 2018-08-21 NOTE — TELEPHONE ENCOUNTER
Reason for call:  Pt is calling and she is wanting to know if SF would be okay to do a telephone visit to discuss medication. She failed to tell me what medication it was for. Please advise.

## 2018-08-21 NOTE — TELEPHONE ENCOUNTER
Angedleroman with oswaldo NIÑO for phone visit. Appt scheduled    Next 5 appointments (look out 90 days)     Aug 22, 2018 11:00 AM CDT   Telephone Visit with Dipti Kasper MD   [facility] ([facility]    [address], [address]  [place] [address]   [phone]                    Susan Ordoñez RN, BSN

## 2018-08-21 NOTE — TELEPHONE ENCOUNTER
Synthroid:  Routing refill request to provider for review/approval because:  Lou given x1 and patient did not follow up  Labs not current:  TSH    Ila Santana, RN, BSN

## 2018-08-22 ENCOUNTER — VIRTUAL VISIT (OUTPATIENT)
Dept: FAMILY MEDICINE | Facility: CLINIC | Age: 48
End: 2018-08-22
Payer: COMMERCIAL

## 2018-08-22 DIAGNOSIS — Z53.9 ERRONEOUS ENCOUNTER--DISREGARD: Primary | ICD-10-CM

## 2018-08-22 NOTE — MR AVS SNAPSHOT
After Visit Summary   8/22/2018    Agatha Canas    MRN: [id number]           Patient Information     Date Of Birth          1970        Visit Information        Provider Department      8/22/2018 11:00 AM Dpiti Kasper MD [facility]        Today's Diagnoses     ERRONEOUS ENCOUNTER--DISREGARD    -  1       Follow-ups after your visit        Your next 10 appointments already scheduled     Aug 24, 2018  1:40 PM CDT   Telephone Visit with Dipti Kasper MD   [facility] ([facility]    [address], [address]  [place] [address]   [phone]           Note: this is not an onsite visit; there is no need to come to the facility.              Who to contact     If you have questions or need follow up information about today's clinic visit or your schedule please contact [facility] directly at [phone].  Normal or non-critical lab and imaging results will be communicated to you by MyChart, letter or phone within 4 business days after the clinic has received the results. If you do not hear from us within 7 days, please contact the clinic through [employer] or phone. If you have a critical or abnormal lab result, we will notify you by phone as soon as possible.  Submit refill requests through [employer] or call your pharmacy and they will forward the refill request to us. Please allow 3 business days for your refill to be completed.          Additional Information About Your Visit        MyChart Information     [employer] gives you secure access to your electronic health record. If you see a primary care provider, you can also send messages to your care team and make appointments. If you have questions, please call your primary care clinic.  If you do not have a primary care provider, please call [phone] and they will assist you.        Care EveryWhere ID     This is your Care EveryWhere ID. This could be used by other  organizations to access your [place] medical records  [id number]         Blood Pressure from Last 3 Encounters:   01/17/18 112/60   06/27/17 110/60   11/25/16 132/72    Weight from Last 3 Encounters:   09/30/11 150 lb (68 kg)   06/27/11 145 lb (65.8 kg)   10/18/10 171 lb 1.6 oz (77.6 kg)              Today, you had the following     No orders found for display         Today's Medication Changes          These changes are accurate as of 8/22/18 11:59 PM.  If you have any questions, ask your nurse or doctor.               Stop taking these medicines if you haven't already. Please contact your care team if you have questions.     nystatin cream   Commonly known as:  MYCOSTATIN   Stopped by:  Dipti Kasper MD                    Primary Care Provider Office Phone # Fax #    Dipti Kasper [phone] [address] [address]        Equal Access to Services     [facility]: Hadii aad ku hadasho Soomaali, waaxda luqadaha, qaybta kaalmada adeegyada, waxay idiin hayaan dedrick leesarachristiana duncan . So [facility] [phone].    ATENCIÓN: Si habla español, tiene a amaya disposición servicios gratuitos de asistencia lingüística. [facility] [phone].    We comply with applicable federal civil rights laws and [place] laws. We do not discriminate on the basis of race, color, national origin, age, disability, sex, sexual orientation, or gender identity.            Thank you!     Thank you for choosing [facility]  for your care. Our goal is always to provide you with excellent care. Hearing back from our patients is one way we can continue to improve our services. Please take a few minutes to complete the written survey that you may receive in the mail after your visit with us. Thank you!             Your Updated Medication List - Protect others around you: Learn how to safely use, store and throw away your medicines at www.disposemymeds.org.          This list is accurate as of  8/22/18 11:59 PM.  Always use your most recent med list.                   Brand Name Dispense Instructions for use Diagnosis    baclofen 20 MG tablet    LIORESAL    90 tablet    TAKE 1 TABLET (20 MG) BY MOUTH 3 TIMES DAILY    Paraplegia (H)       buPROPion 150 MG 12 hr tablet    WELLBUTRIN SR    60 tablet    TAKE 1 TABLET (150 MG) BY MOUTH 2 TIMES DAILY    Anxiety       CALCIUM 600+D 600-200 MG-UNIT Tabs   Generic drug:  calcium carbonate-vitamin D           ciclopirox 0.77 % cream    LOPROX    30 g    Apply topically 2 times daily To toenails    Onychomycosis       CRANBERRY PO      twice daily        FISH OIL PO      once daily        hydrOXYzine 25 MG capsule    VISTARIL    90 capsule    Take 1 capsule (25 mg) by mouth 3 times daily as needed for itching    Chronic pain syndrome       ibuprofen 200 MG tablet    ADVIL/MOTRIN    120 tablet    TAKE 1 TABLET (200 MG) BY MOUTH EVERY 4 HOURS AS NEEDED FOR PAIN    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       levothyroxine 75 MCG tablet    SYNTHROID/LEVOTHROID    90 tablet    TAKE 1 TABLET (75 MCG) BY MOUTH DAILY    Hypothyroidism due to acquired atrophy of thyroid       lisinopril 20 MG tablet    PRINIVIL/ZESTRIL    90 tablet    TAKE 1 TABLET (20 MG) BY MOUTH DAILY    Benign essential hypertension       * methadone 5 MG tablet    DOLOPHINE    300 tablet    20 mg po qam, 15 mg at 1 pm daily and 15 mg at bedtime. To last 30 days.    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 To last 30 days.    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 To last 30 days.    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, Paraplegia (H)       metroNIDAZOLE 0.75 % cream    METROCREAM    45 g    Apply topically 2 times daily    Rosacea       multivitamin per tablet     100 tablet    Take 1  tablet by mouth daily.    Paraplegia (H)       multivitamin, therapeutic with minerals Tabs tablet     30 tablet    Take 1 tablet by mouth daily    Routine general medical examination at a health care facility       nicotine 21 MG/24HR 24 hr patch    NICODERM CQ    30 patch    Place 1 patch onto the skin every 24 hours    Tobacco use disorder       order for DME     200 each     LARGE DISPOSABLE UNDERPAD to use as needed.    Urinary incontinence       order for DME     1 each    Equipment being ordered:irrigation kit for bladder catheter    UTI symptoms       * order for DME     28 each    Equipment being ordered: AG Batool 4X8, 28 per month.    Open wound of buttock, complicated, unspecified laterality, sequela       * order for DME     28 each    Equipment being ordered: aquafill silver 4x10, 28 per month    Open wound of buttock, complicated, unspecified laterality, sequela       * order for DME     28 each    Equipment being ordered: Aquaphil AG? 4x10? For 28 day supply monthly    Open wound of buttock, complicated, unspecified laterality, sequela       * order for DME     1 each    Therapeutic mattress    Paraplegia (H), Open wound of buttock, complicated, unspecified laterality, sequela       * order for DME     1 each    Order for hospital bed.    Paraplegia (H), Open wound of buttock, complicated, unspecified laterality, sequela       * order for DME     1 each    Equipment being ordered: 16 french silicone coated  - with NO LIP    Urinary incontinence, unspecified type       order for DME     1 each    Equipment being ordered:   hyperfix nonwoven tape Wound care TID 12 rolls per month    Open wound of buttock with complication, unspecified laterality, sequela, Paraplegia (H)       order for DME     1 each    Equipment being ordered:   ABD pads sterile dressing 5X9 For wound care dressing changes TID 90 each per month    Open wound of buttock with complication, unspecified laterality, sequela,  Paraplegia (H)       order for DME     1 each    Equipment being ordered:   duoderm 6X6 Wound care TID 15 each per month    Open wound of buttock with complication, unspecified laterality, sequela, Paraplegia (H)       order for DME     1 each    Equipment being ordered:   Sponge drain IV sterile 4x4 Wound care  pack per month    Open wound of buttock with complication, unspecified laterality, sequela, Paraplegia (H)       order for DME     1 each    Equipment being ordered:   Aquacel AG 4x5 Wound care TID 90 each per month    Open wound of buttock with complication, unspecified laterality, sequela, Paraplegia (H)       order for DME     1 each    Equipment being ordered:   microcyn wound care spray Wound care TID 3 bottles per month    Open wound of buttock with complication, unspecified laterality, sequela, Paraplegia (H)       order for DME     90 each    Equipment being ordered: Appropriate needed wound healing supplies for cleansing in treating the buttock wound 3 times daily.    Wound of buttock, unspecified laterality, initial encounter       oxybutynin 10 MG 24 hr tablet    DITROPAN XL    90 tablet    Take 1 tablet (10 mg) by mouth daily    Urinary incontinence, unspecified type       * oxyCODONE-acetaminophen 7.5-325 MG per tablet    PERCOCET    180 tablet    Take 2 tablets by mouth every 6 hours as needed for pain Max 6 per day    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, Paraplegia (H)       * oxyCODONE-acetaminophen 7.5-325 MG per tablet    PERCOCET    180 tablet    Take 2 tablets by mouth every 6 hours as needed for pain Max 6 per day    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, Paraplegia (H)       * oxyCODONE-acetaminophen 7.5-325 MG per tablet    PERCOCET    180 tablet    Take 2 tablets by mouth every 6 hours as needed for pain Max 6 per day    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, Paraplegia (H)       Pregabalin 200 MG capsule    LYRICA    90 capsule    Take 1 capsule (200 mg) by mouth 3 times daily    Paraplegia (H), Chronic  pain syndrome       * triamcinolone 0.1 % cream    KENALOG    30 g    Apply  topically 3 times daily. Apply sparingly to affected area.    Dyshidrotic eczema       * triamcinolone 0.5 % cream    KENALOG    30 g    APPLY SPARINGLY TO AFFECTED AREA THREE TIMES DAILY.    Atopic dermatitis, unspecified       venlafaxine 75 MG tablet    EFFEXOR    240 tablet    2 po qam and 1 po qpm.    Anxiety       * Notice:  This list has 14 medication(s) that are the same as other medications prescribed for you. Read the directions carefully, and ask your doctor or other care provider to review them with you.

## 2018-08-22 NOTE — PROGRESS NOTES
Called Agatha. No answer. Left message.  She is to call back if she would still like the phone visit.     This encounter was opened in error. Please disregard.

## 2018-08-22 NOTE — PROGRESS NOTES
"Agatha Canas is a 48 year old female who is being evaluated via a telephone visit.      The patient has been notified of following:     \"This telephone visit will be conducted via a call between you and your physician/provider. We have found that certain health care needs can be provided without the need for a physical exam.  This service lets us provide the care you need with a short phone conversation.  If a prescription is necessary we can send it directly to your pharmacy.  If lab work is needed we can place an order for that and you can then stop by our lab to have the test done at a later time.    We will bill your insurance company for this service.  Please check with your medical insurance if this type of visit is covered. You may be responsible for the cost of this type of visit if insurance coverage is denied.  The typical cost is $30 (10min), $59 (11-20min) and $85 (21-30min).  Most often these visits are shorter than 10 minutes.    If during the course of the call the physician/provider feels a telephone visit is not appropriate, you will not be charged for this service.\"       Consent has been obtained for this service by care team member: yes.   See the scanned image in the medical record.    Agatha Canas complains of  Recheck Medication (LYRICA/ call anytime )      I have reviewed and updated the patient's Past Medical History, Social History, Family History and Medication List.    ALLERGIES  Penicillins and Clindamycin    Nallely Rojo MA    (MA signature)    Additional provider notes: called Agatha.     Assessment/Plan:  No diagnosis found.      Total time of call between patient and provider was *** minutes     "

## 2018-08-27 ENCOUNTER — TELEPHONE (OUTPATIENT)
Dept: FAMILY MEDICINE | Facility: CLINIC | Age: 48
End: 2018-08-27

## 2018-08-27 NOTE — TELEPHONE ENCOUNTER
Reason for Call:  Form, our goal is to have forms completed with 72 hours, however, some forms may require a visit or additional information.    Type of letter, form or note:  medical    Who is the form from?: Home care - [facility]    Where did the form come from: form was faxed in    What clinic location was the form placed at?: [facility] - [phone]    Where the form was placed: [facility]    What number is listed as a contact on the form?: [phone]       Additional comments: Please sign and fax order back to: [phone]    Call taken on 8/27/2018 at 3:20 PM by Sana Trevino

## 2018-08-29 ENCOUNTER — TELEPHONE (OUTPATIENT)
Dept: FAMILY MEDICINE | Facility: CLINIC | Age: 48
End: 2018-08-29

## 2018-08-29 ENCOUNTER — TRANSFERRED RECORDS (OUTPATIENT)
Dept: HEALTH INFORMATION MANAGEMENT | Facility: CLINIC | Age: 48
End: 2018-08-29

## 2018-08-29 DIAGNOSIS — R30.0 DYSURIA: Primary | ICD-10-CM

## 2018-08-29 NOTE — TELEPHONE ENCOUNTER
Reason for Call: Request for an order or referral:    Order or referral being requested: UA    Date needed: as soon as possible    Has the patient been seen by the PCP for this problem? Not Applicable    Additional comments: Katt is at home and patient is complaining of a possible UTI. Katt would like orders placed to get a UA    Phone number Patient can be reached at:  Cell number on file:    Telephone Information:   Mobile [phone]       Best Time:  asap    Can we leave a detailed message on this number?  YES    Call taken on 8/29/2018 at 12:10 PM by Nathalie Hayes

## 2018-08-29 NOTE — TELEPHONE ENCOUNTER
katt (HOME CARE) [phone]     Katt informed.  She asked that we fax the orders to [facility] at [phone]    faxed

## 2018-08-31 ENCOUNTER — TELEPHONE (OUTPATIENT)
Dept: FAMILY MEDICINE | Facility: CLINIC | Age: 48
End: 2018-08-31

## 2018-08-31 NOTE — TELEPHONE ENCOUNTER
Patient called regarding UA results.  Patient left a urine sample at [facility] on Wednesday , [place]. Dr. Kasper placed orders.        Called lab to send over results. Results have been received .       Provider please review and advise.       Mother's cell phone number is [email or web address] - [phone]

## 2018-08-31 NOTE — TELEPHONE ENCOUNTER
Cramping around where the cath site is, lower back and low grade fever. Since she switched to these catheters she has not had a UTI. Having a lot of sediment but it doesn't look to bad per pt.    KL reviewed lab results and no infection.  Pt will callback if symptoms worsen or do not improve.    Susan Ordoñez, RN, BSN

## 2018-09-04 ENCOUNTER — TELEPHONE (OUTPATIENT)
Dept: FAMILY MEDICINE | Facility: CLINIC | Age: 48
End: 2018-09-04

## 2018-09-04 NOTE — TELEPHONE ENCOUNTER
Reason for call:  Form  Reason for Call:  Form, our goal is to have forms completed with 72 hours, however, some forms may require a visit or additional information.    Type of letter, form or note:  medical    Who is the form from?:  [facility] [employer]    Where did the form come from: form was faxed in    What clinic location was the form placed at?: [facility] - [phone]    Where the form was placed: 's Box    What number is listed as a contact on the form?:   [phone]       Additional comments:  Fax to  [phone]    created by Kelli Sortoalesia

## 2018-09-05 ENCOUNTER — TELEPHONE (OUTPATIENT)
Dept: FAMILY MEDICINE | Facility: CLINIC | Age: 48
End: 2018-09-05

## 2018-09-05 NOTE — TELEPHONE ENCOUNTER
Reason for call:  Form  Reason for Call:  Form, our goal is to have forms completed with 72 hours, however, some forms may require a visit or additional information.    Type of letter, form or note:  Medical    Who [facility] the form from[facility] [employer]    Where did the form come from: form was faxed in    What clinic location was the form placed at?: [facility] - [phone]    Where the form was placed: 's in box     What number is listed as a contact on the form?: [phone]       Additional comments: Fax back to [phone]    Call taken on 9/5/2018 at 9:09 AM by Leland Gaines

## 2018-09-11 ENCOUNTER — TELEPHONE (OUTPATIENT)
Dept: FAMILY MEDICINE | Facility: CLINIC | Age: 48
End: 2018-09-11

## 2018-09-11 NOTE — TELEPHONE ENCOUNTER
Reason for Call:  Form, our goal is to have forms completed with 72 hours, however, some forms may require a visit or additional information.    Type of letter, form or note:  medical    Who is the form from?: [employer] (if other please explain)    Where did the form come from: form was faxed in    What clinic location was the form placed at?: [facility] - [phone]    Where the form was placed: Kelli's Box    What number is listed as a contact on the form?: [phone]       Additional comments: Please sign and attach records pertaining to requested item. Please fax to: [phone]    Call taken on 9/11/2018 at 11:38 AM by Sana Trevino

## 2018-09-12 ENCOUNTER — TELEPHONE (OUTPATIENT)
Dept: FAMILY MEDICINE | Facility: CLINIC | Age: 48
End: 2018-09-12

## 2018-09-12 NOTE — TELEPHONE ENCOUNTER
Reason for call:  Form  Reason for Call:  Form, our goal is to have forms completed with 72 hours, however, some forms may require a visit or additional information.    Type of letter, form or note:  Medical    Who is the form from?:  [facility] [employer]     Where did the form come from: form was faxed in    What clinic location was the form placed at?: [facility] - [phone]    Where the form was placed: 's in box     What number is listed as a contact on the form?: [phone]       Additional comments: Fax back to [phone]    Call taken on 9/12/2018 at 11:10 AM by Leland Gaines

## 2018-09-13 NOTE — TELEPHONE ENCOUNTER
Received another form from [employer] (colostomy status)   Form placed in Providers in-box to be completed.    Phone - [phone]  Fax - [phone]

## 2018-09-14 ENCOUNTER — TELEPHONE (OUTPATIENT)
Dept: FAMILY MEDICINE | Facility: CLINIC | Age: 48
End: 2018-09-14

## 2018-09-14 DIAGNOSIS — G89.21 CHRONIC PAIN DUE TO INJURY: ICD-10-CM

## 2018-09-14 DIAGNOSIS — G82.20 PARAPLEGIA (H): ICD-10-CM

## 2018-09-14 RX ORDER — OXYCODONE AND ACETAMINOPHEN 7.5; 325 MG/1; MG/1
2 TABLET ORAL EVERY 6 HOURS PRN
Qty: 180 TABLET | Refills: 0 | Status: SHIPPED | OUTPATIENT
Start: 2018-09-17 | End: 2018-10-12

## 2018-09-14 RX ORDER — METHADONE HYDROCHLORIDE 5 MG/1
TABLET ORAL
Qty: 300 TABLET | Refills: 0 | Status: SHIPPED | OUTPATIENT
Start: 2018-09-17 | End: 2018-10-12

## 2018-09-14 NOTE — TELEPHONE ENCOUNTER
methadone (DOLOPHINE) 5 MG tablet      Last Written Prescription Date:  8/17/2018  Last Fill Quantity: 300,   # refills: 0  Last Office Visit: 1/17/2018  Future Office visit:       Routing refill request to provider for review/approval because:  Drug not on the [facility], [facility] or [facility] refill protocol or controlled substance      oxyCODONE-acetaminophen (PERCOCET) 7.5-325 MG per tablet      Last Written Prescription Date:  5/19/2018  Last Fill Quantity: 180,   # refills: 0  Last Office Visit: 1/17/2018  Future Office visit:       Routing refill request to provider for review/approval because:  Drug not on the [facility], [facility] or [facility] refill protocol or controlled substance

## 2018-09-14 NOTE — TELEPHONE ENCOUNTER
Reason for Call:  Medication or medication refill:    Do you use a [facility]?  Name of the pharmacy and phone number for the current request:  [facility] in [place]    Name of the medication requested: methadone (DOLOPHINE) 5 MG tablet and oxyCODONE-acetaminophen (PERCOCET) 7.5-325 MG per tablet    Other request: Please mail the RX to her pharmacy    Can we leave a detailed message on this number? YES    Phone number patient can be reached at: Home number on file [phone] (home)    Best Time: any    Call taken on 9/14/2018 at 10:27 AM by Hillary Mcgraw

## 2018-09-19 ENCOUNTER — TELEPHONE (OUTPATIENT)
Dept: FAMILY MEDICINE | Facility: CLINIC | Age: 48
End: 2018-09-19

## 2018-09-19 NOTE — TELEPHONE ENCOUNTER
Reason for Call:  Other     Detailed comments: Katt calling from [facility] maryann states is at pt home right now in [place] and pt is showing signs of UTI wondering if provider can put in order for UA for pt to do it at the [facility] . Please advise    Fax order to fax [phone]    Phone Number Patient can be reached at: Home number on file [phone] (home)    Best Time: ANY    Can we leave a detailed message on this number? YES    Call taken on 9/19/2018 at 2:46 PM by Tanisha Lara

## 2018-09-25 ENCOUNTER — TELEPHONE (OUTPATIENT)
Dept: FAMILY MEDICINE | Facility: CLINIC | Age: 48
End: 2018-09-25

## 2018-09-25 DIAGNOSIS — R05.9 COUGH: Primary | ICD-10-CM

## 2018-09-25 DIAGNOSIS — G89.4 CHRONIC PAIN SYNDROME: ICD-10-CM

## 2018-09-25 DIAGNOSIS — G82.20 PARAPLEGIA (H): ICD-10-CM

## 2018-09-25 RX ORDER — ALBUTEROL SULFATE 90 UG/1
2 AEROSOL, METERED RESPIRATORY (INHALATION) EVERY 6 HOURS PRN
Qty: 1 INHALER | Refills: 1 | Status: SHIPPED | OUTPATIENT
Start: 2018-09-25 | End: 2021-08-11

## 2018-09-25 RX ORDER — PREGABALIN 200 MG/1
200 CAPSULE ORAL 3 TIMES DAILY
Qty: 90 CAPSULE | Refills: 1 | Status: SHIPPED | OUTPATIENT
Start: 2018-09-25 | End: 2018-11-23

## 2018-09-25 NOTE — TELEPHONE ENCOUNTER
Reason for call:  Patient would like to update you on her lyrica.  [facility] ABD pads, Cath,

## 2018-09-25 NOTE — TELEPHONE ENCOUNTER
"Called patient and read message from SF to her. Patient states \" I did not know it was such a long process\" She will call back to schedule if she decides to go through with it. Informed her Lyrica Rx was faxed over to pharmacy. Patient states she also needs Rx for Albuterol. Per SF she will send to a rx to pharmacy. Robin Nick MA    "

## 2018-09-25 NOTE — TELEPHONE ENCOUNTER
Pt called.  She researched that taking cymbalta and lyrica together would work well.  Provider, would you consider taking pt off of effexor and putting her on cymbalta, along with the lyrica?    Also, she is requesting a refill for albuterol inhaler.  This medication is not active on her med list.  Please send to [facility].

## 2018-09-25 NOTE — TELEPHONE ENCOUNTER
[phone]. Regarding her Lyrica. She will discuss further with Kelli. Declined giving any more information

## 2018-09-25 NOTE — TELEPHONE ENCOUNTER
Prescription printed and signed for lyrica. Please fax.     Weaning Effexor is a long process to reduce risk for withdrawal and would want to decrease prior to starting cymbalta. If she would like to proceed, will need office visit or telephone visit to discuss and map out plan.

## 2018-09-26 ENCOUNTER — TELEPHONE (OUTPATIENT)
Dept: FAMILY MEDICINE | Facility: CLINIC | Age: 48
End: 2018-09-26

## 2018-09-26 NOTE — TELEPHONE ENCOUNTER
[facility] calling. Patient is trying to fill Lyrica now but pharmacy says it would be a week early if they filled today and was wondering if that is ok to fill a week early.

## 2018-09-26 NOTE — TELEPHONE ENCOUNTER
Pharmacist informed.  He will tell pt to make it last one month - so her next refill should be from when it (should) have been due.

## 2018-09-29 ENCOUNTER — TELEPHONE (OUTPATIENT)
Dept: NURSING | Facility: CLINIC | Age: 48
End: 2018-09-29

## 2018-09-29 ENCOUNTER — NURSE TRIAGE (OUTPATIENT)
Dept: NURSING | Facility: CLINIC | Age: 48
End: 2018-09-29

## 2018-09-29 NOTE — TELEPHONE ENCOUNTER
Patient states she will be out of her baclofen by tonight and thought today was Sunday and her pharmacy is closed on weekends.  [facility] called prescription for baclofen 20mg; dispense 6 tablets; no refills; take 1 tablet orally 3 times daily; to [facility] in [place], [place]; [phone], per patient's request and informed patient to call pharmacy before going to  to make sure no issues.

## 2018-10-02 ENCOUNTER — TELEPHONE (OUTPATIENT)
Dept: FAMILY MEDICINE | Facility: CLINIC | Age: 48
End: 2018-10-02

## 2018-10-02 NOTE — LETTER
October 2, 2018      Agatha BEACH Missael  [address] [facility] [address]        To Whom It May Concern,         This patient has 8.6 x 4.4 x 0.6 cm decubitus ulcer right buttocks. She has been treated for this for the last few years.  This decubitus ulcer has moderate discharge. 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  This is complicated by paraplegia and limited mobility.     The dressing is changed daily and sometimes more often due to the discharge.     Therefore, 1200 gauze curity sponge 4x4 and 90 ABD pads are needed at a time.     She also have a chronic catheter and has had recurrent infections. Given that, we are requesting 2 catheter, gold 2-way gamble each time.        Please let me know what questions you have.       Sincerely,        STANLEY DAVILA MD, MD

## 2018-10-02 NOTE — TELEPHONE ENCOUNTER
Reason for call:  Form  Reason for Call:  Form, our goal is to have forms completed with 72 hours, however, some forms may require a visit or additional information.    Type of letter, form or note:  Medical    Who [facility] the form from[facility] [employer]     Where did the form come from: form was faxed in    What clinic location was the form placed at?: [facility] - [phone]    Where the form was placed: 's in box     What number is listed as a contact on the form?: [phone]       Additional comments: Fax back to [phone]    Call taken on 10/2/2018 at 10:37 AM by Leland Gaines

## 2018-10-02 NOTE — TELEPHONE ENCOUNTER
[facility] is requesting: a letter of medical necessity supporting why the following are needed.    - 1200 gauze curity sponge 4X4  - 2 catheter, gold 2-way gamble  - 90 ABD PAD    Please fax letter to [phone]    [facility] phone - [phone]

## 2018-10-04 ENCOUNTER — TRANSFERRED RECORDS (OUTPATIENT)
Dept: HEALTH INFORMATION MANAGEMENT | Facility: CLINIC | Age: 48
End: 2018-10-04

## 2018-10-04 ENCOUNTER — MEDICAL CORRESPONDENCE (OUTPATIENT)
Dept: HEALTH INFORMATION MANAGEMENT | Facility: CLINIC | Age: 48
End: 2018-10-04

## 2018-10-05 ENCOUNTER — TELEPHONE (OUTPATIENT)
Dept: FAMILY MEDICINE | Facility: CLINIC | Age: 48
End: 2018-10-05

## 2018-10-05 NOTE — TELEPHONE ENCOUNTER
Reason for Call:  Request for results:    Name of test or procedure: ua    Date of test of procedure: 10-4    Location of the test or procedure: [facility]    OK to leave the result message on voice mail or with a family member? YES    Phone number Patient can be reached at:  Home number on file [phone] (home)    Additional comments: call with results.    Call taken on 10/5/2018 at 2:05 PM by Promise Cantu

## 2018-10-05 NOTE — TELEPHONE ENCOUNTER
Reason for Call:  Other form    Detailed comments: patient is wanting to know if you received a form from Keke Gross yesterday?    Phone Number Patient can be reached at: Home number on file [phone] (home) or Cell number on file:    Telephone Information:   Mobile [phone]       Best Time: anytime    Can we leave a detailed message on this number? YES    Call taken on 10/5/2018 at 10:50 AM by Michelle Jennings

## 2018-10-05 NOTE — TELEPHONE ENCOUNTER
We received records from Keke Gross.  There was no where noted for Provider to sign.    SF signed anyway and faxed to Keke Gross.

## 2018-10-05 NOTE — TELEPHONE ENCOUNTER
Pt informed we have not received forms.  Pt will ask Keke to send the forms to us re: measurements of her wounds.

## 2018-10-05 NOTE — TELEPHONE ENCOUNTER
Please call Shyanne from home care with result of her recent lab work.   [phone]  Ok to leave a message.

## 2018-10-09 ENCOUNTER — TELEPHONE (OUTPATIENT)
Dept: FAMILY MEDICINE | Facility: CLINIC | Age: 48
End: 2018-10-09

## 2018-10-09 NOTE — TELEPHONE ENCOUNTER
Reason for call:  Form  Reason for Call:  Form, our goal is to have forms completed with 72 hours, however, some forms may require a visit or additional information.    Type of letter, form or note:  Medical    Who is the form from?:  [facility] [employer]     Where did the form come from: form was faxed in    What clinic location was the form placed at?: [facility] - [phone]    Where the form was placed: 's in box     What number is listed as a contact on the form?: [phone]       Additional comments: Fax back to [phone]    Call taken on 10/9/2018 at 1:49 PM by Leland Gaines

## 2018-10-09 NOTE — TELEPHONE ENCOUNTER
These forms require information about pt's wounds.  faxed form to [facility] to complete and asked them to fax back to us when done, for SF to sign.  Then we will fax forms to [facility].

## 2018-10-10 NOTE — TELEPHONE ENCOUNTER
Patient is calling she states they needs measurement of her wound on this form.  Patients states if you need the measurements of her wound call Olivia her home care nurse at [phone].  Please call patient if you have any questions and please let her know when this is complete.

## 2018-10-12 ENCOUNTER — TELEPHONE (OUTPATIENT)
Dept: FAMILY MEDICINE | Facility: CLINIC | Age: 48
End: 2018-10-12

## 2018-10-12 DIAGNOSIS — G82.20 PARAPLEGIA (H): ICD-10-CM

## 2018-10-12 DIAGNOSIS — G89.21 CHRONIC PAIN DUE TO INJURY: ICD-10-CM

## 2018-10-12 DIAGNOSIS — S31.809S OPEN WOUND OF BUTTOCK WITH COMPLICATION, UNSPECIFIED LATERALITY, SEQUELA: ICD-10-CM

## 2018-10-12 RX ORDER — OXYCODONE AND ACETAMINOPHEN 7.5; 325 MG/1; MG/1
2 TABLET ORAL EVERY 6 HOURS PRN
Qty: 180 TABLET | Refills: 0 | Status: SHIPPED | OUTPATIENT
Start: 2018-10-16 | End: 2018-11-09

## 2018-10-12 RX ORDER — METHADONE HYDROCHLORIDE 5 MG/1
TABLET ORAL
Qty: 300 TABLET | Refills: 0 | Status: SHIPPED | OUTPATIENT
Start: 2018-10-14 | End: 2018-11-09

## 2018-10-12 NOTE — TELEPHONE ENCOUNTER
Yesterday, we received the form completed by Keke Gross    I faxed it to [facility].

## 2018-10-12 NOTE — TELEPHONE ENCOUNTER
Reason for Call:  Other prescription    Detailed comments: patient is asking to speak to Kelli regarding her medications. Patient wouldn't give any other information.    Phone Number Patient can be reached at: Home number on file [phone] (home) or Cell number on file:    Telephone Information:   Mobile [phone]       Best Time: anytim    Can we leave a detailed message on this number? YES    Call taken on 10/12/2018 at 8:58 AM by Michelle Jennings
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
## 2018-10-12 NOTE — TELEPHONE ENCOUNTER
Pt is requesting refills on percocet and methodone.  Please mail to [facility] in [place].  She stated she has not yet met with the [facility] staff up there.    Also, please fax ([phone]) an order/request to [employer] [facility] for hypaflex tape. Thanks.

## 2018-10-15 ENCOUNTER — TELEPHONE (OUTPATIENT)
Dept: FAMILY MEDICINE | Facility: CLINIC | Age: 48
End: 2018-10-15

## 2018-10-15 NOTE — TELEPHONE ENCOUNTER
Reason for call:  Form  Reason for Call:  Form, our goal is to have forms completed with 72 hours, however, some forms may require a visit or additional information.    Type of letter, form or note:  Medical    Who is the form from?: [facility] [employer]      Where did the form come from: form was faxed in    What clinic location was the form placed at?: [facility] - [phone]    Where the form was placed: 's in box     What number is listed as a contact on the form?: [phone]       Additional comments: Fax back to [phone]    Call taken on 10/15/2018 at 8:01 AM by Leland Gaines

## 2018-10-15 NOTE — TELEPHONE ENCOUNTER
Reason for call:  Form  Reason for Call:  Form, our goal is to have forms completed with 72 hours, however, some forms may require a visit or additional information.    Type of letter, form or note:  Medical    Who is the form from?: [facility] [employer]       Where did the form come from: form was faxed in    What clinic location was the form placed at?: [facility] - [phone]    Where the form was placed: 's in box     What number is listed as a contact on the form?: [phone]       Additional comments: Fax back to [phone]    Call taken on 10/15/2018 at 10:56 AM by Leland Gaines

## 2018-10-17 ENCOUNTER — TELEPHONE (OUTPATIENT)
Dept: FAMILY MEDICINE | Facility: CLINIC | Age: 48
End: 2018-10-17

## 2018-10-17 DIAGNOSIS — I10 BENIGN ESSENTIAL HYPERTENSION: ICD-10-CM

## 2018-10-17 NOTE — TELEPHONE ENCOUNTER
Reason for Call:  Other call back    Detailed comments: Patient's Care Coordinator, Trice, called clinic requesting to speak with Dr. Kasper about the request to switch medical supply companies. Please give Trice a call back to discuss .     Phone Number: [phone]    Best Time: any    Can we leave a detailed message on this number? YES    Call taken on 10/17/2018 at 1:01 PM by Sana Trevino

## 2018-10-18 ENCOUNTER — MEDICAL CORRESPONDENCE (OUTPATIENT)
Dept: HEALTH INFORMATION MANAGEMENT | Facility: CLINIC | Age: 48
End: 2018-10-18

## 2018-10-18 ENCOUNTER — TELEPHONE (OUTPATIENT)
Dept: FAMILY MEDICINE | Facility: CLINIC | Age: 48
End: 2018-10-18

## 2018-10-18 ENCOUNTER — TRANSFERRED RECORDS (OUTPATIENT)
Dept: HEALTH INFORMATION MANAGEMENT | Facility: CLINIC | Age: 48
End: 2018-10-18

## 2018-10-18 DIAGNOSIS — T83.511A URINARY TRACT INFECTION ASSOCIATED WITH INDWELLING URETHRAL CATHETER, INITIAL ENCOUNTER (H): Primary | ICD-10-CM

## 2018-10-18 DIAGNOSIS — Z93.3 COLOSTOMY STATUS (H): ICD-10-CM

## 2018-10-18 DIAGNOSIS — G82.20 PARAPLEGIA (H): ICD-10-CM

## 2018-10-18 DIAGNOSIS — R32 URINARY INCONTINENCE, UNSPECIFIED TYPE: ICD-10-CM

## 2018-10-18 DIAGNOSIS — N39.0 URINARY TRACT INFECTION ASSOCIATED WITH INDWELLING URETHRAL CATHETER, INITIAL ENCOUNTER (H): Primary | ICD-10-CM

## 2018-10-18 RX ORDER — LISINOPRIL 20 MG/1
TABLET ORAL
Qty: 90 TABLET | Refills: 1 | Status: SHIPPED | OUTPATIENT
Start: 2018-10-18 | End: 2019-07-01

## 2018-10-18 NOTE — TELEPHONE ENCOUNTER
Please call and find out where the urinalysis was sent to and when and how we are to expect results.    Jake Singleton MD

## 2018-10-18 NOTE — TELEPHONE ENCOUNTER
Reason for Call:  Request for results:    Name of test or procedure: lab    Date of test of procedure: 10/18/2018    Location of the test or procedure: at home    OK to leave the result message on voice mail or with a family member? YES    Phone number Patient can be reached at:  Other phone number:  [phone]    Additional comments: Desi  from [facility] is calling, Desi was at the patient's home today and she had a 100.1 fever she did a UA on her. Please call when results.    Call taken on 10/18/2018 at 1:46 PM by Michelle Jennings

## 2018-10-18 NOTE — TELEPHONE ENCOUNTER
Lisinopril  Routing refill request to provider for review/approval because:  Labs not current:  BMP    Ila Santana, RN, BSN

## 2018-10-19 ENCOUNTER — TELEPHONE (OUTPATIENT)
Dept: FAMILY MEDICINE | Facility: CLINIC | Age: 48
End: 2018-10-19

## 2018-10-19 ENCOUNTER — MEDICAL CORRESPONDENCE (OUTPATIENT)
Dept: HEALTH INFORMATION MANAGEMENT | Facility: CLINIC | Age: 48
End: 2018-10-19

## 2018-10-19 RX ORDER — CIPROFLOXACIN 500 MG/1
500 TABLET, FILM COATED ORAL 2 TIMES DAILY
Qty: 20 TABLET | Refills: 0 | Status: SHIPPED | OUTPATIENT
Start: 2018-10-19 | End: 2019-10-15

## 2018-10-19 RX ORDER — CIPROFLOXACIN 500 MG/1
500 TABLET, FILM COATED ORAL 2 TIMES DAILY
Qty: 20 TABLET | Refills: 0 | Status: SHIPPED | OUTPATIENT
Start: 2018-10-19 | End: 2018-10-19

## 2018-10-19 NOTE — TELEPHONE ENCOUNTER
[employer] phone - [phone] & Fax - [phone]    Faxed the wound report to [employer]    Agatha will make a list of supplies she needs and get it to [facility], then she will fax it to us to send to [employer]

## 2018-10-19 NOTE — TELEPHONE ENCOUNTER
We received the list of items that pt needs ordered with [employer].  Provider, please send order for the following supplies to (fax [phone]):  Note placed in SF office - on desk    Regular order:  2 catheter - 16 [place], 5 or 10 balloon  Catheter split sponges - 75 count 4x5?  2 trays  4 night bags - 2000 ml  4 day bags  3 leg bands  15 night pads/chucks  Agacel AG 4x5 foam - 30 count  3 gauze 4x4 200 count  4 boxes of gloves size L  4 hyflex tape  1 box alcohol pads  1 box skin prep  1 bottle of Microcyn  Cleanser and adhesive remover wipes  30 ABD pads 5x9 sterile dressing  2 boxes of ring barriers  1 box adhesive glue  3 boxes of convatec 4 in 5 count reference #[id number]  2 boxes of convatec 4 in 10 count reference #[id number]    Extra wound care supplies:  2 boxes of hypoflex tape  1 4x4 gauze 200 count  34 ABD pads  10 duoderm dressing - border duoderm - 6x6

## 2018-10-19 NOTE — TELEPHONE ENCOUNTER
[employer] sent there own form re: wound report.   Form placed in Providers in-box to be completed.  (Included the form recently completed by Keke Gross and faxed to [employer] for Providers review)

## 2018-10-19 NOTE — TELEPHONE ENCOUNTER
Left message asking for [facility] to call back.  Please inform her of message below from Dr. Singleton.    Ask what pharmacy to send the cipro to?

## 2018-10-19 NOTE — TELEPHONE ENCOUNTER
The urine culture results shows that there are gram-negative rods present as well as 2 other organisms that are still being cultured.  Report is preliminary with no sensitivities.    Given the situation, would empirically begin Ciprofloxacin 500 mg twice daily for 10 days and adjust based on culture results next week.    A prescription is pending and will send to the pharmacy of choice.  Please inform the home care staff of the recommendations.    Jake Singleton MD

## 2018-10-19 NOTE — TELEPHONE ENCOUNTER
Reason for call:  Form  Reason for Call:  Form, our goal is to have forms completed with 72 hours, however, some forms may require a visit or additional information.    Type of letter, form or note:  Medical    Who is the form from?: [facility]      Where did the form come from: form was faxed in    What clinic location was the form placed at?: [facility] - [phone]    Where the form was placed: 's in box     What number is listed as a contact on the form?: [phone]       Additional comments: Fax back to [phone]    Call taken on 10/19/2018 at 3:43 PM by Leland Gaines

## 2018-10-22 NOTE — TELEPHONE ENCOUNTER
Clarified with pt to know exactly what supplies she uses for her wounds - documented this on the form, signed by SF  Faxed the form to [employer], along with 3 LOV notes.

## 2018-10-24 ENCOUNTER — TELEPHONE (OUTPATIENT)
Dept: FAMILY MEDICINE | Facility: CLINIC | Age: 48
End: 2018-10-24

## 2018-10-24 NOTE — TELEPHONE ENCOUNTER
Please locate culture results. These were sent to scanning on Monday as it looked like results were addressed.

## 2018-10-24 NOTE — TELEPHONE ENCOUNTER
Spoke to patient regarding overdue results. Pt states that she still have UTI sx cramps and don't think the antibiotic is working. It looks like we still waiting for urine culture. I told patient that will call her as soon we get the results.      Panel Management Review      Patient has the following on her problem list:     Depression / Dysthymia review    Measure:  Needs PHQ-9 score of 4 or less during index window.  Administer PHQ-9 and if score is 5 or more, send encounter to provider for next steps.    5 - 7 month window range:     PHQ-9 SCORE 1/17/2018 4/10/2018 5/16/2018   Total Score - - -   Total Score 12 5 6       If PHQ-9 recheck is 5 or more, route to provider for next steps.    Patient is due for:  PHQ9    Hypertension   Last three blood pressure readings:  BP Readings from Last 3 Encounters:   01/17/18 112/60   06/27/17 110/60   11/25/16 132/72     Blood pressure: Passed    HTN Guidelines:  Age 18-59 BP range:  Less than 140/90  Age 60-85 with Diabetes:  Less than 140/90  Age 60-85 without Diabetes:  less than 150/90      Composite cancer screening  Chart review shows that this patient is due/due soon for the following Pap Smear  Summary:    Patient is due/failing the following:   PAP and PHQ9, DAP,BMP     Action needed:   Patient needs office visit for pap.    Type of outreach:    Phone, spoke to patient.  pt will have her lab draw via home care     Questions for provider review:    None                                                                                                                                    Juvenal Orourke MA       Chart routed to Care Team .

## 2018-10-24 NOTE — TELEPHONE ENCOUNTER
Based on the culture results, there was minimal bacteria noted. That bacteria should be treated with antibiotics. If continued issues, recommend urology evaluation as discussed.

## 2018-11-01 ENCOUNTER — TRANSFERRED RECORDS (OUTPATIENT)
Dept: HEALTH INFORMATION MANAGEMENT | Facility: CLINIC | Age: 48
End: 2018-11-01

## 2018-11-01 LAB
CREAT SERPL-MCNC: 0.87 MG/DL (ref 0.4–1)
GFR SERPL CREATININE-BSD FRML MDRD: >60 ML/MIN/1.73M2
GLUCOSE SERPL-MCNC: 88 MG/DL (ref 70–99)
POTASSIUM SERPL-SCNC: 4.2 MEQ/L (ref 3.4–5.1)

## 2018-11-05 ENCOUNTER — TELEPHONE (OUTPATIENT)
Dept: FAMILY MEDICINE | Facility: CLINIC | Age: 48
End: 2018-11-05

## 2018-11-05 NOTE — TELEPHONE ENCOUNTER
Reason for Call:  Form, our goal is to have forms completed with 72 hours, however, some forms may require a visit or additional information.    Type of letter, form or note:  medical    Who is the form from?: [facility]  (if other please explain)    Where did the form come from: form was faxed in    What clinic location was the form placed at?: [facility] - [phone]    Where the form was placed: Dr's Box    What number is listed as a contact on the form?: [phone]       Additional comments:    Call taken on 11/5/2018 at 4:23 PM by Nesha Le

## 2018-11-05 NOTE — TELEPHONE ENCOUNTER
Reason for Call:  Form, our goal is to have forms completed with 72 hours, however, some forms may require a visit or additional information.    Type of letter, form or note:  medical    Who is the form from?: Home care    Where did the form come from: form was faxed in    What clinic location was the form placed at?: [facility] - [phone]    Where the form was placed: Dr's Box    What number is listed as a contact on the form?: [phone]       Additional comments: please complete form,sign,date and return to fax [phone]    Call taken on 11/5/2018 at 7:44 AM by Tanisha Lara

## 2018-11-06 ENCOUNTER — TELEPHONE (OUTPATIENT)
Dept: FAMILY MEDICINE | Facility: CLINIC | Age: 48
End: 2018-11-06

## 2018-11-06 NOTE — TELEPHONE ENCOUNTER
Reason for Call:  Form, our goal is to have forms completed with 72 hours, however, some forms may require a visit or additional information.    Type of letter, form or note:  [place] [facility]     Who is the form from?: [facility] (if other please explain)    Where did the form come from: form was faxed in    What clinic location was the form placed at?: [facility] - [phone]    Where the form was placed: 's Box    What number is listed as a contact on the form?: [phone]       Additional comments: needs to have a dx on it then fax to [phone]    Call taken on 11/6/2018 at 12:44 PM by Jessica Romo

## 2018-11-06 NOTE — TELEPHONE ENCOUNTER
Same form as earlier today.  Please include a diagnosis on it.    Form placed in Providers in-box to be completed.

## 2018-11-09 ENCOUNTER — TELEPHONE (OUTPATIENT)
Dept: FAMILY MEDICINE | Facility: CLINIC | Age: 48
End: 2018-11-09

## 2018-11-09 DIAGNOSIS — R05.9 COUGH: Primary | ICD-10-CM

## 2018-11-09 DIAGNOSIS — G89.21 CHRONIC PAIN DUE TO INJURY: ICD-10-CM

## 2018-11-09 DIAGNOSIS — G82.20 PARAPLEGIA (H): ICD-10-CM

## 2018-11-09 RX ORDER — OXYCODONE AND ACETAMINOPHEN 7.5; 325 MG/1; MG/1
2 TABLET ORAL EVERY 6 HOURS PRN
Qty: 180 TABLET | Refills: 0 | Status: SHIPPED | OUTPATIENT
Start: 2018-11-16 | End: 2018-11-16

## 2018-11-09 RX ORDER — METHADONE HYDROCHLORIDE 5 MG/1
TABLET ORAL
Qty: 300 TABLET | Refills: 0 | Status: SHIPPED | OUTPATIENT
Start: 2018-11-14 | End: 2018-11-16

## 2018-11-09 RX ORDER — BUDESONIDE AND FORMOTEROL FUMARATE DIHYDRATE 80; 4.5 UG/1; UG/1
2 AEROSOL RESPIRATORY (INHALATION) 2 TIMES DAILY
Qty: 3 INHALER | Refills: 1 | Status: SHIPPED | OUTPATIENT
Start: 2018-11-09 | End: 2022-02-09

## 2018-11-09 NOTE — TELEPHONE ENCOUNTER
Patient stated she would like to restart the Symbicort medication.      Mailed the two other scripts to [facility] in [place]

## 2018-11-09 NOTE — TELEPHONE ENCOUNTER
Reason for Call:  Other prescription    Detailed comments: pt states needs refill of percocet and methadone (DOLOPHINE) 5 MG tablet symbicort inhaler. Pt states needs it mailed to [facility].     Pt states [facility]     Phone Number Patient can be reached at: Cell number on file:    Telephone Information:   Mobile [phone]       Best Time: ANY    Can we leave a detailed message on this number? YES    Call taken on 11/9/2018 at 12:49 PM by Tanisha Lara

## 2018-11-09 NOTE — TELEPHONE ENCOUNTER
Printed and signed.     symbicort is not currently on list.  Looks like it was stopped 2 years ago. Please see if she is looking to restart

## 2018-11-09 NOTE — TELEPHONE ENCOUNTER
Percocet and methadone    Routing refill request to provider for review/approval because:  Drug not active on patient's medication list    Symbicort    Routing refill request to provider for review/approval because:  Drug not active on patient's medication list    Susan Ordoñez RN, BSN

## 2018-11-12 ENCOUNTER — TELEPHONE (OUTPATIENT)
Dept: FAMILY MEDICINE | Facility: CLINIC | Age: 48
End: 2018-11-12

## 2018-11-12 NOTE — TELEPHONE ENCOUNTER
Reason for Call:  Form, our goal is to have forms completed with 72 hours, however, some forms may require a visit or additional information.    Type of letter, form or note:  medical Insurance     Who is the form from?: [employer] (if other please explain)    Where did the form come from: form was faxed in    What clinic location was the form placed at?: [facility] - [phone]    Where the form was placed: Dr's Box    What number is listed as a contact on the form?: [phone]       Additional comments: please complete form and fax completed form to [phone] as it is being required by Insurance when supplies/services are provided    Call taken on 11/12/2018 at 4:44 PM by Amanda Genao

## 2018-11-13 DIAGNOSIS — F41.9 ANXIETY: ICD-10-CM

## 2018-11-13 RX ORDER — VENLAFAXINE 75 MG/1
TABLET ORAL
Qty: 270 TABLET | Refills: 0 | Status: SHIPPED | OUTPATIENT
Start: 2018-11-13 | End: 2019-02-05

## 2018-11-13 NOTE — TELEPHONE ENCOUNTER
Received form back form [facility] with a a note:  Please correct the dates on this wound supply request form to match and please add her diagnosis to it, as this will then act as a script.    Form placed in Providers in-box to be completed.

## 2018-11-14 NOTE — TELEPHONE ENCOUNTER
Effexor:  Prescription approved per [facility] Refill Protocol.  Ila Santana, RN, BSN

## 2018-11-16 ENCOUNTER — TELEPHONE (OUTPATIENT)
Dept: FAMILY MEDICINE | Facility: CLINIC | Age: 48
End: 2018-11-16

## 2018-11-16 DIAGNOSIS — G89.21 CHRONIC PAIN DUE TO INJURY: ICD-10-CM

## 2018-11-16 DIAGNOSIS — G82.20 PARAPLEGIA (H): ICD-10-CM

## 2018-11-16 RX ORDER — OXYCODONE AND ACETAMINOPHEN 7.5; 325 MG/1; MG/1
TABLET ORAL
Qty: 18 TABLET | Refills: 0 | Status: SHIPPED | OUTPATIENT
Start: 2018-11-16 | End: 2019-01-08

## 2018-11-16 RX ORDER — METHADONE HYDROCHLORIDE 5 MG/1
TABLET ORAL
Qty: 300 TABLET | Refills: 0 | Status: SHIPPED | OUTPATIENT
Start: 2018-11-16 | End: 2018-12-11

## 2018-11-16 RX ORDER — METHADONE HYDROCHLORIDE 5 MG/1
TABLET ORAL
Qty: 30 TABLET | Refills: 0 | Status: SHIPPED | OUTPATIENT
Start: 2018-11-16 | End: 2019-01-08

## 2018-11-16 RX ORDER — OXYCODONE AND ACETAMINOPHEN 7.5; 325 MG/1; MG/1
2 TABLET ORAL EVERY 6 HOURS PRN
Qty: 180 TABLET | Refills: 0 | Status: SHIPPED | OUTPATIENT
Start: 2018-11-16 | End: 2018-12-11

## 2018-11-16 NOTE — TELEPHONE ENCOUNTER
Pt called stating the pharmacy did not receive the Rx we mailed on 10/12/2018 for the November fill.  Verified with the pharmacy - they had not received the scripts.    Pharmacy stated SF could give a verbal ok for an emergency 72 hour fill, as long as they received the paper script in the mail (for A. 72 hour fill and B. The actual month long script) within 5 days.  Provider, can you place the prescriptions and ok for verbal ?    Mail to [facility] in [place]

## 2018-11-16 NOTE — TELEPHONE ENCOUNTER
Reason for Call:  Other Forms from [employer]    Detailed comments: Patient said that she can not get her medical supplies until a diagnosis code is put on the form that [employer] sent to our office on 11/13/2018. Please fill out this information and fax back to [email or web address].     Phone Number Patient can be reached at: Cell number on file:    Telephone Information:   Mobile [phone]       Best Time: anytime    Can we leave a detailed message on this number? YES    Call taken on 11/16/2018 at 10:57 AM by Nesha Le

## 2018-11-16 NOTE — TELEPHONE ENCOUNTER
We have resent this form to [employer] multiple times.  They are requesting the diagnosis - which has been faxed to them: s38.891N Open Wound buttock, complicated  Also, they request that we change the dates to the current date (as there were two different dates on the form).    Will huddle with SF to figure out what exact date needs to be on  the form. Then will fax.

## 2018-11-16 NOTE — TELEPHONE ENCOUNTER
Gave verbal ok to pharmacy for 72 hours Rx    Mailed month supply Rx for methadone & percocet to [facility]  Mailed 72 hours emergency fill Rx for methadone & percocet to [facility]    Pt informed

## 2018-11-20 ENCOUNTER — TELEPHONE (OUTPATIENT)
Dept: FAMILY MEDICINE | Facility: CLINIC | Age: 48
End: 2018-11-20

## 2018-11-20 DIAGNOSIS — R32 URINARY INCONTINENCE, UNSPECIFIED TYPE: Primary | ICD-10-CM

## 2018-11-20 DIAGNOSIS — S31.809S OPEN WOUND OF BUTTOCK WITH COMPLICATION, UNSPECIFIED LATERALITY, SEQUELA: ICD-10-CM

## 2018-11-20 NOTE — TELEPHONE ENCOUNTER
Our goal is to have forms completed with 72 hours, however some forms may require a visit or additional information.    Who is the form from?: [facility]   Where the form came from: form was faxed in  What clinic location was the form placed at?: [facility]  Where the form was placed: [facility]  What number is listed as a contact on the form?: [phone]    Phone call message- patient request for a letter, form or note:    Date needed: as soon as possible  Please fax to [phone]    Type of letter, form or note: medical

## 2018-11-23 ENCOUNTER — TELEPHONE (OUTPATIENT)
Dept: FAMILY MEDICINE | Facility: CLINIC | Age: 48
End: 2018-11-23

## 2018-11-23 DIAGNOSIS — G82.20 PARAPLEGIA (H): ICD-10-CM

## 2018-11-23 DIAGNOSIS — G89.4 CHRONIC PAIN SYNDROME: ICD-10-CM

## 2018-11-23 RX ORDER — BACLOFEN 20 MG/1
TABLET ORAL
Qty: 90 TABLET | Refills: 0 | Status: SHIPPED | OUTPATIENT
Start: 2018-11-23 | End: 2018-12-27

## 2018-11-23 RX ORDER — PREGABALIN 200 MG/1
200 CAPSULE ORAL 3 TIMES DAILY
Qty: 90 CAPSULE | Refills: 0 | Status: SHIPPED | OUTPATIENT
Start: 2018-11-23 | End: 2018-12-24

## 2018-11-23 NOTE — TELEPHONE ENCOUNTER
[place] Pres[place]. Refilled. PT due for inperson visit with PCP. Please help her schedule.  Ila Hernandez PA-C

## 2018-11-23 NOTE — TELEPHONE ENCOUNTER
Reason for Call:  Medication or medication refill:    Do you use a [facility]?  Name of the pharmacy and phone number for the current request:      Name of the medication requested: Lyeric 600 MG     Other request: Patient is stating that she needs this medication today.     Can we leave a detailed message on this number? YES    Phone number patient can be reached at: Home number on file [phone] (home)    Best Time: Anytime     Call taken on 11/23/2018 at 1:55 PM by Nesha Le

## 2018-11-25 ENCOUNTER — TRANSFERRED RECORDS (OUTPATIENT)
Dept: HEALTH INFORMATION MANAGEMENT | Facility: CLINIC | Age: 48
End: 2018-11-25

## 2018-11-27 LAB
CREAT SERPL-MCNC: 1 MG/DL (ref 0.6–1)
GFR SERPL CREATININE-BSD FRML MDRD: 59 ML/MIN/1.73M2
GLUCOSE SERPL-MCNC: 162 MG/DL (ref 74–106)
POTASSIUM SERPL-SCNC: 3.8 MMOL/L (ref 3.6–5.2)

## 2018-11-28 ENCOUNTER — TELEPHONE (OUTPATIENT)
Dept: FAMILY MEDICINE | Facility: CLINIC | Age: 48
End: 2018-11-28

## 2018-11-28 NOTE — TELEPHONE ENCOUNTER
Reason for Call:  Form, our goal is to have forms completed with 72 hours, however, some forms may require a visit or additional information.    Type of letter, form or note:  medical    Who is the form from?: [employer]  (if other please explain)    Where did the form come from: form was faxed in    What clinic location was the form placed at?: [facility] - [phone]    Where the form was placed: Dr's Box    What number is listed as a contact on the form?: [phone]       Additional comments: Please note the following Requirements; Add length of need, Sign, and Date.     Call taken on 11/28/2018 at 3:34 PM by Nesha Le

## 2018-11-29 ENCOUNTER — TRANSFERRED RECORDS (OUTPATIENT)
Dept: HEALTH INFORMATION MANAGEMENT | Facility: CLINIC | Age: 48
End: 2018-11-29

## 2018-11-29 ENCOUNTER — TELEPHONE (OUTPATIENT)
Dept: FAMILY MEDICINE | Facility: CLINIC | Age: 48
End: 2018-11-29

## 2018-11-29 NOTE — TELEPHONE ENCOUNTER
Reason for Call:  Form, our goal is to have forms completed with 72 hours, however, some forms may require a visit or additional information.    Type of letter, form or note:  medical    Who is the form from?: Home care    Where did the form come from: form was faxed in    What clinic location was the form placed at?: [facility] - [phone]    Where the form was placed:       What number is listed as a contact on the form?: [phone]       Additional comments: Review, Sign and Fax back to [phone]    Call taken on 11/29/2018 at 8:34 AM by Nesha Le

## 2018-12-05 ENCOUNTER — TELEPHONE (OUTPATIENT)
Dept: FAMILY MEDICINE | Facility: CLINIC | Age: 48
End: 2018-12-05

## 2018-12-05 NOTE — TELEPHONE ENCOUNTER
Reason for Call:  Form, our goal is to have forms completed with 72 hours, however, some forms may require a visit or additional information.    Type of letter, form or note:  medical    Who is the form from?: Home care    Where did the form come from: form was faxed in    What clinic location was the form placed at?: [facility] - [phone]    Where the form was placed: Dr's Box    What number is listed as a contact on the form?: [phone]       Additional comments: Please sign, Date, and Return to fax # [phone]    Call taken on 12/5/2018 at 1:47 PM by Nesha Le

## 2018-12-06 ENCOUNTER — TELEPHONE (OUTPATIENT)
Dept: FAMILY MEDICINE | Facility: CLINIC | Age: 48
End: 2018-12-06

## 2018-12-06 NOTE — TELEPHONE ENCOUNTER
Reason for Call:  Form, our goal is to have forms completed with 72 hours, however, some forms may require a visit or additional information.    Type of letter, form or note:  medical    Who is the form from?: Home care    Where did the form come from: form was faxed in    What clinic location was the form placed at?: [facility] - [phone]    Where the form was placed: Dr's Box    What number is listed as a contact on the form?: [phone]       Additional comments: Please review. Sign, and Return. Fax: [phone]    Call taken on 12/6/2018 at 9:30 AM by Nesha Le

## 2018-12-10 ENCOUNTER — TELEPHONE (OUTPATIENT)
Dept: FAMILY MEDICINE | Facility: CLINIC | Age: 48
End: 2018-12-10

## 2018-12-10 DIAGNOSIS — G82.20 PARAPLEGIA (H): ICD-10-CM

## 2018-12-10 DIAGNOSIS — G89.21 CHRONIC PAIN DUE TO INJURY: ICD-10-CM

## 2018-12-10 NOTE — TELEPHONE ENCOUNTER
Reason for Call:  Medication or medication refill:    Do you use a [facility]?  Name of the pharmacy and phone number for the current request:  [facility]    Name of the medication requested: Percocet and Methodone    Other request: please mail to pharmacy    Can we leave a detailed message on this number? NO    Phone number patient can be reached at: Home number on file [phone] (home)    Best Time:     Call taken on 12/10/2018 at 3:14 PM by Ana Barillas

## 2018-12-11 ENCOUNTER — TELEPHONE (OUTPATIENT)
Dept: FAMILY MEDICINE | Facility: CLINIC | Age: 48
End: 2018-12-11

## 2018-12-11 RX ORDER — OXYCODONE AND ACETAMINOPHEN 7.5; 325 MG/1; MG/1
2 TABLET ORAL EVERY 6 HOURS PRN
Qty: 180 TABLET | Refills: 0 | Status: SHIPPED | OUTPATIENT
Start: 2018-12-15 | End: 2019-02-08

## 2018-12-11 RX ORDER — METHADONE HYDROCHLORIDE 5 MG/1
TABLET ORAL
Qty: 300 TABLET | Refills: 0 | Status: SHIPPED | OUTPATIENT
Start: 2018-12-15 | End: 2019-02-08

## 2018-12-11 NOTE — TELEPHONE ENCOUNTER
Printed and signed.     Please assist in scheduling for in person office visit. Needs to be in the next 1-2 months.

## 2018-12-11 NOTE — TELEPHONE ENCOUNTER
Reason for Call:  Form, our goal is to have forms completed with 72 hours, however, some forms may require a visit or additional information.    Type of letter, form or note:  medical    Who is the form from?: [employer]  (if other please explain)    Where did the form come from: form was faxed in    What clinic location was the form placed at?: [facility] - [phone]    Where the form was placed: 's Box    What number is listed as a contact on the form?: [phone]       Additional comments: Fax #: [phone]    Call taken on 12/11/2018 at 5:24 PM by Nesha Le

## 2018-12-12 NOTE — TELEPHONE ENCOUNTER
Faxed 2 of 3 forms back to [employer].    Faxed 1 of 3 forms to [facility] at [phone], because we need them to complete (on a monthly basis) the wound report form and fax back to us to have SF sign and then we can fax it to [employer].    Awaiting form back from [facility]. Postponing encounter to follow up with on Friday.

## 2018-12-14 ENCOUNTER — TELEPHONE (OUTPATIENT)
Dept: FAMILY MEDICINE | Facility: CLINIC | Age: 48
End: 2018-12-14

## 2018-12-14 NOTE — TELEPHONE ENCOUNTER
Reason for call:  Form  Reason for Call:  Form, our goal is to have forms completed with 72 hours, however, some forms may require a visit or additional information.    Type of letter, form or note:  medical    Who is the form from?: Home care    Where did the form come from: form was faxed in    What clinic location was the form placed at?: [facility] - [phone]    Where the form was placed: Dr's Box    What number is listed as a contact on the form?: [phone]       Additional comments: please sign and date    Call taken on 12/14/2018 at 7:33 AM by Lorena Roland

## 2018-12-17 ENCOUNTER — TELEPHONE (OUTPATIENT)
Dept: FAMILY MEDICINE | Facility: CLINIC | Age: 48
End: 2018-12-17

## 2018-12-17 NOTE — TELEPHONE ENCOUNTER
Reason for call:  Form  Reason for Call:  Form, our goal is to have forms completed with 72 hours, however, some forms may require a visit or additional information.    Type of letter, form or note:  Medical    Who is the form from?: [facility]      Where did the form come from: form was faxed in    What clinic location was the form placed at?: [facility] - [phone]    Where the form was placed: 's in box     What number is listed as a contact on the form?: [phone]       Additional comments: Fax back to [phone]    Call taken on 12/17/2018 at 12:24 PM by Leland Gaines

## 2018-12-21 ENCOUNTER — TELEPHONE (OUTPATIENT)
Dept: FAMILY MEDICINE | Facility: CLINIC | Age: 48
End: 2018-12-21

## 2018-12-21 NOTE — TELEPHONE ENCOUNTER
Reason for Call:  Form, our goal is to have forms completed with 72 hours, however, some forms may require a visit or additional information.    Type of letter, form or note:  medical    Who is the form from?:  [facility] Care  (if other please explain)    Where did the form come from: form was faxed in    What clinic location was the form placed at?: [facility] - [phone]    Where the form was placed: 's Box    What number is listed as a contact on the form?: [phone]       Additional comments: Please Review, Sign and Return     Call taken on 12/21/2018 at 7:50 AM by Nesha Le

## 2018-12-22 ENCOUNTER — TELEPHONE (OUTPATIENT)
Dept: FAMILY MEDICINE | Facility: CLINIC | Age: 48
End: 2018-12-22

## 2018-12-22 ENCOUNTER — NURSE TRIAGE (OUTPATIENT)
Dept: NURSING | Facility: CLINIC | Age: 48
End: 2018-12-22

## 2018-12-22 DIAGNOSIS — G82.20 PARAPLEGIA (H): ICD-10-CM

## 2018-12-22 DIAGNOSIS — G89.4 CHRONIC PAIN SYNDROME: ICD-10-CM

## 2018-12-22 NOTE — TELEPHONE ENCOUNTER
Additional Information    Caller has already spoken with another triager and has no further questions.    Negative: Caller has already spoken with the PCP and has no further questions.    Negative: Caller is angry or rude (e.g., hangs up, verbally abusive, yelling)    Negative: Caller hangs up    Protocols used: NO CONTACT OR DUPLICATE CONTACT CALL-[facility]ULTJ[facility]

## 2018-12-22 NOTE — TELEPHONE ENCOUNTER
Caller is requesting refill of lyrica  Medication is not listed in  Medication list but review  Of EMR reveals last refill  for one month one month ago and was to be seen before next refill;  Ila Hernandez PA-C          11/23/18 2:40 PM   Note      [place]. Refilled. PT due for inperson visit with PCP. Please help her schedule.  Ila Hernandez PA-C             patient acknowledges this and understands and will contact clinic on Monday 12/24/19  Analilia Myrick RN  FNA      Reason for Disposition    Caller requesting a NON-URGENT new prescription or refill and triager unable to refill per unit policy    Protocols used: MEDICATION QUESTION CALL-ADULT-

## 2018-12-22 NOTE — TELEPHONE ENCOUNTER
Patient called back and spoke to another nurse.      Regarding: med refill  ----- Message from Yi Flower sent at 12/22/2018  1:06 PM CST -----  Reason for call:  Medication   If this is a refill request, has the caller requested the refill from the pharmacy already? Yes  Will the patient be using a [facility]? No  Name of the pharmacy and phone number for the current request: [facility]    Name of the medication requested: Lyrica    Other request: need refill or even just a few    Phone number to reach patient:  Home number on file [phone] (home)    Best Time:  Asap, pharmacy closed at 230pm     Can we leave a detailed message on this number?  YES

## 2018-12-24 ENCOUNTER — TELEPHONE (OUTPATIENT)
Dept: FAMILY MEDICINE | Facility: CLINIC | Age: 48
End: 2018-12-24

## 2018-12-24 DIAGNOSIS — G82.20 PARAPLEGIA (H): ICD-10-CM

## 2018-12-24 DIAGNOSIS — G89.4 CHRONIC PAIN SYNDROME: ICD-10-CM

## 2018-12-24 RX ORDER — PREGABALIN 200 MG/1
CAPSULE ORAL
Qty: 90 CAPSULE | Refills: 0 | OUTPATIENT
Start: 2018-12-24

## 2018-12-24 RX ORDER — PREGABALIN 200 MG/1
200 CAPSULE ORAL 3 TIMES DAILY
Qty: 90 CAPSULE | Refills: 0 | Status: SHIPPED | OUTPATIENT
Start: 2018-12-24 | End: 2019-01-21

## 2018-12-24 RX ORDER — PREGABALIN 200 MG/1
CAPSULE ORAL
Qty: 270 CAPSULE | Refills: 3 | Status: CANCELLED | OUTPATIENT
Start: 2018-12-24

## 2018-12-24 NOTE — TELEPHONE ENCOUNTER
Reason for Call:  Other call back and prescription    Detailed comments: Patient called clinic back to schedule appt on 2/5/19 for Lyrica refill. Please approve refill. Please call patient back - she is asking to speak to a nurse about this also.     Phone Number Patient can be reached at: Home number on file [phone] (home)    Best Time: any    Can we leave a detailed message on this number? YES    Call taken on 12/24/2018 at 9:05 AM by Sana Trevino

## 2018-12-24 NOTE — TELEPHONE ENCOUNTER
Spoke with pt. Appt schedule for OV. SF last note states F/U in 1-2 months. KL will send Rx    Susan Ordoñez RN, BSN

## 2018-12-24 NOTE — TELEPHONE ENCOUNTER
Reason for call:  Form  Reason for Call:  Form, our goal is to have forms completed with 72 hours, however, some forms may require a visit or additional information.    Type of letter, form or note:  Medical    Who is the form from?: [facility]       Where did the form come from: form was faxed in    What clinic location was the form placed at?: [facility] - [phone]    Where the form was placed: 's in box     What number is listed as a contact on the form?: [phone]       Additional comments: Fax back to [phone]    Call taken on 12/24/2018 at 7:41 AM by Leland Gaines

## 2018-12-27 DIAGNOSIS — G82.20 PARAPLEGIA (H): ICD-10-CM

## 2018-12-27 RX ORDER — BACLOFEN 20 MG/1
TABLET ORAL
Qty: 90 TABLET | Refills: 0 | Status: SHIPPED | OUTPATIENT
Start: 2018-12-27 | End: 2019-01-28

## 2018-12-27 NOTE — TELEPHONE ENCOUNTER
Reason for call:  Form  Reason for Call:  Form, our goal is to have forms completed with 72 hours, however, some forms may require a visit or additional information.    Type of letter, form or note:  Medical    Who is the form from?: [facility]       Where did the form come from: form was faxed in    What clinic location was the form placed at?: [facility] - [phone]    Where the form was placed: 's in box     What number is listed as a contact on the form?: [phone]       Additional comments: Fax back to [phone]    Call taken on 12/27/2018 at 1:55 PM by Leland Gaines

## 2019-01-07 ENCOUNTER — TELEPHONE (OUTPATIENT)
Dept: FAMILY MEDICINE | Facility: CLINIC | Age: 49
End: 2019-01-07

## 2019-01-07 NOTE — TELEPHONE ENCOUNTER
Reason for call:  Form  Reason for Call:  Form, our goal is to have forms completed with 72 hours, however, some forms may require a visit or additional information.    Type of letter, form or note:  Medical    Who is the form from?: [facility]      Where did the form come from: form was faxed in    What clinic location was the form placed at?: [facility] - [phone]    Where the form was placed: 's in box     What number is listed as a contact on the form?: [phone]       Additional comments: Fax back to [phone]    Call taken on 1/7/2019 at 5:01 PM by Leland Gaines

## 2019-01-08 DIAGNOSIS — G82.20 PARAPLEGIA (H): ICD-10-CM

## 2019-01-08 DIAGNOSIS — G89.21 CHRONIC PAIN DUE TO INJURY: ICD-10-CM

## 2019-01-08 RX ORDER — OXYCODONE AND ACETAMINOPHEN 7.5; 325 MG/1; MG/1
TABLET ORAL
Qty: 18 TABLET | Refills: 0 | Status: SHIPPED | OUTPATIENT
Start: 2019-01-14 | End: 2019-01-14

## 2019-01-08 RX ORDER — METHADONE HYDROCHLORIDE 5 MG/1
TABLET ORAL
Qty: 30 TABLET | Refills: 0 | Status: SHIPPED | OUTPATIENT
Start: 2019-01-14 | End: 2019-01-14

## 2019-01-08 NOTE — TELEPHONE ENCOUNTER
Mail scripts to [facility] in [place]      methadone (DOLOPHINE) 5 MG tablet      Last Written Prescription Date:  12/15/2018  Last Fill Quantity: 300,   # refills: 0  Last Office Visit:  7/10/2018  Future Office visit:    Next 5 appointments (look out 90 days)    Feb 05, 2019 11:00 AM CST  Office Visit with Dipti Kasper MD  [facility] [address] [place] [address], [address]  [facility] [address]  [phone]           Routing refill request to provider for review/approval because:  Drug not on the [facility], [facility] or [facility] refill protocol or controlled substance    oxyCODONE-acetaminophen (PERCOCET) 7.5-325 MG per tablet      Last Written Prescription Date:  12/15/2018  Last Fill Quantity: 180,   # refills: 0  Last Office Visit:  7/10/2018  Future Office visit:    Next 5 appointments (look out 90 days)    Feb 05, 2019 11:00 AM CST  Office Visit with Dipti Kasper MD  [facility] ([facility] [address], [address]  [facility] [address]  [phone]           Routing refill request to provider for review/approval because:  Drug not on the [facility], [facility] or [facility] refill protocol or controlled substance

## 2019-01-08 NOTE — TELEPHONE ENCOUNTER
Spoke to pt to clarify that she has switched back to [employer] and no longer will be with [facility] [employer].    3 forms:  2 are for SF to sign - Form placed in Providers in-box to be completed.  1 is a wound assessment and I have faxed it to [facility] to complete - they then will fax it back to us to complete and send to [employer].

## 2019-01-10 ENCOUNTER — TELEPHONE (OUTPATIENT)
Dept: FAMILY MEDICINE | Facility: CLINIC | Age: 49
End: 2019-01-10

## 2019-01-10 NOTE — TELEPHONE ENCOUNTER
Reason for Call:  Form, our goal is to have forms completed with 72 hours, however, some forms may require a visit or additional information.    Type of letter, form or note:  medical    Who is the form from?: [employer]  (if other please explain)    Where did the form come from: form was faxed in    What clinic location was the form placed at?: [facility] - [phone]    Where the form was placed: Dr's Box    What number is listed as a contact on the form?: [phone]       Additional comments:     Call taken on 1/10/2019 at 11:24 AM by Nesha Le

## 2019-01-10 NOTE — TELEPHONE ENCOUNTER
received 3rd form back from [facility].  Forms placed in Providers in-box to be completed.   Please sign and fax back to [employer] at [phone]

## 2019-01-14 ENCOUNTER — TELEPHONE (OUTPATIENT)
Dept: FAMILY MEDICINE | Facility: CLINIC | Age: 49
End: 2019-01-14

## 2019-01-14 DIAGNOSIS — G82.20 PARAPLEGIA (H): ICD-10-CM

## 2019-01-14 DIAGNOSIS — G89.21 CHRONIC PAIN DUE TO INJURY: ICD-10-CM

## 2019-01-14 RX ORDER — METHADONE HYDROCHLORIDE 5 MG/1
TABLET ORAL
Qty: 300 TABLET | Refills: 0 | Status: SHIPPED | OUTPATIENT
Start: 2019-01-14 | End: 2019-03-11

## 2019-01-14 RX ORDER — OXYCODONE AND ACETAMINOPHEN 7.5; 325 MG/1; MG/1
TABLET ORAL
Qty: 180 TABLET | Refills: 0 | Status: SHIPPED | OUTPATIENT
Start: 2019-01-14 | End: 2019-03-11

## 2019-01-14 NOTE — TELEPHONE ENCOUNTER
Reason for Call:  Other form    Detailed comments: [facility] is faxing back a form that was filled out there is a question that was missed on it and they also need to know local, size, drainage and stage.      Call taken on 1/14/2019 at 5:28 PM by Michelle Jennings

## 2019-01-14 NOTE — TELEPHONE ENCOUNTER
pharmacy called.  Stated that pt's insurance will no longer accept post dated prescriptions.  They need each Rx to be mailed to them monthly.  Please re - mail asap to [facility] in [place]:    methadone & oxy/percocet

## 2019-01-14 NOTE — TELEPHONE ENCOUNTER
Yes.  It is correct.  The pharmacy stated that the insurance will fill the script if it is WRITTEN within 30 days of dispensing.    Also, pharmacy just received in the mail a script for methadone dated 1/8/2019 for #30 and percocet for #18.

## 2019-01-15 ENCOUNTER — TELEPHONE (OUTPATIENT)
Dept: FAMILY MEDICINE | Facility: CLINIC | Age: 49
End: 2019-01-15

## 2019-01-15 NOTE — TELEPHONE ENCOUNTER
Reason for Call:  Form, our goal is to have forms completed with 72 hours, however, some forms may require a visit or additional information.    Type of letter, form or note:  medical    Who is the form from?: [employer] (if other please explain)    Where did the form come from: form was faxed in    What [facility] [phone]    Where the form was placed: 's Box    What number is listed as a contact on the form?: [phone]       Additional comments:     Call taken on 1/15/2019 at 8:22 AM by Nesha Le

## 2019-01-16 ENCOUNTER — TELEPHONE (OUTPATIENT)
Dept: FAMILY MEDICINE | Facility: CLINIC | Age: 49
End: 2019-01-16

## 2019-01-16 NOTE — TELEPHONE ENCOUNTER
Reason for call:  Form  Reason for Call:  Form, our goal is to have forms completed with 72 hours, however, some forms may require a visit or additional information.    Type of letter, form or note:  Medical    Who is the form from?: [facility] [employer] [facility]      Where did the form come from: form was faxed in    What clinic location was the form placed at?: [facility] - [phone]    Where the form was placed: 's in box     What number is listed as a contact on the form?: [phone]       Additional comments: Fax back to [phone]    Call taken on 1/16/2019 at 1:20 PM by Leland Gaines

## 2019-01-17 ENCOUNTER — TELEPHONE (OUTPATIENT)
Dept: FAMILY MEDICINE | Facility: CLINIC | Age: 49
End: 2019-01-17

## 2019-01-17 NOTE — TELEPHONE ENCOUNTER
Agatha would like to talk to you regarding this medication.      Please call them for the Prior Auth. Pharmacy help [phone]

## 2019-01-17 NOTE — TELEPHONE ENCOUNTER
Prior Authorization Retail Medication Request  Medication/Dose:   oxyCODONE-acetaminophen (PERCOCET) 7.5-325 MG per tablet    Provider, do you want to change the medication or start a PA?  Please advise on previously Tried and Failed Therapies:      Insurance ID (if provided): [id number]  Insurance Phone (if provided): [phone]

## 2019-01-17 NOTE — TELEPHONE ENCOUNTER
Reason for Call:  Other call back    Detailed comments: pt is requesting a call back from Kelli to discuss her prior auth that is needed. Please advise.     Phone Number Patient can be reached at: Home number on file [phone] (home)    Best Time: any     Can we leave a detailed message on this number? YES    Call taken on 1/17/2019 at 2:22 PM by Jessica Romo

## 2019-01-18 NOTE — TELEPHONE ENCOUNTER
Received a fax from [facility] - the medication is already approved effective 3/28/2018 - 3/28/2019.  If the pharmacy needs assistance processing the claim, please have them call Pharmacy Services for the insurance.    Pharmacy informed.  Prescription is filled for pt

## 2019-01-19 DIAGNOSIS — R32 URINARY INCONTINENCE, UNSPECIFIED TYPE: ICD-10-CM

## 2019-01-21 ENCOUNTER — TELEPHONE (OUTPATIENT)
Dept: FAMILY MEDICINE | Facility: CLINIC | Age: 49
End: 2019-01-21

## 2019-01-21 DIAGNOSIS — R30.0 DYSURIA: ICD-10-CM

## 2019-01-21 DIAGNOSIS — R30.0 DYSURIA: Primary | ICD-10-CM

## 2019-01-21 DIAGNOSIS — G82.20 PARAPLEGIA (H): ICD-10-CM

## 2019-01-21 DIAGNOSIS — G89.4 CHRONIC PAIN SYNDROME: ICD-10-CM

## 2019-01-21 RX ORDER — OXYBUTYNIN CHLORIDE 10 MG/1
10 TABLET, EXTENDED RELEASE ORAL DAILY
Qty: 30 TABLET | Refills: 0 | Status: SHIPPED | OUTPATIENT
Start: 2019-01-21 | End: 2019-02-12

## 2019-01-21 RX ORDER — PREGABALIN 200 MG/1
200 CAPSULE ORAL 3 TIMES DAILY
Qty: 90 CAPSULE | Refills: 0 | Status: SHIPPED | OUTPATIENT
Start: 2019-01-21 | End: 2019-02-20

## 2019-01-21 NOTE — TELEPHONE ENCOUNTER
Reason for Call: Request for an order or referral:    Order or referral being requested: urine for uti    Date needed: as soon as possible    Has the patient been seen by the PCP for this problem? NO    Additional comments: is wondering if she could get order sent for urine to [facility] phone [phone] fax is [phone]    Phone number Patient can be reached at:  Home number on file [phone] (home)    Best Time:  any    Can we leave a detailed message on this number?  YES    Call taken on 1/21/2019 at 1:25 PM by Promise Cantu

## 2019-01-21 NOTE — TELEPHONE ENCOUNTER
Reason for Call:  Medication or medication refill:    Do you use a [facility]?  Name of the pharmacy and phone number for the [facility] request[facility]    Name of the medication requested: octavio    Other request: none    Can we leave a detailed message on this number? YES    Phone number patient can be reached at: Home number on file [phone] (home)    Best Time: anytime    Call taken on 1/21/2019 at 9:45 AM by Leland Gaines

## 2019-01-21 NOTE — TELEPHONE ENCOUNTER
Reason for Call: Request for an order or referral:    Order or referral being requested: urine sample    Date needed: as soon as possible    Has the patient been seen by the PCP for this problem? YES    Additional comments: none    Phone number Patient can be reached at:  Home number on file [phone] (home)    Best Time:  anytime    Can we leave a detailed message on this number?  yes with ky  [phone]    Call taken on 1/21/2019 at 11:32 AM by Leland Gaines

## 2019-01-22 NOTE — TELEPHONE ENCOUNTER
Pending Prescriptions:                       Disp   Refills    oxybutynin ER (DITROPAN-XL) 10 MG 24 hr t*90 tab*2            Sig: TAKE 1 TABLET (10 MG) BY MOUTH DAILY    Last ov 01/17/2018    Medication is being filled for 1 time refill only due to:  Patient needs to be seen because it has been more than one year since last visit.     Please call and help schedule.  Naseem Melendez, RN, BSN

## 2019-02-01 ENCOUNTER — TELEPHONE (OUTPATIENT)
Dept: FAMILY MEDICINE | Facility: CLINIC | Age: 49
End: 2019-02-01

## 2019-02-01 NOTE — TELEPHONE ENCOUNTER
Reason for Call:  Form, our goal is to have forms completed with 72 hours, however, some forms may require a visit or additional information.    Type of letter, form or note:  medical    Who is the form from?: [facility] - [facility]    Where did the form come from: form was faxed in    What clinic location was the form placed at?: [facility] - [phone]    Where the form was placed: K[facility]    What number is listed as a contact on the form?: [phone]       Additional comments: Please sign and date orders and fax back to: [phone]    Call taken on 2/1/2019 at 11:14 AM by Sana Trevino

## 2019-02-04 ENCOUNTER — TELEPHONE (OUTPATIENT)
Dept: FAMILY MEDICINE | Facility: CLINIC | Age: 49
End: 2019-02-04

## 2019-02-04 NOTE — TELEPHONE ENCOUNTER
Reason for call:  Form  Reason for Call:  Form, our goal is to have forms completed with 72 hours, however, some forms may require a visit or additional information.    Type of letter, form or note:  Medical    Who is the form from?: [facility]      Where did the form come from: form was faxed in    What clinic location was the form placed at?: [facility] - [phone]    Where the form was placed: 's in box     What number is listed as a contact on the form?: [phone]       Additional comments: Fax back to [phone]    Call taken on 2/4/2019 at 8:51 AM by Leland Gaines

## 2019-02-05 DIAGNOSIS — F41.9 ANXIETY: ICD-10-CM

## 2019-02-05 RX ORDER — VENLAFAXINE 75 MG/1
TABLET ORAL
Qty: 90 TABLET | Refills: 0 | Status: SHIPPED | OUTPATIENT
Start: 2019-02-05 | End: 2019-03-08

## 2019-02-05 NOTE — TELEPHONE ENCOUNTER
"Requested Prescriptions   Pending Prescriptions Disp Refills     venlafaxine (EFFEXOR) 75 MG tablet [Pharmacy Med Name: VENLAFAXINE HCL 75 MG TAB 75 TAB] 270 tablet 0     Sig: TAKE 2 TABS BY MOUTH EVERY MORNING AND 1 TABLET BY MOUTH EVERY EVENING .    Serotonin-Norepinephrine Reuptake Inhibitors  Failed - 2/5/2019  4:02 PM       Failed - Blood pressure under 140/90 in past 12 months    BP Readings from Last 3 Encounters:   01/17/18 112/60   06/27/17 110/60   11/25/16 132/72          Passed - Recent (12 mo) or future (30 days) visit within the authorizing provider's specialty    Patient had office visit in the last 12 months or has a visit in the next 30 days with authorizing provider or within the authorizing provider's specialty.  See \"Patient Info\" tab in inbasket, or \"Choose Columns\" in Meds & Orders section of the refill encounter.         Passed - Medication is active on med list       Passed - Patient is age 18 or older       Passed - No active pregnancy on record       Passed - Normal serum creatinine on file in past 12 months    Recent Labs   Lab Test 11/27/18   CR 1.0          Passed - No positive pregnancy test in past 12 months        Medication is being filled for 1 time refill only due to:  Patient needs to be seen because due for physical .   Next 5 appointments (look out 90 days)    Feb 12, 2019 11:20 AM CST  Office Visit with Dipti Kasper MD  [facility] ([facility]) [address]  [facility] [id number] [phone]        Snehal Geiger, HARDEEP, BSN         "

## 2019-02-07 ENCOUNTER — TELEPHONE (OUTPATIENT)
Dept: FAMILY MEDICINE | Facility: CLINIC | Age: 49
End: 2019-02-07

## 2019-02-07 DIAGNOSIS — G89.21 CHRONIC PAIN DUE TO INJURY: ICD-10-CM

## 2019-02-07 DIAGNOSIS — G82.20 PARAPLEGIA (H): ICD-10-CM

## 2019-02-07 NOTE — TELEPHONE ENCOUNTER
Reason for call:  Form  Reason for Call:  Form, our goal is to have forms completed with 72 hours, however, some forms may require a visit or additional information.    Type of letter, form or note:  Medical    Who [facility] the form from[facility] [place] [place]        Where did the form come from: form was faxed in    What clinic location was the form placed at?: [facility] - [phone]    Where the form was placed: 's in box     What number is listed as a contact on the form?: [phone]       Additional comments: Fax back to [phone]    Call taken on 2/7/2019 at 10:27 AM by Leland Gaines

## 2019-02-07 NOTE — TELEPHONE ENCOUNTER
Please mail to [facility] in [place]      methadone (DOLOPHINE) 5 MG tablet      Last Written Prescription Date:  1/14/2019  Last Fill Quantity: 300,   # refills: 0  Last Office Visit: 1/17/2018  Future Office visit:    Next 5 appointments (look out 90 days)    Feb 27, 2019 11:20 AM CST  Office Visit with Dipti Kasper MD  [facility] [id number] [place] [address], [address]  [facility] [address]  [phone]           Routing refill request to provider for review/approval because:  Drug not on the [facility], [facility] or [facility] refill protocol or controlled substance      oxyCODONE-acetaminophen (PERCOCET) 7.5-325 MG per tablet      Last Written Prescription Date:  1/14/2019  Last Fill Quantity: 180,   # refills: 0  Last Office Visit: 1/17/2018  Future Office visit:    Next 5 appointments (look out 90 days)    Feb 27, 2019 11:20 AM CST  Office Visit with Dipti Kasper MD  [facility] ([facility]) [address], [address]  [facility] [address]  [phone]           Routing refill request to provider for review/approval because:  Drug not on the [facility], [facility] or [facility] refill protocol or controlled substance

## 2019-02-07 NOTE — TELEPHONE ENCOUNTER
Reason for Call:  Medication or medication refill:    Do you use a [facility]?  Name of the pharmacy and phone number for the current request:  [facility] in [place]    Name of the medication requested: Percocet and Mythadone    Other request: none    Can we leave a detailed message on this number? YES    Phone number patient can be reached at: Home number on file [phone] (home)    Best Time: anytime    Call taken on 2/7/2019 at 12:46 PM by Leland Gaines

## 2019-02-08 ENCOUNTER — TELEPHONE (OUTPATIENT)
Dept: FAMILY MEDICINE | Facility: CLINIC | Age: 49
End: 2019-02-08

## 2019-02-08 RX ORDER — OXYCODONE AND ACETAMINOPHEN 7.5; 325 MG/1; MG/1
2 TABLET ORAL EVERY 6 HOURS PRN
Qty: 180 TABLET | Refills: 0 | Status: SHIPPED | OUTPATIENT
Start: 2019-02-08 | End: 2019-02-08

## 2019-02-08 RX ORDER — OXYCODONE AND ACETAMINOPHEN 7.5; 325 MG/1; MG/1
2 TABLET ORAL EVERY 6 HOURS PRN
Qty: 180 TABLET | Refills: 0 | Status: SHIPPED | OUTPATIENT
Start: 2019-02-08 | End: 2019-03-15

## 2019-02-08 RX ORDER — METHADONE HYDROCHLORIDE 5 MG/1
TABLET ORAL
Qty: 300 TABLET | Refills: 0 | Status: SHIPPED | OUTPATIENT
Start: 2019-02-08 | End: 2019-05-03

## 2019-02-08 RX ORDER — METHADONE HYDROCHLORIDE 5 MG/1
TABLET ORAL
Qty: 300 TABLET | Refills: 0 | Status: SHIPPED | OUTPATIENT
Start: 2019-02-08 | End: 2019-02-08

## 2019-02-08 NOTE — TELEPHONE ENCOUNTER
Reason for call:  Form  Reason for Call:  Form, our goal is to have forms completed with 72 hours, however, some forms may require a visit or additional information.    Type of letter, form or note:  Medical    Who is the form from?: [facility]      Where did the form come from: form was faxed in    What clinic location was the form placed at?: [facility] - [phone]    Where the form was placed: 's in box     What number is listed as a contact on the form?: [phone]       Additional comments: Fax back to [phone]    Call taken on 2/8/2019 at 6:20 AM by Leland Gaines

## 2019-02-11 NOTE — TELEPHONE ENCOUNTER
Reason for call:  Form  Reason for Call:  Form, our goal is to have forms completed with 72 hours, however, some forms may require a visit or additional information.    Type of letter, form or note:  medical    Who is the form from?: Home care    Where did the form come from: form was faxed in    What clinic location was the form placed at?: [facility] - [phone]    Where the form was placed: team coordinators box    What number is listed as a contact on the form?: [phone]       Additional comments: 2 more orders faxed over that need to be signed and completed.     Call taken on 2/11/2019 at 7:23 AM by Lorena Roland

## 2019-02-11 NOTE — TELEPHONE ENCOUNTER
There are more forms received from [facility].    Form placed in Providers in-box to be completed.

## 2019-02-12 ENCOUNTER — TELEPHONE (OUTPATIENT)
Dept: FAMILY MEDICINE | Facility: CLINIC | Age: 49
End: 2019-02-12

## 2019-02-12 NOTE — TELEPHONE ENCOUNTER
Reason for call:  Form  Reason for Call:  Form, our goal is to have forms completed with 72 hours, however, some forms may require a visit or additional information.    Type of letter, form or note:  Medical    [facility] [employer]      Where did the form come from: form was faxed in    What clinic location was the form placed at?: [facility] - [phone]    Where the form was placed: 's in box     What number is listed as a contact on the form?: [phone]       Additional comments: Fax back to [phone]    Call taken on 2/12/2019 at 12:48 PM by Leland Gaines

## 2019-02-16 ENCOUNTER — MEDICAL CORRESPONDENCE (OUTPATIENT)
Dept: HEALTH INFORMATION MANAGEMENT | Facility: CLINIC | Age: 49
End: 2019-02-16

## 2019-02-18 ENCOUNTER — TELEPHONE (OUTPATIENT)
Dept: FAMILY MEDICINE | Facility: CLINIC | Age: 49
End: 2019-02-18

## 2019-02-18 NOTE — TELEPHONE ENCOUNTER
Reason for call:  Form  Reason for Call:  Form, our goal is to have forms completed with 72 hours, however, some forms may require a visit or additional information.    Type of letter, form or note:  Medical    Who is the form from?: [facility]      Where did the form come from: form was faxed in    What clinic location was the form placed at?: [facility] - [phone]    Where the form was placed: 's in box     What number is listed as a contact on the form?: [phone]       Additional comments: Fax back to [phone]    Call taken on 2/18/2019 at 9:24 AM by Leland Gaines

## 2019-02-20 DIAGNOSIS — G82.20 PARAPLEGIA (H): ICD-10-CM

## 2019-02-20 DIAGNOSIS — G89.4 CHRONIC PAIN SYNDROME: ICD-10-CM

## 2019-02-20 RX ORDER — PREGABALIN 200 MG/1
200 CAPSULE ORAL 3 TIMES DAILY
Qty: 90 CAPSULE | Refills: 0 | Status: SHIPPED | OUTPATIENT
Start: 2019-02-20 | End: 2019-03-19

## 2019-02-20 NOTE — TELEPHONE ENCOUNTER
Reason for Call:  Medication or medication refill:    Do you use a [facility]?  Name of the pharmacy and phone number for the current request:  [facility]    Name of the medication requested: lyrica    Other request: none    Can we leave a detailed message on this number? YES    Phone number patient can be reached at: Home number on file [phone] (home)    Best Time: anytime    Call taken on 2/20/2019 at 1:59 PM by Leland Gaines

## 2019-02-20 NOTE — TELEPHONE ENCOUNTER
Requested Prescriptions   Pending Prescriptions Disp Refills     Pregabalin (LYRICA) 200 MG capsule 90 capsule 0     Sig: Take 1 capsule (200 mg) by mouth 3 times daily    There is no refill protocol information for this order        Pregabalin (LYRICA) 200 MG capsule      Last Written Prescription Date:  01/21/2019  Last Fill Quantity: 90,   # refills: 0  Last Office Visit: 07/10/2018  Future Office visit:    Next 5 appointments (look out 90 days)    Feb 27, 2019 11:20 AM CST  Office Visit with Dipti Kasper MD  [facility] [address], [address]  [facility] [id number] [phone]      Routing refill request to provider for review/approval because:  Drug not on the [facility], [facility] or [facility] refill protocol or controlled substance    Snehal Geiger RN, BSN

## 2019-02-22 ENCOUNTER — TELEPHONE (OUTPATIENT)
Dept: FAMILY MEDICINE | Facility: CLINIC | Age: 49
End: 2019-02-22

## 2019-02-22 NOTE — TELEPHONE ENCOUNTER
Our goal is to have forms completed with 72 hours, however some forms may require a visit or additional information.    Who is the form from?: [employer]  (if other please explain)  Where the form came from: form was faxed in  What clinic location was the form placed at?: [place]  Where the form was placed: 's Box  What number is listed as a contact on the form?: [phone]    Phone call message- patient request for a letter, form or note:    Date needed: as soon as possible  Please fax to [phone]  Has the patient signed a consent form for release of information?     Additional comments:     Call taken on 2/22/2019 at 9:10 AM by Juvenal Orourke    Type of letter, form or note:

## 2019-02-25 ENCOUNTER — TELEPHONE (OUTPATIENT)
Dept: FAMILY MEDICINE | Facility: CLINIC | Age: 49
End: 2019-02-25

## 2019-02-25 NOTE — TELEPHONE ENCOUNTER
Reason for call:  Form  Reason for Call:  Form, our goal is to have forms completed with 72 hours, however, some forms may require a visit or additional information.    Type of letter, form or note:  Medical    Who is the form from?: [facility]      Where did the form come from: form was faxed in    What clinic location was the form placed at?: [facility] - [phone]    Where the form was placed: 's in box     What number is listed as a contact on the form?: [phone]       Additional comments: Fax back to [phone]    Call taken on 2/25/2019 at 7:55 AM by Leland Gaines

## 2019-03-04 DIAGNOSIS — G89.21 CHRONIC PAIN DUE TO INJURY: ICD-10-CM

## 2019-03-05 RX ORDER — IBUPROFEN 200 MG
TABLET ORAL
Qty: 120 TABLET | Refills: 0 | Status: SHIPPED | OUTPATIENT
Start: 2019-03-05 | End: 2019-05-20

## 2019-03-05 NOTE — TELEPHONE ENCOUNTER
Ibuprofen    Routing refill request to provider for review/approval because:  Labs out of range:  B/P, ALT, AST, CBC    BP Readings from Last 3 Encounters:   01/17/18 112/60   06/27/17 110/60   11/25/16 132/72     Susan Ordoñez, RN, BSN

## 2019-03-07 ENCOUNTER — TELEPHONE (OUTPATIENT)
Dept: FAMILY MEDICINE | Facility: CLINIC | Age: 49
End: 2019-03-07

## 2019-03-07 DIAGNOSIS — N39.0 RECURRENT UTI: Primary | ICD-10-CM

## 2019-03-07 NOTE — TELEPHONE ENCOUNTER
RN - please call home care nurse. Patient likely needs this evaluated.  She has a urologist I believe as this may be issue related to the catheter

## 2019-03-07 NOTE — TELEPHONE ENCOUNTER
I spoke with the home home care nurse who states there is a large lump, 4cm around the site. Can't see it but can feel it, It is hard to the touch. Very painful. Draining. Home health nurse is going to file a volnerable adult report today on her because pt's mother who is a paid PCA would not let pt call to report the symptoms of possible UTI. She started to have symptoms Sunday or Monday. Her mom would not let her  the sample either. No fever while nurse was there. Pt also stated she will not go to the clinic. Discussed Urology also.     Susan Ordoñez, RN, BSN

## 2019-03-07 NOTE — TELEPHONE ENCOUNTER
There are standing orders in. Replaced today as unsure how to print from prior orders.     Please print the UA reflex micro and the urine culture orders.

## 2019-03-07 NOTE — TELEPHONE ENCOUNTER
Reason for Call: Request for an order or referral:    Order or referral being requested: UA    Date needed: as soon as possible    Has the patient been seen by the PCP for this problem? NO    Additional comments: pts home care nurse stated that pt believes she has a UTI. Nurse would like an order placed for a sample.    Phone number Patient can be reached at:  [phone]    Best Time:  any    Can we leave a detailed message on this number?  YES    Call taken on 3/7/2019 at 9:15 AM by Yojana Chaves

## 2019-03-07 NOTE — TELEPHONE ENCOUNTER
Reason for Call:  Other update    Detailed comments: Suprapublic cath site hard lump left.  Moderate amount of drainage.    Phone Number Patient can be reached at: Other phone number:  [phone]     Best Time: anytime    Can we leave a detailed message on this number? YES    Call taken on 3/7/2019 at 12:22 PM by Leland Gaines

## 2019-03-07 NOTE — TELEPHONE ENCOUNTER
Provider, I thought there was a standing order for pt to have UA and UC completed in the lab orders.  Which order do I print to fax to them?

## 2019-03-08 ENCOUNTER — TRANSFERRED RECORDS (OUTPATIENT)
Dept: HEALTH INFORMATION MANAGEMENT | Facility: CLINIC | Age: 49
End: 2019-03-08

## 2019-03-08 ENCOUNTER — TELEPHONE (OUTPATIENT)
Dept: FAMILY MEDICINE | Facility: CLINIC | Age: 49
End: 2019-03-08

## 2019-03-08 NOTE — TELEPHONE ENCOUNTER
Reason for Call:  Form, our goal is to have forms completed with 72 hours, however, some forms may require a visit or additional information.    Type of letter, form or note:  [facility]    Who is the form from?: Home care    Where did the form come from: form was faxed in    What clinic location was the form placed at?: [facility] - [phone]    Where the form was placed: Dr's Box    What number is listed as a contact on the form?: [phone]       Additional comments: fax to [phone]    Call taken on 3/8/2019 at 7:27 AM by Jessica Romo

## 2019-03-11 ENCOUNTER — TELEPHONE (OUTPATIENT)
Dept: FAMILY MEDICINE | Facility: CLINIC | Age: 49
End: 2019-03-11

## 2019-03-11 DIAGNOSIS — G82.20 PARAPLEGIA (H): ICD-10-CM

## 2019-03-11 DIAGNOSIS — G89.21 CHRONIC PAIN DUE TO INJURY: ICD-10-CM

## 2019-03-11 RX ORDER — OXYCODONE AND ACETAMINOPHEN 7.5; 325 MG/1; MG/1
TABLET ORAL
Qty: 180 TABLET | Refills: 0 | Status: SHIPPED | OUTPATIENT
Start: 2019-03-11 | End: 2019-04-10

## 2019-03-11 RX ORDER — METHADONE HYDROCHLORIDE 5 MG/1
TABLET ORAL
Qty: 300 TABLET | Refills: 0 | Status: SHIPPED | OUTPATIENT
Start: 2019-03-11 | End: 2019-04-10

## 2019-03-11 NOTE — TELEPHONE ENCOUNTER
Ok for April 9. If she has other concerns that are more urgent, ok to schedule with a different provider.     Please also notify her that the urine culture showed only contaminants. No UTI noted.

## 2019-03-11 NOTE — TELEPHONE ENCOUNTER
"Pt informed of information.      Re: the UTI results.  The infection is around the site - more topical.  Pt states \"there is green stuff and is red and irritated\".     Provider, would you be willing to send a topical medication?  "

## 2019-03-11 NOTE — TELEPHONE ENCOUNTER
Mail scripts to the pharmacy      methadone (DOLOPHINE) 5 MG tablet      Last Written Prescription Date:  2/8/2019  Last Fill Quantity: 300,   # refills: 0  Last Office Visit: 1/17/2018  Future Office visit:    Next 5 appointments (look out 90 days)    Apr 09, 2019 11:20 AM CDT  Office Visit with Dipti Kasper MD  [facility]) [address]  [facility] [address]  [phone]           Routing refill request to provider for review/approval because:  Drug not on the [facility], [facility] or [facility] refill protocol or controlled substance    oxyCODONE-acetaminophen (PERCOCET) 7.5-325 MG per tablet      Last Written Prescription Date:  2/8/2019  Last Fill Quantity: 180,   # refills: 0  Last Office Visit: 1/17/2018  Future Office visit:    Next 5 appointments (look out 90 days)    Apr 09, 2019 11:20 AM CDT  Office Visit with Dipti Kasper MD  [facility] ([facility]) [address]  [facility] [address]  [phone]           Routing refill request to provider for review/approval because:  Drug not on the [facility], [facility] or [facility] refill protocol or controlled substance

## 2019-03-11 NOTE — TELEPHONE ENCOUNTER
Reason for Call:  Other question    Detailed comments: We made an appointment for the patient for 04/09/2019 this was the first opening for Dr Kasper, patient wants to know if this is ok or do you want her to see someone else sooner?     Phone Number Patient can be reached at: Home number on file [phone] (home) or Cell number on file:    Telephone Information:   Mobile [phone]       Best Time: anytime    Can we leave a detailed message on this number? YES    Call taken on 3/11/2019 at 10:27 AM by Michelle Jennings

## 2019-03-11 NOTE — TELEPHONE ENCOUNTER
As I recommended last week, patient needs to have this looked at to see what is going on.     RN - please triage with patient.

## 2019-03-11 NOTE — TELEPHONE ENCOUNTER
Left message asking pt to return call. Please triage for possible infection around suprapubic cath site.  Per homecare nurse last week 3/7 it is an area approximately 4cm around that is hard and painful with drainage.  Dr. Kasper recommended that the pt call her urologist as it may be related to her catheter. See 3/7/2019 telephone encounter    Susan Ordoñez, RN, BSN

## 2019-03-11 NOTE — TELEPHONE ENCOUNTER
Reason for Call:  Medication or medication refill:    Do you use a [facility]?  Name of the pharmacy and phone number for the current request:  [facility] [place]    Name of the medication requested: percocet and methodone    Other request: none    Can we leave a detailed message on this number? YES    Phone number patient can be reached at: Home number on file [phone] (home)    Best Time: anytime    Call taken on 3/11/2019 at 10:48 AM by Leland Gaines

## 2019-03-12 ENCOUNTER — TELEPHONE (OUTPATIENT)
Dept: FAMILY MEDICINE | Facility: CLINIC | Age: 49
End: 2019-03-12

## 2019-03-12 NOTE — TELEPHONE ENCOUNTER
Patient informed she should follow up with urology regarding possible infection around catheter site.     Ila Santana, RN, BSN

## 2019-03-12 NOTE — TELEPHONE ENCOUNTER
Reason for call:  Form  Reason for Call:  Form, our goal is to have forms completed with 72 hours, however, some forms may require a visit or additional information.    Type of letter, form or note:  medical    Who is the form from?:  [facility] [employer]    Where did the form come from: form was faxed in    What clinic location was the form placed at?: [facility] - [phone]    Where the form was placed: 's Box    What number is listed as a contact on the form?:  [phone]       Additional comments:  Fax to [phone]    created by Kelli Reza

## 2019-03-14 ENCOUNTER — TELEPHONE (OUTPATIENT)
Dept: FAMILY MEDICINE | Facility: CLINIC | Age: 49
End: 2019-03-14

## 2019-03-14 DIAGNOSIS — F11.90 CHRONIC, CONTINUOUS USE OF OPIOIDS: Primary | ICD-10-CM

## 2019-03-14 NOTE — TELEPHONE ENCOUNTER
Prior Authorization Retail Medication Request  Medication/Dose:  oxyCODONE-acetaminophen (PERCOCET) 7.5-325 MG per tablet    Provider, do you want to change the medication or start a PA?  Please advise on previously Tried and Failed Therapies:      Insurance ID (if provided): [id number]  Insurance Phone (if provided): [phone]

## 2019-03-14 NOTE — TELEPHONE ENCOUNTER
Proceed with PA request.     Patient has large decubitus ulcer, chronic pain, paraplegia. Has long standing use/stable with oxycodone-acetaminophen. No concerns or issues at this time.

## 2019-03-15 ENCOUNTER — TELEPHONE (OUTPATIENT)
Dept: FAMILY MEDICINE | Facility: CLINIC | Age: 49
End: 2019-03-15

## 2019-03-15 DIAGNOSIS — G82.20 PARAPLEGIA (H): ICD-10-CM

## 2019-03-15 DIAGNOSIS — G89.21 CHRONIC PAIN DUE TO INJURY: ICD-10-CM

## 2019-03-15 RX ORDER — OXYCODONE AND ACETAMINOPHEN 7.5; 325 MG/1; MG/1
2 TABLET ORAL EVERY 6 HOURS PRN
Qty: 12 TABLET | Refills: 0 | Status: SHIPPED | OUTPATIENT
Start: 2019-03-15 | End: 2019-05-03

## 2019-03-15 RX ORDER — OXYCODONE AND ACETAMINOPHEN 7.5; 325 MG/1; MG/1
2 TABLET ORAL EVERY 6 HOURS PRN
Qty: 42 TABLET | Refills: 0 | Status: SHIPPED | OUTPATIENT
Start: 2019-03-15 | End: 2019-03-15

## 2019-03-15 NOTE — TELEPHONE ENCOUNTER
Pharmacy stated they can do a 72 hour emergency fill for percocet.  insurance is limiting them to 90 mme per day, so 33 mg per day limit of percocet.    Provider, please revise Rx to accommodate limitations.  We can then call the Rx in to the pharmacy and make sure we mail the script within one week.

## 2019-03-15 NOTE — TELEPHONE ENCOUNTER
Reason for Call:  Other call back    Detailed comments: Patient called clinic. I relayed to patient that PA process has been started. She is asking for Kelli to give her a call back to let her know exactly what has been done so far and if PA was sent directly to insurance company.     Phone Number Patient can be reached at: Home number on file [phone] (home)    Best Time: any    Can we leave a detailed message on this number? YES    Call taken on 3/15/2019 at 9:26 AM by Sana Trevino

## 2019-03-15 NOTE — TELEPHONE ENCOUNTER
Printed.     Please let patient know that insurance is limiting her to #4 of percocet per day and can only receive 3 day emergency supply

## 2019-03-15 NOTE — TELEPHONE ENCOUNTER
Spoke to patient.  Assured her that our PA team is working on the PA.    Will re-route the encounter as urgent for PA team to initiate asap.  Thanks PA team for your help!

## 2019-03-15 NOTE — TELEPHONE ENCOUNTER
There is a PA in process for percocet.  Patient is wondering if Dr. Kasper would be willing to write an emergency script for 7 days (she would pay out of pocket).  Provider, are you willing to do this?    Would need to call pharmacy and see if they will accept a script faxed (then mail hard copy to pharmacy in [place]).

## 2019-03-15 NOTE — TELEPHONE ENCOUNTER
CENTRAL PRIOR AUTHORIZATION  [phone]    PA Initiation    Medication: oxyCODONE-acetaminophen (PERCOCET) 7.5-325 MG per tablet - INITIATED 03/15/2019  Insurance Company: [employer] - Phone [phone] Fax [phone]  Pharmacy Filling the Rx: [facility] [id number] [place] [address] - [facility] [address]  Filling Pharmacy Phone: [phone]  Filling Pharmacy Fax:    Start Date: 3/15/2019

## 2019-03-18 NOTE — TELEPHONE ENCOUNTER
PRIOR AUTHORIZATION DENIED    Medication: oxyCODONE-acetaminophen (PERCOCET) 7.5-325 MG per tablet - DENIED 03/17/2019    Denial Date: 3/17/2019    Denial Rational: THIS DEC[place] 256B.0625 SUBD. IN ORDER TO MEET OUR CRITERIA, YOUR DOCTOR WOULD NEED TO:   -TELL US THE RISKS OF OPIOID USE WITH A CONDITION THE CENTERS FOR DISEASE CONTROL AND PREVENTION (CDC) GUIDELINES CONSIDER HIGH RISK WERE  DISCUSSED WITH YOU, YOU WERE EDUCATED ON USING NALOXONE (A DRUG THAT CAN REVERSE THE EFFECTS OF OPIOID OVERDOSE), AND HE/SHE  CONSIDERED PRESCRIBING NALOXONE FOR YOU.  THE REQUEST DOES NOT MEET OUR CRITERIA BECAUSE YOUR DOCTOR DID NOT SEND THE INFORMATION LISTED ABOVE.          Appeal Information: IF THE PROVIDER WOULD LIKE APPEAL THIS DENIAL, PLEASE HAVE THEM PROVIDE A LETTER OF MEDICAL NECESSITY ALONG WITH ANY DOCUMENTATION THAT STATES THERAPIES TRIED/OUTCOMES. ONCE IT HAS BEEN PLACED IN THE PATIENT'S CHART, PLEASE NOTIFY THE PA TEAM ONCE IT HAS BEEN COMPLETED AND WE CAN INITIATE THE APPEAL ON BEHALF OF THE PROVIDER AND PATIENT.

## 2019-03-18 NOTE — TELEPHONE ENCOUNTER
Prescription for naloxone sent for patient.     Patient has upcoming appointment to discuss opioid use.

## 2019-03-18 NOTE — TELEPHONE ENCOUNTER
Pt called back and asked that we call the insurance pharmacy help desk to discuss this further at [phone]

## 2019-03-18 NOTE — TELEPHONE ENCOUNTER
Patient called with the telephone number you can call do you an appeal with [facility] [employer] [phone].  Should would like Kelli to call her back to confirm that appeals has been done [phone].

## 2019-03-18 NOTE — TELEPHONE ENCOUNTER
"Pt informed of message from Provider and the note form the PA team as to why it was denied:  They are denying it based on the that the provider needs to educate the patient/planed on prescribing Naloxone for the patient.  I did not find any notes to submit to the insurance.     She stated that she has been informed and educated on opioid use from the pharmacy.  \"It is what it is\" pt said, though she was disappointed with the denial and medication change.  "

## 2019-03-18 NOTE — TELEPHONE ENCOUNTER
Spoke to rep at Pharmacy helpdesk.  Since the Rx has been provided for naloxone (NARCAN) 4 MG/0.1ML nasal spray and pt is informed how to use it, by the pharmacist -  insurance will re-evaluate the submission for prior auth.      PA team - Please resubmit PA or appeal the denied PA  (with above information) for oxyCODONE-acetaminophen (PERCOCET) 7.5-325 MG per tablet   Pt ID # - [id number]     Patient called with the telephone number you can call do you an appeal with [facility] [employer] [phone].

## 2019-03-19 DIAGNOSIS — G82.20 PARAPLEGIA (H): ICD-10-CM

## 2019-03-19 DIAGNOSIS — G89.4 CHRONIC PAIN SYNDROME: ICD-10-CM

## 2019-03-19 RX ORDER — PREGABALIN 200 MG/1
200 CAPSULE ORAL 3 TIMES DAILY
Qty: 90 CAPSULE | Refills: 0 | Status: SHIPPED | OUTPATIENT
Start: 2019-03-19 | End: 2019-04-18

## 2019-03-19 NOTE — TELEPHONE ENCOUNTER
Prior Authorization Approval- WILL NEED TO INITIATE RENEWAL NO LATER THAN 14 DAYS PRIOR THE EXPIRATION OF THE AUTHORIZATION.    Authorization Effective Date: 3/9/2019  Authorization Expiration Date: 9/19/2019  Medication: oxyCODONE-acetaminophen (PERCOCET) 7.5-325 MG per tablet -APPEAL INFORMATION SENT 03/19/2019  Approved Dose/Quantity:   Reference #:     Insurance Company: [facility] [employer] - Phone [phone] Fax [phone]  Expected CoPay:       CoPay Card Available:      Foundation Assistance Needed:    Which Pharmacy is filling the prescription (Not needed for infusion/clinic administered): [facility] [address] [place] [address] AT [address]  Pharmacy Notified: Yes  Patient Notified: Yes    I called the pharmacy and they did seem a little confused by the approval. I explained why it was denied in the first place and then that with the added information that Naloxone was prescribed and it was sent in again for reconsideration and subsequently approved.    I did call the patient as I was not sure if the pharmacy was going to contact her with this approval.  I left a message that the PA was approved and the effective dates and if she has further questions, she can of course call the PA department at [phone] or her provider's office.

## 2019-03-19 NOTE — TELEPHONE ENCOUNTER
Medication Appeal Initiation    We have initiated an appeal for the requested medication:  Medication: oxyCODONE-acetaminophen (PERCOCET) 7.5-325 MG per tablet -APPEAL INFORMATION SENT 03/19/2019  Appeal Start Date:  3/19/2019  Insurance Company: [facility] [employer] - Phone [phone] Fax [phone]  Comments:       I faxed the insurance earlier as I was not sure whom was going to initiate the appeal.  I did go ahead and fax the information along with all the previous documents to the [facility] stating that the patient was prescribed the requested Naloxone.

## 2019-03-19 NOTE — TELEPHONE ENCOUNTER
Requested Prescriptions   Pending Prescriptions Disp Refills     Pregabalin (LYRICA) 200 MG capsule 90 capsule 0     Sig: Take 1 capsule (200 mg) by mouth 3 times daily    There is no refill protocol information for this order        Routing refill request to provider for review/approval because:  Drug not on the [facility] refill protocol     Raudel Newby RN, BSN

## 2019-03-19 NOTE — TELEPHONE ENCOUNTER
Reason for call:  Medication  Reason for Call:  Medication or medication refill:    Do you use a [facility]?  Name of the pharmacy and phone number for the current request[facility]    Name of the medication requested: lyrica    Other request: pt needs a refill on this medication    Can we leave a detailed message on this number? YES    Phone number patient can be reached at: Cell number on file:    Telephone Information:   Mobile [phone]       Best Time: anytime    Call taken on 3/19/2019 at 9:32 AM by Lorena Roland

## 2019-03-20 ENCOUNTER — TELEPHONE (OUTPATIENT)
Dept: FAMILY MEDICINE | Facility: CLINIC | Age: 49
End: 2019-03-20

## 2019-03-20 DIAGNOSIS — G89.4 CHRONIC PAIN SYNDROME: ICD-10-CM

## 2019-03-20 DIAGNOSIS — G82.20 PARAPLEGIA (H): ICD-10-CM

## 2019-03-20 NOTE — TELEPHONE ENCOUNTER
Pharmacy Note:  hydrOXYzine (VISTARIL) 25 MG capsule is not available.  Can we switch to Atarax or hydroxyzine Hcl tabs??

## 2019-03-20 NOTE — TELEPHONE ENCOUNTER
This was done yesterday, duplicate request. Confirmed with pharmacy they have it.    Susan Ordoñez, RN, BSN

## 2019-03-20 NOTE — TELEPHONE ENCOUNTER
Reason for Call:  Medication or medication refill:    Do you use a [facility]?  Name of the pharmacy and phone number for the current request:  [facility]    Name of the medication requested: Lyrica    Other request: none    Can we leave a detailed message on this number? YES    Phone number patient can be reached at: Home number on file [phone] (home)    Best Time: anytime    Call taken on 3/20/2019 at 9:35 AM by Leland Gaines

## 2019-03-25 ENCOUNTER — TELEPHONE (OUTPATIENT)
Dept: FAMILY MEDICINE | Facility: CLINIC | Age: 49
End: 2019-03-25

## 2019-03-25 DIAGNOSIS — G82.20 PARAPLEGIA (H): ICD-10-CM

## 2019-03-25 DIAGNOSIS — R32 URINARY INCONTINENCE, UNSPECIFIED TYPE: ICD-10-CM

## 2019-03-26 RX ORDER — OXYBUTYNIN CHLORIDE 10 MG/1
10 TABLET, EXTENDED RELEASE ORAL DAILY
Qty: 30 TABLET | Refills: 0 | Status: SHIPPED | OUTPATIENT
Start: 2019-03-26 | End: 2019-05-01

## 2019-03-26 RX ORDER — BACLOFEN 20 MG/1
TABLET ORAL
Qty: 90 TABLET | Refills: 0 | Status: SHIPPED | OUTPATIENT
Start: 2019-03-26 | End: 2019-04-23

## 2019-03-26 NOTE — TELEPHONE ENCOUNTER
Ditropan and Baclofen  Routing refill request to provider for review/approval because:  Lou given x1 and patient did not follow up  Not on [facility] protocol (Baclofen)    Next 5 appointments (look out 90 days)    Apr 09, 2019 11:20 AM CDT  Office Visit with Dipti Kasper MD  [facility] [address], [address]  [facility] [address]  [phone]        Ila Santana, RN, BSN

## 2019-04-05 DIAGNOSIS — F41.9 ANXIETY: ICD-10-CM

## 2019-04-05 RX ORDER — VENLAFAXINE 75 MG/1
TABLET ORAL
Qty: 90 TABLET | Refills: 0 | Status: SHIPPED | OUTPATIENT
Start: 2019-04-05 | End: 2019-05-01

## 2019-04-05 NOTE — TELEPHONE ENCOUNTER
Reason for Call:  Medication or medication refill:    Do you use a [facility]?  Name of the pharmacy and phone number for the current request:  [facility] [phone]    Name of the medication requested: Effexor    Other request: patient dropped a little less then a hand full and is wondering if you could call a new Rx in.     Can we leave a detailed message on this number? YES    Phone number patient can be reached at: Home number on file [phone] (home)    Best Time: any    Call taken on 4/5/2019 at 12:44 PM by Promise Cantu

## 2019-04-10 DIAGNOSIS — G82.20 PARAPLEGIA (H): ICD-10-CM

## 2019-04-10 DIAGNOSIS — G89.21 CHRONIC PAIN DUE TO INJURY: ICD-10-CM

## 2019-04-10 RX ORDER — METHADONE HYDROCHLORIDE 5 MG/1
TABLET ORAL
Qty: 300 TABLET | Refills: 0 | Status: SHIPPED | OUTPATIENT
Start: 2019-04-10 | End: 2019-05-03

## 2019-04-10 RX ORDER — OXYCODONE AND ACETAMINOPHEN 7.5; 325 MG/1; MG/1
2 TABLET ORAL EVERY 6 HOURS PRN
Qty: 12 TABLET | Refills: 0 | Status: CANCELLED | OUTPATIENT
Start: 2019-04-10

## 2019-04-10 RX ORDER — OXYCODONE AND ACETAMINOPHEN 7.5; 325 MG/1; MG/1
TABLET ORAL
Qty: 180 TABLET | Refills: 0 | Status: SHIPPED | OUTPATIENT
Start: 2019-04-10 | End: 2019-05-03

## 2019-04-10 NOTE — TELEPHONE ENCOUNTER
TC - Please mail prescriptions    Spoke with Agatha. Reports that she had a UTI and was in the hospital starting Tuesday until this afternoon.  Reports that she is on antibiotics for 3-5 days. Reports that it is Bactrim.  Feeling better.     Discussed her pain medication and management. Reviewed that she was last physically seen in the clinic 15 months ago. At that time, had recommended pain management clinic and frequent follow up with me until that point. We have had a couple of phone visits but no in person visits.     Recommend at this time that she return to [facility] which she is agreeable to. She will call this week to schedule and will call back if she has any trouble scheduling. She will call my office to let me know of when the appointment is.     She agrees to this plan. She will schedule a follow up with me for her other medical issues as she is due annually for that. She agrees.     All questions invited, asked and answered to the patient's apparent satisfaction.  Patient agrees to plan.

## 2019-04-10 NOTE — TELEPHONE ENCOUNTER
Reason for call:  Medication  Reason for Call:  Medication or medication refill:    Do you use a [facility]?  Name of the pharmacy and [facility] number for the [facility] [place]    Name of the medication requested: methadone  And perocet    Other request: pt needs these to be mailed to the [facility] pharmacy. But had to cancel her appt because she was in the hospital. Please contact pt if there are any issues.     Can we leave a detailed message on this number? YES    Phone number patient can be reached at: Home number on file [phone] (home)    Best Time: anytime    Call taken on 4/10/2019 at 12:56 PM by Lorena Roland

## 2019-04-10 NOTE — TELEPHONE ENCOUNTER
methadone (DOLOPHINE) 5 MG tablet      Last Written Prescription Date:  3/11/2019  Last Fill Quantity: 300,   # refills: 0  Last Office Visit: 4/10/2018  Future Office visit:       Routing refill request to provider for review/approval because:  Drug not on the [facility], [facility] or [facility] refill protocol or controlled substance      oxyCODONE-acetaminophen (PERCOCET) 7.5-325 MG per tablet.      Last Written Prescription Date:  3/11/2019  Last Fill Quantity: 180,   # refills: 0  Last Office Visit: 4/10/2018  Future Office visit:       Routing refill request to provider for review/approval because:  Drug not on the [facility], [facility] or [facility] refill protocol or controlled substance

## 2019-04-15 ENCOUNTER — TELEPHONE (OUTPATIENT)
Dept: FAMILY MEDICINE | Facility: CLINIC | Age: 49
End: 2019-04-15

## 2019-04-15 DIAGNOSIS — F11.90 CHRONIC NARCOTIC USE: Primary | ICD-10-CM

## 2019-04-15 NOTE — TELEPHONE ENCOUNTER
TC - please find out if the prescription have been received by the pharmacy yet. If not, what would need to be done to give an emergency prescription.

## 2019-04-15 NOTE — TELEPHONE ENCOUNTER
Reason for call:  Form  Reason for Call:  Form, our goal is to have forms completed with 72 hours, however, some forms may require a visit or additional information.    Type of letter, form or note:  Medical    Who is the form from?: [facility]      Where did the form come from: form was faxed in    What clinic location was the form placed at?: [facility] - [phone]    Where the form was placed: 's in box     What number is listed as a contact on the form?: [phone]       Additional comments: Fax back to [phone]    Call taken on 4/15/2019 at 8:42 AM by Leland Gaines

## 2019-04-15 NOTE — TELEPHONE ENCOUNTER
"Reason for Call:  Other prescription    Detailed comments: Patient is calling requesting a \"7 day emergency\" on her methadone and percocet. Please advise.     Phone Number Patient can be reached at: Cell number on file:    Telephone Information:   Mobile [phone]       Best Time: Any    Can we leave a detailed message on this number? YES    Call taken on 4/15/2019 at 12:26 PM by Juliette Arriaga      "

## 2019-04-15 NOTE — TELEPHONE ENCOUNTER
Both Scripts were received by pharmacy.  Pt has already picked up the Rx for percocet.   Methadone requires a PA.  I completed the paperwork (based on previously submitted PA's) for this medication, faxed it a couple hours ago as urgent review.  We are awaiting response.    Pt has been informed of this.  We will inform pt of PA status once we learn.  She is ok with this.

## 2019-04-15 NOTE — TELEPHONE ENCOUNTER
Prior Authorization Retail Medication Request  Medication/Dose: methadone 5 mg tablets    Provider, do you want to change the medication or start a PA?  Please advise on previously Tried and Failed Therapies:      Insurance ID (if provided):  [id number]  Insurance Phone (if provided):  [phone]

## 2019-04-15 NOTE — TELEPHONE ENCOUNTER
Re-faxed PA from 2018 to insurance at [phone] to be evaluated  Waiting for response.        Notes from previous PA submitted March 2018.

## 2019-04-15 NOTE — TELEPHONE ENCOUNTER
Reason for call:  Form  Reason for Call:  Form, our goal is to have forms completed with 72 hours, however, some forms may require a visit or additional information.    Type of letter, form or note:  Medical    Who is the form from?: [facility] [employer]      Where did the form come from: form was faxed in    What clinic location was the form placed at?: [facility] - [phone]    Where the form was placed: 's in box     What number is listed as a contact on the form?: [phone]        Additional comments: Fax back to [phone]    Call taken on 4/15/2019 at 9:25 AM by Leland Gaines

## 2019-04-16 ENCOUNTER — TRANSFERRED RECORDS (OUTPATIENT)
Dept: HEALTH INFORMATION MANAGEMENT | Facility: CLINIC | Age: 49
End: 2019-04-16

## 2019-04-16 NOTE — TELEPHONE ENCOUNTER
Spoke with Trice.  Pt wants to switch from [facility] to [facility] because pt has had issues getting certain supplies in a timely manner.    [facility] is requiring 1. Orders/prescriptions for ALL supplies and equipment pt needs 2. A wound report within the last 30 days    Informed Trice that this is an extensive request, as pt has numerous supply needs that are not outlined in a defined list..  Asked if she could reach out to [facility] to see if they have a defined List of supplies in their records that would assist the provider in ordering this for [facility].  Also, Provider, could we use the most recent form sent to [facility] about pt's wounds, location and sizes to satisfy the request for a wound report?   Plan: Patient will begin efudex treatment as prescribed and follow up for reevaluation Detail Level: Zone

## 2019-04-17 ENCOUNTER — TELEPHONE (OUTPATIENT)
Dept: FAMILY MEDICINE | Facility: CLINIC | Age: 49
End: 2019-04-17

## 2019-04-17 NOTE — TELEPHONE ENCOUNTER
Reason for Call:  Form, our goal is to have forms completed with 72 hours, however, some forms may require a visit or additional information.    Type of letter, form or note:  [facility]    Who is the form from?: Home care    Where did the form come from: form was faxed in    What clinic location was the form placed at?: [facility] - [phone]    Where the form was placed: Dr's Box    What number is listed as a contact on the form?: [phone]       Additional comments: fax back to: [phone]    Call taken on 4/17/2019 at 7:11 AM by Jessica Romo

## 2019-04-17 NOTE — TELEPHONE ENCOUNTER
Received this form back today 4/17/2019, with a note:  Provider, please add ICD 10 diagnosis code.  The Dx we have on file is unspecified, which does not match the wound assessment.  [facility] insurance needs this information.  Please indicate the cause of the wounds.  Return fax to [phone]    Form placed in Providers in-box to be completed.

## 2019-04-18 ENCOUNTER — TELEPHONE (OUTPATIENT)
Dept: FAMILY MEDICINE | Facility: CLINIC | Age: 49
End: 2019-04-18

## 2019-04-18 DIAGNOSIS — G89.4 CHRONIC PAIN SYNDROME: ICD-10-CM

## 2019-04-18 DIAGNOSIS — G82.20 PARAPLEGIA (H): ICD-10-CM

## 2019-04-18 RX ORDER — PREGABALIN 200 MG/1
200 CAPSULE ORAL 3 TIMES DAILY
Qty: 90 CAPSULE | Refills: 0 | Status: SHIPPED | OUTPATIENT
Start: 2019-04-18 | End: 2019-05-16

## 2019-04-18 NOTE — TELEPHONE ENCOUNTER
Reason for Call:  Medication or medication refill:    Do you use a [facility]?  Name of the pharmacy and phone number for the current request:  [facility]    Name of the medication requested: Annabella    Other request: would like it today    Can we leave a detailed message on this number? YES    Phone number patient can be reached at: Home number on file [phone] (home)    Best Time: anytime    Call taken on 4/18/2019 at 10:29 AM by Leland Gaines

## 2019-04-18 NOTE — TELEPHONE ENCOUNTER
Reason for call:  Form  Reason for Call:  Form, our goal is to have forms completed with 72 hours, however, some forms may require a visit or additional information.    Type of letter, form or note:  Medical    Who is the form from?: [facility]      Where did the form come from: form was faxed in    What clinic location was the form placed at?: [facility] - [phone]    Where the form was placed: 's in box     What number is listed as a contact on the form?: [phone]       Additional comments: Fax back to [phone]    Call taken on 4/18/2019 at 9:05 AM by Leland Gaines

## 2019-04-19 ENCOUNTER — TELEPHONE (OUTPATIENT)
Dept: FAMILY MEDICINE | Facility: CLINIC | Age: 49
End: 2019-04-19

## 2019-04-19 NOTE — TELEPHONE ENCOUNTER
Reason for Call:  Other call back    Detailed comments: Patient called clinic asking about results of UA that was sent in on Monday. I advised patient that I didn't see anything in her chart. Please call this patient if there is any info on this UA.    Phone Number Patient can be reached at: Home number on file [phone] (home)    Best Time: any    Can we leave a detailed message on this number? YES    Call taken on 4/19/2019 at 8:38 AM by Sana Trevino

## 2019-04-22 ENCOUNTER — TELEPHONE (OUTPATIENT)
Dept: FAMILY MEDICINE | Facility: CLINIC | Age: 49
End: 2019-04-22

## 2019-04-22 NOTE — TELEPHONE ENCOUNTER
Reason for call:  Form  Reason for Call:  Form, our goal is to have forms completed with 72 hours, however, some forms may require a visit or additional information.    Type of letter, form or note:  Medical    Who is the form from?: [facility]      Where did the form come from: form was faxed in    What clinic location was the form placed at?: [facility] - [phone]    Where the form was placed: 's in box     What number is listed as a contact on the form?: [phone]       Additional comments: Fax back to [phone]    Call taken on 4/22/2019 at 8:45 AM by Leland Gaines

## 2019-04-24 ENCOUNTER — TELEPHONE (OUTPATIENT)
Dept: FAMILY MEDICINE | Facility: CLINIC | Age: 49
End: 2019-04-24

## 2019-04-24 NOTE — TELEPHONE ENCOUNTER
Reason for Call:  Form, our goal is to have forms completed with 72 hours, however, some forms may require a visit or additional information.    Type of letter, form or note:  medical    Who is the form from?: Home care    Where did the form come from: form was faxed in    What clinic location was the form placed at?: [facility] - [phone]    Where the form was placed: forms box Box/Folder    What number is listed as a contact on the form?: [phone]       Additional comments: please complete form,sign,date and return to fax [phone]    Call taken on 4/24/2019 at 7:15 AM by Tanisha Lara

## 2019-05-03 ENCOUNTER — TELEPHONE (OUTPATIENT)
Dept: FAMILY MEDICINE | Facility: CLINIC | Age: 49
End: 2019-05-03

## 2019-05-03 DIAGNOSIS — G89.21 CHRONIC PAIN DUE TO INJURY: ICD-10-CM

## 2019-05-03 DIAGNOSIS — G82.20 PARAPLEGIA (H): ICD-10-CM

## 2019-05-03 RX ORDER — METHADONE HYDROCHLORIDE 5 MG/1
TABLET ORAL
Qty: 300 TABLET | Refills: 0 | Status: SHIPPED | OUTPATIENT
Start: 2019-05-03 | End: 2019-06-10

## 2019-05-03 RX ORDER — OXYCODONE AND ACETAMINOPHEN 7.5; 325 MG/1; MG/1
TABLET ORAL
Qty: 180 TABLET | Refills: 0 | Status: SHIPPED | OUTPATIENT
Start: 2019-05-03 | End: 2019-06-10

## 2019-05-03 NOTE — TELEPHONE ENCOUNTER
Reason for Call:  Other prescription    Detailed comments: Patient is calling stating that she has an appt with the pain clinic on the 22nd. Patient also states that she would like the prescription for her percocet and methadone sent to the [facility] in [place]. Patient would also like to inform Dr Kasper that she will be scheduling with her sometime next month.    Phone Number Patient can be reached at: Cell number on file:    Telephone Information:   Mobile [phone]       Best Time: Any    Can we leave a detailed message on this number? YES    Call taken on 5/3/2019 at 2:38 PM by Juliette Arriaga

## 2019-05-03 NOTE — TELEPHONE ENCOUNTER
The prescriptions will be at the pharmacy. Can refill 5/10/19    Please assist in scheduling follow up for Stephanie

## 2019-05-03 NOTE — TELEPHONE ENCOUNTER
Reason for call:  Form  Reason for Call:  Form, our goal is to have forms completed with 72 hours, however, some forms may require a visit or additional information.    Type of letter, form or note:  Medical    Who is the form from?: [facility]      Where did the form come from: form was faxed in    What clinic location was the form placed at?: [facility] - [phone]    Where the form was placed: 's in box     What number is listed as a contact on the form?: [phone]       Additional comments: Fax back to [phone]    Call taken on 5/3/2019 at 7:18 AM by Leland Gaines

## 2019-05-07 DIAGNOSIS — F41.9 ANXIETY: ICD-10-CM

## 2019-05-08 ENCOUNTER — TELEPHONE (OUTPATIENT)
Dept: FAMILY MEDICINE | Facility: CLINIC | Age: 49
End: 2019-05-08

## 2019-05-08 RX ORDER — BUPROPION HYDROCHLORIDE 150 MG/1
TABLET, EXTENDED RELEASE ORAL
Qty: 60 TABLET | Refills: 0 | Status: SHIPPED | OUTPATIENT
Start: 2019-05-08 | End: 2019-09-24

## 2019-05-08 NOTE — TELEPHONE ENCOUNTER
Reason for Call:  Other Belia STATES THAT SHE NEED A PRIOR AUTH FOR HER  METHADONE.  [phone]    Detailed comments: NONE    Phone Number Patient can be reached at: Cell number on file:    Telephone Information:   Mobile [phone]       Best Time: ANYTIME    Can we leave a detailed message on this number? YES    Call taken on 5/8/2019 at 10:42 AM by Leland Gaines

## 2019-05-08 NOTE — TELEPHONE ENCOUNTER
Prescription approved per [facility] Refill Protocol.  Naseem Melendez, RN, BSN

## 2019-05-10 ENCOUNTER — TELEPHONE (OUTPATIENT)
Dept: FAMILY MEDICINE | Facility: CLINIC | Age: 49
End: 2019-05-10

## 2019-05-10 NOTE — TELEPHONE ENCOUNTER
PA Initiation    Medication: methadone- INITIATED  Insurance Company: [facility] - Phone [phone] Fax [phone]  [facility] the Rx[facility] [address] [place] [address] [place] - [facility] [address] AT [facility] [address] [facility] [address]  Filling Pharmacy Phone: [phone]  Filling Pharmacy Fax:    Start Date: 5/10/2019

## 2019-05-10 NOTE — TELEPHONE ENCOUNTER
Patient is calling she would like Kelli to give her a call to let her know they are working on her Prior Auth

## 2019-05-10 NOTE — TELEPHONE ENCOUNTER
Spoke to pt.  Informed her that I faxed the PA in April to insurance to be reviewed as urgent.    Did not hear back.  I missed a few days of work and the follow up for this encounter got lost in the work.    Explained this to pt and apologized.   Routed the PA request to the PA pool team to address urgently.   awaiting response form them.

## 2019-05-10 NOTE — TELEPHONE ENCOUNTER
Reason for call:  Form  Reason for Call:  Form, our goal is to have forms completed with 72 hours, however, some forms may require a visit or additional information.    Type of letter, form or note:  Medical    Who is the form from?: [facility] [employer]      Where did the form come from: form was faxed in    What clinic location was the form placed at?: [facility] - [phone]    Where the form was placed: 's in box     What number is listed as a contact on the form?: [phone]       Additional comments: Fax back to [phone]    Call taken on 5/10/2019 at 11:26 AM by Leland Gaines

## 2019-05-10 NOTE — TELEPHONE ENCOUNTER
Called insurance to check on status of PA faxed on 4/15/2019.  I could not get through to a representative.    Routing to PA pool - can you please reach out to insurance and initiate an urgent prior auth. For methadone.    reason for need I noted on the forms scanned and attached to the encounter.  Thanks.

## 2019-05-13 NOTE — TELEPHONE ENCOUNTER
Called [facility] [phone].  Spoke to Katt - we will fax the order to [facility] at [phone].  Katt will call the pt to inform her we are doing the urine test and coordinate a time for Katt to see pt.  Wrote on the fax sheet and order (please fax back to us asap)

## 2019-05-13 NOTE — TELEPHONE ENCOUNTER
She will need an updated Urine Drug Screen prior to PA being considered by her insurance. Order placed. See if this can be done through her Home Care. Let patient know this as well.     The re-flag for me.

## 2019-05-13 NOTE — TELEPHONE ENCOUNTER
Provider, please review and advise on the information being requested by the insurance.  Then route to Nikki Blackman.

## 2019-05-13 NOTE — TELEPHONE ENCOUNTER
Insurance is requesting further information. Please re-route back to me (not the PA pool) when finished. Thanks!

## 2019-05-15 NOTE — TELEPHONE ENCOUNTER
Thank you for getting the UA scanned in! I still need the first 2 questions answered for insurance and then I will send it off.        Thanks,  Nikki

## 2019-05-15 NOTE — TELEPHONE ENCOUNTER
Patient has remained stable in terms of pain control. No recent change in medication as she will be establishing with pain management with first appointment on 5/22/19.    Urine drug screen results reviewed. No unexpected findings.

## 2019-05-15 NOTE — TELEPHONE ENCOUNTER
Prior Authorization Approval    Authorization Effective Date: 5/15/2019  Authorization Expiration Date: 11/15/2019  Medication: methadone- APPROVED  Approved Dose/Quantity:   Reference #:     Insurance Company: [employer] - Phone [phone] Fax [phone]  Expected CoPay:       CoPay Card Available:      Foundation Assistance Needed:    Which Pharmacy is filling the prescription (Not needed for infusion/clinic administered): [facility] [address] [place] [address] AVE  AT [facility] & [address] [place] [address]  Pharmacy Notified: Yes  Patient Notified: Yes

## 2019-05-15 NOTE — TELEPHONE ENCOUNTER
Received the lab results from [facility].    Scanned these results into the chart/media tab.    Routing encounter back to PA pool team member: Nikki Blackman to further pursue the prior authorization. (Thank You).

## 2019-05-16 DIAGNOSIS — G82.20 PARAPLEGIA (H): ICD-10-CM

## 2019-05-16 DIAGNOSIS — G89.4 CHRONIC PAIN SYNDROME: ICD-10-CM

## 2019-05-16 RX ORDER — PREGABALIN 200 MG/1
200 CAPSULE ORAL 3 TIMES DAILY
Qty: 90 CAPSULE | Refills: 0 | Status: SHIPPED | OUTPATIENT
Start: 2019-05-16 | End: 2019-06-13

## 2019-05-16 NOTE — TELEPHONE ENCOUNTER
Refill for:  Pregabalin (LYRICA) 200 MG capsule    Pt is hoping this Rx will be sent to [facility] today.  I informed her that SF will be in tomorrow and can address it at that time, since it was last sent on 4/18/2019 (she should have enough to get through tomorrow).  She asked that another Provider review this request today.        Pregabalin (LYRICA) 200 MG capsule      Last Written Prescription Date:  4/18/2019  Last Fill Quantity: 90,   # refills: 0  Last Office Visit:  1/17/2018  Future Office visit:    Next 5 appointments (look out 90 days)    Jun 07, 2019  2:40 PM CDT  Office Visit with Dipti Kasper MD  [facility] ([facility]) [address], [address]  [facility] [address]  [phone]           Routing refill request to provider for review/approval because:  Drug not on the G, P or  Health refill protocol or controlled substance

## 2019-05-16 NOTE — TELEPHONE ENCOUNTER
Reason for Call:  Medication or medication refill:    Do you use a [facility]?  Name of the pharmacy and phone number for the current request:  [facility]    Name of the medication requested: lyrica    Other request: patient needing refill. declined calling pharmacy. Requesting call when this is done    Can we leave a detailed message on this number? YES    Phone number patient can be reached at: Home number on file [phone] (home)    Best Time: any    Call taken on 5/16/2019 at 12:48 PM by Promise Cantu

## 2019-05-17 ENCOUNTER — MEDICAL CORRESPONDENCE (OUTPATIENT)
Dept: HEALTH INFORMATION MANAGEMENT | Facility: CLINIC | Age: 49
End: 2019-05-17

## 2019-05-20 ENCOUNTER — TELEPHONE (OUTPATIENT)
Dept: FAMILY MEDICINE | Facility: CLINIC | Age: 49
End: 2019-05-20

## 2019-05-20 DIAGNOSIS — K12.2 ORAL INFECTION: Primary | ICD-10-CM

## 2019-05-20 NOTE — TELEPHONE ENCOUNTER
SF-Pt is requesting an antibiotic, dentist appt is Wednesday.     [facility]    Agatha Canas is a 49 year old female who calls with infection in her mouth.    NURSING ASSESSMENT:  Description:  I spoke with pt who states she will be having work done on her teeth. Wednesday she has her first dentist appt. No fever. Chin and over on both sides are swollen, from her gums in the inside.     Next 5 appointments (look out 90 days)    Jun 07, 2019  2:40 PM CDT  Office Visit with Dipti Kasper MD  [facility] ([facility]) [address], [address]  [facility] [address] [phone]          Allergies:   Allergies   Allergen Reactions     Penicillins Hives     Pt states it was quite a long time ago so she doesn't exactly remember     Clindamycin Hives and Rash     NURSING PLAN: Routed to provider Yes    Susan Ordoñez RN

## 2019-05-20 NOTE — TELEPHONE ENCOUNTER
Reason for Call:  Other prescription    Detailed comments: patient is requesting an antibiotic as she believe that she has an infection in her mouth. I did tell patient that she should be seen for this but she wanted a message sent to Dipti Kasper    Phone Number Patient can be reached at: Home number on file [phone] (home)    Best Time: any    Can we leave a detailed message on this number? YES    Call taken on 5/20/2019 at 3:16 PM by Yojana Chaves

## 2019-05-21 ENCOUNTER — TELEPHONE (OUTPATIENT)
Dept: FAMILY MEDICINE | Facility: CLINIC | Age: 49
End: 2019-05-21

## 2019-05-21 RX ORDER — DOXYCYCLINE 100 MG/1
100 CAPSULE ORAL 2 TIMES DAILY
Qty: 20 CAPSULE | Refills: 0 | Status: SHIPPED | OUTPATIENT
Start: 2019-05-21 | End: 2019-10-15

## 2019-05-21 NOTE — TELEPHONE ENCOUNTER
Prescription sent. For worsening or concerns, will need to be seen. By the dentist is preferred. See below that she has an appointment scheduled.

## 2019-05-21 NOTE — TELEPHONE ENCOUNTER
Reason for Call:  Other prescription    Detailed comments: [facility] pharmacy calling the doxycycline hyclate is not covered by insurance but the doxycycline mono is covered. Wanting to know if she would want to change prescriptions.   Called team and Dr. Kasper was not available.   Phone Number Pharmacy can be reached at: Other phone number:  [phone]      Call taken on 5/21/2019 at 9:52 AM by Ginette Dick

## 2019-05-22 ENCOUNTER — TRANSFERRED RECORDS (OUTPATIENT)
Dept: HEALTH INFORMATION MANAGEMENT | Facility: CLINIC | Age: 49
End: 2019-05-22

## 2019-05-24 ENCOUNTER — TELEPHONE (OUTPATIENT)
Dept: FAMILY MEDICINE | Facility: CLINIC | Age: 49
End: 2019-05-24

## 2019-05-24 NOTE — TELEPHONE ENCOUNTER
Left detailed message asking pt to call back.  Please gather more information on what she would like to discuss with SF.

## 2019-05-24 NOTE — TELEPHONE ENCOUNTER
Reason for Call:  Other call back    Detailed comments: Please call she wants to talk to you about [facility].  S    Phone Number Patient can be reached at: Home number on file [phone] (home)    Best Time: Please call later this afternoon    Can we leave a detailed message on this number? YES    Call taken on 5/24/2019 at 10:02 AM by Hillary Mcgraw

## 2019-05-29 NOTE — TELEPHONE ENCOUNTER
Per SF, called [facility] [phone] to request records to be faxed to us for review.  Left detailed message asking their medical records dept to fax us her records.

## 2019-05-29 NOTE — TELEPHONE ENCOUNTER
"Pt called back.  It was unclear what she was requesting.    Clarified with pt that she went to her [facility] pain [facility] appt on 5/22/2019 in [place].  Pt wants to tell Dr. Kasper what the provider advised for her.  I told pt that we can simply request the records from [facility] pain [facility] and pt stated, \"that is not what I want.  I want to tell Dr. Kasper.\"      Provider, please call pt.  "

## 2019-05-30 NOTE — TELEPHONE ENCOUNTER
Called Agatha back.     She answered the phone but states she can't talk now. Will contact tomorrow.

## 2019-05-31 NOTE — TELEPHONE ENCOUNTER
"Spoke with Agatha. Went to [facility].  She reports that she was offered a pain pump or stimulator. She says she can't do those.     She reports that she wants to \"be safe\" and wanted to know her other options.     Reports that the doctor wants her off the methadone and other pain medications. He would like to try the suboxone.     She is worried about this. She does report that her pain is not controlled, nor has it ever been.     She agrees to go back to pain management to discuss.   "

## 2019-06-03 ENCOUNTER — MEDICAL CORRESPONDENCE (OUTPATIENT)
Dept: HEALTH INFORMATION MANAGEMENT | Facility: CLINIC | Age: 49
End: 2019-06-03

## 2019-06-05 ENCOUNTER — TELEPHONE (OUTPATIENT)
Dept: FAMILY MEDICINE | Facility: CLINIC | Age: 49
End: 2019-06-05

## 2019-06-05 NOTE — TELEPHONE ENCOUNTER
Reason for Call:  Other      Detailed comments: Patient wants to talk to Kelli about a medication (methadone (DOLOPHINE) 5 MG tablet). Patient wont give me any other information.     Phone Number Patient can be reached at: Home number on file [phone] (home)    Best Time: Anytime     Can we leave a detailed message on this number? YES    Call taken on 6/5/2019 at 9:50 AM by Virgie Dudley

## 2019-06-05 NOTE — TELEPHONE ENCOUNTER
Received a call from a pharmacist named Dieter from [facility] in [place], [place].  He stated that patient's mother spoke to him last Friday about her concerns for the pt.  Mom stated that the pt often gets mad and shouts when she is talking about her medications.  Mom stated to the pharmacist that the pt will make herself sick so she has to go to the Hospital to get more medications (the pharmacist speculated that it was due to withdrawal).  Mom further told Dieter that pt will not let her in on the clinic appointments so Mom is speculating that pt is not always forthcoming with the information shared to the Provider.  Dieter encouraged Mom to call the clinic and pt's Provider to discuss this but she did not want to, so Dieter called the clinic to rely the information to Dr. Kasper.

## 2019-06-05 NOTE — TELEPHONE ENCOUNTER
"Spoke to pt.  She is requesting #60 tablets of methadone to be replaced due to incident below:    \"I forgot to tell Dr. Kasper this when I spoke to her.\"  Pt was in the car and opened the medication bottle to take her methadone and the dog jumped and hit her arm and spilled medication out.  She stated, \"I am missing like six days worth.\"  I asked her what quantity that would be, how many pills?  She did not have an answer.  She told me she takes 4 in the morning, 3 at midday and 3 at night.  I told her that is 10 per day multiplied by 6 days, that is #60 tablets you are missing.  She stated \"that seems like a lot.\"  She further stated she tries to take 2 per day and is afraid she will have withdrawal.  She stated she \"never asks for extra methadone or percocet\" and hopes Dr. Kasper will send replacement medication.  Pay stated she will pay out of pocket for them.    "

## 2019-06-05 NOTE — TELEPHONE ENCOUNTER
It is part of the controlled substance agreement that lost or stolen medication will not be replaced.     She may experience withdrawal from having less methadone. If she is not feeling well, she may need to go to the ER for help.

## 2019-06-06 ENCOUNTER — TELEPHONE (OUTPATIENT)
Dept: FAMILY MEDICINE | Facility: CLINIC | Age: 49
End: 2019-06-06

## 2019-06-06 NOTE — TELEPHONE ENCOUNTER
Reason for Call:  Agatha would like to talk to you regarding [facility].  That is all the information she is giving me    Detailed comments: none    Phone Number Patient can be reached at: Home number on file [phone] (home)    Best Time: anytime    Can we leave a detailed message on this number? YES    Call taken on 6/6/2019 at 10:06 AM by Leland Gaines

## 2019-06-06 NOTE — TELEPHONE ENCOUNTER
"Spoke with Agatha. She is concerned about methadone and the pills that are \"lost\".  She reports that when she had to go to [facility] she stopped at the gas station to take her methadone and dog hit her arm and dropped multiple doses. Again explained why this will not be filled. She was concerned about withdrawal. Discussed. Present to ED if needed.    She has not followed up with [facility] at this point. She was to call last week to reschedule. She reports that she did but she was disconnected.  She has not called back. Instructed her to call back.     All questions invited, asked and answered to the patient's apparent satisfaction.  Patient agrees to plan.    "

## 2019-06-10 DIAGNOSIS — G89.21 CHRONIC PAIN DUE TO INJURY: ICD-10-CM

## 2019-06-10 DIAGNOSIS — G82.20 PARAPLEGIA (H): ICD-10-CM

## 2019-06-10 RX ORDER — METHADONE HYDROCHLORIDE 5 MG/1
TABLET ORAL
Qty: 300 TABLET | Refills: 0 | Status: SHIPPED | OUTPATIENT
Start: 2019-06-10 | End: 2019-07-09

## 2019-06-10 RX ORDER — OXYCODONE AND ACETAMINOPHEN 7.5; 325 MG/1; MG/1
TABLET ORAL
Qty: 180 TABLET | Refills: 0 | Status: SHIPPED | OUTPATIENT
Start: 2019-06-10 | End: 2019-07-09

## 2019-06-10 NOTE — TELEPHONE ENCOUNTER
oxyCODONE-acetaminophen (PERCOCET) 7.5-325 MG per tablet      Last Written Prescription Date:  5/3/2019  Last Fill Quantity: 180,   # refills: 0  Last Office Visit: 7/10/2018  Future Office visit:       Routing refill request to provider for review/approval because:  Drug not on the [facility], [facility] or [facility] refill protocol or controlled substance      methadone (DOLOPHINE) 5 MG tablet      Last Written Prescription Date:  5/3/2019  Last Fill Quantity: 300,   # refills: 0  Last Office Visit: 7/10/2018  Future Office visit:       Routing refill request to provider for review/approval because:  Drug not on the [facility], [facility] or [facility] refill protocol or controlled substance

## 2019-06-13 ENCOUNTER — TELEPHONE (OUTPATIENT)
Dept: FAMILY MEDICINE | Facility: CLINIC | Age: 49
End: 2019-06-13

## 2019-06-13 DIAGNOSIS — G82.20 PARAPLEGIA (H): ICD-10-CM

## 2019-06-13 DIAGNOSIS — G89.4 CHRONIC PAIN SYNDROME: ICD-10-CM

## 2019-06-13 RX ORDER — PREGABALIN 200 MG/1
200 CAPSULE ORAL 3 TIMES DAILY
Qty: 90 CAPSULE | Refills: 0 | Status: SHIPPED | OUTPATIENT
Start: 2019-06-13 | End: 2019-07-11

## 2019-06-13 NOTE — TELEPHONE ENCOUNTER
Reason for Call:  Other call back    Detailed comments: Katt  from [facility] calling wanting to speak with Dr. Kasper's nurse in regards to some concerns with patients use of her Methadone prescription and increase in depression.   Phone Number: [phone]    Can we leave a detailed message on this number? YES    Call taken on 6/13/2019 at 3:43 PM by Ginette Dick

## 2019-06-13 NOTE — TELEPHONE ENCOUNTER
Lyrica      Last Written Prescription Date:  5/16/19  Last Fill Quantity: 90 capsules,   # refills: 0  Last Office Visit: 7/10/18  Future Office visit:       Routing refill request to provider for review/approval because:  Drug not on the [facility], P or [facility] refill protocol or controlled substance

## 2019-06-13 NOTE — TELEPHONE ENCOUNTER
I spoke with Katt- from [facility] in regards to Agatha. Katt states that she saw Agatha today and Agahta was reporting that she is in tons of pain and that she is having a lot of increased depression symptoms- not eating over the past 2 days and feeling very down. Katt stated that Agatha's over all demeanor was down and low energy. Katt encouraged her to be seen today and she refused to go in. It was also reported to Katt that Agatha's Mom thinks that she is abusing her methadone. Last time she was out of methadone for 3-4 days before she was able to get a refill and had pretty severe withdrawal and had to go to the ED. The next day after the ED visit she was able to get a refill and withdrawal symptoms resolved.     OV offered and she thinks that Agatha will refuse. Agatha [place] of the [facility] and they have been encouraging her to set up primary care nearby and she has been refusing.     Alejandrina Hines, RN, BSN

## 2019-06-24 ENCOUNTER — TELEPHONE (OUTPATIENT)
Dept: FAMILY MEDICINE | Facility: CLINIC | Age: 49
End: 2019-06-24

## 2019-06-24 NOTE — TELEPHONE ENCOUNTER
Reason for call:  Form  Reason for Call:  Form, our goal is to have forms completed with 72 hours, however, some forms may require a visit or additional information.    Type of letter, form or note:  Medical    Who is the form from?: [facility]       Where did the form come from: form was faxed in    What clinic location was the form placed at?: [facility] - [phone]    Where the form was placed: 's in box     What number is listed as a contact on the form?: [phone]       Additional comments: Fax back to [phone]    Call taken on 6/24/2019 at 6:37 PM by Leland Gaines

## 2019-06-26 DIAGNOSIS — Z00.00 ROUTINE GENERAL MEDICAL EXAMINATION AT A HEALTH CARE FACILITY: ICD-10-CM

## 2019-06-26 DIAGNOSIS — G89.4 CHRONIC PAIN SYNDROME: ICD-10-CM

## 2019-06-26 DIAGNOSIS — F41.9 ANXIETY: ICD-10-CM

## 2019-06-27 RX ORDER — FOLIC ACID/MV,IRON,MIN/LUTEIN 0.4-18-25
TABLET ORAL
Qty: 30 TABLET | Refills: 0 | Status: SHIPPED | OUTPATIENT
Start: 2019-06-27 | End: 2019-08-12

## 2019-06-27 RX ORDER — HYDROXYZINE HYDROCHLORIDE 25 MG/1
TABLET, FILM COATED ORAL
Qty: 90 TABLET | Refills: 0 | Status: SHIPPED | OUTPATIENT
Start: 2019-06-27 | End: 2020-02-28

## 2019-06-27 RX ORDER — VENLAFAXINE 75 MG/1
TABLET ORAL
Qty: 90 TABLET | Refills: 0 | Status: SHIPPED | OUTPATIENT
Start: 2019-06-27 | End: 2019-07-29

## 2019-06-27 NOTE — TELEPHONE ENCOUNTER
Medication is being filled for 1 time refill only due to:  Patient needs to be seen because due for mood follow up.   No future office visit scheduled.   Please call and help schedule.  Thank you!  Naseem Melendez, RN, BSN

## 2019-07-01 DIAGNOSIS — I10 BENIGN ESSENTIAL HYPERTENSION: ICD-10-CM

## 2019-07-01 RX ORDER — LISINOPRIL 20 MG/1
TABLET ORAL
Qty: 90 TABLET | Refills: 0 | Status: SHIPPED | OUTPATIENT
Start: 2019-07-01 | End: 2019-10-23

## 2019-07-01 NOTE — TELEPHONE ENCOUNTER
Pending Prescriptions:                       Disp   Refills    lisinopril (PRINIVIL/ZESTRIL) 20 MG table*90 tab*1            Sig: TAKE 1 TABLET (20 MG) BY MOUTH DAILY    BP Readings from Last 3 Encounters:   01/17/18 112/60   06/27/17 110/60   11/25/16 132/72     Routing refill request to provider for review/approval because:  Labs not current:  B/P    Susan Ordoñez RN, BSN

## 2019-07-02 ENCOUNTER — TELEPHONE (OUTPATIENT)
Dept: FAMILY MEDICINE | Facility: CLINIC | Age: 49
End: 2019-07-02

## 2019-07-02 NOTE — TELEPHONE ENCOUNTER
Our goal is to have forms completed with 72 hours, however some forms may require a visit or additional information.    Who is the form from?: [facility]  (if other please explain)  Where the form came from: form was faxed in  What clinic location was the form placed at?: [facility]  Where the form was placed: placed in [facility] inbox     What number is listed as a contact on the form?: [phone]  Fax number to return form to:  [phone]        Call taken on 7/2/2019 at 9:38 AM by Virgie Dudley

## 2019-07-02 NOTE — TELEPHONE ENCOUNTER
Our goal is to have forms completed with 72 hours, however some forms may require a visit or additional information.    Who is the form from?: [facility] (if other please explain)  Where the form came from: form was faxed in  What clinic location was the form placed at?: [facility]  Where the form was placed: placed in [facility] inbox     What number is listed as a contact on the form?: [phone]  Fax number to return form to:  [phone]        Call taken on 7/2/2019 at 9:43 AM by Virgie Dudley

## 2019-07-03 ENCOUNTER — TELEPHONE (OUTPATIENT)
Dept: FAMILY MEDICINE | Facility: CLINIC | Age: 49
End: 2019-07-03

## 2019-07-03 NOTE — TELEPHONE ENCOUNTER
Reason for call:  Form  Reason for Call:  Form, our goal is to have forms completed with 72 hours, however, some forms may require a visit or additional information.    Type of letter, form or note:  Medical    Who is the form from?: [facility]      Where did the form come from: form was faxed in    What clinic location was the form placed at?: [facility] - [phone]    Where the form was placed: 's in box     What number is listed as a contact on the form?: [phone]       Additional comments: Fax back to [phone]    Call taken on 7/3/2019 at 8:16 AM by Leland Gaines

## 2019-07-08 ENCOUNTER — TELEPHONE (OUTPATIENT)
Dept: FAMILY MEDICINE | Facility: CLINIC | Age: 49
End: 2019-07-08

## 2019-07-08 NOTE — TELEPHONE ENCOUNTER
Reason for call:  Form  Reason for Call:  Form, our goal is to have forms completed with 72 hours, however, some forms may require a visit or additional information.    Type of letter, form or note:  medical    Who [facility] the [facility] (if other please explain)    Where did the form come from: form was faxed in    What clinic location was the form placed at?: [facility] - [phone]    Where the form was placed: Given to physician    What number is listed as a contact on the form?: [phone]       Additional comments:       Call taken on 7/8/2019 at 9:49 AM by Chrissy Reyes

## 2019-07-09 NOTE — TELEPHONE ENCOUNTER
Form faxed to [facility] to complete as it is for wound care and they have info.  They can send back for Dr Kasper to sign.  Will close encounter as a new encounter will be created when form comes back in.

## 2019-07-10 ENCOUNTER — TELEPHONE (OUTPATIENT)
Dept: FAMILY MEDICINE | Facility: CLINIC | Age: 49
End: 2019-07-10

## 2019-07-10 NOTE — TELEPHONE ENCOUNTER
Our goal is to have forms completed with 72 hours, however some forms may require a visit or additional information.    Who is the form from?: [employer] (if other please explain)  Where the form came from: form was faxed in  What clinic location was the form placed at?: [facility]  Where the form was placed: placed in [facility] inbox     What number is listed as a contact on the form?: n/a   Fax number to return form to:  [phone]        Call taken on 7/10/2019 at 1:32 PM by Virgie Dudley

## 2019-07-11 DIAGNOSIS — G82.20 PARAPLEGIA (H): ICD-10-CM

## 2019-07-11 DIAGNOSIS — G89.4 CHRONIC PAIN SYNDROME: ICD-10-CM

## 2019-07-11 RX ORDER — PREGABALIN 200 MG/1
200 CAPSULE ORAL 3 TIMES DAILY
Qty: 90 CAPSULE | Refills: 0 | Status: SHIPPED | OUTPATIENT
Start: 2019-07-11 | End: 2019-08-09

## 2019-07-11 NOTE — TELEPHONE ENCOUNTER
Pending Prescriptions:                       Disp   Refills    Pregabalin (LYRICA) 200 MG capsule        90 cap*0            Sig: Take 1 capsule (200 mg) by mouth 3 times daily       Last Written Prescription Date:  6/13/2019  Last Fill Quantity: 90,   # refills: 0  Last Office Visit:   Future Office visit:    Next 5 appointments (look out 90 days)    Aug 16, 2019  1:00 PM CDT  Office Visit with Dipti Kasper MD  [facility]) [address]  [place] [facility] [id number] [phone]           Routing refill request to provider for review/approval because:  Drug not on the [facility], [facility] or  Health refill protocol or controlled substance    Susan Ordoñez, RN, BSN

## 2019-07-11 NOTE — TELEPHONE ENCOUNTER
Reason for call:  Other  Reason for Call: Request for an order or referral:    Order or referral being requested: labs for thyroid    Date needed: as soon as possible    Has the patient been seen by the PCP for this problem? YES    Additional comments: [facility] to see if patient is due and if she can do them for her. [facility] [phone]    Phone number Patient can be reached at:  Home number on file [phone] (home)    Best Time:  any    Can we leave a detailed message on this number?  YES    Call taken on 4/12/2017 at 11:19 AM by Promise Cantu     unable to perform

## 2019-07-11 NOTE — TELEPHONE ENCOUNTER
Reason for Call:  Medication or medication refill:    Do you use a [facility]?  Name of the pharmacy and phone number for the current request:  [facility]    Name of the medication requested: lyrica    Other request: none    Can we leave a detailed message on this number? YES    Phone number patient can be reached at: Cell number on file:    Telephone Information:   Mobile [phone]       Best Time: anytime    Call taken on 7/11/2019 at 2:43 PM by Leland Gaines

## 2019-07-12 ENCOUNTER — TELEPHONE (OUTPATIENT)
Dept: FAMILY MEDICINE | Facility: CLINIC | Age: 49
End: 2019-07-12

## 2019-07-12 NOTE — TELEPHONE ENCOUNTER
Called insurance to initiate an urgent review for a PA of lyrica.    They will call the clinic back today with a decision.    Please call pt with decision once we hear from insurance.        Per notes from encounter dated 8/8/2018:  Patient has failed gabapentin for her pain.   She is currently on oxycodone and methadone as well as baclofen.   She has done well with Lyrica in the past.

## 2019-07-12 NOTE — TELEPHONE ENCOUNTER
Urgent request.  Pt needs to fill this Rx today.          Prior Authorization Retail Medication Request  Medication/Dose:  Pregabalin (LYRICA) 200 MG capsule    Please outline justification why patient needs this medication?  What medications has patient previously Tried and Failed?      Insurance ID (if provided): [id number]  Insurance Phone (if provided): [phone]

## 2019-07-12 NOTE — TELEPHONE ENCOUNTER
Reason for Call:  Other prescription    Detailed comments: Patient stated that she needs a prio authorization for the medication trice. Patient wants to speak to Kelli.    Phone Number Patient can be reached at: Home number on file [phone] (home)    Best Time: Anytime    Can we leave a detailed message on this number? YES    Call taken on 7/12/2019 at 10:17 AM by Virgie Dudley

## 2019-07-15 ENCOUNTER — TELEPHONE (OUTPATIENT)
Dept: FAMILY MEDICINE | Facility: CLINIC | Age: 49
End: 2019-07-15

## 2019-07-15 NOTE — TELEPHONE ENCOUNTER
Our goal is to have forms completed with 72 hours, however some forms may require a visit or additional information.    Who is the form from?: [facility] (if other please explain)  Where the form came from: form was faxed in  What clinic location was the form placed at?: [facility]  Where the form was placed: placed in [facility] inbox     What number is listed as a contact on the form?: [phone]  Fax number to return form to:  [phone]        Call taken on 7/15/2019 at 7:19 AM by Virgie Dudley

## 2019-07-16 ENCOUNTER — TELEPHONE (OUTPATIENT)
Dept: FAMILY MEDICINE | Facility: CLINIC | Age: 49
End: 2019-07-16

## 2019-07-19 ENCOUNTER — TELEPHONE (OUTPATIENT)
Dept: FAMILY MEDICINE | Facility: CLINIC | Age: 49
End: 2019-07-19

## 2019-07-19 NOTE — TELEPHONE ENCOUNTER
Reason for call:  Form  Reason for Call:  Form, our goal is to have forms completed with 72 hours, however, some forms may require a visit or additional information.    Type of letter, form or note:  Medical    Who is the form from?:  [facility]     Where did the form come from: form was faxed in    What clinic location was the form placed at?: [facility] - [phone]    Where the form was placed: 's in box     What number is listed as a contact on the form?: [phone]       Additional comments: Fax back to [phone]    Call taken on 7/19/2019 at 1:39 PM by Leland Gaines

## 2019-07-22 ENCOUNTER — TELEPHONE (OUTPATIENT)
Dept: FAMILY MEDICINE | Facility: CLINIC | Age: 49
End: 2019-07-22

## 2019-07-22 NOTE — TELEPHONE ENCOUNTER
Reason for call:  Form  Reason for Call:  Form, our goal is to have forms completed with 72 hours, however, some forms may require a visit or additional information.    Type of letter, form or note:  Medical    Who is the form from?: [facility]      Where did the form come from: form was faxed in    What clinic location was the form placed at?: [facility] - [phone]    Where the form was placed: 's in box     What number is listed as a contact on the form?: [phone]       Additional comments: Fax back to [phone]    Call taken on 7/22/2019 at 9:35 AM by Leland Gaines

## 2019-07-24 ENCOUNTER — TELEPHONE (OUTPATIENT)
Dept: FAMILY MEDICINE | Facility: CLINIC | Age: 49
End: 2019-07-24

## 2019-07-24 NOTE — TELEPHONE ENCOUNTER
Reason for call:  Form  Reason for Call:  Form, our goal is to have forms completed with 72 hours, however, some forms may require a visit or additional information.    Type of letter, form or note:  Medical    Who is the form from?:[facility]     Where did the form come from: form was faxed in    What clinic location was the form placed at?: [facility] - [phone]    Where the form was placed: 's in box     What number is listed as a contact on the form?: [phone]       Additional comments: Fax back to [phone]    Call taken on 7/24/2019 at 12:50 PM by Leland Gaines

## 2019-07-29 ENCOUNTER — TELEPHONE (OUTPATIENT)
Dept: FAMILY MEDICINE | Facility: CLINIC | Age: 49
End: 2019-07-29

## 2019-07-29 NOTE — TELEPHONE ENCOUNTER
Reason for call:  Form  Reason for Call:  Form, our goal is to have forms completed with 72 hours, however, some forms may require a visit or additional information.    Type of letter, form or note:  Medical    Who is the form from?: [employer]      Where did the form come from: form was faxed in    What clinic location was the form placed at?: [facility] - [phone]    Where the form was placed: 's in box     What number is listed as a contact on the form?: [phone]       Additional comments: Fax back to [phone]    Call taken on 7/29/2019 at 9:52 AM by Leland Gaines

## 2019-07-30 ENCOUNTER — TELEPHONE (OUTPATIENT)
Dept: FAMILY MEDICINE | Facility: CLINIC | Age: 49
End: 2019-07-30

## 2019-07-30 NOTE — TELEPHONE ENCOUNTER
Reason for call:  Form  Reason for Call:  Form, our goal is to have forms completed with 72 hours, however, some forms may require a visit or additional information.    Type of letter, form or note:  Medical    Who is the form from?: [employer] [employer]      Where did the form come from: form was faxed in    What clinic location was the form placed at?: [facility] - [phone]    Where the form was placed: 's in box     What number is listed as a contact on the form?: [phone]       Additional comments: Fax back to [phone]    Call taken on 7/30/2019 at 8:07 AM by Leland Gaines

## 2019-07-31 ENCOUNTER — MEDICAL CORRESPONDENCE (OUTPATIENT)
Dept: HEALTH INFORMATION MANAGEMENT | Facility: CLINIC | Age: 49
End: 2019-07-31

## 2019-08-02 ENCOUNTER — TELEPHONE (OUTPATIENT)
Dept: FAMILY MEDICINE | Facility: CLINIC | Age: 49
End: 2019-08-02

## 2019-08-02 NOTE — TELEPHONE ENCOUNTER
Reason for call:  Form  Reason for Call:  Form, our goal is to have forms completed with 72 hours, however, some forms may require a visit or additional information.    Type of letter, form or note:  Medical    Who is the form from?: [employer]ke [employer]      Where did the form come from: form was faxed in    What clinic location was the form placed at?: [facility] - [phone]    Where the form was placed: 's in box     What number is listed as a contact on the form?: [phone]       Additional comments: Fax back to [phone]    Call taken on 8/2/2019 at 10:29 AM by Leland Gaines

## 2019-08-08 ENCOUNTER — TELEPHONE (OUTPATIENT)
Dept: FAMILY MEDICINE | Facility: CLINIC | Age: 49
End: 2019-08-08

## 2019-08-08 DIAGNOSIS — G89.21 CHRONIC PAIN DUE TO INJURY: ICD-10-CM

## 2019-08-08 DIAGNOSIS — G82.20 PARAPLEGIA (H): ICD-10-CM

## 2019-08-08 NOTE — TELEPHONE ENCOUNTER
Reason for Call:  Medication or medication refill:    Do you use a [facility]?  Name of the pharmacy and phone number for the current request:  [facility]    Name of the medication requested: percocet and metadone    Other request: none    Can we leave a detailed message on this number? YES    Phone number patient can be reached at: Home number on file [phone] (home)    Best Time: anytime    Call taken on 8/8/2019 at 1:32 PM by Leland Gaines

## 2019-08-09 DIAGNOSIS — G82.20 PARAPLEGIA (H): ICD-10-CM

## 2019-08-09 DIAGNOSIS — G89.4 CHRONIC PAIN SYNDROME: ICD-10-CM

## 2019-08-09 RX ORDER — METHADONE HYDROCHLORIDE 5 MG/1
TABLET ORAL
Qty: 300 TABLET | Refills: 0 | Status: SHIPPED | OUTPATIENT
Start: 2019-08-09 | End: 2019-09-17

## 2019-08-09 RX ORDER — OXYCODONE AND ACETAMINOPHEN 7.5; 325 MG/1; MG/1
TABLET ORAL
Qty: 180 TABLET | Refills: 0 | Status: SHIPPED | OUTPATIENT
Start: 2019-08-09 | End: 2019-09-17

## 2019-08-09 RX ORDER — PREGABALIN 200 MG/1
200 CAPSULE ORAL 3 TIMES DAILY
Qty: 90 CAPSULE | Refills: 0 | Status: SHIPPED | OUTPATIENT
Start: 2019-08-09 | End: 2019-09-17

## 2019-08-09 NOTE — TELEPHONE ENCOUNTER
Reason for call:  Medication  Reason for Call:  Medication or medication refill:    Do you use a [facility]?  Name of the pharmacy and phone number for the current request:  [facility]    Name of the medication requested: lyrica    Other request: pt is all out and needs to get a refill sent to pharmacy    Can we leave a detailed message on this number? YES    Phone number patient can be reached at: Cell number on file:    Telephone Information:   Mobile [phone]       Best Time: anytime    Call taken on 8/9/2019 at 4:16 PM by Lorena Roland

## 2019-08-13 ENCOUNTER — TRANSFERRED RECORDS (OUTPATIENT)
Dept: HEALTH INFORMATION MANAGEMENT | Facility: CLINIC | Age: 49
End: 2019-08-13

## 2019-08-13 LAB
ALT SERPL-CCNC: 31 U/L (ref 12–78)
AST SERPL-CCNC: 56 U/L (ref 15–37)
CREAT SERPL-MCNC: 1.9 MG/DL (ref 0.6–1)
GLUCOSE SERPL-MCNC: 103 MG/DL (ref 74–106)
POTASSIUM SERPL-SCNC: 4.2 MMOL/L (ref 3.6–5.2)

## 2019-08-15 ENCOUNTER — TELEPHONE (OUTPATIENT)
Dept: FAMILY MEDICINE | Facility: CLINIC | Age: 49
End: 2019-08-15

## 2019-08-15 NOTE — TELEPHONE ENCOUNTER
Our goal is to have forms completed with 72 hours, however some forms may require a visit or additional information.    Who is the form from?: [employer] [facility]  (if other please explain)  Where the form came from: form was faxed in  What clinic location was the form placed at?: [facility]  Where the form was placed: placed in TC inbox     What number is listed as a contact on the form?: [phone]  Fax number to return form to:  [phone]        Call taken on 8/15/2019 at 4:58 PM by Virgie Dudley

## 2019-08-15 NOTE — TELEPHONE ENCOUNTER
Our goal is to have forms completed with 72 hours, however some forms may require a visit or additional information.    Who is the form from?: [facility] [employer]  (if other please explain)  Where the form came from: form was faxed in  What clinic location was the form placed at?: [facility]  Where the form was placed: placed in TC inbox     What number is listed as a contact on the form?: [phone]  Fax number to return form to:  [phone]        Call taken on 8/15/2019 at 5:03 PM by Virgie Dudley

## 2019-08-16 ENCOUNTER — TELEPHONE (OUTPATIENT)
Dept: FAMILY MEDICINE | Facility: CLINIC | Age: 49
End: 2019-08-16

## 2019-08-16 NOTE — TELEPHONE ENCOUNTER
Patient wanted to let SF know that she is currently admitted to a hospital for sepsis. She will follow up once out.     Ila Santana RN, BSN

## 2019-08-16 NOTE — TELEPHONE ENCOUNTER
Reason for Call:  Other hospital fu    Detailed comments: patient is in the hospital in [facility] and would like to speak with Dr Eason or nurse. Would not give any other info.    Phone Number Patient can be reached at: Other phone number:  [phone]    Best Time: any    Can we leave a detailed message on this number? YES    Call taken on 8/16/2019 at 10:25 AM by Promise Cantu

## 2019-08-26 ENCOUNTER — MEDICAL CORRESPONDENCE (OUTPATIENT)
Dept: HEALTH INFORMATION MANAGEMENT | Facility: CLINIC | Age: 49
End: 2019-08-26

## 2019-08-27 ENCOUNTER — TELEPHONE (OUTPATIENT)
Dept: FAMILY MEDICINE | Facility: CLINIC | Age: 49
End: 2019-08-27

## 2019-08-27 NOTE — TELEPHONE ENCOUNTER
Reason for call:  Form  Reason for Call:  Form, our goal is to have forms completed with 72 hours, however, some forms may require a visit or additional information.    Type of letter, form or note:  medical    Who is the form from?: [facility]    Where did the form come from: form was faxed in    What clinic location was the form placed at?: [facility] - [phone]    Where the form was placed: Dr's Box    What number is listed as a contact on the form?:   [phone]       Additional comments:  Fax to  [phone]    Requesting BP medications due to seizures from BP being too high.    created by Kelli Reza

## 2019-08-27 NOTE — TELEPHONE ENCOUNTER
I called patient to discuss how she was feeling and offer an earlier appointment.   She does not live close to the clinic and was trying to make her appointments the same day, which is why she isn't scheduled until 10/1.   I highly recommend that she be seen sooner.   She was unable to talk right now and will call back tomorrow if she wishes to reschedule.     Ila Santana, RN, BSN

## 2019-08-27 NOTE — TELEPHONE ENCOUNTER
Reviewed fax form. This was completed.     The fax was from the pharmacy and stated that patient had seizure due to blood pressure so would like a blood pressure cuff.     RN to call and evaluate if needs sooner follow up from recent hospitalization.

## 2019-08-28 ENCOUNTER — TELEPHONE (OUTPATIENT)
Dept: FAMILY MEDICINE | Facility: CLINIC | Age: 49
End: 2019-08-28

## 2019-08-28 NOTE — TELEPHONE ENCOUNTER
Reason for call:  Pt is calling to schedule a Hospital Follow-up for sepsis, but SF has nothing until 9/18 and pt needs to be seen sooner. She would like for SF to call her or work her in sometime this week or next week. Please advise.

## 2019-08-29 ENCOUNTER — MEDICAL CORRESPONDENCE (OUTPATIENT)
Dept: HEALTH INFORMATION MANAGEMENT | Facility: CLINIC | Age: 49
End: 2019-08-29

## 2019-08-29 ENCOUNTER — TELEPHONE (OUTPATIENT)
Dept: FAMILY MEDICINE | Facility: CLINIC | Age: 49
End: 2019-08-29

## 2019-08-29 NOTE — TELEPHONE ENCOUNTER
Reason for call:  Form  Reason for Call:  Form, our goal is to have forms completed with 72 hours, however, some forms may require a visit or additional information.    Type of letter, form or note:  Medical    Who is the form from?: [employer]ke Gross      Where did the form come from: form was faxed in    What clinic location was the form placed at?: [facility] - [phone]    Where the form was placed: 's in box     What number is listed as a contact on the form?: [phone]       Additional comments: Fax back to [phone]    Call taken on 8/29/2019 at 11:15 AM by Leland Gaines

## 2019-09-11 ENCOUNTER — TELEPHONE (OUTPATIENT)
Dept: FAMILY MEDICINE | Facility: CLINIC | Age: 49
End: 2019-09-11

## 2019-09-11 NOTE — TELEPHONE ENCOUNTER
Reason for call:  Form  Reason for Call:  Form, our goal is to have forms completed with 72 hours, however, some forms may require a visit or additional information.    Type of letter, form or note:  Medical    Who is the form from?: [facility]      Where did the form come from: form was faxed in    What clinic location was the form placed at?: [facility] - [phone]    Where the form was placed: 's in box     What number is listed as a contact on the form?: [phone]       Additional comments: Fax back to [phone]    Call taken on 9/11/2019 at 11:45 AM by Leland Gaines

## 2019-09-12 ENCOUNTER — TELEPHONE (OUTPATIENT)
Dept: FAMILY MEDICINE | Facility: CLINIC | Age: 49
End: 2019-09-12

## 2019-09-12 DIAGNOSIS — Z53.9 DIAGNOSIS NOT YET DEFINED: Primary | ICD-10-CM

## 2019-09-12 PROCEDURE — 99207 C MD CERTIFICATION HHA PATIENT: CPT | Performed by: FAMILY MEDICINE

## 2019-09-12 NOTE — TELEPHONE ENCOUNTER
Reason for call:  Form  Reason for Call:  Form, our goal is to have forms completed with 72 hours, however, some forms may require a visit or additional information.    Type of letter, form or note:  Medical    Who is the form from?: [facility]       Where did the form come from: form was faxed in    What clinic location was the form placed at?: [facility] - [phone]    Where the form was placed: 's in box     What number is listed as a contact on the form?: [phone]       Additional comments: Fax back to [phone]    Call taken on 9/12/2019 at 10:08 AM by Leland Gaines

## 2019-09-12 NOTE — TELEPHONE ENCOUNTER
Our goal is to have forms completed with 72 hours, however some forms may require a visit or additional information.    Who is the form from?: [facility]  (if other please explain)  Where the form came from: form was faxed in  What clinic location was the form placed at?: [facility]  Where the form was placed: placed in TC inbox     What number is listed as a contact on the form?: [phone]  Fax number to return form to:  [phone]        Call taken on 9/12/2019 at 10:37 AM by Virgie Dudley

## 2019-09-12 NOTE — TELEPHONE ENCOUNTER
Our goal is to have forms completed with 72 hours, however some forms may require a visit or additional information.    Who is the form from?: [employer] [facility] (if other please explain)  Where the form came from: form was faxed in  What clinic location was the form placed at?: [facility]  Where the form was placed: placed in TC inbox     What number is listed as a contact on the form?: [phone]  Fax number to return form to:  [phone]        Call taken on 9/12/2019 at 10:39 AM by Virgie Dudley

## 2019-09-13 ENCOUNTER — TELEPHONE (OUTPATIENT)
Dept: FAMILY MEDICINE | Facility: CLINIC | Age: 49
End: 2019-09-13

## 2019-09-13 ENCOUNTER — MEDICAL CORRESPONDENCE (OUTPATIENT)
Dept: HEALTH INFORMATION MANAGEMENT | Facility: CLINIC | Age: 49
End: 2019-09-13

## 2019-09-13 NOTE — TELEPHONE ENCOUNTER
Reason for call:  Form  Reason for Call:  Form, our goal is to have forms completed with 72 hours, however, some forms may require a visit or additional information.    Type of letter, form or note:  Medical    Who is the form from?: [facility]      Where did the form come from: form was faxed in    What clinic location was the form placed at?: [facility] - [phone]    Where the form was placed: 's in box     What number is listed as a contact on the form?: [phone]       Additional comments: Fax back to [phone]    Call taken on 9/13/2019 at 2:53 PM by Leland Gaines

## 2019-09-16 ENCOUNTER — TELEPHONE (OUTPATIENT)
Dept: FAMILY MEDICINE | Facility: CLINIC | Age: 49
End: 2019-09-16

## 2019-09-16 DIAGNOSIS — G89.21 CHRONIC PAIN DUE TO INJURY: ICD-10-CM

## 2019-09-16 DIAGNOSIS — G82.20 PARAPLEGIA (H): ICD-10-CM

## 2019-09-16 DIAGNOSIS — G89.4 CHRONIC PAIN SYNDROME: ICD-10-CM

## 2019-09-16 NOTE — TELEPHONE ENCOUNTER
Reason for call:  Form  Reason for Call:  Form, our goal is to have forms completed with 72 hours, however, some forms may require a visit or additional information.    Type of letter, form or note:  Medical    Who is the form from?: [phone]      Where did the form come from: form was faxed in    What clinic location was the form placed at?: [facility] - [phone]    Where the form was placed: 's in box     What number is listed as a contact on the form?: [phone]       Additional comments: Fax back to [phone]    Call taken on 9/16/2019 at 10:43 AM by Leland Gaines

## 2019-09-17 ENCOUNTER — TELEPHONE (OUTPATIENT)
Dept: FAMILY MEDICINE | Facility: CLINIC | Age: 49
End: 2019-09-17

## 2019-09-17 RX ORDER — OXYCODONE AND ACETAMINOPHEN 7.5; 325 MG/1; MG/1
TABLET ORAL
Qty: 180 TABLET | Refills: 0 | Status: SHIPPED | OUTPATIENT
Start: 2019-09-17 | End: 2019-10-15

## 2019-09-17 RX ORDER — PREGABALIN 200 MG/1
200 CAPSULE ORAL 3 TIMES DAILY
Qty: 90 CAPSULE | Refills: 0 | Status: SHIPPED | OUTPATIENT
Start: 2019-09-17 | End: 2019-10-15

## 2019-09-17 RX ORDER — METHADONE HYDROCHLORIDE 5 MG/1
TABLET ORAL
Qty: 300 TABLET | Refills: 0 | Status: SHIPPED | OUTPATIENT
Start: 2019-09-17 | End: 2019-10-15

## 2019-09-17 RX ORDER — OXYCODONE AND ACETAMINOPHEN 7.5; 325 MG/1; MG/1
TABLET ORAL
Qty: 180 TABLET | Refills: 0 | Status: SHIPPED | OUTPATIENT
Start: 2019-09-17 | End: 2019-09-17

## 2019-09-17 RX ORDER — METHADONE HYDROCHLORIDE 5 MG/1
TABLET ORAL
Qty: 300 TABLET | Refills: 0 | Status: SHIPPED | OUTPATIENT
Start: 2019-09-17 | End: 2019-09-17

## 2019-09-17 NOTE — TELEPHONE ENCOUNTER
Routing to PA pool to initiate prior authorization  Please reference previous PA approval dated 4/15/2019 for submitting as urgent and justification on need for medication.

## 2019-09-17 NOTE — TELEPHONE ENCOUNTER
Central Prior Authorization Team   Phone: [phone]    Prior Authorization Not Needed per Insurance    Medication: methadone   Insurance Company: \[facility]"" [place] - Phone [phone] Fax [phone]  Expected CoPay:      Pharmacy Filling the Rx: [id number] - [place], [place] - [facility] [address] AT [facility] & [address] [place] [address]  Pharmacy Notified: Yes  Patient Notified: Yes    PA approved until 11/15/19. Verified with insurance that pharmacy received paid claim on 9/17/19 for #300/30 days.

## 2019-09-17 NOTE — TELEPHONE ENCOUNTER
Methadone and Percocet sent to [facility].     Please cancel these two prescriptions at [facility].

## 2019-09-17 NOTE — TELEPHONE ENCOUNTER
Central Prior Authorization Team   Phone: [phone]    PA Initiation    Medication: oxyCODONE-acetaminophen (PERCOCET) 7.5-325 MG per tablet  Insurance Company: [facility] [place] - Phone [phone] Fax [phone]  Pharmacy Filling the Rx: [id number] - [place], [place] - [address] AT [facility] & [address]  Filling Pharmacy Phone: [phone]  Filling Pharmacy Fax:    Start Date: 9/17/2019

## 2019-09-17 NOTE — TELEPHONE ENCOUNTER
Patient called clinic and stated that Methadone was sent to a different pharmacy. She needs it sent to [facility] in [place]

## 2019-09-17 NOTE — TELEPHONE ENCOUNTER
Routing to PA pool to initiate prior authorization    Please reference previous PA approval encounter dated 3/14/2019

## 2019-09-17 NOTE — TELEPHONE ENCOUNTER
Prior Authorization Retail Medication Request  Medication/Dose: methadone 5 mg tablets     Provider, do you want to change the medication or start a PA?  Please advise on previously Tried and Failed Therapies:       Insurance ID (if provided):  [id number]  Insurance Phone (if provided):  [phone]

## 2019-09-17 NOTE — TELEPHONE ENCOUNTER
Prior Authorization Retail Medication Request  Medication/Dose: oxyCODONE-acetaminophen (PERCOCET) 7.5-325 MG per tablet     Provider, do you want to change the medication or start a PA?  Please advise on previously Tried and Failed Therapies:       Insurance ID (if provided):  [id number]  Insurance Phone (if provided):  [phone]

## 2019-09-18 NOTE — TELEPHONE ENCOUNTER
PA Pool:  Here are the results form the urine drug screen from [facility] everywhere.  Please submit PA.

## 2019-09-24 DIAGNOSIS — F41.9 ANXIETY: ICD-10-CM

## 2019-09-24 NOTE — TELEPHONE ENCOUNTER
Pending Prescriptions:                       Disp   Refills    buPROPion (WELLBUTRIN SR) 150 MG 12 hr ta*60 tab*0            Sig: TAKE 1 TABLET (150 MG) BY MOUTH 2 TIMES DAILY    Routing refill request to provider for review/approval because:  A break in medication  Patient needs to be seen because it has been more than 1 year since last office visit  LOV: 7/10/18 vitual visit      Neelima [place], RN, BSN

## 2019-09-25 ENCOUNTER — TELEPHONE (OUTPATIENT)
Dept: FAMILY MEDICINE | Facility: CLINIC | Age: 49
End: 2019-09-25

## 2019-09-25 RX ORDER — BUPROPION HYDROCHLORIDE 150 MG/1
TABLET, EXTENDED RELEASE ORAL
Qty: 60 TABLET | Refills: 0 | Status: SHIPPED | OUTPATIENT
Start: 2019-09-25 | End: 2019-11-19

## 2019-09-25 NOTE — TELEPHONE ENCOUNTER
Reason for call:  Form  Reason for Call:  Form, our goal is to have forms completed with 72 hours, however, some forms may require a visit or additional information.    Type of letter, form or note:  medical    Who is the form from?:   [employer]    Where did the form come from: form was faxed in    What clinic location was the form placed at?: [facility] - [phone]    Where the form was placed: Dr's Box    What number is listed as a contact on the form?:   [phone]       Additional comments:  Fax to [phone]    This form was sent to [facility] to complete.  They completed it and faxed back for SF to sign and fax to [employer].    created by Kelli Reza

## 2019-09-26 ENCOUNTER — TELEPHONE (OUTPATIENT)
Dept: FAMILY MEDICINE | Facility: CLINIC | Age: 49
End: 2019-09-26

## 2019-09-26 DIAGNOSIS — F41.9 ANXIETY: ICD-10-CM

## 2019-09-26 DIAGNOSIS — G82.20 PARAPLEGIA (H): ICD-10-CM

## 2019-09-26 NOTE — TELEPHONE ENCOUNTER
Pending Prescriptions:                       Disp   Refills    baclofen (LIORESAL) 20 MG tablet [Pharmacy*90 tab*0        Sig: TAKE 1 TABLET (20 MG) BY MOUTH 3 TIMES DAILY    venlafaxine (EFFEXOR) 75 MG tablet [Pharma*90 tab*0        Sig: TAKE 2 TABS BY MOUTH EVERY MORNING AND 1 TABLET BY           MOUTH EVERY EVENING . **SCHEDULE CHECKUP WITH           PRESCRIBER** BEFORE MORE REFILLS WILL BE GI    Routing refill request to provider for review/approval because:  Drug not on the [facility] refill protocol   Needs PHQ9    Next 5 appointments (look out 90 days)    Oct 01, 2019  2:20 PM CDT  Office Visit with Dipti Kasper MD  [facility] [address]  [facility] [address] [phone]

## 2019-09-26 NOTE — TELEPHONE ENCOUNTER
Reason for call:  Form  Reason for Call:  Form, our goal is to have forms completed with 72 hours, however, some forms may require a visit or additional information.    Type of letter, form or note:  Medical    Who is the form from?: [facility]      Where did the form come from: form was faxed in    What clinic location was the form placed at?: [facility] - [phone]    Where the form was placed: 's in box     What number is listed as a contact on the form?: [phone]       Additional comments: Fax back to [phone]      Call taken on 9/26/2019 at 3:36 PM by Leland Gaines

## 2019-09-26 NOTE — TELEPHONE ENCOUNTER
Pt is calling on the status of her medication. She will need a refill by tomorrow bc she is all out and has none left. Please advise.     baclofen (LIORESAL) 20 MG tablet

## 2019-09-27 ENCOUNTER — TELEPHONE (OUTPATIENT)
Dept: FAMILY MEDICINE | Facility: CLINIC | Age: 49
End: 2019-09-27

## 2019-09-27 RX ORDER — BACLOFEN 20 MG/1
TABLET ORAL
Qty: 90 TABLET | Refills: 0 | Status: SHIPPED | OUTPATIENT
Start: 2019-09-27 | End: 2019-10-23

## 2019-09-27 RX ORDER — VENLAFAXINE 75 MG/1
TABLET ORAL
Qty: 90 TABLET | Refills: 0 | Status: SHIPPED | OUTPATIENT
Start: 2019-09-27 | End: 2019-10-15

## 2019-09-27 NOTE — TELEPHONE ENCOUNTER
Pt called and requested this form be completed and faxed today.  Verified the items pt needs ordered.  Provider Jayne Garcia signed form and faxed

## 2019-09-27 NOTE — TELEPHONE ENCOUNTER
Our goal is to have forms completed with 72 hours, however some forms may require a visit or additional information.    Who is the form from?: Keke Gross [facility]  (if other please explain)  Where the form came from: form was faxed in  What clinic location was the form placed at?: [facility]  Where the form was placed: placed in TC inbox     What number is listed as a contact on the form?: [phone]  Fax number to return form to:  [phone]        Call taken on 9/27/2019 at 8:39 AM by Virgie Dudley

## 2019-10-03 ENCOUNTER — TELEPHONE (OUTPATIENT)
Dept: FAMILY MEDICINE | Facility: CLINIC | Age: 49
End: 2019-10-03

## 2019-10-03 NOTE — TELEPHONE ENCOUNTER
Reason for call:  Form  Reason for Call:  Form, our goal is to have forms completed with 72 hours, however, some forms may require a visit or additional information.    Type of letter, form or note:  Medical    Who is the form from?: [facility]      Where did the form come from: form was faxed in    What clinic location was the form placed at?: [facility] - [phone]    Where the form was placed: 's in box     What number is listed as a contact on the form?: [phone]       Additional comments: Fax back to [phone]    Call taken on 10/3/2019 at 10:53 AM by Leland Gaines

## 2019-10-09 ENCOUNTER — MEDICAL CORRESPONDENCE (OUTPATIENT)
Dept: HEALTH INFORMATION MANAGEMENT | Facility: CLINIC | Age: 49
End: 2019-10-09

## 2019-10-10 ENCOUNTER — TELEPHONE (OUTPATIENT)
Dept: FAMILY MEDICINE | Facility: CLINIC | Age: 49
End: 2019-10-10

## 2019-10-10 NOTE — TELEPHONE ENCOUNTER
"Patient would only say she needs Kelli to call her back regarding her medications.   \"Kelli will know\"  "

## 2019-10-11 ENCOUNTER — NURSE TRIAGE (OUTPATIENT)
Dept: FAMILY MEDICINE | Facility: CLINIC | Age: 49
End: 2019-10-11

## 2019-10-11 ENCOUNTER — TELEPHONE (OUTPATIENT)
Dept: FAMILY MEDICINE | Facility: CLINIC | Age: 49
End: 2019-10-11

## 2019-10-11 NOTE — TELEPHONE ENCOUNTER
"Spoke to pt.  She is wondering if Dr. Dipti Kasper is willing to work her in for an appointment within the next week?  Pt stated since she has been off the antibiotic, \"everything I eat goes right through me.\"  And she feels like she has the start of pneumonia or something related.    Found an available appt time on 10/15/2019 at 220 with SF.  Scheduled it and informed pt of the appt details.    No work in request needed.  "

## 2019-10-11 NOTE — TELEPHONE ENCOUNTER
Patient has appt on Tuesday the 15th with Dr Kasper coming in for med management but also has concerns for possible pneumonia and diarrhea symptoms. Asking nurses to call and triage.    Tj Melgar MA on 10/11/2019 at 10:11 AM

## 2019-10-11 NOTE — PROGRESS NOTES
"Subjective        Agatha Canas is a 49 year old female who presents to clinic today for the following health issues:    History of Present Illness        She eats 2-3 servings of fruits and vegetables daily.She consumes 4 sweetened beverage(s) daily.  She is taking medications regularly.     Hypertension Follow-up      Do you check your blood pressure regularly outside of the clinic? Yes     Are you following a low salt diet? No    Are your blood pressures ever more than 140 on the top number (systolic) OR more   than 90 on the bottom number (diastolic), for example 140/90? No  Depression and Anxiety Follow-Up    How are you doing with your depression since your last visit? \"good\"     How are you doing with your anxiety since your last visit?  \" I have a lot anxiety\"     Are you having other symptoms that might be associated with depression or anxiety? No    Have you had a significant life event? Health Concerns     Do you have any concerns with your use of alcohol or other drugs? No    Social History     Tobacco Use     Smoking status: Current Every Day Smoker     Packs/day: 0.50     Years: 20.00     Pack years: 10.00     Types: Cigarettes     Smokeless tobacco: Never Used     Tobacco comment: currently on nicoderm patch    Substance Use Topics     Alcohol use: No     Drug use: No     PHQ 4/10/2018 5/16/2018 10/15/2019   PHQ-9 Total Score 5 6 2   Q9: Thoughts of better off dead/self-harm past 2 weeks Not at all Not at all Not at all     SHYLA-7 SCORE 4/10/2018 5/16/2018 10/15/2019   Total Score - - -   Total Score - - 3 (minimal anxiety)   Total Score 4 7 3     No flowsheet data found.  No flowsheet data found.    Suicide Assessment Five-step Evaluation and Treatment (SAFE-T)  Acute Illness   Acute illness concerns: Head cold  Onset: 3-4 days     Fever: no    Chills/Sweats: no    Headache (location?): no    Sinus Pressure:no    Conjunctivitis:  no    Ear Pain: no    Rhinorrhea: YES    Congestion: YES    Sore " Throat: no      Cough: no    Wheeze: no     Decreased Appetite: no    Nausea: no    Vomiting: no    Diarrhea:  no    Dysuria/Freq.: no     Fatigue/Achiness: no     Sick/Strep Exposure: no      Therapies Tried and outcome: none   The patient states that she has a low grade fever. She notes taking Albuterol and Symbicort. She notes that she coughed up phlegm, and she is feeling much better.     Thyroid  The patient states that she temporarily discontinued use of her thyroid medication while in the hospital. She notes using it now.     UTI  She notes she is positive she has a urinary tract infection. She also has lower back pain and cramping.     Chronic pain syndrome   The patient states that she has an appointment scheduled for October 28th. She notes that the previous appointments she did not attend due to not having a ride.   She notes her pain has gotten worse since she left the hospital.   She notes the Percocet helps her pain. She also notes that the Methadone causes her to have pain if she misses a dose.      Neurology   The patient states that she went into the hospital because of the infection in her wound.   She notes taking Kepra for 6 months. She declines having a follow up scheduled currently.     Blood pressure   The patient states that the entire time she was in [facility], her blood pressure was high.     Mood  The patient notes that her mood is much better. She states that she is getting out more often now. She has a new relationship.     Medications   The patient states that she is not taking her Oxybutynin. She notes taking Lyrica.     Order for DME   The patient needs a new Roho. She notes the current one is leaking. She would also like a new manual wheelchair. She will need a printed prescription.     Wound care  The patient states that she is ready to have surgery. She notes consulting a Wound doctor weekly. She has a wound vac. She reports things are slowly improving. She is anxious to have the  surgery to repair her wound and then reports she will be able to have her colostomy takedown completed as well.     Tobacco cessation   The patient states that she has plans to quit smoking. She notes that she has consulted someone. She has nicotine gum, patches and other helpful tools.     Patient Active Problem List   Diagnosis     Paraplegia (H)     Injury, other and unspecified, other specified sites, including multiple     Acute posthemorrhagic anemia     Open fracture of ischium (H)     Open fracture of sacrum and coccyx (H)     Cellulitis and abscess     Open fracture of lumbar spine with spinal cord injury (H)     Prolonged depressive reaction     Other and unspecified alcohol dependence, unspecified drinking behavior     Cellulitis of leg     Constipation     Chronic pain syndrome     Uterovaginal prolapse, incomplete     Other disorder of menstruation and other abnormal bleeding from female genital tract     CARDIOVASCULAR SCREENING; LDL GOAL LESS THAN 160     Health Care Home     Dermatitis     Rosacea     Anxiety associated with depression     PTSD (post-traumatic stress disorder)     Urinary incontinence     Hypothyroidism     Tobacco use disorder     Chantel gangrene     Colostomy status (H)     Open wound of buttock, complicated     Controlled substance agreement signed     Anxiety     Latex sensitivity     Recurrent UTI     Benign essential hypertension     Past Surgical History:   Procedure Laterality Date     PAST SURGICAL HISTORY      bilateral ankle fusions secondary to fractures     PAST SURGICAL HISTORY      I & D open coccyx fractures       Social History     Tobacco Use     Smoking status: Current Every Day Smoker     Packs/day: 0.50     Years: 20.00     Pack years: 10.00     Types: Cigarettes     Smokeless tobacco: Never Used     Tobacco comment: currently on nicoderm patch    Substance Use Topics     Alcohol use: No     Family History   Problem Relation Age of Onset     Hypertension Mother       Depression Mother      Hypertension Father      Depression Father      Depression Brother      Hypertension Brother      Depression Sister      Hypertension Sister      Hypertension Sister      Asthma No family hx of      C.A.D. No family hx of      Diabetes No family hx of      Cerebrovascular Disease No family hx of      Breast Cancer No family hx of      Cancer - colorectal No family hx of      Prostate Cancer No family hx of      Cancer No family hx of      Alzheimer Disease No family hx of      Arthritis No family hx of      Blood Disease No family hx of      Cardiovascular No family hx of      Circulatory No family hx of      Eye Disorder No family hx of      Gastrointestinal Disease No family hx of      Heart Disease No family hx of      Genitourinary Problems No family hx of      Lipids No family hx of      Musculoskeletal Disorder No family hx of      Neurologic Disorder No family hx of      Respiratory No family hx of      Thyroid Disease No family hx of          Current Outpatient Medications   Medication Sig Dispense Refill     albuterol (PROAIR HFA/PROVENTIL HFA/VENTOLIN HFA) 108 (90 Base) MCG/ACT inhaler Inhale 2 puffs into the lungs every 6 hours as needed for shortness of breath / dyspnea or wheezing 1 Inhaler 1     baclofen (LIORESAL) 20 MG tablet TAKE 1 TABLET (20 MG) BY MOUTH 3 TIMES DAILY 90 tablet 0     budesonide-formoterol (SYMBICORT) 80-4.5 MCG/ACT Inhaler Inhale 2 puffs into the lungs 2 times daily 3 Inhaler 1     buPROPion (WELLBUTRIN SR) 150 MG 12 hr tablet TAKE 1 TABLET (150 MG) BY MOUTH 2 TIMES DAILY 60 tablet 0     calcium carbonate-vitamin D (CALCIUM 600+D) 600-200 MG-UNIT TABS        cephALEXin (KEFLEX) 500 MG capsule Take 1 capsule (500 mg) by mouth 3 times daily 30 capsule 0     CERTAVITE/ANTIOXIDANTS tablet TAKE 1 TABLET BY MOUTH DAILY 30 tablet 0     ciclopirox (LOPROX) 0.77 % cream Apply topically 2 times daily To toenails 30 g 2     ibuprofen (ADVIL/MOTRIN) 200 MG tablet  TAKE 1 TABLET (200 MG) BY MOUTH EVERY 4 HOURS AS NEEDED FOR PAIN 120 tablet 0     levETIRAcetam (KEPPRA) 750 MG tablet Take 1 tablet (750 mg) by mouth 2 times daily 180 tablet 1     levothyroxine (SYNTHROID/LEVOTHROID) 75 MCG tablet TAKE 1 TABLET (75 MCG) BY MOUTH DAILY 90 tablet 0     lisinopril (PRINIVIL/ZESTRIL) 20 MG tablet TAKE 1 TABLET (20 MG) BY MOUTH DAILY 90 tablet 0     methadone (DOLOPHINE) 5 MG tablet 20 mg po qam, 15 mg at 1 pm daily and 15 mg at bedtime. To last 72 hours 300 tablet 0     metroNIDAZOLE (METROCREAM) 0.75 % cream Apply topically 2 times daily 45 g 11     Multiple Vitamin (MULTIVITAMIN) per tablet Take 1 tablet by mouth daily. 100 tablet 1     nicotine (NICODERM CQ) 21 MG/24HR patch 2h hr Place 1 patch onto the skin every 24 hours 30 patch 1     nystatin (MYCOSTATIN) 232133 UNIT/GM external cream APPLY TOPICALLY 3 TIMES DAILY FOR 14 DAYS 60 g 3     order for DME Equipment being ordered: Wheelchair - manual. 1 each 0     order for DME Equipment being ordered: needs new ROHO for wheelchair. Current does not fit wheelchair. 1 each 0     order for DME Equipment being ordered: chux - 150 per month 1 Month 11     order for DME Equipment being ordered: Regular order:  2 catheter - 16 french, 5 or 10 balloon  Catheter split sponges - 75 count 4x5?  2 trays  4 night bags - 2000 ml  4 day bags  3 leg bands  15 night pads/chucks  Agacel AG 4x5 foam - 30 count  3 gauze 4x4 200 count  4 boxes of gloves size L  4 hyflex tape  1 box alcohol pads 1 Units 11     order for DME Equipment being ordered:  1 box alcohol pads  1 box skin prep  1 bottle of Microcyn  Cleanser and adhesive remover wipes  30 ABD pads 5x9 sterile dressing  2 boxes of ring barriers  1 box adhesive glue  3 boxes of convatec 4 in 5 count reference #[id number]  2 boxes of convatec 4 in 10 count reference #[id number]     Extra wound care supplies:  2 boxes of hypoflex tape  1 4x4 gauze 200 count  34 ABD pads  10 duoderm dressing - border duoderm  - 6x6 1 Units 11     order for DME Equipment being ordered:     hyperfix nonwoven tape  Wound care TID  12 rolls per month 1 each 11     order for DME Equipment being ordered: Appropriate needed wound healing supplies for cleansing in treating the buttock wound 3 times daily. 90 each 3     order for DME Equipment being ordered:     ABD pads sterile dressing 5X9  For wound care dressing changes TID  90 each per month 1 each 11     order for DME Equipment being ordered:     duoderm 6X6  Wound care TID  15 each per month 1 each 11     order for DME Equipment being ordered:     Sponge drain IV sterile 4x4  Wound care TID  225 pack per month 1 each 11     order for DME Equipment being ordered:     Aquacel AG 4x5  Wound care TID  90 each per month 1 each 11     order for DME Equipment being ordered:     microcyn wound care spray  Wound care TID  3 bottles per month 1 each 11     order for DME Equipment being ordered: 16 french silicone coated  - with NO LIP 1 each 3     order for DME Therapeutic mattress 1 each 0     order for DME Equipment being ordered: Aquaphil AG  4x10  For 28 day supply monthly 28 each 11     order for DME Equipment being ordered: aquafill silver 4x10, 28 per month 28 each 11     order for DME Equipment being ordered: AG Batool 4X8, 28 per month. 28 each 11     ORDER FOR DME Equipment being ordered:irrigation kit for bladder catheter 1 each 1     ORDER FOR DME  LARGE DISPOSABLE UNDERPAD to use as needed. 200 each 8     oxyCODONE-acetaminophen (PERCOCET) 7.5-325 MG per tablet Take 2 tablets by mouth every 6 hours as needed for pain, max 6 per day 180 tablet 0     Pregabalin (LYRICA) 200 MG capsule Take 1 capsule (200 mg) by mouth 3 times daily 270 capsule 1     triamcinolone (ARISTOCORT HP) 0.5 % external cream APPLY SPARINGLY TO AFFECTED AREA THREE TIMES DAILY. 30 g 1     triamcinolone (KENALOG) 0.1 % cream Apply  topically 3 times daily. Apply sparingly to affected area. 30 g 5     venlafaxine  (EFFEXOR) 75 MG tablet 2 tabs po qam and 1 po qhs 270 tablet 3     CRANBERRY OR twice daily       FISH OIL OR once daily       hydrOXYzine (ATARAX) 25 MG tablet TAKE 1 TABLET (25 MG) BY MOUTH 3 TIMES DAILY AS NEEDED FOR ITCHING (Patient not taking: Reported on 10/15/2019) 90 tablet 0     naloxone (NARCAN) 4 MG/0.1ML nasal spray Spray 1 spray (4 mg) into one nostril alternating nostrils as needed for opioid reversal every 2-3 minutes until assistance arrives (Patient not taking: Reported on 10/15/2019) 0.2 mL 1     order for DME Order for hospital bed. (Patient not taking: Reported on 8/22/2018) 1 each 0     Allergies   Allergen Reactions     Penicillins Hives     Pt states it was quite a long time ago so she doesn't exactly remember     Clindamycin Hives and Rash     Recent Labs   Lab Test 08/13/19 11/27/18 11/01/18 06/27/17  1224 04/19/17 12/02/16 11/25/16  1404  08/28/13   LDL  --   --   --   --   --   --  Cannot estimate LDL when triglyceride exceeds 400 mg/dL  88  --   --    HDL  --   --   --   --   --   --  28*  --   --    TRIG  --   --   --   --   --   --  597*  --   --    ALT 31  --   --   --   --  17 18  --   --    CR 1.9* 1.0 0.87  --   --  1.0 0.94   < > 0.7   GFRESTIMATED  --  59* >60  --   --  60 64  --  >60   GFRESTBLACK  --   --   --   --   --   --  78  --  >60   POTASSIUM 4.2 3.8 4.2  --   --  4.2 4.1   < > 3.4   TSH  --   --   --  3.49 5.58*  --  7.04*   < >  --     < > = values in this interval not displayed.      BP Readings from Last 3 Encounters:   10/15/19 110/60   01/17/18 112/60   06/27/17 110/60    Wt Readings from Last 3 Encounters:   09/30/11 68 kg (150 lb)   06/27/11 65.8 kg (145 lb)   10/18/10 77.6 kg (171 lb 1.6 oz)        Reviewed and updated as needed this visit by Provider         Review of Systems   ROS COMP: CONSTITUTIONAL: NEGATIVE for chills, change in weight; POSITIVE for fever   ENT/MOUTH: NEGATIVE for ear, mouth and throat problems  RESP: NEGATIVE for significant cough or  SOB  CV: NEGATIVE for chest pain, palpitations or peripheral edema  : POSITIVE for uti  PSYCHIATRIC: NEGATIVE for changes in mood or affect  SKIN: POSITIVE for wound on buttock area   MS: POSITIVE for stomach and back pain    This document serves as a record of the services and decisions personally performed and made by Dipti Kasper MD. It was created on her behalf by Lydia Murphy, a trained medical scribe. The creation of this document is based the provider's statements to the medical scribe.    Lydia Murphy October 15, 2019 2:10 PM        Objective    /60   Temp 99.1  F (37.3  C) (Oral)   There is no height or weight on file to calculate BMI.  Physical Exam   GENERAL: healthy, alert and no distress  HENT: ear canals and TM's normal, mouth without ulcers or lesions  NECK: no adenopathy, no asymmetry, masses, or scars and thyroid normal to palpation  RESP: lungs clear to auscultation - no rales, rhonchi or wheezes  CV: regular rate and rhythm, normal S1 S2, no S3 or S4, no murmur, click or rub, no peripheral edema and peripheral pulses strong  ABDOMEN: soft, nontender, no hepatosplenomegaly, no masses and bowel sounds normal, colostomy bag in place   MS: no gross musculoskeletal defects noted, no edema  SKIN: no suspicious lesions or rashes, wounds were not evaluated today as she sees wound clinic weekly- no new changes   PSYCH: mentation appears normal, affect normal/bright    Diagnostic Test Results:  Labs reviewed in Epic        Assessment & Plan     1. 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  Patient has reported pain is only fairly controlled.   Have been discussing off and on for several months the importance of seeing Pain Clinic to evaluate regiment and see if better control of pain can be obtained.   She has missed a couple of appointments due to transportation.   Stressed the importance of making those appointments.   Advised patient that she needs to follow up with Pain Clinic. Refills provided to  patient so she can manage pain until visit.   - methadone (DOLOPHINE) 5 MG tablet; 20 mg po qam, 15 mg at 1 pm daily and 15 mg at bedtime. To last 72 hours  Dispense: 300 tablet; Refill: 0  - oxyCODONE-acetaminophen (PERCOCET) 7.5-325 MG per tablet; Take 2 tablets by mouth every 6 hours as needed for pain, max 6 per day  Dispense: 180 tablet; Refill: 0    2. Paraplegia (H)  See above.   Patient requested to have order for ROHO and manual wheelchair as her current ones have been become worn out.   Orders have been placed.   - methadone (DOLOPHINE) 5 MG tablet; 20 mg po qam, 15 mg at 1 pm daily and 15 mg at bedtime. To last 72 hours  Dispense: 300 tablet; Refill: 0  - oxyCODONE-acetaminophen (PERCOCET) 7.5-325 MG per tablet; Take 2 tablets by mouth every 6 hours as needed for pain, max 6 per day  Dispense: 180 tablet; Refill: 0  - Pregabalin (LYRICA) 200 MG capsule; Take 1 capsule (200 mg) by mouth 3 times daily  Dispense: 270 capsule; Refill: 1  - order for DME; Equipment being ordered: Wheelchair - manual.  Dispense: 1 each; Refill: 0  - order for DME; Equipment being ordered: needs new ROHO for wheelchair. Current does not fit wheelchair.  Dispense: 1 each; Refill: 0    3. Seizures (H)  Patient is doing well since hospital admission August 14th-most recent seizure.   Doing well. Well controlled. Tolerating medication.  No change in plan.   Refills have been ordered.   She will schedule her follow up with neurology.   - levETIRAcetam (KEPPRA) 750 MG tablet; Take 1 tablet (750 mg) by mouth 2 times daily  Dispense: 180 tablet; Refill: 1    4. Urinary tract infection associated with indwelling urethral catheter, initial encounter (H)  Patient reports having a urinary tract infection- accompanied by back pain and abdominal pain.   Reviewed labs- show elevated white count. Also has bacteria and Epi cells shown in lab.  Most likely UTI.  Awaiting culture. Will notify with results.   Patient will start on Keflex.   If  "symptoms worsen or persist, patient will contact me.   - cephALEXin (KEFLEX) 500 MG capsule; Take 1 capsule (500 mg) by mouth 3 times daily  Dispense: 30 capsule; Refill: 0    5. History of osteomyelitis  Patient has history of.   She is doing well.   Will continue to monitor.   Stable.     6. Abdominal cramping  Patient reports having abdominal cramping.   See #4.     7. Chronic pain syndrome  As above.   - Pregabalin (LYRICA) 200 MG capsule; Take 1 capsule (200 mg) by mouth 3 times daily  Dispense: 270 capsule; Refill: 1    8. Hypothyroidism due to acquired atrophy of thyroid  Doing well. Well controlled. Tolerating medication.  Labs have been ordered.   Awaiting results.   Dose adjustment as needed.   - TSH WITH FREE T4 REFLEX    9. Benign essential hypertension  Doing well. Well controlled. Tolerating medication.  No change in plan.   Labs have been ordered.   Awaiting results.   - Comprehensive metabolic panel (BMP + Alb, Alk Phos, ALT, AST, Total. Bili, TP)    10. Open wound of buttock with complication, unspecified laterality, sequela  Patient reports consulting Wound care weekly.   Wound was not examined today during visit as wound is examined weekly.   Patient will follow up with Specialist as planned.     11. Anxiety associated with depression  Doing well. Well controlled. Tolerating medication.  No change in plan.     12. Anxiety  Doing well. Well controlled. Tolerating medication.  No change in plan.   Refills have been ordered .  - venlafaxine (EFFEXOR) 75 MG tablet; 2 tabs po qam and 1 po qhs  Dispense: 270 tablet; Refill: 3    13. Tobacco abuse  Patient reports having plans to quit smoking as \"it is time\".   She reports consulting Specialist and was given Nicotine gum and patches.   Advised patient of health benefits tobacco cessation could bring.   Encouraged patient to quit tobacco use.   Advised patient to contact me if any concerns arise.   - TOBACCO CESSATION ORDER FOR     14. Colostomy status " (H)  Patient is doing well.   Exam showed Colostomy bag in place.   Stable. No change to plan.     Tobacco Cessation:   reports that she has been smoking cigarettes. She has a 10.00 pack-year smoking history. She has never used smokeless tobacco.  Tobacco Cessation Action Plan: Information offered: Patient not interested at this time  Shared Medical Visit / Group Education  Self help information given to patient  Patient consulted Physician and was given helpful options such as Nicotine gum and patches.     Follow up- Come back in 3 months for recheck.     The information in this document, created by the medical scribe for me, accurately reflects the services I personally performed and the decisions made by me. I have reviewed and approved this document for accuracy prior to leaving the patient care area.    Dipti Kasper MD  [facility]

## 2019-10-11 NOTE — TELEPHONE ENCOUNTER
"Spoke to patient.     States she has been having a productive cough for about a week. She states there is some wheezing in the morning, but goes away soon after she gets up and moves around. She is using her inhalers, which are helping.     Denies fever, nausea, and vomiting.     States sometimes when she coughs, it looks a little brownish clear, but that is pretty common for her. Denies red/pink sputum.     RN advised sooner appointment, patient declined.    1. ONSET: \"When did the cough begin?\"       Week ago  2. SEVERITY: \"How bad is the cough today?\"       same  3. RESPIRATORY DISTRESS: \"Describe your breathing.\"       Easy, tight in morning   4. FEVER: \"Do you have a fever?\" If so, ask: \"What is your temperature, how was it measured, and when did it start?\"      No  5. HEMOPTYSIS: \"Are you coughing up any blood?\" If so ask: \"How much?\" (flecks, streaks, tablespoons, etc.)      No  6. TREATMENT: \"What have you done so far to treat the cough?\" (e.g., meds, fluids, humidifier)      Humidifier, inhalers, warm showers, no medications  7. CARDIAC HISTORY: \"Do you have any history of heart disease?\" (e.g., heart attack, congestive heart failure)       no  8. LUNG HISTORY: \"Do you have any history of lung disease?\"  (e.g., pulmonary embolus, asthma, emphysema)      no  9. PE RISK FACTORS: \"Do you have a history of blood clots?\" (or: recent major surgery, recent prolonged travel, bedridden )      no  10. OTHER SYMPTOMS: \"Do you have any other symptoms? (e.g., runny nose, wheezing, chest pain)        Runny nose, mild, clear; diarrhea, but not sure if it is related. RN advised increase fluids, try immodium, and call back if not improved in a few days.    11. PREGNANCY: \"Is there any chance you are pregnant?\" \"When was your last menstrual period?\"        no  12. TRAVEL: \"Have you traveled out of the country in the last month?\" (e.g., travel history, exposures)        No.     Next 5 appointments (look out 90 days)    Oct " 15, 2019  2:20 PM CDT  Office Visit with Dipti Kasper MD  [facility] ([facility]) [address]  [place] [address]  [phone]   Nov 15, 2019  1:00 PM CST  Office Visit with Dipti Kasper MD  [facility] ([facility]) [address], [address]  [facility] [address]  [phone]        Neeta Millan RN BSN      Additional Information    Negative: Bluish (or gray) lips or face    Negative: Severe difficulty breathing (e.g., struggling for each breath, speaks in single words)    Negative: Rapid onset of cough and has hives    Negative: Coughing started suddenly after medicine, an allergic food or bee sting    Negative: Difficulty breathing after exposure to flames, smoke, or fumes    Negative: Sounds like a life-threatening emergency to the triager    Negative: Chest pain present when not coughing    Negative: Difficulty breathing    Negative: Passed out (i.e., fainted, collapsed and was not responding)    Negative: Previous asthma attacks and this feels like asthma attack    Negative: Patient sounds very sick or weak to the triager    Negative: Coughed up > 1 tablespoon (15 ml) blood (Exception: blood-tinged sputum)    Negative: Fever > 103 F (39.4 C)    Negative: Fever > 101 F (38.3 C) and over 60 years of age    Negative: Fever > 100.0 F (37.8 C) and has diabetes mellitus or a weak immune system (e.g., HIV positive, cancer chemotherapy, organ transplant, splenectomy, chronic steroids)    Negative: Fever > 100.0 F (37.8 C) and bedridden (e.g., nursing home patient, stroke, chronic illness, recovering from surgery)    Negative: Increasing ankle swelling    Negative: SEVERE coughing spells (e.g., whooping sound after coughing, vomiting after coughing)    Negative: Fever present > 3 days (72 hours)    Negative: Fever returns after gone for over 24 hours and symptoms worse or not improved    Negative: Using nasal washes and pain  medicine > 24 hours and sinus pain persists    Negative: Continuous (nonstop) coughing interferes with work or school and no improvement using cough treatment per Care Advice    Negative: Coughing up kaykay-colored (reddish-brown) or blood-tinged sputum    Negative: Wheezing is present    Negative: Known COPD or other severe lung disease (i.e., bronchiectasis, cystic fibrosis, lung surgery) and worsening symptoms (i.e., increased sputum purulence or amount, increased breathing difficulty)    Negative: Patient wants to be seen    Negative: Cough has been present for > 3 weeks    Allergy symptoms are also present (e.g., itchy eyes, clear nasal discharge, postnasal drip)    Negative: Shock suspected (e.g., cold/pale/clammy skin, too weak to stand, low BP, rapid pulse)    Negative: Difficult to awaken or acting confused (e.g., disoriented, slurred speech)    Negative: Sounds like a life-threatening emergency to the triager    Negative: Vomiting also present and worse than the diarrhea    Negative: Blood in stool and without diarrhea    Protocols used: COUGH-A-OH, DIARRHEA-A-OH

## 2019-10-14 ENCOUNTER — TRANSFERRED RECORDS (OUTPATIENT)
Dept: HEALTH INFORMATION MANAGEMENT | Facility: CLINIC | Age: 49
End: 2019-10-14

## 2019-10-15 ENCOUNTER — TELEPHONE (OUTPATIENT)
Dept: FAMILY MEDICINE | Facility: CLINIC | Age: 49
End: 2019-10-15

## 2019-10-15 ENCOUNTER — OFFICE VISIT (OUTPATIENT)
Dept: FAMILY MEDICINE | Facility: CLINIC | Age: 49
End: 2019-10-15
Payer: COMMERCIAL

## 2019-10-15 VITALS — TEMPERATURE: 99.1 F | SYSTOLIC BLOOD PRESSURE: 110 MMHG | DIASTOLIC BLOOD PRESSURE: 60 MMHG

## 2019-10-15 DIAGNOSIS — E03.4 HYPOTHYROIDISM DUE TO ACQUIRED ATROPHY OF THYROID: ICD-10-CM

## 2019-10-15 DIAGNOSIS — G82.20 PARAPLEGIA (H): ICD-10-CM

## 2019-10-15 DIAGNOSIS — F41.8 ANXIETY ASSOCIATED WITH DEPRESSION: ICD-10-CM

## 2019-10-15 DIAGNOSIS — F41.9 ANXIETY: ICD-10-CM

## 2019-10-15 DIAGNOSIS — Z72.0 TOBACCO ABUSE: ICD-10-CM

## 2019-10-15 DIAGNOSIS — G89.4 CHRONIC PAIN SYNDROME: ICD-10-CM

## 2019-10-15 DIAGNOSIS — R56.9 SEIZURES (H): ICD-10-CM

## 2019-10-15 DIAGNOSIS — G89.21 CHRONIC PAIN DUE TO INJURY: Primary | ICD-10-CM

## 2019-10-15 DIAGNOSIS — Z87.39 HISTORY OF OSTEOMYELITIS: ICD-10-CM

## 2019-10-15 DIAGNOSIS — R10.9 ABDOMINAL CRAMPING: ICD-10-CM

## 2019-10-15 DIAGNOSIS — N39.0 URINARY TRACT INFECTION ASSOCIATED WITH INDWELLING URETHRAL CATHETER, INITIAL ENCOUNTER (H): ICD-10-CM

## 2019-10-15 DIAGNOSIS — S31.809S OPEN WOUND OF BUTTOCK WITH COMPLICATION, UNSPECIFIED LATERALITY, SEQUELA: ICD-10-CM

## 2019-10-15 DIAGNOSIS — T83.511A URINARY TRACT INFECTION ASSOCIATED WITH INDWELLING URETHRAL CATHETER, INITIAL ENCOUNTER (H): ICD-10-CM

## 2019-10-15 DIAGNOSIS — I10 BENIGN ESSENTIAL HYPERTENSION: ICD-10-CM

## 2019-10-15 DIAGNOSIS — Z93.3 COLOSTOMY STATUS (H): ICD-10-CM

## 2019-10-15 LAB
ALBUMIN SERPL-MCNC: 3 G/DL (ref 3.4–5)
ALP SERPL-CCNC: 147 U/L (ref 40–150)
ALT SERPL W P-5'-P-CCNC: 22 U/L (ref 0–50)
ANION GAP SERPL CALCULATED.3IONS-SCNC: 5 MMOL/L (ref 3–14)
AST SERPL W P-5'-P-CCNC: 16 U/L (ref 0–45)
BILIRUB SERPL-MCNC: 0.3 MG/DL (ref 0.2–1.3)
BUN SERPL-MCNC: 10 MG/DL (ref 7–30)
CALCIUM SERPL-MCNC: 9.6 MG/DL (ref 8.5–10.1)
CHLORIDE SERPL-SCNC: 101 MMOL/L (ref 94–109)
CO2 SERPL-SCNC: 30 MMOL/L (ref 20–32)
CREAT SERPL-MCNC: 0.84 MG/DL (ref 0.52–1.04)
GFR SERPL CREATININE-BSD FRML MDRD: 82 ML/MIN/{1.73_M2}
GLUCOSE SERPL-MCNC: 85 MG/DL (ref 70–99)
POTASSIUM SERPL-SCNC: 4.3 MMOL/L (ref 3.4–5.3)
PROT SERPL-MCNC: 8.6 G/DL (ref 6.8–8.8)
SODIUM SERPL-SCNC: 136 MMOL/L (ref 133–144)
TSH SERPL DL<=0.005 MIU/L-ACNC: 3.41 MU/L (ref 0.4–4)

## 2019-10-15 PROCEDURE — 90471 IMMUNIZATION ADMIN: CPT | Performed by: FAMILY MEDICINE

## 2019-10-15 PROCEDURE — 90686 IIV4 VACC NO PRSV 0.5 ML IM: CPT | Performed by: FAMILY MEDICINE

## 2019-10-15 PROCEDURE — 99214 OFFICE O/P EST MOD 30 MIN: CPT | Mod: 25 | Performed by: FAMILY MEDICINE

## 2019-10-15 PROCEDURE — 80053 COMPREHEN METABOLIC PANEL: CPT | Performed by: FAMILY MEDICINE

## 2019-10-15 PROCEDURE — 84443 ASSAY THYROID STIM HORMONE: CPT | Performed by: FAMILY MEDICINE

## 2019-10-15 PROCEDURE — 36415 COLL VENOUS BLD VENIPUNCTURE: CPT | Performed by: FAMILY MEDICINE

## 2019-10-15 RX ORDER — VENLAFAXINE 75 MG/1
TABLET ORAL
Qty: 270 TABLET | Refills: 3 | Status: SHIPPED | OUTPATIENT
Start: 2019-10-15 | End: 2020-08-28

## 2019-10-15 RX ORDER — METHADONE HYDROCHLORIDE 5 MG/1
TABLET ORAL
Qty: 300 TABLET | Refills: 0 | Status: SHIPPED | OUTPATIENT
Start: 2019-10-15 | End: 2019-11-11

## 2019-10-15 RX ORDER — OXYCODONE AND ACETAMINOPHEN 7.5; 325 MG/1; MG/1
TABLET ORAL
Qty: 180 TABLET | Refills: 0 | Status: SHIPPED | OUTPATIENT
Start: 2019-10-15 | End: 2019-11-11

## 2019-10-15 RX ORDER — PREGABALIN 200 MG/1
200 CAPSULE ORAL 3 TIMES DAILY
Qty: 270 CAPSULE | Refills: 1 | Status: SHIPPED | OUTPATIENT
Start: 2019-10-15 | End: 2020-03-12

## 2019-10-15 RX ORDER — LEVETIRACETAM 750 MG/1
750 TABLET ORAL 2 TIMES DAILY
COMMUNITY
End: 2019-10-15

## 2019-10-15 RX ORDER — LEVETIRACETAM 750 MG/1
750 TABLET ORAL 2 TIMES DAILY
Qty: 180 TABLET | Refills: 1 | Status: SHIPPED | OUTPATIENT
Start: 2019-10-15 | End: 2019-12-26

## 2019-10-15 RX ORDER — CEPHALEXIN 500 MG/1
500 CAPSULE ORAL 3 TIMES DAILY
Qty: 30 CAPSULE | Refills: 0 | Status: SHIPPED | OUTPATIENT
Start: 2019-10-15 | End: 2019-10-17

## 2019-10-15 ASSESSMENT — ANXIETY QUESTIONNAIRES
7. FEELING AFRAID AS IF SOMETHING AWFUL MIGHT HAPPEN: NOT AT ALL
GAD7 TOTAL SCORE: 3
6. BECOMING EASILY ANNOYED OR IRRITABLE: SEVERAL DAYS
5. BEING SO RESTLESS THAT IT IS HARD TO SIT STILL: NOT AT ALL
3. WORRYING TOO MUCH ABOUT DIFFERENT THINGS: NOT AT ALL
GAD7 TOTAL SCORE: 3
GAD7 TOTAL SCORE: 3
7. FEELING AFRAID AS IF SOMETHING AWFUL MIGHT HAPPEN: NOT AT ALL
2. NOT BEING ABLE TO STOP OR CONTROL WORRYING: SEVERAL DAYS
4. TROUBLE RELAXING: NOT AT ALL
1. FEELING NERVOUS, ANXIOUS, OR ON EDGE: SEVERAL DAYS

## 2019-10-15 ASSESSMENT — PAIN SCALES - GENERAL: PAINLEVEL: MODERATE PAIN (4)

## 2019-10-15 ASSESSMENT — PATIENT HEALTH QUESTIONNAIRE - PHQ9
SUM OF ALL RESPONSES TO PHQ QUESTIONS 1-9: 2
10. IF YOU CHECKED OFF ANY PROBLEMS, HOW DIFFICULT HAVE THESE PROBLEMS MADE IT FOR YOU TO DO YOUR WORK, TAKE CARE OF THINGS AT HOME, OR GET ALONG WITH OTHER PEOPLE: NOT DIFFICULT AT ALL
SUM OF ALL RESPONSES TO PHQ QUESTIONS 1-9: 2

## 2019-10-15 NOTE — TELEPHONE ENCOUNTER
Reason for call:  Form  Reason for Call:  Form, our goal is to have forms completed with 72 hours, however, some forms may require a visit or additional information.    Type of letter, form or note:  Medical    Who is the form from?:[facility]       Where did the form come from: form was faxed in    What clinic location was the form placed at?: [facility] - [phone]    Where the form was placed: 's in box     What number is listed as a contact on the form?: [phone]       Additional comments: Fax back to [phone]    Call taken on 10/15/2019 at 8:22 AM by Leland Gaines

## 2019-10-16 ASSESSMENT — PATIENT HEALTH QUESTIONNAIRE - PHQ9: SUM OF ALL RESPONSES TO PHQ QUESTIONS 1-9: 2

## 2019-10-16 ASSESSMENT — ANXIETY QUESTIONNAIRES: GAD7 TOTAL SCORE: 3

## 2019-10-17 ENCOUNTER — TELEPHONE (OUTPATIENT)
Dept: FAMILY MEDICINE | Facility: CLINIC | Age: 49
End: 2019-10-17

## 2019-10-17 DIAGNOSIS — T83.511A URINARY TRACT INFECTION ASSOCIATED WITH INDWELLING URETHRAL CATHETER, INITIAL ENCOUNTER (H): Primary | ICD-10-CM

## 2019-10-17 DIAGNOSIS — N39.0 URINARY TRACT INFECTION ASSOCIATED WITH INDWELLING URETHRAL CATHETER, INITIAL ENCOUNTER (H): Primary | ICD-10-CM

## 2019-10-17 RX ORDER — SULFAMETHOXAZOLE/TRIMETHOPRIM 800-160 MG
1 TABLET ORAL 2 TIMES DAILY
Qty: 14 TABLET | Refills: 0 | Status: SHIPPED | OUTPATIENT
Start: 2019-10-17 | End: 2020-04-08

## 2019-10-17 RX ORDER — SULFAMETHOXAZOLE/TRIMETHOPRIM 800-160 MG
1 TABLET ORAL 2 TIMES DAILY
Qty: 6 TABLET | Refills: 0 | Status: SHIPPED | OUTPATIENT
Start: 2019-10-17 | End: 2019-10-17 | Stop reason: ALTCHOICE

## 2019-10-17 NOTE — TELEPHONE ENCOUNTER
Huddled with provider, sending Bactrim for 7 days not 3. Called pharmacy to cancel 3 day. Pt will stop the Keflex and start Bactrim. She will call and update us early next week if not getting better.     Susan Ordoñez RN, BSN

## 2019-10-17 NOTE — TELEPHONE ENCOUNTER
Spoke to patient.  She is wondering what the urine culture results are from [facility].  We received a fax of the results and I showed Provider Laura Edward.  She reviewed them and will have the RN call the pt.    Pt is also stating that she was on antibiotics from Sept 16 to October 1 and experienced diarrhea daily while taking it.  Now that she is off the antibiotics, she said she is having diarrhea but not as frequent.  RN, please review with pt.

## 2019-10-17 NOTE — TELEPHONE ENCOUNTER
Can you please call Agatha and let her know I switched her antibiotic to Bactrim DS for 3 days.    Thank you.  Laura Edward, CNP

## 2019-10-17 NOTE — TELEPHONE ENCOUNTER
LM asking pt to return call. Please transfer to RN. Pt is on Keflex, provider reviewed urine culture and this antibiotic is variable. If pt is improving then she may continue if not improving please route back to provider to send over new prescription. Please also triage diarrhea, see message below.    Susan Ordoñez, HARDEEP, BSN

## 2019-10-17 NOTE — TELEPHONE ENCOUNTER
Provider please send over new antibiotic     I spoke with the pt who states she has taken 3 doses of Keflex so far. Still having cramping. Pt is also having diarrhea. Last round of antibiotics she was done 10/1 and still had a few watery stools. Then was having diarrhea every other day, more soft then watery. She has taken imodium which has helped.     Susan Ordoñez, RN, BSN

## 2019-10-18 ENCOUNTER — TELEPHONE (OUTPATIENT)
Dept: FAMILY MEDICINE | Facility: CLINIC | Age: 49
End: 2019-10-18

## 2019-10-18 DIAGNOSIS — Z00.00 ROUTINE GENERAL MEDICAL EXAMINATION AT A HEALTH CARE FACILITY: ICD-10-CM

## 2019-10-18 RX ORDER — FOLIC ACID/MV,IRON,MIN/LUTEIN 0.4-18-25
TABLET ORAL
Qty: 90 TABLET | Refills: 3 | Status: SHIPPED | OUTPATIENT
Start: 2019-10-18 | End: 2020-10-20

## 2019-10-18 NOTE — TELEPHONE ENCOUNTER
"Requested Prescriptions   Pending Prescriptions Disp Refills     CERTAVITE/ANTIOXIDANTS tablet [Pharmacy Med Name: CERTAVITE-ANTIOXIDANT TAB TAB] 30 tablet 0     Sig: TAKE 1 TABLET BY MOUTH DAILY       Vitamin Supplements (Adult) Protocol Passed - 10/18/2019  2:53 PM        Passed - High dose Vitamin D not ordered        Passed - Recent (12 mo) or future (30 days) visit within the authorizing provider's specialty     Patient has had an office visit with the authorizing provider or a provider within the authorizing providers department within the previous 12 mos or has a future within next 30 days. See \"Patient Info\" tab in inbasket, or \"Choose Columns\" in Meds & Orders section of the refill encounter.              Passed - Medication is active on med list        Prescription approved per [facility] Refill Protocol.    Raudel Newby RN, BSN    "

## 2019-10-18 NOTE — TELEPHONE ENCOUNTER
Reason for call:  Form  Reason for Call:  Form, our goal is to have forms completed with 72 hours, however, some forms may require a visit or additional information.    Type of letter, form or note:  Medical    Who is the form from?:  [facility]     Where did the form come from: form was faxed in    What clinic location was the form placed at?: [facility] - [phone]    Where the form was placed: 's in box     What number is listed as a contact on the form?: [phone]       Additional comments: Fax back to [phone]    Call taken on 10/18/2019 at 10:29 AM by Leland Gaines

## 2019-10-21 ENCOUNTER — TELEPHONE (OUTPATIENT)
Dept: FAMILY MEDICINE | Facility: CLINIC | Age: 49
End: 2019-10-21

## 2019-10-21 NOTE — TELEPHONE ENCOUNTER
Reason for call:  Form  Reason for Call:  Form, our goal is to have forms completed with 72 hours, however, some forms may require a visit or additional information.    Type of letter, form or note:  Medical    Who is the form from?: [employer]      Where did the form come from: form was faxed in    What clinic location was the form placed at?: [facility] - [phone]    Where the form was placed: 's in box     What number is listed as a contact on the form?: [phone]       Additional comments: Fax back to [phone]    Call taken on 10/21/2019 at 9:29 AM by Leland Gaines

## 2019-10-25 ENCOUNTER — MEDICAL CORRESPONDENCE (OUTPATIENT)
Dept: HEALTH INFORMATION MANAGEMENT | Facility: CLINIC | Age: 49
End: 2019-10-25

## 2019-10-31 ENCOUNTER — TELEPHONE (OUTPATIENT)
Dept: FAMILY MEDICINE | Facility: CLINIC | Age: 49
End: 2019-10-31

## 2019-10-31 DIAGNOSIS — Z53.9 DIAGNOSIS NOT YET DEFINED: Primary | ICD-10-CM

## 2019-10-31 PROCEDURE — G0179 MD RECERTIFICATION HHA PT: HCPCS | Performed by: FAMILY MEDICINE

## 2019-11-03 NOTE — TELEPHONE ENCOUNTER
Patient has failed gabapentin for her pain.     She is currently on oxycodone and methadone as well as baclofen.     She has done well with Lyrica in the past.    No

## 2019-11-07 ENCOUNTER — TELEPHONE (OUTPATIENT)
Dept: FAMILY MEDICINE | Facility: CLINIC | Age: 49
End: 2019-11-07

## 2019-11-07 NOTE — TELEPHONE ENCOUNTER
Reason for call:  Form  Reason for Call:  Form, our goal is to have forms completed with 72 hours, however, some forms may require a visit or additional information.    Type of letter, form or note:  Medical    Who is the form from?:  [facility]     Where did the form come from: form was faxed in    What clinic location was the form placed at?: [facility] - [phone]    Where the form was placed: 's in box     What number is listed as a contact on the form?: [phone]       Additional comments: Fax back to [phone]    Call taken on 11/7/2019 at 3:56 PM by Leland Gaines

## 2019-11-11 ENCOUNTER — TELEPHONE (OUTPATIENT)
Dept: FAMILY MEDICINE | Facility: CLINIC | Age: 49
End: 2019-11-11

## 2019-11-11 DIAGNOSIS — G82.20 PARAPLEGIA (H): ICD-10-CM

## 2019-11-11 DIAGNOSIS — G89.21 CHRONIC PAIN DUE TO INJURY: ICD-10-CM

## 2019-11-11 RX ORDER — METHADONE HYDROCHLORIDE 5 MG/1
TABLET ORAL
Qty: 300 TABLET | Refills: 0 | Status: SHIPPED | OUTPATIENT
Start: 2019-11-11 | End: 2019-12-09

## 2019-11-11 RX ORDER — METHADONE HYDROCHLORIDE 5 MG/1
TABLET ORAL
Qty: 300 TABLET | Refills: 0 | Status: CANCELLED | OUTPATIENT
Start: 2019-11-11

## 2019-11-11 RX ORDER — OXYCODONE AND ACETAMINOPHEN 7.5; 325 MG/1; MG/1
TABLET ORAL
Qty: 180 TABLET | Refills: 0 | Status: SHIPPED | OUTPATIENT
Start: 2019-11-11 | End: 2019-12-09

## 2019-11-11 RX ORDER — OXYCODONE AND ACETAMINOPHEN 7.5; 325 MG/1; MG/1
TABLET ORAL
Qty: 180 TABLET | Refills: 0 | Status: CANCELLED | OUTPATIENT
Start: 2019-11-11

## 2019-11-11 NOTE — TELEPHONE ENCOUNTER
[facility] [phone] to request records to be faxed to us.    They stated she cancelled the appt that was scheduled for 10/28/2019.  The last appt pt actually attended was in May 2019 at [facility].

## 2019-11-11 NOTE — TELEPHONE ENCOUNTER
Please call patient. She is wondering if  could electronically send her percocet and methadone to [facility]   [phone]

## 2019-11-11 NOTE — TELEPHONE ENCOUNTER
Spoke with Agatha. She reports she was unable to see her pain clinic because her ride company cancelled.     She feels she has a reliable company now.     She has yet to make the appointment again with pain.     Discussed how important her follow up with pain clinic is to help in her management. If she continues this way, will not be able to assist her in her pain management any longer.     She will call the pain clinic now and schedule that appointment.

## 2019-11-11 NOTE — TELEPHONE ENCOUNTER
Pending Prescriptions:                       Disp   Refills    oxyCODONE-acetaminophen (PERCOCET) 7.5-32*180 ta*0            Sig: Take 2 tablets by mouth every 6 hours as needed           for pain, max 6 per day    Last Written Prescription Date:  10/15/2019  Last Fill Quantity: 180,  # refills: 0   Last office visit: 10/15/2019 with prescribing provider:     Future Office Visit:   Next 5 appointments (look out 90 days)    2020  1:00 PM CST  Office Visit with Dipti Kasper MD  [facility] ([facility]) [address] [place] [address], [address]  [facility] [address]  [phone]                 methadone (DOLOPHINE) 5 MG tablet         300 ta*0            Si mg po qam, 15 mg at 1 pm daily and 15 mg at           bedtime. To last 72 hours    Last Written Prescription Date:  10/15/2019  Last Fill Quantity: 300,  # refills: 0   Last office visit: 10/15/2019 with prescribing provider:     Future Office Visit:   Next 5 appointments (look out 90 days)    2020  1:00 PM CST  Office Visit with Dipti Kasper MD  [facility] ([facility]) [address], [address]  [facility] [address]  [phone]         Susan Ordoñez RN, BSN

## 2019-11-11 NOTE — TELEPHONE ENCOUNTER
Reason for Call:  Medication or medication refill:    Do you use a [facility]?  Name of the pharmacy and phone number for the current request:  [facility] [place]    Name of the medication requested: Perocet and Metadone    Other request: none    Can we leave a detailed message on this number? YES    Phone number patient can be reached at: Home number on file [phone] (home)    Best Time: anytime    Call taken on 11/11/2019 at 10:45 AM by Leland Gaines

## 2019-11-11 NOTE — TELEPHONE ENCOUNTER
Would like notes from pain clinic that she had appointment with 10/28/19. [facility].

## 2019-11-12 ENCOUNTER — TELEPHONE (OUTPATIENT)
Dept: FAMILY MEDICINE | Facility: CLINIC | Age: 49
End: 2019-11-12

## 2019-11-12 NOTE — TELEPHONE ENCOUNTER
Forms received from Insurance for the Prior Authorization.  Completed most of the items on the form - placed form in Provider inbox to complete the highlighted areas.    Fax to [phone] as urgent review

## 2019-11-12 NOTE — TELEPHONE ENCOUNTER
Faxed as urgent to [phone]  Scanned PA form into chart  Awaiting decision. Will call pt once we learn of the decision.

## 2019-11-12 NOTE — TELEPHONE ENCOUNTER
Patient is calling back.  She states that we needs to call the pharmacy help desk to see if they got the  PA yet and what the status is.

## 2019-11-12 NOTE — TELEPHONE ENCOUNTER
Spoke to insurance rep, pa has been received but it is incomplete. Additional information such as urine drug screen (UDS) and chart notes are needed for review. Faxed additional information to insurance as urgent. PA case ref #[id number].

## 2019-11-12 NOTE — TELEPHONE ENCOUNTER
Re-faxed PA form for urgent review.    Routing to PA pool to call insurance about an urgent review of this prior authorization for methadone  Insurance Company: [employer] - Phone [phone] Fax [phone]  Thank you.

## 2019-11-12 NOTE — TELEPHONE ENCOUNTER
Patient called clinic, She stated she spoke ti her insurance and they  Have not received the PA yet. She is asking for this to be re-faxed.

## 2019-11-12 NOTE — TELEPHONE ENCOUNTER
Reason for call:  Form  Reason for Call:  Form, our goal is to have forms completed with 72 hours, however, some forms may require a visit or additional information.    Type of letter, form or note:  medical    Who is the form from?:  [facility] [employer]    Where did the form come from: form was faxed in    What clinic location was the form placed at?: [facility] - [phone]    Where the form was placed: 's Box    What number is listed as a contact on the form?:  [phone]       Additional comments:  Fax to [phone]    created by Kelli Reza

## 2019-11-12 NOTE — TELEPHONE ENCOUNTER
Reason for Call:  Other call back    Detailed comments: Patient needs PA on her methadone (DOLOPHINE) 5 MG tablet  She is suppose to pick this up today.  She would like Kelil to call her insurance company today to make this go faster.  Please call if any questions    Phone Number Patient can be reached at: Home number on file [phone] (home)    Best Time: any    Can we leave a detailed message on this number? YES    Call taken on 11/12/2019 at 9:44 AM by Hillary Mcgraw

## 2019-11-13 NOTE — TELEPHONE ENCOUNTER
Prior Authorization Approval    Authorization Effective Date: 11/12/2019  Authorization Expiration Date: 5/12/2020  Medication: methadone- APPROVED   Approved Dose/Quantity:   Reference #:     Insurance Company: [employer] - Phone [phone] Fax [phone]  Expected CoPay:       CoPay Card Available:      Foundation Assistance Needed:    Which Pharmacy is filling the prescription (Not needed for infusion/clinic administered): [id number] - [place], [place] - [address] [place] [address] AT [facility] & [address] [place] [address]  Pharmacy Notified: Yes  Patient Notified: Comment:  **Instructed pharmacy to notify patient when script is ready to /ship.**

## 2019-11-13 NOTE — TELEPHONE ENCOUNTER
Received fax from [employer].    PA for methadone HCL 5 mg tablet approved effective 11/12/2019 through 5/12/2020    Forms scanned into pts chart.  Pharmacy informed

## 2019-11-18 ENCOUNTER — TELEPHONE (OUTPATIENT)
Dept: FAMILY MEDICINE | Facility: CLINIC | Age: 49
End: 2019-11-18

## 2019-11-18 DIAGNOSIS — L30.9 DERMATITIS: Primary | ICD-10-CM

## 2019-11-18 NOTE — TELEPHONE ENCOUNTER
Reason for call:  Form  Reason for Call:  Form, our goal is to have forms completed with 72 hours, however, some forms may require a visit or additional information.    Type of letter, form or note:  Medical    Who is the form from?: [facility]      Where did the form come from: form was faxed in    What clinic location was the form placed at?: [facility] - [phone]    Where the form was placed: 's in box     What number is listed as a contact on the form?: [phone]       Additional comments: Fax back to [phone]    Call taken on 11/18/2019 at 11:01 AM by Leland Gaines

## 2019-11-18 NOTE — TELEPHONE ENCOUNTER
Reason for Call:  Medication or medication refill:    Do you use a [facility]?  Name of the pharmacy and phone number for the current request:  [facility] - [place]     Name of the medication requested: triamcinolone (KENALOG) 0.1 % cream    Other request: Patient wanting to know if she can increase the dose of the medication. Eczema is getting worse     Can we leave a detailed message on this number? YES    Phone number patient can be reached at: Cell number on file:    Telephone Information:   Mobile [phone]       Best Time: Anytime     Call taken on 11/18/2019 at 3:44 PM by Virgie Dudley

## 2019-11-19 DIAGNOSIS — F41.9 ANXIETY: ICD-10-CM

## 2019-11-19 NOTE — TELEPHONE ENCOUNTER
SF:    1. Can you place a dermatology referral for patient. She states she would like a dermatologist in [place] if possible.    2. Pt will try and mychart a picture of the rash/eczema.     I spoke with the pt who states one day she did not have any spots but then the next day she had a lot of spots. They started at white blisters. A lot of them went away and some came back. She would apply the medicine and then would wear gloves. She has the spots on her hands, feet. She is worried that she will start to get them on her feet. Wants to see a dermatologist but not able to one until August. Will be in town 11/27/2019 and has appt at 2:30 or second week of December might work too.    Susan Ordoñez, RN, BSN

## 2019-11-20 ENCOUNTER — MYC MEDICAL ADVICE (OUTPATIENT)
Dept: FAMILY MEDICINE | Facility: CLINIC | Age: 49
End: 2019-11-20

## 2019-11-20 RX ORDER — BUPROPION HYDROCHLORIDE 150 MG/1
TABLET, EXTENDED RELEASE ORAL
Qty: 180 TABLET | Refills: 1 | Status: SHIPPED | OUTPATIENT
Start: 2019-11-20 | End: 2020-06-04

## 2019-11-20 NOTE — TELEPHONE ENCOUNTER
I scheduled pt with [facility] [phone]     December 10 at 1:15 PM with Dr. Borden - [address], [place]    Pt informed of appt details.

## 2019-11-20 NOTE — TELEPHONE ENCOUNTER
See triage RN note on 11/18/2019.  These are the pictures.  Will flag for provider to review and advise.    Naseem Melendez, RN, BSN

## 2019-11-20 NOTE — TELEPHONE ENCOUNTER
Prescription approved per [facility] Refill Protocol.    Next 5 appointments (look out 90 days)    Jan 17, 2020  1:00 PM CST  Office Visit with Dipti Kasper MD  [facility] [address], [address]  [facility] [address]  [phone]        Ila Santana RN, BSN

## 2019-11-22 ENCOUNTER — TELEPHONE (OUTPATIENT)
Dept: FAMILY MEDICINE | Facility: CLINIC | Age: 49
End: 2019-11-22

## 2019-11-22 NOTE — TELEPHONE ENCOUNTER
Our goal is to have forms completed with 72 hours, however some forms may require a visit or additional information.    Who is the form from?: [facility]  (if other please explain)  Where the form came from: form was faxed in  What clinic location was the form placed at?: [facility]  Where the form was placed: placed in TC inbox     What number is listed as a contact on the form?: [phone]  Fax number to return form to:  [phone]        Call taken on 11/22/2019 at 10:38 AM by Virgie Dudley

## 2019-11-24 ENCOUNTER — TELEPHONE (OUTPATIENT)
Dept: FAMILY MEDICINE | Facility: CLINIC | Age: 49
End: 2019-11-24

## 2019-11-24 NOTE — TELEPHONE ENCOUNTER
Reason for call:  Form  Reason for Call:  Form, our goal is to have forms completed with 72 hours, however, some forms may require a visit or additional information.    Type of letter, form or note:  Medical    Who is the form from?:[facility]       Where did the form come from: form was faxed in    What clinic location was the form placed at?: [facility] - [phone]    Where the form was placed: 's in box     What number is listed as a contact on the form?: [phone]       Additional comments: Fax back to [phone]    Call taken on 11/24/2019 at 1:16 PM by Leland Gaines

## 2019-11-27 ENCOUNTER — TELEPHONE (OUTPATIENT)
Dept: FAMILY MEDICINE | Facility: CLINIC | Age: 49
End: 2019-11-27

## 2019-11-27 NOTE — TELEPHONE ENCOUNTER
Reason for call:  Form  Reason for Call:  Form, our goal is to have forms completed with 72 hours, however, some forms may require a visit or additional information.    Type of letter, form or note:  Medical    Who is the form from?: [employer]      Where did the form come from: form was faxed in    What clinic location was the form placed at?: [facility] - [phone]    Where the form was placed: 's in box     What number is listed as a contact on the form?: [phone]       Additional comments: Fax back to [phone]    Call taken on 11/27/2019 at 4:06 PM by Leland Gaines

## 2019-12-03 ENCOUNTER — TELEPHONE (OUTPATIENT)
Dept: FAMILY MEDICINE | Facility: CLINIC | Age: 49
End: 2019-12-03

## 2019-12-03 NOTE — TELEPHONE ENCOUNTER
Our goal is to have forms completed with 72 hours, however some forms may require a visit or additional information.    Who is the form from?: [facility] [employer]  (if other please explain)  Where the form came from: form was faxed in  What clinic location was the form placed at?: [facility]  Where the form was placed: placed in TC inbox     What number is listed as a contact on the form?: [phone]  Fax number to return form to:  [phone]        Call taken on 12/3/2019 at 2:28 PM by Virgie Dudley

## 2019-12-04 NOTE — TELEPHONE ENCOUNTER
Faxed one form to [employer]    Faxed second form to [facility] (fax [phone]) to complete and asked them to refax to us for Dr. Kasper to sign - then need to fax to [employer] once completed.

## 2019-12-06 ENCOUNTER — TELEPHONE (OUTPATIENT)
Dept: FAMILY MEDICINE | Facility: CLINIC | Age: 49
End: 2019-12-06

## 2019-12-06 NOTE — TELEPHONE ENCOUNTER
Reason for call:  Form  Reason for Call:  Form, our goal is to have forms completed with 72 hours, however, some forms may require a visit or additional information.    Type of letter, form or note:  Medical    Who is the form from?: [facility]    Where did the form come from: form was faxed in    What clinic location was the form placed at?: [facility] - [phone]    Where the form was placed: 's in box     What number is listed as a contact on the form?: [phone]       Additional comments: Fax back to [phone]    Call taken on 12/6/2019 at 4:51 PM by Leland Gaines

## 2019-12-09 ENCOUNTER — TELEPHONE (OUTPATIENT)
Dept: FAMILY MEDICINE | Facility: CLINIC | Age: 49
End: 2019-12-09

## 2019-12-09 DIAGNOSIS — G89.21 CHRONIC PAIN DUE TO INJURY: ICD-10-CM

## 2019-12-09 DIAGNOSIS — G82.20 PARAPLEGIA (H): ICD-10-CM

## 2019-12-09 RX ORDER — METHADONE HYDROCHLORIDE 5 MG/1
TABLET ORAL
Qty: 300 TABLET | Refills: 0 | Status: CANCELLED | OUTPATIENT
Start: 2019-12-09

## 2019-12-09 RX ORDER — OXYCODONE AND ACETAMINOPHEN 7.5; 325 MG/1; MG/1
TABLET ORAL
Qty: 180 TABLET | Refills: 0 | Status: CANCELLED | OUTPATIENT
Start: 2019-12-09

## 2019-12-09 RX ORDER — METHADONE HYDROCHLORIDE 5 MG/1
TABLET ORAL
Qty: 300 TABLET | Refills: 0 | Status: SHIPPED | OUTPATIENT
Start: 2019-12-09 | End: 2020-01-10

## 2019-12-09 RX ORDER — OXYCODONE AND ACETAMINOPHEN 7.5; 325 MG/1; MG/1
TABLET ORAL
Qty: 180 TABLET | Refills: 0 | Status: SHIPPED | OUTPATIENT
Start: 2019-12-09 | End: 2020-01-10

## 2019-12-09 NOTE — TELEPHONE ENCOUNTER
Reason for Call:  Medication or medication refill:    Do you use a [facility]?  Name of the pharmacy and phone number for the current request:  [facility]    Name of the medication requested: Percocet and Methodone    Other request:     Can we leave a detailed message on this number? NO    Phone number patient can be reached at: [phone]     Best Time:     Call taken on 12/9/2019 at 2:37 PM by Ana Barillas

## 2019-12-09 NOTE — TELEPHONE ENCOUNTER
Reason for call:  Form  Reason for Call:  Form, our goal is to have forms completed with 72 hours, however, some forms may require a visit or additional information.    Type of letter, form or note:  Medical    Who is the form from?: Keke Gross      Where did the form come from: form was faxed in    What clinic location was the form placed at?: [facility] - [phone]    Where the form was placed: 's in box     What number is listed as a contact on the form?: [phone]       Additional comments: Fax back to [phone]    Call taken on 12/9/2019 at 9:58 AM by Leland Gaines

## 2019-12-09 NOTE — TELEPHONE ENCOUNTER
Pending Prescriptions:                       Disp   Refills    oxyCODONE-acetaminophen (PERCOCET) 7.5-32*180 ta*0            Sig: Take 2 tablets by mouth every 6 hours as needed           for pain, max 6 per day     Last Written Prescription Date:  2019  Last Fill Quantity: 180,  # refills: 0   Last office visit: 10/15/2019 with prescribing provider:    Future Office Visit:   Next 5 appointments (look out 90 days)    2020  1:00 PM CST  [facility] [address] [place] [address], [address]  [facility] [address]  [phone]              methadone (DOLOPHINE) 5 MG tablet         300 ta*0            Si mg po qam, 15 mg at 1 pm daily and 15 mg at           bedtime. To last 72 hours    Last Written Prescription Date:  2019  Last Fill Quantity: 300,  # refills: 0   Last office visit: 10/15/2019 with prescribing provider:     Future Office Visit:   Next 5 appointments (look out 90 days)    2020  1:00 PM CST  Office Visit with [facility]i [facility] [address], [address]  [facility] [address]  [phone]         Susan Ordoñez RN, BSN
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
## 2019-12-10 ENCOUNTER — TRANSFERRED RECORDS (OUTPATIENT)
Dept: HEALTH INFORMATION MANAGEMENT | Facility: CLINIC | Age: 49
End: 2019-12-10

## 2019-12-10 ENCOUNTER — TELEPHONE (OUTPATIENT)
Dept: FAMILY MEDICINE | Facility: CLINIC | Age: 49
End: 2019-12-10

## 2019-12-10 NOTE — TELEPHONE ENCOUNTER
Reason for call:  Form  Reason for Call:  Form, our goal is to have forms completed with 72 hours, however, some forms may require a visit or additional information.    Type of letter, form or note:  Medical    Who is the form from?: Keke Gross [facility]      Where did the form come from: form was faxed in    What clinic location was the form placed at?: [facility] - [phone]    Where the form was placed: 's in box     What number is listed as a contact on the form?: [phone]       Additional comments: Fax back to [phone]    Call taken on 12/10/2019 at 12:04 PM by Leland Gaines

## 2019-12-10 NOTE — TELEPHONE ENCOUNTER
Faxed this form again to Keke Gross [facility] - noting it is the second request for this to be completed and faxed back to us.

## 2019-12-11 ENCOUNTER — NURSE TRIAGE (OUTPATIENT)
Dept: NURSING | Facility: CLINIC | Age: 49
End: 2019-12-11

## 2019-12-11 NOTE — TELEPHONE ENCOUNTER
Methadone ordered by Majo. What is the dosing? RN needs to confirm it. I read the following:  methadone (DOLOPHINE) 5 MG tablet 300 tablet 0 2019  No   Si mg po qam, 15 mg at 1 pm daily and 15 mg at bedtime. To last 72 hours     Loida Cowart RN-[facility]

## 2019-12-12 ENCOUNTER — TELEPHONE (OUTPATIENT)
Dept: FAMILY MEDICINE | Facility: CLINIC | Age: 49
End: 2019-12-12

## 2019-12-12 NOTE — TELEPHONE ENCOUNTER
Our goal is to have forms completed with 72 hours, however some forms may require a visit or additional information.    Who is the form from?: Keke Gross [facility]  (if other please explain)  Where the form came from: form was faxed in  What clinic location was the form placed at?: [facility]  Where the form was placed: placed in TC inbox     What number is listed as a contact on the form?: [phone]  Fax number to return form to:  [phone]        Call taken on 12/12/2019 at 2:35 PM by Virgie Dudley

## 2019-12-13 ENCOUNTER — TELEPHONE (OUTPATIENT)
Dept: FAMILY MEDICINE | Facility: CLINIC | Age: 49
End: 2019-12-13

## 2019-12-13 NOTE — TELEPHONE ENCOUNTER
received completed wound care form back form Keke Gross    Form signed by Dr. Dipti Kapser     Faxed to [facility]

## 2019-12-13 NOTE — TELEPHONE ENCOUNTER
Received callback from [facility] nurse    98.1  110/70 72  O2 97%    No concerns    Susan Ordoñez, RN, BSN

## 2019-12-13 NOTE — TELEPHONE ENCOUNTER
Our goal is to have forms completed with 72 hours, however some forms may require a visit or additional information.    Who is the form from?: Keke Gross [facility]  (if other please explain)  Where the form came from: form was faxed in  What clinic location was the form placed at?: [facility]  Where the form was placed: placed in TC inbox     What number is listed as a contact on the form?: [phone]  Fax number to return form to:  [phone]        Call taken on 12/13/2019 at 2:55 PM by Virgie Dudley

## 2019-12-13 NOTE — TELEPHONE ENCOUNTER
When pt calls back please transfer to HARDEEP Davis    Received fax from Home RN with vitals temp 101.1.     I spoke with the pt who states she ended up going into the hospital. Just got home yesterday. Went in on Tuesday. She states she was septic and a UTI. Nurse is coming this afternoon. They will callback with updated vitals.    Susan Ordoñez RN, BSN

## 2019-12-17 ENCOUNTER — TELEPHONE (OUTPATIENT)
Dept: FAMILY MEDICINE | Facility: CLINIC | Age: 49
End: 2019-12-17

## 2019-12-17 NOTE — TELEPHONE ENCOUNTER
Reason for call:  Form  Reason for Call:  Form, our goal is to have forms completed with 72 hours, however, some forms may require a visit or additional information.    Type of letter, form or note:  Medical    Who is the form from?:  [facility]     Where did the form come from: form was faxed in    What clinic location was the form placed at?: [facility] - [phone]    Where the form was placed: 's in box     What number is listed as a contact on the form?: [phone]       Additional comments: Fax back to [phone]    Call taken on 12/17/2019 at 4:11 PM by Leland Gaines

## 2019-12-24 ENCOUNTER — TELEPHONE (OUTPATIENT)
Dept: FAMILY MEDICINE | Facility: CLINIC | Age: 49
End: 2019-12-24

## 2019-12-24 NOTE — TELEPHONE ENCOUNTER
Reason for call:  Form  Reason for Call:  Form, our goal is to have forms completed with 72 hours, however, some forms may require a visit or additional information.    Type of letter, form or note:  Medical    Who is the form from?:  Keke Gross [facility]     Where did the form come from: form was faxed in    What clinic location was the form placed at?: [facility] - [phone]    Where the form was placed: 's in box     What number is listed as a contact on the form?: [phone]       Additional comments: Fax back to [phone]      Call taken on 12/24/2019 at 9:23 AM by Leland Gaines

## 2019-12-26 DIAGNOSIS — I10 BENIGN ESSENTIAL HYPERTENSION: ICD-10-CM

## 2019-12-26 DIAGNOSIS — R56.9 SEIZURES (H): ICD-10-CM

## 2019-12-26 RX ORDER — LISINOPRIL 20 MG/1
20 TABLET ORAL DAILY
Qty: 90 TABLET | Refills: 30 | Status: SHIPPED | OUTPATIENT
Start: 2019-12-26 | End: 2020-02-12

## 2019-12-26 RX ORDER — LEVETIRACETAM 750 MG/1
750 TABLET ORAL 2 TIMES DAILY
Qty: 90 TABLET | Refills: 0 | Status: SHIPPED | OUTPATIENT
Start: 2019-12-26 | End: 2020-04-06

## 2019-12-26 NOTE — TELEPHONE ENCOUNTER
"Please call and help schedule 3 month recheck.      Requested Prescriptions   Pending Prescriptions Disp Refills     levETIRAcetam (KEPPRA) 750 MG tablet 180 tablet 1     Sig: Take 1 tablet (750 mg) by mouth 2 times daily       Anti-Seizure Meds Protocol  Failed - 12/26/2019  2:33 PM        Failed - Review Authorizing provider's last note.      Refer to last progress notes: confirm request is for original authorizing provider (cannot be through other providers).          Failed - Normal CBC on file in past 26 months     Recent Labs   Lab Test 09/23/13   WBC 12.8   RBC 5.59   HGB 13.3   HCT 42.2   *                 Failed - Normal platelet count on file in past 26 months     Recent Labs   Lab Test 09/23/13   *               Passed - Recent (12 mo) or future (30 days) visit within the authorizing provider's specialty     Patient has had an office visit with the authorizing provider or a provider within the authorizing providers department within the previous 12 mos or has a future within next 30 days. See \"Patient Info\" tab in inbasket, or \"Choose Columns\" in Meds & Orders section of the refill encounter.              Passed - Normal ALT or AST on file in past 26 months     Recent Labs   Lab Test 10/15/19  1453   ALT 22     Recent Labs   Lab Test 10/15/19  1453   AST 16             Passed - Medication is active on med list        Passed - No active pregnancy on record        Passed - No positive pregnancy test in last 12 months        lisinopril (PRINIVIL/ZESTRIL) 20 MG tablet 90 tablet 0     Sig: Take 1 tablet (20 mg) by mouth daily       ACE Inhibitors (Including Combos) Protocol Passed - 12/26/2019  2:33 PM        Passed - Blood pressure under 140/90 in past 12 months     BP Readings from Last 3 Encounters:   10/15/19 110/60   01/17/18 112/60   06/27/17 110/60                 Passed - Recent (12 mo) or future (30 days) visit within the authorizing provider's specialty     Patient has had an office " "visit with the authorizing provider or a provider within the authorizing providers department within the previous 12 mos or has a future within next 30 days. See \"Patient Info\" tab in inbasket, or \"Choose Columns\" in Meds & Orders section of the refill encounter.              Passed - Medication is active on med list        Passed - Patient is age 18 or older        Passed - No active pregnancy on record        Passed - Normal serum creatinine on file in past 12 months     Recent Labs   Lab Test 10/15/19  1453   CR 0.84             Passed - Normal serum potassium on file in past 12 months     Recent Labs   Lab Test 10/15/19  1453   POTASSIUM 4.3             Passed - No positive pregnancy test within past 12 months        Last OV:  10/15/2019    I called and spoke with patient. She reports she lost both her Keppra and Lisinopril prescriptions recently. She stated she had tried to get Keppra refilled as she had a refill at pharmacy, but that it was not able to be filled due to being early.    Medication is being filled for 1 time refill only due to:  Patient needs to be seen because due for 3 month follow up..     Raudel Newby, RN, BSN      "

## 2019-12-26 NOTE — TELEPHONE ENCOUNTER
Reason for Call:  Medication or medication refill:    Do you use a [facility]?  Name of the pharmacy and phone number for the current request:  [facility]   [phone]    Name of the medication requested: Keppra BP pills    Other request:  none    Can we leave a detailed message on this number? YES    Phone number patient can be reached at: Home number on file [phone] (home)    Best Time: anytime    Call taken on 12/26/2019 at 2:22 PM by Leland Gaines

## 2019-12-27 ENCOUNTER — TELEPHONE (OUTPATIENT)
Dept: FAMILY MEDICINE | Facility: CLINIC | Age: 49
End: 2019-12-27

## 2019-12-27 NOTE — TELEPHONE ENCOUNTER
Our goal is to have forms completed with 72 hours, however some forms may require a visit or additional information.    Who is the form from?: Keke Gross [facility]  (if other please explain)  Where the form came from: form was faxed in  What clinic location was the form placed at?: [facility]  Where the form was placed: placed in TC inbox     What number is listed as a contact on the form?: [phone]  Fax number to return form to:  [phone]        Call taken on 12/27/2019 at 2:36 PM by Virgie Dudley

## 2019-12-30 ENCOUNTER — TELEPHONE (OUTPATIENT)
Dept: FAMILY MEDICINE | Facility: CLINIC | Age: 49
End: 2019-12-30

## 2019-12-30 NOTE — TELEPHONE ENCOUNTER
Our goal is to have forms completed with 72 hours, however some forms may require a visit or additional information.    Who is the form from?: Keke Gross [facility]  (if other please explain)  Where the form came from: form was faxed in  What clinic location was the form placed at?: [facility]  Where the form was placed: placed in TC inbox     What number is listed as a contact on the form?: [phone]  Fax number to return form to:  [phone]        Call taken on 12/30/2019 at 3:45 PM by Virgie Dudley

## 2020-01-02 ENCOUNTER — TELEPHONE (OUTPATIENT)
Dept: FAMILY MEDICINE | Facility: CLINIC | Age: 50
End: 2020-01-02

## 2020-01-02 ENCOUNTER — MEDICAL CORRESPONDENCE (OUTPATIENT)
Dept: HEALTH INFORMATION MANAGEMENT | Facility: CLINIC | Age: 50
End: 2020-01-02

## 2020-01-02 NOTE — TELEPHONE ENCOUNTER
Reason for call:  Form  Reason for Call:  Form, our goal is to have forms completed with 72 hours, however, some forms may require a visit or additional information.    Type of letter, form or note:  Medical    Who is the form from?: johnnie busch      Where did the form come from: form was faxed in    What clinic location was the form placed at?: [facility] - [phone]    Where the form was placed: 's in box     What number is listed as a contact on the form?: [phone]       Additional comments: Fax back to [phone]    Call taken on 1/2/2020 at 2:52 PM by Leland Gaines

## 2020-01-07 ENCOUNTER — TELEPHONE (OUTPATIENT)
Dept: FAMILY MEDICINE | Facility: CLINIC | Age: 50
End: 2020-01-07

## 2020-01-07 NOTE — TELEPHONE ENCOUNTER
Reason for call:  Form  Reason for Call:  Form, our goal is to have forms completed with 72 hours, however, some forms may require a visit or additional information.    Type of letter, form or note:  Medical    Who is the form from?: Keke Gross [facility]      Where did the form come from: form was faxed in    What clinic location was the form placed at?: [facility] - [phone]    Where the form was placed: 's in box     What number is listed as a contact on the form?: [phone]       Additional comments: Fax back to [phone]    Call taken on 1/7/2020 at 3:29 PM by Leland Gaines

## 2020-01-07 NOTE — TELEPHONE ENCOUNTER
Reason for Call:  Other call back      Detailed comments: home care calling wanting to know if Dr. Kasper got patient UA results that were sent to her, because patient has not heard from her yet.     Phone Number: [phone]- ([facility] home care nurse)      Can we leave a detailed message on this number? YES    Call taken on 1/7/2020 at 3:17 PM by Ginette Dick

## 2020-01-08 ENCOUNTER — TELEPHONE (OUTPATIENT)
Dept: FAMILY MEDICINE | Facility: CLINIC | Age: 50
End: 2020-01-08

## 2020-01-08 NOTE — TELEPHONE ENCOUNTER
Reason for call:  Form  Reason for Call:  Form, our goal is to have forms completed with 72 hours, however, some forms may require a visit or additional information.    Type of letter, form or note:  Medical    Who is the form from?:  [facility] [employer] [employer]     Where did the form come from: form was faxed in    What clinic location was the form placed at?: [facility] - [phone]    Where the form was placed: 's in box     What number is listed as a contact on the form?: [phone]       Additional comments: Fax back to [phone]    Call taken on 1/8/2020 at 4:33 PM by Leland Gaines

## 2020-01-11 DIAGNOSIS — G89.21 CHRONIC PAIN DUE TO INJURY: ICD-10-CM

## 2020-01-13 ENCOUNTER — TELEPHONE (OUTPATIENT)
Dept: FAMILY MEDICINE | Facility: CLINIC | Age: 50
End: 2020-01-13

## 2020-01-13 NOTE — TELEPHONE ENCOUNTER
Faxed note to [facility] [place] [facility] asking them to clarify need for PA - faxed their PA approval letter (11/2019) back to them as well to review.  Faxed to [phone]  Awaiting reply from insurance.

## 2020-01-13 NOTE — TELEPHONE ENCOUNTER
Reason for call:  Form  Reason for Call:  Form, our goal is to have forms completed with 72 hours, however, some forms may require a visit or additional information.    Type of letter, form or note:  Medical    Who is the form from?:Keke Gross [facility]     Where did the form come from: form was faxed in    [facility] [phone]    Where the form was placed: 's in box     What number is listed as a contact on the form?: [phone]       Additional comments: Fax back to [phone]    Call taken on 1/13/2020 at 8:51 AM by Leland Gaines

## 2020-01-13 NOTE — TELEPHONE ENCOUNTER
Prior Authorization Retail Medication Request  Medication/Dose:  Methadone 5 mg    A new PA is needed for quantity over #204 @ 6 per day      Provider, do you want to change the medication or start a PA?    Please outline justification why patient needs this medication?    What medications has patient previously Tried and Failed?      Insurance ID (if provided): [id number]  Insurance Phone (if provided): [phone]

## 2020-01-13 NOTE — TELEPHONE ENCOUNTER
Received a reply form insurance - note states: we rare unable to further process this request.  Pt is no longer eligible as of 12/31/2019.  Please discuss other pharmacy benefits coverage with the pt.    Called the pharmacy to inquire if pt has new Rx insurance.  The pharmacy stated the pt is now straight [place] for insurance and the pharmacy had submitted a PA that was approved (through May 2020) for the medication but [place] was still restricting the quantity to 6 per day, unless the correct override codes are submitted (by the pharmacy).  Pharmacy informed me there is nothing the clinic can do at this time.  It is between the pharmacy and [place] insurance.

## 2020-01-14 RX ORDER — IBUPROFEN 200 MG
TABLET ORAL
Qty: 120 TABLET | Refills: 0 | Status: SHIPPED | OUTPATIENT
Start: 2020-01-14 | End: 2020-03-25

## 2020-01-14 NOTE — TELEPHONE ENCOUNTER
Pending Prescriptions:                       Disp   Refills    ibuprofen (ADVIL/MOTRIN) 200 MG tablet [Ph*120 ta*0        Sig: TAKE 1 TABLET (200 MG) BY MOUTH EVERY 4 HOURS AS           NEEDED FOR PAIN    Routing refill request to provider for review/approval because:  Labs

## 2020-01-20 ENCOUNTER — TRANSFERRED RECORDS (OUTPATIENT)
Dept: HEALTH INFORMATION MANAGEMENT | Facility: CLINIC | Age: 50
End: 2020-01-20

## 2020-01-20 ENCOUNTER — TELEPHONE (OUTPATIENT)
Dept: FAMILY MEDICINE | Facility: CLINIC | Age: 50
End: 2020-01-20

## 2020-01-20 NOTE — TELEPHONE ENCOUNTER
Reason for call:  Form  Reason for Call:  Form, our goal is to have forms completed with 72 hours, however, some forms may require a visit or additional information.    Type of letter, form or note:  medical    Who is the form from?:  [facility]     Where did the form come from: form was faxed in    What clinic location was the form placed at?: [facility] - [phone]    Where the form was placed: 's Box    What number is listed as a contact on the form?:  [phone]       Additional comments:  Fax to [phone]    created by Kelli Reza

## 2020-01-21 ENCOUNTER — TELEPHONE (OUTPATIENT)
Dept: FAMILY MEDICINE | Facility: CLINIC | Age: 50
End: 2020-01-21

## 2020-01-21 NOTE — PROGRESS NOTES
Subjective     Agatha Canas is a 49 year old female who presents to clinic today for the following health issues:    History of Present Illness        Mental Health Follow-up:  Patient presents to follow-up on Depression & Anxiety.Patient's depression since last visit has been:  Good  The patient is not having other symptoms associated with depression.  Patient's anxiety since last visit has been:  Worse  The patient is having other symptoms associated with anxiety. (has alot to take care of)  Any significant life events: No  Patient is not feeling anxious or having panic attacks.  Patient has no concerns about alcohol or drug use.     Social History  Tobacco Use    Smoking status: Current Every Day Smoker      Packs/day: 0.50      Years: 20.00      Pack years: 10      Types: Cigarettes    Smokeless tobacco: Never Used    Tobacco comment: currently on nicoderm patch   Alcohol use: No  Drug use: No      Today's PHQ-9         PHQ-9 Total Score:     (P) 7   PHQ-9 Q9 Thoughts of better off dead/self-harm past 2 weeks :   (P) Not at all   Thoughts of suicide or self harm:      Self-harm Plan:        Self-harm Action:          Safety concerns for self or others:           Hypertension: She presents for follow up of hypertension.  She does not check blood pressure  regularly outside of the clinic. Outpatient blood pressures have not been over 140/90. She follows a low salt diet.     Hypothyroidism:     Since last visit, patient describes the following symptoms::  None    Weight gain::  No weight gain    Weight loss::  No weight loss    She eats 2-3 servings of fruits and vegetables daily.She consumes 5 sweetened beverage(s) daily.She exercises with enough effort to increase her heart rate 10 to 19 minutes per day.  She exercises with enough effort to increase her heart rate 3 or less days per week.   She is taking medications regularly.    Paraplegia/chronic pain   The patient states that she consulted the Pain  Clinic. She notes the pain clinic wants her to get a lumbar injection.  She states that last time she got an injection, she had spasticity, so she is afraid of it. She states that this injection would take over a span of two weeks. [facility]   She is wondering if she could increase her Baclofen to 80 MG daily.  They would like to have her change from the Methadone to Suboxone.   She is willing to try this.   She is scheduled for physical therapy and also behavioral.   She would like to do this closer to home in [place].       Hypothyroidism   The patient states that she is sweating a lot. She notes that her Mother keeps the heat up, but she is wondering if this is normal.   She also notes losing a lot of hair.       Pap  The patient states that her Pap is due.     Patient Active Problem List   Diagnosis     Paraplegia (H)     Injury, other and unspecified, other specified sites, including multiple     Acute posthemorrhagic anemia     Open fracture of ischium (H)     Open fracture of sacrum and coccyx (H)     Cellulitis and abscess     Open fracture of lumbar spine with spinal cord injury (H)     Prolonged depressive reaction     Other and unspecified alcohol dependence, unspecified drinking behavior     Cellulitis of leg     Constipation     Chronic pain syndrome     Uterovaginal prolapse, incomplete     Other disorder of menstruation and other abnormal bleeding from female genital tract     CARDIOVASCULAR SCREENING; LDL GOAL LESS THAN 160     Health Care Home     Dermatitis     Rosacea     Anxiety associated with depression     PTSD (post-traumatic stress disorder)     Urinary incontinence     Hypothyroidism     Tobacco use disorder     Chantel gangrene     Colostomy status (H)     Open wound of buttock, complicated     Controlled substance agreement signed     Anxiety     Latex sensitivity     Recurrent UTI     Benign essential hypertension     Past Surgical  History:   Procedure Laterality Date     PAST SURGICAL HISTORY      bilateral ankle fusions secondary to fractures     PAST SURGICAL HISTORY      I & D open coccyx fractures       Social History     Tobacco Use     Smoking status: Current Every Day Smoker     Packs/day: 0.50     Years: 20.00     Pack years: 10.00     Types: Cigarettes     Smokeless tobacco: Never Used     Tobacco comment: currently on nicoderm patch    Substance Use Topics     Alcohol use: No     Family History   Problem Relation Age of Onset     Hypertension Mother      Depression Mother      Hypertension Father      Depression Father      Depression Brother      Hypertension Brother      Depression Sister      Hypertension Sister      Hypertension Sister      Asthma No family hx of      C.A.D. No family hx of      Diabetes No family hx of      Cerebrovascular Disease No family hx of      Breast Cancer No family hx of      Cancer - colorectal No family hx of      Prostate Cancer No family hx of      Cancer No family hx of      Alzheimer Disease No family hx of      Arthritis No family hx of      Blood Disease No family hx of      Cardiovascular No family hx of      Circulatory No family hx of      Eye Disorder No family hx of      Gastrointestinal Disease No family hx of      Heart Disease No family hx of      Genitourinary Problems No family hx of      Lipids No family hx of      Musculoskeletal Disorder No family hx of      Neurologic Disorder No family hx of      Respiratory No family hx of      Thyroid Disease No family hx of          Current Outpatient Medications   Medication Sig Dispense Refill     albuterol (PROAIR HFA/PROVENTIL HFA/VENTOLIN HFA) 108 (90 Base) MCG/ACT inhaler Inhale 2 puffs into the lungs every 6 hours as needed for shortness of breath / dyspnea or wheezing 1 Inhaler 1     baclofen (LIORESAL) 20 MG tablet TAKE 1 TABLET (20 MG) BY MOUTH 3 TIMES DAILY 90 tablet 1     buPROPion (WELLBUTRIN SR) 150 MG 12 hr tablet TAKE 1 TABLET  (150 MG) BY MOUTH 2 TIMES DAILY 180 tablet 1     calcium carbonate-vitamin D (CALCIUM 600+D) 600-200 MG-UNIT TABS        CERTAVITE/ANTIOXIDANTS tablet TAKE 1 TABLET BY MOUTH DAILY 90 tablet 3     CRANBERRY OR twice daily       FISH OIL OR once daily       hydrOXYzine (ATARAX) 25 MG tablet TAKE 1 TABLET (25 MG) BY MOUTH 3 TIMES DAILY AS NEEDED FOR ITCHING 90 tablet 0     ibuprofen (ADVIL/MOTRIN) 200 MG tablet TAKE 1 TABLET (200 MG) BY MOUTH EVERY 4 HOURS AS NEEDED FOR PAIN 120 tablet 0     levETIRAcetam (KEPPRA) 750 MG tablet Take 1 tablet (750 mg) by mouth 2 times daily 90 tablet 0     levothyroxine (SYNTHROID/LEVOTHROID) 75 MCG tablet TAKE 1 TABLET (75 MCG) BY MOUTH DAILY 90 tablet 2     lisinopril (PRINIVIL/ZESTRIL) 20 MG tablet Take 1 tablet (20 mg) by mouth daily 90 tablet 30     methadone (DOLOPHINE) 5 MG tablet 20 mg po qam, 15 mg at 1 pm daily and 15 mg at bedtime. To last 72 hours 300 tablet 0     metroNIDAZOLE (METROCREAM) 0.75 % cream Apply topically 2 times daily 45 g 11     Multiple Vitamin (MULTIVITAMIN) per tablet Take 1 tablet by mouth daily. 100 tablet 1     naloxone (NARCAN) 4 MG/0.1ML nasal spray Spray 1 spray (4 mg) into one nostril alternating nostrils as needed for opioid reversal every 2-3 minutes until assistance arrives 0.2 mL 1     nicotine (NICODERM CQ) 21 MG/24HR patch 2h hr Place 1 patch onto the skin every 24 hours 30 patch 1     nystatin (MYCOSTATIN) 601637 UNIT/GM external cream APPLY TOPICALLY 3 TIMES DAILY FOR 14 DAYS 60 g 3     order for DME Equipment being ordered: Wheelchair - manual. 1 each 0     order for DME Equipment being ordered: needs new ROHO for wheelchair. Current does not fit wheelchair. 1 each 0     order for DME Equipment being ordered: chux - 150 per month 1 Month 11     order for DME Equipment being ordered: Regular order:  2 catheter - 16 french, 5 or 10 balloon  Catheter split sponges - 75 count 4x5?  2 trays  4 night bags - 2000 ml  4 day bags  3 leg bands  15  night pads/chucks  Agacel AG 4x5 foam - 30 count  3 gauze 4x4 200 count  4 boxes of gloves size L  4 hyflex tape  1 box alcohol pads 1 Units 11     order for DME Equipment being ordered:  1 box alcohol pads  1 box skin prep  1 bottle of Microcyn  Cleanser and adhesive remover wipes  30 ABD pads 5x9 sterile dressing  2 boxes of ring barriers  1 box adhesive glue  3 boxes of convatec 4 in 5 count reference #[id number]  2 boxes of convatec 4 in 10 count reference #[id number]     Extra wound care supplies:  2 boxes of hypoflex tape  1 4x4 gauze 200 count  34 ABD pads  10 duoderm dressing - border duoderm - 6x6 1 Units 11     order for DME Equipment being ordered:     hyperfix nonwoven tape  Wound care TID  12 rolls per month 1 each 11     order for DME Equipment being ordered: Appropriate needed wound healing supplies for cleansing in treating the buttock wound 3 times daily. 90 each 3     order for DME Equipment being ordered:     ABD pads sterile dressing 5X9  For wound care dressing changes TID  90 each per month 1 each 11     order for DME Equipment being ordered:     duoderm 6X6  Wound care TID  15 each per month 1 each 11     order for DME Equipment being ordered:     Sponge drain IV sterile 4x4  Wound care TID  225 pack per month 1 each 11     order for DME Equipment being ordered:     Aquacel AG 4x5  Wound care TID  90 each per month 1 each 11     order for DME Equipment being ordered:     microcyn wound care spray  Wound care TID  3 bottles per month 1 each 11     order for DME Equipment being ordered: 16 Estonian silicone coated  - with NO LIP 1 each 3     order for DME Order for hospital bed. 1 each 0     order for DME Therapeutic mattress 1 each 0     order for DME Equipment being ordered: Aquaphil AG  4x10  For 28 day supply monthly 28 each 11     order for DME Equipment being ordered: aquafill silver 4x10, 28 per month 28 each 11     order for DME Equipment being ordered: AG Batool 4X8, 28 per month. 28 each 11      ORDER FOR DME Equipment being ordered:irrigation kit for bladder catheter 1 each 1     oxyCODONE-acetaminophen (PERCOCET) 7.5-325 MG per tablet Take 2 tablets by mouth every 6 hours as needed for pain, max 6 per day 180 tablet 0     Pregabalin (LYRICA) 200 MG capsule Take 1 capsule (200 mg) by mouth 3 times daily 270 capsule 1     triamcinolone (ARISTOCORT HP) 0.5 % external cream APPLY SPARINGLY TO AFFECTED AREA THREE TIMES DAILY. 30 g 1     triamcinolone (KENALOG) 0.1 % cream Apply  topically 3 times daily. Apply sparingly to affected area. 30 g 5     venlafaxine (EFFEXOR) 75 MG tablet 2 tabs po qam and 1 po qhs 270 tablet 3     budesonide-formoterol (SYMBICORT) 80-4.5 MCG/ACT Inhaler Inhale 2 puffs into the lungs 2 times daily (Patient not taking: Reported on 1/24/2020) 3 Inhaler 1     ciclopirox (LOPROX) 0.77 % cream Apply topically 2 times daily To toenails 30 g 2     ORDER FOR DME  LARGE DISPOSABLE UNDERPAD to use as needed. 200 each 8     Allergies   Allergen Reactions     Penicillins Hives     Pt states it was quite a long time ago so she doesn't exactly remember     Clindamycin Hives and Rash     Recent Labs   Lab Test 10/15/19  1453 08/13/19 11/27/18 06/27/17  1224  12/02/16 11/25/16  1404   LDL  --   --   --   --   --   --   --  Cannot estimate LDL when triglyceride exceeds 400 mg/dL  88   HDL  --   --   --   --   --   --   --  28*   TRIG  --   --   --   --   --   --   --  597*   ALT 22 31  --   --   --   --  17 18   CR 0.84 1.9* 1.0   < >  --   --  1.0 0.94   GFRESTIMATED 82  --  59*   < >  --   --  60 64   GFRESTBLACK >90  --   --   --   --   --   --  78   POTASSIUM 4.3 4.2 3.8   < >  --   --  4.2 4.1   TSH 3.41  --   --   --  3.49   < >  --  7.04*    < > = values in this interval not displayed.      BP Readings from Last 3 Encounters:   01/24/20 122/62   10/15/19 110/60   01/17/18 112/60    Wt Readings from Last 3 Encounters:   01/24/20 68 kg (150 lb)   09/30/11 68 kg (150 lb)   06/27/11  "65.8 kg (145 lb)        Reviewed and updated as needed this visit by Provider         Review of Systems   ROS COMP: CONSTITUTIONAL: NEGATIVE for fever, chills, change in weight  ENT/MOUTH: NEGATIVE for ear, mouth and throat problems  RESP: NEGATIVE for significant cough or SOB  CV: NEGATIVE for chest pain, palpitations or peripheral edema  ENDOCRINE: NEGATIVE for temperature intolerance, skin; POSITIVE for hair loss and excessive sweating    This document serves as a record of the services and decisions personally performed and made by Dipti Kasper MD. It was created on her behalf by Lydia Murphy, a trained medical scribe. The creation of this document is based the provider's statements to the medical scribe.    Lydia Murphy January 24, 2020 4:01 PM        Objective    /62   Pulse 79   Temp 96.7  F (35.9  C) (Temporal)   Resp 14   Ht 1.727 m (5' 8\")   Wt 68 kg (150 lb)   SpO2 95%   BMI 22.81 kg/m    Body mass index is 22.81 kg/m .  Physical Exam   GENERAL: healthy, alert and no distress  RESP: lungs clear to auscultation - no rales, rhonchi or wheezes  CV: regular rate and rhythm, normal S1 S2, no S3 or S4, no murmur, click or rub, no peripheral edema and peripheral pulses strong  MS: no gross musculoskeletal defects noted, no edema    Diagnostic Test Results:  No results found. However, due to the size of the patient record, not all encounters were searched. Please check Results Review for a complete set of results.        Assessment & Plan     1. Chronic, continuous use of opioids  Patient reports consulting Pain Clinic.  Reviewed patient's record from Pain Clinic- suggested Methadone discontinuation and start Suboxone. Also suggested PT referral and increase of Baclofen.   Also suggested she gets a lumbar injection- patient is afraid as last time she had spasticity. Injection would take a span of two weeks. [facility] at [facility] and needs referral.   Referral for " pain management for injection has been printed and given to patient at visit today.   All questions invited, asked and answered to the patient's apparent satisfaction.  Patient agrees to plan.    - PAIN MANAGEMENT REFERRAL    2. Paraplegia (H)  As above.   Patient will start of higher dose of Baclofen at 80 MG daily- helps with spasticity.   Refills have been ordered.   If symptoms worsen or persist, patient will contact me.   - baclofen (LIORESAL) 20 MG tablet; Take 1 tablet (20 mg) by mouth 4 times daily  Dispense: 360 tablet; Refill: 1    3. Benign essential hypertension  Doing well. Well controlled. Tolerating medication.  No change in plan.     4. Hypothyroidism due to acquired atrophy of thyroid  Having sweats and losing hair.  Recommend recheck of TSH. She declines.   Tolerating medication.  No change in plan.   Will check at next visit if still concerns.     Follow up- Come back in 3 months for recheck and routine visit.     The information in this document, created by the medical scribe for me, accurately reflects the services I personally performed and the decisions made by me. I have reviewed and approved this document for accuracy prior to leaving the patient care area.    Dipti Kasper MD  [facility]    Answers for HPI/ROS submitted by the patient on 1/24/2020   Chronic problems general questions HPI Form  If you checked off any problems, how difficult have these problems made it for you to do your work, take care of things at home, or get along with other people?: Not difficult at all  PHQ9 TOTAL SCORE: 7  SHYLA 7 TOTAL SCORE: 5

## 2020-01-21 NOTE — TELEPHONE ENCOUNTER
Received urine dip results with trace leukocytes. Otherwise ok.     Please check with lab if micro was performed. Done at [facility].

## 2020-01-21 NOTE — TELEPHONE ENCOUNTER
Spoke to lab at [facility].  The urine culture results are in process.  They will fax results to us asap once completed.

## 2020-01-23 NOTE — TELEPHONE ENCOUNTER
"Reviewed results sent.     RN - please reach out to her home care RN.  Patient has yeast in her urine. This generally is not treated. What I would like to know if why this urine was tested.  Is patient \"sick\" or is it due to cloudiness of the urine in her bag.  If she is having symptoms, would like to know what they are.  Likely this does not need to be treated.     Please call my cell with questions.   "

## 2020-01-23 NOTE — TELEPHONE ENCOUNTER
Katt RN called back. Read her Dr. Kasper's note.  When Katt visited patient late Monday, urine in catheter was 'very' dark arlet color, pt was very fatigued, and low grade temp of 99.2 orally. No other urinary changes or symptoms like pain, itching, back/flank pain, blood in urine, no vaginal discharge.    Katt reports pt with hx of frequent UTIs and currently has a wound vac on one of her pressure wounds on her back, and also port. She wanted to rule out UTI based on her symptoms at that time.    She is scheduled to see patient again today.  Requested she call back with an update on patient at that time.  She agrees with this plan.    Flagged for SF as JAMES.  Neelima Anthony, BSN, RN, PHN

## 2020-01-23 NOTE — TELEPHONE ENCOUNTER
LM asking HARDEEP Bolivar ([phone]) to return call. Please transfer to RN. See notes below from Dr. Majo Ordoñez, MSN, RN

## 2020-01-24 ENCOUNTER — OFFICE VISIT (OUTPATIENT)
Dept: FAMILY MEDICINE | Facility: CLINIC | Age: 50
End: 2020-01-24
Payer: MEDICAID

## 2020-01-24 ENCOUNTER — TELEPHONE (OUTPATIENT)
Dept: FAMILY MEDICINE | Facility: CLINIC | Age: 50
End: 2020-01-24

## 2020-01-24 ENCOUNTER — TRANSFERRED RECORDS (OUTPATIENT)
Dept: HEALTH INFORMATION MANAGEMENT | Facility: CLINIC | Age: 50
End: 2020-01-24

## 2020-01-24 VITALS
WEIGHT: 150 LBS | BODY MASS INDEX: 22.73 KG/M2 | DIASTOLIC BLOOD PRESSURE: 62 MMHG | TEMPERATURE: 96.7 F | HEART RATE: 79 BPM | HEIGHT: 68 IN | SYSTOLIC BLOOD PRESSURE: 122 MMHG | RESPIRATION RATE: 14 BRPM | OXYGEN SATURATION: 95 %

## 2020-01-24 DIAGNOSIS — F11.90 CHRONIC, CONTINUOUS USE OF OPIOIDS: Primary | ICD-10-CM

## 2020-01-24 DIAGNOSIS — G82.20 PARAPLEGIA (H): ICD-10-CM

## 2020-01-24 DIAGNOSIS — E03.4 HYPOTHYROIDISM DUE TO ACQUIRED ATROPHY OF THYROID: ICD-10-CM

## 2020-01-24 DIAGNOSIS — I10 BENIGN ESSENTIAL HYPERTENSION: ICD-10-CM

## 2020-01-24 PROCEDURE — 99214 OFFICE O/P EST MOD 30 MIN: CPT | Performed by: FAMILY MEDICINE

## 2020-01-24 RX ORDER — BACLOFEN 20 MG/1
20 TABLET ORAL 4 TIMES DAILY
Qty: 360 TABLET | Refills: 1 | Status: SHIPPED | OUTPATIENT
Start: 2020-01-24 | End: 2020-06-25

## 2020-01-24 ASSESSMENT — ANXIETY QUESTIONNAIRES
3. WORRYING TOO MUCH ABOUT DIFFERENT THINGS: SEVERAL DAYS
6. BECOMING EASILY ANNOYED OR IRRITABLE: SEVERAL DAYS
GAD7 TOTAL SCORE: 5
7. FEELING AFRAID AS IF SOMETHING AWFUL MIGHT HAPPEN: NOT AT ALL
4. TROUBLE RELAXING: NOT AT ALL
1. FEELING NERVOUS, ANXIOUS, OR ON EDGE: MORE THAN HALF THE DAYS
2. NOT BEING ABLE TO STOP OR CONTROL WORRYING: SEVERAL DAYS
7. FEELING AFRAID AS IF SOMETHING AWFUL MIGHT HAPPEN: NOT AT ALL
GAD7 TOTAL SCORE: 5
GAD7 TOTAL SCORE: 5
5. BEING SO RESTLESS THAT IT IS HARD TO SIT STILL: NOT AT ALL

## 2020-01-24 ASSESSMENT — MIFFLIN-ST. JEOR: SCORE: 1358.9

## 2020-01-24 ASSESSMENT — PATIENT HEALTH QUESTIONNAIRE - PHQ9
10. IF YOU CHECKED OFF ANY PROBLEMS, HOW DIFFICULT HAVE THESE PROBLEMS MADE IT FOR YOU TO DO YOUR WORK, TAKE CARE OF THINGS AT HOME, OR GET ALONG WITH OTHER PEOPLE: NOT DIFFICULT AT ALL
SUM OF ALL RESPONSES TO PHQ QUESTIONS 1-9: 7
SUM OF ALL RESPONSES TO PHQ QUESTIONS 1-9: 7

## 2020-01-24 NOTE — TELEPHONE ENCOUNTER
Reason for Call:  Other     Detailed comments: Katt from [facility] giving a update on the patient There is no concerns with vitals signs or no concerns for a uti     Phone Number Patient can be reached at: Other phone number: [phone]    Best Time: anytime     Can we leave a detailed message on this number? YES    Call taken on 1/24/2020 at 8:38 AM by Virgie Dudley

## 2020-01-25 ASSESSMENT — PATIENT HEALTH QUESTIONNAIRE - PHQ9: SUM OF ALL RESPONSES TO PHQ QUESTIONS 1-9: 7

## 2020-01-25 ASSESSMENT — ANXIETY QUESTIONNAIRES: GAD7 TOTAL SCORE: 5

## 2020-01-27 ENCOUNTER — TELEPHONE (OUTPATIENT)
Dept: FAMILY MEDICINE | Facility: CLINIC | Age: 50
End: 2020-01-27

## 2020-01-27 NOTE — TELEPHONE ENCOUNTER
Our goal is to have forms completed with 72 hours, however some forms may require a visit or additional information.    Who is the form from?: [facility]  (if other please explain)  Where the form came from: form was faxed in  What clinic location was the form placed at?: [facility]  Where the form was placed: placed in [facility] inbox     What number is listed as a contact on the form?: [phone]  Fax number to return form to:  [phone]        Call taken on 1/27/2020 at 3:03 PM by Virgie Dudley

## 2020-01-28 ENCOUNTER — TELEPHONE (OUTPATIENT)
Dept: FAMILY MEDICINE | Facility: CLINIC | Age: 50
End: 2020-01-28

## 2020-01-28 NOTE — TELEPHONE ENCOUNTER
Our goal is to have forms completed with 72 hours, however some forms may require a visit or additional information.    Who is the form from?: [facility]  (if other please explain)  Where the form came from: form was faxed in  What clinic location was the form placed at?: [facility]  Where the form was placed: placed in [facility] inbox     What number is listed as a contact on the form?: [phone]  Fax number to return form to:  [phone]        Call taken on 1/28/2020 at 2:23 PM by Virgie Dudley

## 2020-01-31 ENCOUNTER — TELEPHONE (OUTPATIENT)
Dept: FAMILY MEDICINE | Facility: CLINIC | Age: 50
End: 2020-01-31

## 2020-01-31 NOTE — TELEPHONE ENCOUNTER
Reason for call:  Form  Reason for Call:  Form, our goal is to have forms completed with 72 hours, however, some forms may require a visit or additional information.    Type of letter, form or note:  medical    Who is the form from?:  [facility]    Where did the form come from: form was faxed in    What clinic location was the form placed at?: [facility] - [phone]    Where the form was placed: 's Box    What number is listed as a contact on the form?:  [phone]       Additional comments:  Fax to [phone]    created by Kelli Reza

## 2020-02-03 ENCOUNTER — TELEPHONE (OUTPATIENT)
Dept: FAMILY MEDICINE | Facility: CLINIC | Age: 50
End: 2020-02-03

## 2020-02-03 NOTE — TELEPHONE ENCOUNTER
Our goal is to have forms completed with 72 hours, however some forms may require a visit or additional information.    Who is the form from?: [facility]  (if other please explain)  Where the form came from: form was faxed in  What clinic location was the form placed at?: [facility]  Where the form was placed: placed in [facility] inbox     What number is listed as a contact on the form?: [phone]  Fax number to return form to:  [phone]        Call taken on 2/3/2020 at 4:27 PM by Virgie Dudley

## 2020-02-07 ENCOUNTER — TELEPHONE (OUTPATIENT)
Dept: FAMILY MEDICINE | Facility: CLINIC | Age: 50
End: 2020-02-07

## 2020-02-07 DIAGNOSIS — Z93.59 SUPRAPUBIC CATHETER (H): Primary | ICD-10-CM

## 2020-02-07 DIAGNOSIS — G89.21 CHRONIC PAIN DUE TO INJURY: ICD-10-CM

## 2020-02-07 DIAGNOSIS — G82.20 PARAPLEGIA (H): ICD-10-CM

## 2020-02-07 RX ORDER — METHADONE HYDROCHLORIDE 5 MG/1
TABLET ORAL
Qty: 300 TABLET | Refills: 0 | Status: SHIPPED | OUTPATIENT
Start: 2020-02-07 | End: 2020-03-06

## 2020-02-07 RX ORDER — OXYCODONE AND ACETAMINOPHEN 7.5; 325 MG/1; MG/1
TABLET ORAL
Qty: 180 TABLET | Refills: 0 | Status: SHIPPED | OUTPATIENT
Start: 2020-02-07 | End: 2020-03-06

## 2020-02-07 NOTE — TELEPHONE ENCOUNTER
Reason for Call:  Medication or medication refill:    Do you use a [facility]?  Name of the pharmacy and phone number for the current request:  [facility]    Name of the medication requested: methadone and percocet    Other request: please refill    Can we leave a detailed message on this number? YES, please do    Phone number patient can be reached at: Cell number on file:    Telephone Information:   Mobile [phone]       Best Time: any    Call taken on 2/7/2020 at 1:10 PM by Ginette Dick

## 2020-02-07 NOTE — TELEPHONE ENCOUNTER
Our goal is to have forms completed with 72 hours, however some forms may require a visit or additional information.    Who is the form from?: [facility] (if other please explain)  Where the form came from: form was faxed in  What clinic location was the form placed at?: [facility]  Where the form was placed: placed in [facility] inbox     What number is listed as a contact on the form?: [phone]  Fax number to return form to:  [phone]        Call taken on 2/7/2020 at 12:27 PM by Virgie Dudley

## 2020-02-07 NOTE — TELEPHONE ENCOUNTER
Reason for Call: Request for an order or referral:    Order or referral being requested: home care wondering if they could get referral to see urologist for cath. She has been having issues with leaking and pain. She did attempt to try to get a UA today but unable to    Date needed: as soon as possible    Has the patient been seen by the PCP for this problem? YES    Additional comments: please call.    Phone number Patient can be reached at:  Other phone number:  Katt from [facility] [phone]    Best Time:  any    Can we leave a detailed message on this number?  YES    Call taken on 2/7/2020 at 2:39 PM by Promise Cantu

## 2020-02-07 NOTE — TELEPHONE ENCOUNTER
I believe patient has seen urology closer to home. A referral is in but if needs it faxed to a location, let me know and can send.

## 2020-02-10 ENCOUNTER — TELEPHONE (OUTPATIENT)
Dept: FAMILY MEDICINE | Facility: CLINIC | Age: 50
End: 2020-02-10

## 2020-02-10 ENCOUNTER — HEALTH MAINTENANCE LETTER (OUTPATIENT)
Age: 50
End: 2020-02-10

## 2020-02-10 ENCOUNTER — TRANSFERRED RECORDS (OUTPATIENT)
Dept: HEALTH INFORMATION MANAGEMENT | Facility: CLINIC | Age: 50
End: 2020-02-10

## 2020-02-10 NOTE — TELEPHONE ENCOUNTER
Form is from [facility] requesting location, size, drainage amount, etc of pt's wounds.  Faxed form to [facility] (Faxed to [phone]) asking them to complete the form and then fax back to us asap.    Awaiting form.

## 2020-02-10 NOTE — TELEPHONE ENCOUNTER
Reason for call:  Form  Reason for Call:  Form, our goal is to have forms completed with 72 hours, however, some forms may require a visit or additional information.    Type of letter, form or note:  Medical    Who is the form from?: [facility]      Where did the form come from: form was faxed in    What clinic location was the form placed at?: [facility] - [phone]    Where the form was placed: 's in box     What number is listed as a contact on the form?: [phone]       Additional comments: Fax back to [phone]    Call taken on 2/10/2020 at 9:28 AM by Leland Gaines

## 2020-02-10 NOTE — TELEPHONE ENCOUNTER
Reason for call:  Form  Reason for Call:  Form, our goal is to have forms completed with 72 hours, however, some forms may require a visit or additional information.    Type of letter, form or note:  Medical    Who is the form from?: [facility]      Where did the form come from: form was faxed in    What clinic location was the form placed at?: [facility] - [phone]    Where the form was placed: 's in box     What number is listed as a contact on the form?: [phone]       Additional comments: Fax back to [phone]    Call taken on 2/10/2020 at 2:27 PM by Leland Gaines

## 2020-02-10 NOTE — TELEPHONE ENCOUNTER
Prior Authorization Retail Medication Request  Medication/Dose:    oxyCODONE-acetaminophen (PERCOCET) 7.5-325 MG per tablet    Received PA forms that are requesting chart notes, etc.  Provider, please review and let me know what to include in the PA fax to [place] [facility].  Form placed in Providers in-box to be completed.       Insurance ID (if provided):  [id number]  Insurance Phone (if provided): [phone]  Fax [phone]

## 2020-02-10 NOTE — TELEPHONE ENCOUNTER
Please send most recent office visit here and also her transferred records from [facility] 1/24/20 if able.

## 2020-02-11 ENCOUNTER — TRANSFERRED RECORDS (OUTPATIENT)
Dept: HEALTH INFORMATION MANAGEMENT | Facility: CLINIC | Age: 50
End: 2020-02-11

## 2020-02-11 DIAGNOSIS — I10 BENIGN ESSENTIAL HYPERTENSION: ICD-10-CM

## 2020-02-11 LAB
CREAT SERPL-MCNC: 0.9 MG/DL (ref 0.6–1)
GLUCOSE SERPL-MCNC: 76 MG/DL (ref 74–106)
POTASSIUM SERPL-SCNC: 4.6 MMOL/L (ref 3.6–5.2)

## 2020-02-11 NOTE — TELEPHONE ENCOUNTER
Spoke to Katt with Home [facility].  Please place urology referral to [facility] in [place].  Phone [phone], Fax to Attn: Urology [phone]

## 2020-02-11 NOTE — TELEPHONE ENCOUNTER
Our goal is to have forms completed with 72 hours, however some forms may require a visit or additional information.    Who is the form from?: Keke Gross [facility]  (if other please explain)  Where the form came from: form was faxed in  What clinic location was the form placed at?: [facility]  Where the form was placed: placed in TC inbox     What number is listed as a contact on the form?: [phone]  Fax number to return form to:  [phone]        Call taken on 2/10/2020 at 6:31 PM by Virgie Dudley

## 2020-02-12 RX ORDER — LISINOPRIL 20 MG/1
20 TABLET ORAL DAILY
Qty: 90 TABLET | Refills: 1 | Status: SHIPPED | OUTPATIENT
Start: 2020-02-12 | End: 2020-07-27

## 2020-02-12 NOTE — TELEPHONE ENCOUNTER
Pending Prescriptions:                       Disp   Refills    lisinopril (PRINIVIL/ZESTRIL) 20 MG table*90 tab*0            Sig: TAKE 1 TABLET (20 MG) BY MOUTH DAILY    Prescription approved per [facility] Refill Protocol.    Susan Ordoñez, MSN, RN

## 2020-02-13 ENCOUNTER — TELEPHONE (OUTPATIENT)
Dept: FAMILY MEDICINE | Facility: CLINIC | Age: 50
End: 2020-02-13

## 2020-02-13 NOTE — TELEPHONE ENCOUNTER
Our goal is to have forms completed with 72 hours, however some forms may require a visit or additional information.    Who is the form from?: Keke Gross [facility]  (if other please explain)  Where the form came from: form was faxed in  What clinic location was the form placed at?: [facility]  Where the form was placed: placed in TC inbox     What number is listed as a contact on the form?: [phone]  Fax number to return form to:  [phone]        Call taken on 2/13/2020 at 3:04 PM by Virgie Dudley

## 2020-02-17 ENCOUNTER — TELEPHONE (OUTPATIENT)
Dept: FAMILY MEDICINE | Facility: CLINIC | Age: 50
End: 2020-02-17

## 2020-02-17 NOTE — TELEPHONE ENCOUNTER
Received response from [employer] - [place] for oxycodone acetaminophen does not require a PA

## 2020-02-17 NOTE — TELEPHONE ENCOUNTER
Reason for call:  Form  Reason for Call:  Form, our goal is to have forms completed with 72 hours, however, some forms may require a visit or additional information.    Type of letter, form or note:  Medical    Who is the form from?:[facility]       Where did the form come from: form was faxed in    What clinic location was the form placed at?: [facility] - [phone]    Where the form was placed: 's in box     What number is listed as a contact on the form?: [phone]       Additional comments: Fax back to [phone]    Call taken on 2/17/2020 at 7:28 AM by Leland Gaines

## 2020-02-18 ENCOUNTER — TELEPHONE (OUTPATIENT)
Dept: FAMILY MEDICINE | Facility: CLINIC | Age: 50
End: 2020-02-18

## 2020-02-18 NOTE — TELEPHONE ENCOUNTER
Our goal is to have forms completed with 72 hours, however some forms may require a visit or additional information.    Who is the form from?: Keke Gross [facility]  (if other please explain)  Where the form came from: form was faxed in  What clinic location was the form placed at?: [facility]  Where the form was placed: placed in TC inbox     What number is listed as a contact on the form?: [phone]  Fax number to return form to:  [phone]        Call taken on 2/18/2020 at 4:48 PM by Virgie Dudley

## 2020-02-19 ENCOUNTER — TELEPHONE (OUTPATIENT)
Dept: FAMILY MEDICINE | Facility: CLINIC | Age: 50
End: 2020-02-19

## 2020-02-19 NOTE — TELEPHONE ENCOUNTER
Reason for call:  Form  Reason for Call:  Form, our goal is to have forms completed with 72 hours, however, some forms may require a visit or additional information.    Type of letter, form or note:  medical    Who is the form from?:   [facility]     Where did the form come from: form was faxed in    What clinic location was the form placed at?: [facility] - [phone]    Where the form was placed: 's Box    What number is listed as a contact on the form?:   [phone]       Additional comments:  Fax to  [phone]    created by Kelli Reza

## 2020-02-21 ENCOUNTER — TELEPHONE (OUTPATIENT)
Dept: FAMILY MEDICINE | Facility: CLINIC | Age: 50
End: 2020-02-21

## 2020-02-21 NOTE — TELEPHONE ENCOUNTER
Reason for call:  Form  Reason for Call:  Form, our goal is to have forms completed with 72 hours, however, some forms may require a visit or additional information.    Type of letter, form or note:  Medical    Who is the form from?: johnnie busch      Where did the form come from: form was faxed in    What clinic location was the form placed at?: [facility] - [phone]    Where the form was placed: 's in box     What number is listed as a contact on the form?: [phone]       Additional comments: Fax back to [phone]    Call taken on 2/21/2020 at 9:08 AM by Leland Gaines

## 2020-02-24 ENCOUNTER — TELEPHONE (OUTPATIENT)
Dept: FAMILY MEDICINE | Facility: CLINIC | Age: 50
End: 2020-02-24

## 2020-02-24 NOTE — TELEPHONE ENCOUNTER
Could not locate a place for Provider to sign.  Provider, are the forms for review or to sign and fax back?  Form placed in Providers in-box to be completed.

## 2020-02-24 NOTE — TELEPHONE ENCOUNTER
Reason for call:  Form  Reason for Call:  Form, our goal is to have forms completed with 72 hours, however, some forms may require a visit or additional information.    Type of letter, form or note:  Medical    Who is the form from?: Keke Gross      Where did the form come from: form was faxed in    What clinic location was the form placed at?: [facility] - [phone]    Where the form was placed: 's in box     What number is listed as a contact on the form?: [phone]       Additional comments: Fax back to [phone]    Call taken on 2/24/2020 at 8:57 AM by Leland Gaines

## 2020-02-27 ENCOUNTER — TELEPHONE (OUTPATIENT)
Dept: FAMILY MEDICINE | Facility: CLINIC | Age: 50
End: 2020-02-27

## 2020-02-27 NOTE — TELEPHONE ENCOUNTER
Reason for call:  Form  Reason for Call:  Form, our goal is to have forms completed with 72 hours, however, some forms may require a visit or additional information.    Type of letter, form or note:  Medical    Who is the form from?:  Keke Gross [facility]     Where did the form come from: form was faxed in    What clinic location was the form placed at?: [facility] - [phone]    Where the form was placed: 's in box     What number is listed as a contact on the form?: [phone]       Additional comments: Fax back to [phone]    Call taken on 2/27/2020 at 8:41 AM by Leland Gaines

## 2020-03-05 ENCOUNTER — MEDICAL CORRESPONDENCE (OUTPATIENT)
Dept: HEALTH INFORMATION MANAGEMENT | Facility: CLINIC | Age: 50
End: 2020-03-05

## 2020-03-05 ENCOUNTER — TELEPHONE (OUTPATIENT)
Dept: FAMILY MEDICINE | Facility: CLINIC | Age: 50
End: 2020-03-05

## 2020-03-05 NOTE — TELEPHONE ENCOUNTER
Reason for call:  Form  Reason for Call:  Form, our goal is to have forms completed with 72 hours, however, some forms may require a visit or additional information.    Type of letter, form or note:  Medical    Who is the form from?: [facility]      Where did the form come from: form was faxed in    What clinic location was the form placed at?: [facility] - [phone]    Where the form was placed: 's in box     What number is listed as a contact on the form?: [phone]       Additional comments: Fax back to [phone]  Call taken on 3/5/2020 at 1:00 PM by Leland Gaines

## 2020-03-06 ENCOUNTER — TELEPHONE (OUTPATIENT)
Dept: FAMILY MEDICINE | Facility: CLINIC | Age: 50
End: 2020-03-06

## 2020-03-06 DIAGNOSIS — G82.20 PARAPLEGIA (H): ICD-10-CM

## 2020-03-06 DIAGNOSIS — G89.21 CHRONIC PAIN DUE TO INJURY: ICD-10-CM

## 2020-03-06 RX ORDER — OXYCODONE AND ACETAMINOPHEN 7.5; 325 MG/1; MG/1
TABLET ORAL
Qty: 180 TABLET | Refills: 0 | Status: SHIPPED | OUTPATIENT
Start: 2020-03-06 | End: 2020-04-08

## 2020-03-06 RX ORDER — METHADONE HYDROCHLORIDE 5 MG/1
TABLET ORAL
Qty: 300 TABLET | Refills: 0 | Status: SHIPPED | OUTPATIENT
Start: 2020-03-06 | End: 2020-04-08

## 2020-03-06 NOTE — TELEPHONE ENCOUNTER
methadone (DOLOPHINE) 5 MG tablet      Last Written Prescription Date:  2/7/2020  Last Fill Quantity: 300,   # refills: 0  Last Office Visit: 1/24/2020  Future Office visit:    Next 5 appointments (look out 90 days)    Apr 24, 2020  1:00 PM CDT  PHYSICAL with Dipti [facility]asper MD  [facility] [address]  [place] [facility] [address]  [phone]           Routing refill request to provider for review/approval because:  Drug not on the [facility], [facility] or [facility] refill protocol or controlled substance    oxyCODONE-acetaminophen (PERCOCET) 7.5-325 MG per tablet      Last Written Prescription Date:  2/7/2020  Last Fill Quantity: 180,   # refills: 0  Last Office Visit: 1/24/2020  Future Office visit:    Next 5 appointments (look out 90 days)    Apr 24, 2020  1:00 PM CDT  PHYSICAL with Dipti Kasper MD  [facility] [address]  [place] [facility] [address]  [phone]           Routing refill request to provider for review/approval because:  Drug not on the [facility], [facility] or [facility] refill protocol or controlled substance

## 2020-03-06 NOTE — TELEPHONE ENCOUNTER
Our goal is to have forms completed with 72 hours, however some forms may require a visit or additional information.    Who is the form from?: Keke Gross [facility]  (if other please explain)  Where the form came from: form was faxed in  What clinic location was the form placed at?: [facility]  Where the form was placed: placed in TC inbox     What number is listed as a contact on the form?: [phone]  Fax number to return form to:  [phone]      Call taken on 3/6/2020 at 3:16 PM by Virgie Dudley

## 2020-03-06 NOTE — TELEPHONE ENCOUNTER
Reason for Call:  Medication or medication refill:    Do you use a [facility]?  Name of the pharmacy and phone number for the current request:  [facility]    Name of the medication requested: percocet methadone    Other request: patient requesting refill. Declined calling pharmacy    Can we leave a detailed message on this number? YES    Phone number patient can be reached at: Home number on file [phone] (home)    Best Time: any    Call taken on 3/6/2020 at 12:22 PM by Promise Cantu

## 2020-03-11 DIAGNOSIS — G89.4 CHRONIC PAIN SYNDROME: ICD-10-CM

## 2020-03-11 DIAGNOSIS — G82.20 PARAPLEGIA (H): ICD-10-CM

## 2020-03-12 ENCOUNTER — TELEPHONE (OUTPATIENT)
Dept: FAMILY MEDICINE | Facility: CLINIC | Age: 50
End: 2020-03-12

## 2020-03-12 RX ORDER — PREGABALIN 200 MG/1
200 CAPSULE ORAL 3 TIMES DAILY
Qty: 270 CAPSULE | Refills: 1 | Status: SHIPPED | OUTPATIENT
Start: 2020-03-12 | End: 2020-08-30

## 2020-03-12 NOTE — TELEPHONE ENCOUNTER
Pending Prescriptions:                       Disp   Refills    Pregabalin (LYRICA) 200 MG capsule [Pharma*270 ca*1        Sig: Take 1 capsule (200 mg) by mouth 3 times daily    Last Written Prescription Date:  10/15/2019  Last Fill Quantity: 270,  # refills: 1   Last office visit: 1/24/2020 with prescribing provider:     Future Office Visit:   Next 5 appointments (look out 90 days)    Apr 24, 2020  1:00 PM CDT  PHYSICAL with Dipti Kasper MD  [facility] [address]  [place] [address]  [phone]           Routing refill request to provider for review/approval because:  Drug not on the [facility] refill protocol     Susan Ordoñez, MSN, RN

## 2020-03-12 NOTE — TELEPHONE ENCOUNTER
Our goal is to have forms completed with 72 hours, however some forms may require a visit or additional information.    Who is the form from?: [facility] (if other please explain)  Where the form came from: form was faxed in  What clinic location was the form placed at?: [facility]  Where the form was placed: placed in TC inbox     What number is listed as a contact on the form?: [phone]  Fax number to return form to:  [phone]        Call taken on 3/12/2020 at 2:16 PM by Virgie Dudley

## 2020-03-13 DIAGNOSIS — G89.4 CHRONIC PAIN SYNDROME: ICD-10-CM

## 2020-03-15 RX ORDER — HYDROXYZINE HYDROCHLORIDE 25 MG/1
TABLET, FILM COATED ORAL
Qty: 90 TABLET | Refills: 0 | Status: SHIPPED | OUTPATIENT
Start: 2020-03-15 | End: 2020-05-01

## 2020-03-15 NOTE — TELEPHONE ENCOUNTER
Pending Prescriptions:                       Disp   Refills    hydrOXYzine (ATARAX) 25 MG tablet [Pharma*90 tab*0            Sig: TAKE 1 TABLET (25 MG) BY MOUTH 3 TIMES DAILY AS           NEEDED FOR ITCHING    Prescription approved per [facility] Refill Protocol.    Susan Ordoñez, MSN, RN

## 2020-03-16 ENCOUNTER — TELEPHONE (OUTPATIENT)
Dept: FAMILY MEDICINE | Facility: CLINIC | Age: 50
End: 2020-03-16

## 2020-03-16 NOTE — TELEPHONE ENCOUNTER
Reason for call:  Form  Reason for Call:  Form, our goal is to have forms completed with 72 hours, however, some forms may require a visit or additional information.    Type of letter, form or note:  Medical    Who is the form from?:  [facility]     Where did the form come from: form was faxed in    What clinic location was the form placed at?: [facility] - [phone]    Where the form was placed: 's in box     What number is listed as a contact on the form?: [phone]       Additional comments: Fax back to [phone]    Call taken on 3/16/2020 at 3:38 PM by Leland Gaines

## 2020-03-17 ENCOUNTER — TELEPHONE (OUTPATIENT)
Dept: FAMILY MEDICINE | Facility: CLINIC | Age: 50
End: 2020-03-17

## 2020-03-17 NOTE — TELEPHONE ENCOUNTER
Our goal is to have forms completed with 72 hours, however some forms may require a visit or additional information.    Who is the form from?: [facility](if other please explain)  Where the form came from: form was faxed in  What clinic location was the form placed at?: [facility]  Where the form was placed: placed in TC inbox     What number is listed as a contact on the form?: [phone]  Fax number to return form to:  [phone]        Call taken on 3/17/2020 at 2:32 PM by Virgie Dudley

## 2020-03-18 ENCOUNTER — TELEPHONE (OUTPATIENT)
Dept: FAMILY MEDICINE | Facility: CLINIC | Age: 50
End: 2020-03-18

## 2020-03-18 NOTE — TELEPHONE ENCOUNTER
Our goal is to have forms completed with 72 hours, however some forms may require a visit or additional information.    Who is the form from?: [facility] (if other please explain)  Where the form came from: form was faxed in  What clinic location was the form placed at?: [facility]  Where the form was placed: placed in TC inbox     What number is listed as a contact on the form?: [phone]  Fax number to return form to:  [phone]        Call taken on 3/18/2020 at 1:17 PM by Virgie Dudley

## 2020-03-19 ENCOUNTER — TELEPHONE (OUTPATIENT)
Dept: FAMILY MEDICINE | Facility: CLINIC | Age: 50
End: 2020-03-19

## 2020-03-19 NOTE — TELEPHONE ENCOUNTER
Reviewed form. Reached out to Philly at number on fax. No answer. Will attempt to return call again.

## 2020-03-19 NOTE — TELEPHONE ENCOUNTER
Our goal is to have forms completed with 72 hours, however some forms may require a visit or additional information.    Who is the form from?: [facility] (if other please explain)  Where the form came from: form was faxed in  What clinic location was the form placed at?: [facility]  Where the form was placed: Placed in  [employer] bin  What number is listed as a contact on the form?: [phone]    Phone call message- patient request for a letter, form or note:    Date needed: n/a  Please fax to [phone]  Has the patient signed a consent form for release of information? NO    Additional comments: n/a    Call taken on 3/19/2020 at 2:44 PM by Tj Melgar MA    Type of letter, form or note: Vulnerable Adult Verificataion       jT Melgar MA on 3/19/2020 at 2:47 PM

## 2020-03-19 NOTE — TELEPHONE ENCOUNTER
Our goal is to have forms completed with 72 hours, however some forms may require a visit or additional information.    Who is the form from?: [facility]  (if other please explain)  Where the form came from: form was faxed in  What clinic location was the form placed at?: [facility]  Where the form was placed: placed in TC inbox     What number is listed as a contact on the form?: [phone]  Fax number to return form to:  [phone]        Call taken on 3/19/2020 at 12:30 PM by Virgie Dudley

## 2020-03-20 ENCOUNTER — TELEPHONE (OUTPATIENT)
Dept: FAMILY MEDICINE | Facility: CLINIC | Age: 50
End: 2020-03-20

## 2020-03-20 NOTE — TELEPHONE ENCOUNTER
Completed forms and given to kusum to fax.    Adrian Mathias MD  [facility]

## 2020-03-20 NOTE — TELEPHONE ENCOUNTER
Our goal is to have forms completed with 72 hours, however some forms may require a visit or additional information.    Who is the form from?: [facility] (if other please explain)  Where the form came from: form was faxed in  What clinic location was the form placed at?: [facility]  Where the form was placed: placed in TC inbox     What number is listed as a contact on the form?: [phone]  Fax number to return form to:  [phone]      Call taken on 3/20/2020 at 1:11 PM by Virgie Dudley

## 2020-03-24 DIAGNOSIS — G89.21 CHRONIC PAIN DUE TO INJURY: ICD-10-CM

## 2020-03-25 RX ORDER — IBUPROFEN 200 MG
TABLET ORAL
Qty: 120 TABLET | Refills: 0 | Status: SHIPPED | OUTPATIENT
Start: 2020-03-25 | End: 2020-11-25

## 2020-04-06 DIAGNOSIS — R56.9 SEIZURES (H): ICD-10-CM

## 2020-04-06 RX ORDER — LEVETIRACETAM 750 MG/1
750 TABLET ORAL 2 TIMES DAILY
Qty: 180 TABLET | Refills: 1 | Status: SHIPPED | OUTPATIENT
Start: 2020-04-06 | End: 2020-09-27

## 2020-04-07 DIAGNOSIS — G89.21 CHRONIC PAIN DUE TO INJURY: ICD-10-CM

## 2020-04-07 DIAGNOSIS — G82.20 PARAPLEGIA (H): ICD-10-CM

## 2020-04-07 RX ORDER — METHADONE HYDROCHLORIDE 5 MG/1
TABLET ORAL
Qty: 300 TABLET | Refills: 0 | Status: CANCELLED | OUTPATIENT
Start: 2020-04-07

## 2020-04-07 RX ORDER — OXYCODONE AND ACETAMINOPHEN 7.5; 325 MG/1; MG/1
TABLET ORAL
Qty: 180 TABLET | Refills: 0 | Status: CANCELLED | OUTPATIENT
Start: 2020-04-07

## 2020-04-07 NOTE — PROGRESS NOTES
"Subjective     Agatha Canas is a 50 year old female who is being evaluated via a billable telephone visit.      The patient has been notified of following:     \"This telephone visit will be conducted via a call between you and your physician/provider. We have found that certain health care needs can be provided without the need for a physical exam.  This service lets us provide the care you need with a short phone conversation.  If a prescription is necessary we can send it directly to your pharmacy.  If lab work is needed we can place an order for that and you can then stop by our lab to have the test done at a later time.    Telephone visits are billed at different rates depending on your insurance coverage. During this emergency period, for some insurers they may be billed the same as an in-person visit.  Please reach out to your insurance provider with any questions.    If during the course of the call the physician/provider feels a telephone visit is not appropriate, you will not be charged for this service.\"    Patient has given verbal consent for Telephone visit?  Yes    Agatha Canas complains of   Chief Complaint   Patient presents with     Recheck Medication       ALLERGIES  Penicillins and Clindamycin     Chronic pain. Seems controlled. Is trying to get into local clinic in [place] where there is a provider who prescribes Suboxone. She is open to this.   Has had 3-4 hospital visits related to UTI, just got home yesterday with a UTI and dehydration from 4 days ago.   Doing well otherwise. Mood is good, states home is going well.   She herself cut back her Methadone to 15 mg TID down form 20 mg in am dose.   Not able to get to [place] clinic now with Covid.                    Reviewed and updated as needed this visit by Provider  Tobacco  Allergies  Meds  Problems  Med Hx  Surg Hx  Fam Hx         Review of Systems   ROS COMP: Constitutional, HEENT, cardiovascular, pulmonary, gi and gu " systems are negative, except as otherwise noted.       Objective   Reported vitals:  There were no vitals taken for this visit.   alert, active and no distress  Psych: Alert and oriented times 3; coherent speech, normal   rate and volume, able to articulate logical thoughts, able   to abstract reason, no tangential thoughts, no hallucinations   or delusions  Her affect is bright, open answers.      Diagnostic Test Results:  none         Assessment/Plan:  1. 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    - oxyCODONE-acetaminophen (PERCOCET) 7.5-325 MG per tablet; Take 2 tablets by mouth every 6 hours as needed for pain, max 6 per day  Dispense: 180 tablet; Refill: 0  - methadone (DOLOPHINE) 5 MG tablet; 15 mg po qam, 15 mg at 1 pm daily and 15 mg at bedtime. To last 72 hours  Dispense: 270 tablet; Refill: 0    2. Paraplegia (H)    - oxyCODONE-acetaminophen (PERCOCET) 7.5-325 MG per tablet; Take 2 tablets by mouth every 6 hours as needed for pain, max 6 per day  Dispense: 180 tablet; Refill: 0  - methadone (DOLOPHINE) 5 MG tablet; 15 mg po qam, 15 mg at 1 pm daily and 15 mg at bedtime. To last 72 hours  Dispense: 270 tablet; Refill: 0      Chronic stable dosing.   Refilled X 1 month.   She will update us in next few days on [facility] appointment- she states they are seeing patients.   Asked that she ask them to forward their records to us to merge plan of cares.   Pt. Agrees with plan and is stating she wants to merge onto the Suboxone.     Return in about 4 weeks (around 5/6/2020) for Virtual Visit/Telephone Visit.      Phone call duration:  13.4 minutes    ISACC Nunez CNP

## 2020-04-07 NOTE — TELEPHONE ENCOUNTER
Please send one Rx for Lyrica to [facility] - not pended (don't know how to separate pharmacies)    Also, send methadone and Percocet to [facility] - pended for review    oxyCODONE-acetaminophen (PERCOCET) 7.5-325 MG per tablet       Last Written Prescription Date:  3/6/2020  Last Fill Quantity: 180,   # refills: 0  Last Office Visit: 1/24/2020  Future Office visit:    Next 5 appointments (look out 90 days)    Apr 24, 2020  1:00 PM CDT  PH[facility] [address]  [place] [address]  [phone]       Routing refill request to provider for review/approval because:  Drug not on the [facility], [facility] or [facility] refill protocol or controlled substance      methadone (DOLOPHINE) 5 MG tablet      Last Written Prescription Date:  3/6/2020  Last Fill Quantity: 300,   # refills: 0  Last Office Visit: 1/24/2020  Future Office visit:    Next 5 appointments (look out 90 days)    Apr 24, 2020  1:00 PM CDT  PHYSICAL with Dipti [facility] MD  [facility] [address], [address]  [place] [address]  [phone]       Routing refill request to provider for review/approval because:  Drug not on the [facility], [facility] or [facility] refill protocol or controlled substance

## 2020-04-07 NOTE — TELEPHONE ENCOUNTER
Reason for Call:  Medication or medication refill: Medication     Do you use a [facility]?  Name of the pharmacy and phone number for the current request:  [id number] [place] - [address] AT [address] & [address] [place] [address]     Name of the medication requested: methadone (DOLOPHINE) 5 MG tablet , oxyCODONE-acetaminophen (PERCOCET) 7.5-325 MG per tablet     Other request: Pregabalin (LYRICA) 200 MG capsule - send this to [facility] [address]ya [place] [address]     Can we leave a detailed message on this number? YES    Phone number patient can be reached at: Cell number on file:    Telephone Information:   Mobile [phone]       Best Time: Anytime     Call taken on 4/7/2020 at 2:28 PM by Virgie Dudlye

## 2020-04-08 ENCOUNTER — TELEPHONE (OUTPATIENT)
Dept: FAMILY MEDICINE | Facility: CLINIC | Age: 50
End: 2020-04-08

## 2020-04-08 ENCOUNTER — VIRTUAL VISIT (OUTPATIENT)
Dept: FAMILY MEDICINE | Facility: OTHER | Age: 50
End: 2020-04-08
Payer: MEDICAID

## 2020-04-08 DIAGNOSIS — G82.20 PARAPLEGIA (H): ICD-10-CM

## 2020-04-08 DIAGNOSIS — G89.21 CHRONIC PAIN DUE TO INJURY: ICD-10-CM

## 2020-04-08 PROCEDURE — 99214 OFFICE O/P EST MOD 30 MIN: CPT | Mod: TEL | Performed by: NURSE PRACTITIONER

## 2020-04-08 RX ORDER — OXYCODONE AND ACETAMINOPHEN 7.5; 325 MG/1; MG/1
TABLET ORAL
Qty: 180 TABLET | Refills: 0 | Status: SHIPPED | OUTPATIENT
Start: 2020-04-08 | End: 2020-05-06

## 2020-04-08 RX ORDER — METHADONE HYDROCHLORIDE 5 MG/1
TABLET ORAL
Qty: 270 TABLET | Refills: 0 | Status: SHIPPED | OUTPATIENT
Start: 2020-04-08 | End: 2020-05-06

## 2020-04-08 NOTE — TELEPHONE ENCOUNTER
Called pt to help her reschedule the physical exam she had with  for 4/24.    Pt will call back in the summer to schedule the physical and pap.    Pt had telephone visit with RIK today.  She was told to schedule appt with a provider in [facility] [place] [facility], where she lives, to help her ween off the methadone medication. Once she schedules that appt, she will call our clinic back to inform us of that appt date.

## 2020-04-10 NOTE — TELEPHONE ENCOUNTER
Patient called and got an appointment at the clinic. She would not give details about what this was in regards to    Next week 4/15 at 10:30      Any questions call:  [phone]  Ok to leave message

## 2020-04-13 ENCOUNTER — TELEPHONE (OUTPATIENT)
Dept: FAMILY MEDICINE | Facility: CLINIC | Age: 50
End: 2020-04-13

## 2020-04-13 NOTE — TELEPHONE ENCOUNTER
Filled out forms. Please call and schedule hospital follow-up.    Adrian Mathias MD  [facility]

## 2020-04-13 NOTE — TELEPHONE ENCOUNTER
Reason for call:  Form  Reason for Call:  Form, our goal is to have forms completed with 72 hours, however, some forms may require a visit or additional information.    Type of letter, form or note:  medical    Who is the form from?:  [facility]    Where did the form come from: form was faxed in    What clinic location was the form placed at?: [facility] - [phone]    Where the form was placed: 's Box    What number is listed as a contact on the form?:  [phone]       Additional comments:  Fax to [phone]    created by Kelli Reza

## 2020-04-20 ENCOUNTER — TELEPHONE (OUTPATIENT)
Dept: FAMILY MEDICINE | Facility: CLINIC | Age: 50
End: 2020-04-20

## 2020-04-20 NOTE — LETTER
April 21, 2020      Agatha Canas  [address] [place] [address]        To Whom It May Concern,       Agatha Canas is a patient that has been under my care since 2008.      She has a 5.7 x 2.5 x 1 cm decubitus ulcer over her coccyx and requires frequent dressing changes.  She has an additional ulcer, stage 4, on her buttocks.  This also requires frequent dressing changes.     In addition, Ms. Canas has a colostomy for which she needs guaze for ostomy care and also needs guaze for care of her suprapubic catheter care.     She is requesting 1000 guaze pads monthly to care for all her long standing needs.  This is reasonable for the care needed.       Sincerely,        Dipti Kasper MD

## 2020-04-20 NOTE — TELEPHONE ENCOUNTER
Reason for call:  Form  Reason for Call:  Form, our goal is to have forms completed with 72 hours, however, some forms may require a visit or additional information.    Type of letter, form or note:  medical    Who is the form from?:  [facility] [employer]    Where did the form come from: form was faxed in    What clinic location was the form placed at?: [facility] - [phone]    Where the form was placed: 's Box    What number is listed as a contact on the form?:  [phone]       Additional comments:  Fax to [phone]    created by Kelli Reza

## 2020-04-22 ENCOUNTER — TELEPHONE (OUTPATIENT)
Dept: FAMILY MEDICINE | Facility: CLINIC | Age: 50
End: 2020-04-22

## 2020-04-22 NOTE — TELEPHONE ENCOUNTER
Reason for Call:  Other call back    Detailed comments: Home care is having trouble with [facility] cooperating with them.  Would like to talk to you about idea to make this work.  Please call    Phone Number Patient can be reached at: Other phone number:  [phone]*    Best Time: any    Can we leave a detailed message on this number? YES    Call taken on 4/22/2020 at 9:35 AM by Hillary Mcgraw

## 2020-04-23 NOTE — TELEPHONE ENCOUNTER
See notes below, help schedule video visit with patient and home care.   Vanita Cobian, [facility]

## 2020-04-23 NOTE — TELEPHONE ENCOUNTER
If the concerns are about the patient she will need to be present. Video visit may be best so I can see their concerns and discuss with them what needs to be done, medications or otherwise.    Adrian Mathias MD  [facility]

## 2020-04-24 NOTE — TELEPHONE ENCOUNTER
Left message for [facility] (home care who called) to call back as we need to schedule a video visit with Agatha.

## 2020-04-27 NOTE — TELEPHONE ENCOUNTER
Left message with home care (machine was identified) to have a video/virtual visit scheduled with patient to address concerns.

## 2020-04-30 DIAGNOSIS — G89.4 CHRONIC PAIN SYNDROME: ICD-10-CM

## 2020-05-01 RX ORDER — HYDROXYZINE HYDROCHLORIDE 25 MG/1
TABLET, FILM COATED ORAL
Qty: 90 TABLET | Refills: 1 | Status: SHIPPED | OUTPATIENT
Start: 2020-05-01 | End: 2020-07-27

## 2020-05-01 NOTE — TELEPHONE ENCOUNTER
Pending Prescriptions:                       Disp   Refills    hydrOXYzine (ATARAX) 25 MG tablet [Pharma*90 tab*1            Sig: TAKE 1 TABLET (25 MG) BY MOUTH 3 TIMES DAILY AS           NEEDED FOR ITCHING    Prescription approved per [facility].    Susan Ordoñez, MSN, RN

## 2020-05-05 NOTE — PROGRESS NOTES
"Agatha Canas is a 50 year old female who is being evaluated via a billable telephone visit.      The patient has been notified of following:     \"This telephone visit will be conducted via a call between you and your physician/provider. We have found that certain health care needs can be provided without the need for a physical exam.  This service lets us provide the care you need with a short phone conversation.  If a prescription is necessary we can send it directly to your pharmacy.  If lab work is needed we can place an order for that and you can then stop by our lab to have the test done at a later time.    Telephone visits are billed at different rates depending on your insurance coverage. During this emergency period, for some insurers they may be billed the same as an in-person visit.  Please reach out to your insurance provider with any questions.    If during the course of the call the physician/provider feels a telephone visit is not appropriate, you will not be charged for this service.\"    Patient has given verbal consent for Telephone visit?  Yes    What phone number would you like to be contacted at? [phone]    How would you like to obtain your AVS? Patient declines AVS    Subjective     Agatha Canas is a 50 year old female who presents to clinic today for the following health issues:    History of Present Illness        Back Pain:  She presents for follow up of back pain. Patient's back pain is a chronic problem.  Location of back pain:  Right middle of back, right lower back and left lower back (Other Joint pain)  Description of back pain: burning, cramping, dull ache, fullness, gnawing, sharp, shooting and stabbing  Back pain spreads: right buttocks, left buttocks, right thigh, left thigh, right knee, left knee, right foot and left foot    Since last visit, how has back pain changed: gradually worsening  Does back pain interfere with her job:  Yes  Has the patient tried any new " "treatments:  No       Mental Health Follow-up:  Patient presents to follow-up on Depression & Anxiety.Patient's depression since last visit has been:  Medium  The patient is having other symptoms associated with depression.  Patient's anxiety since last visit has been:  Medium  The patient is having other symptoms associated with anxiety.  Any significant life events: No  Patient is not feeling anxious or having panic attacks.  Patient has no concerns about alcohol or drug use.     Social History  Tobacco Use    Smoking status: Current Every Day Smoker      Packs/day: 0.50      Years: 20.00      Pack years: 10      Types: Cigarettes    Smokeless tobacco: Never Used  Alcohol use: No    Comment: none   Drug use: No    Comment: none       Today's PHQ-9         PHQ-9 Total Score:     4   PHQ-9 Q9 Thoughts of better off dead/self-harm past 2 weeks :   Not at all   Thoughts of suicide or self harm:      Self-harm Plan:        Self-harm Action:          Safety concerns for self or others:           She eats 4 or more servings of fruits and vegetables daily.She consumes 3 sweetened beverage(s) daily.She exercises with enough effort to increase her heart rate 30 to 60 minutes per day.  She exercises with enough effort to increase her heart rate 3 or less days per week.   She is taking medications regularly.    PHQ-9 score:    PHQ 5/6/2020   PHQ-9 Total Score 4   Q9: Thoughts of better off dead/self-harm past 2 weeks Not at all     SHYLA-7 SCORE 10/15/2019 1/24/2020 5/6/2020   Total Score - - -   Total Score 3 (minimal anxiety) 5 (mild anxiety) -   Total Score 3 5 3           Had some difficulty with transportation, has upcoming appt. And \"can't get there\". medivan not available.e States mother can't bring her, but has in the past been able to transport her.   Stated the clinic canceled once.   Looks like appt. Is active in her outside medical records.     Doses are stable.     \"I have been trying to get in.\" \" I was really " "good about scheduling, the last time we talked.\"         Patient Active Problem List   Diagnosis     Paraplegia (H)     Injury, other and unspecified, other specified sites, including multiple     Acute posthemorrhagic anemia     Open fracture of ischium (H)     Open fracture of sacrum and coccyx (H)     Cellulitis and abscess     Open fracture of lumbar spine with spinal cord injury (H)     Prolonged depressive reaction     Other and unspecified alcohol dependence, unspecified drinking behavior     Cellulitis of leg     Constipation     Chronic pain syndrome     Uterovaginal prolapse, incomplete     Other disorder of menstruation and other abnormal bleeding from female genital tract     CARDIOVASCULAR SCREENING; LDL GOAL LESS THAN 160     Health Care Home     Dermatitis     Rosacea     Anxiety associated with depression     PTSD (post-traumatic stress disorder)     Urinary incontinence     Hypothyroidism     Tobacco use disorder     Chantel gangrene     Colostomy status (H)     Open wound of buttock, complicated     Controlled substance agreement signed     Anxiety     Latex sensitivity     Recurrent UTI     Benign essential hypertension     Past Surgical History:   Procedure Laterality Date     PAST SURGICAL HISTORY      bilateral ankle fusions secondary to fractures     PAST SURGICAL HISTORY      I & D open coccyx fractures       Social History     Tobacco Use     Smoking status: Current Every Day Smoker     Packs/day: 0.50     Years: 20.00     Pack years: 10.00     Types: Cigarettes     Smokeless tobacco: Never Used   Substance Use Topics     Alcohol use: No     Comment: none      Family History   Problem Relation Age of Onset     Hypertension Mother      Depression Mother      Hypertension Father      Depression Father      Depression Brother      Hypertension Brother      Depression Sister      Hypertension Sister      Hypertension Sister      Asthma No family hx of      C.A.D. No family hx of      Diabetes " No family hx of      Cerebrovascular Disease No family hx of      Breast Cancer No family hx of      Cancer - colorectal No family hx of      Prostate Cancer No family hx of      Cancer No family hx of      Alzheimer Disease No family hx of      Arthritis No family hx of      Blood Disease No family hx of      Cardiovascular No family hx of      Circulatory No family hx of      Eye Disorder No family hx of      Gastrointestinal Disease No family hx of      Heart Disease No family hx of      Genitourinary Problems No family hx of      Lipids No family hx of      Musculoskeletal Disorder No family hx of      Neurologic Disorder No family hx of      Respiratory No family hx of      Thyroid Disease No family hx of            Reviewed and updated as needed this visit by Provider  Tobacco  Allergies  Meds  Problems  Med Hx  Surg Hx  Fam Hx         Review of Systems   ROS COMP: Constitutional, HEENT, cardiovascular, pulmonary, gi and gu systems are negative, except as otherwise noted.       Objective   Reported vitals:  There were no vitals taken for this visit.   healthy, alert and no distress  PSYCH: Alert and oriented times 3; coherent speech, normal   rate and volume, able to articulate logical thoughts, able   to abstract reason, no tangential thoughts, no hallucinations   or delusions  Her affect is normal and pleasant  RESP: No cough, no audible wheezing, able to talk in full sentences  Remainder of exam unable to be completed due to telephone visits    Diagnostic Test Results:  Labs reviewed in Epic        Assessment/Plan:     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    - methadone (DOLOPHINE) 5 MG tablet; 15 mg po qam, 15 mg at 1 pm daily and 15 mg at bedtime. To last 72 hours  Dispense: 270 tablet; Refill: 0  - oxyCODONE-acetaminophen (PERCOCET) 7.5-325 MG per tablet; Take 2 tablets by mouth every 6 hours as needed for pain, max 6 per day  Dispense: 180 tablet; Refill: 0     Paraplegia (H)    - methadone (DOLOPHINE) 5  MG tablet; 15 mg po qam, 15 mg at 1 pm daily and 15 mg at bedtime. To last 72 hours  Dispense: 270 tablet; Refill: 0  - oxyCODONE-acetaminophen (PERCOCET) 7.5-325 MG per tablet; Take 2 tablets by mouth every 6 hours as needed for pain, max 6 per day  Dispense: 180 tablet; Refill: 0    Refill X 1 month done, advised if not seen by pain mgmt will begin to taper down, pt. Is pleasant and agrees and will work on getting visit done.      Return in about 4 weeks (around 6/3/2020) for Virtual Visit/Telephone Visit.      Phone call duration:  9 minutes    ISACC Nunez CNP

## 2020-05-06 ENCOUNTER — VIRTUAL VISIT (OUTPATIENT)
Dept: FAMILY MEDICINE | Facility: OTHER | Age: 50
End: 2020-05-06
Payer: MEDICAID

## 2020-05-06 DIAGNOSIS — G82.20 PARAPLEGIA (H): ICD-10-CM

## 2020-05-06 DIAGNOSIS — G89.21 CHRONIC PAIN DUE TO INJURY: Primary | ICD-10-CM

## 2020-05-06 PROCEDURE — 99214 OFFICE O/P EST MOD 30 MIN: CPT | Mod: 95 | Performed by: NURSE PRACTITIONER

## 2020-05-06 RX ORDER — OXYCODONE AND ACETAMINOPHEN 7.5; 325 MG/1; MG/1
TABLET ORAL
Qty: 180 TABLET | Refills: 0 | Status: SHIPPED | OUTPATIENT
Start: 2020-05-06 | End: 2020-06-04

## 2020-05-06 RX ORDER — METHADONE HYDROCHLORIDE 5 MG/1
TABLET ORAL
Qty: 270 TABLET | Refills: 0 | Status: SHIPPED | OUTPATIENT
Start: 2020-05-06 | End: 2020-06-04

## 2020-05-06 ASSESSMENT — ANXIETY QUESTIONNAIRES
1. FEELING NERVOUS, ANXIOUS, OR ON EDGE: SEVERAL DAYS
IF YOU CHECKED OFF ANY PROBLEMS ON THIS QUESTIONNAIRE, HOW DIFFICULT HAVE THESE PROBLEMS MADE IT FOR YOU TO DO YOUR WORK, TAKE CARE OF THINGS AT HOME, OR GET ALONG WITH OTHER PEOPLE: NOT DIFFICULT AT ALL
6. BECOMING EASILY ANNOYED OR IRRITABLE: NOT AT ALL
3. WORRYING TOO MUCH ABOUT DIFFERENT THINGS: SEVERAL DAYS
5. BEING SO RESTLESS THAT IT IS HARD TO SIT STILL: NOT AT ALL
2. NOT BEING ABLE TO STOP OR CONTROL WORRYING: SEVERAL DAYS
GAD7 TOTAL SCORE: 3
7. FEELING AFRAID AS IF SOMETHING AWFUL MIGHT HAPPEN: NOT AT ALL

## 2020-05-06 ASSESSMENT — PATIENT HEALTH QUESTIONNAIRE - PHQ9
5. POOR APPETITE OR OVEREATING: NOT AT ALL
SUM OF ALL RESPONSES TO PHQ QUESTIONS 1-9: 4

## 2020-05-07 ASSESSMENT — ANXIETY QUESTIONNAIRES: GAD7 TOTAL SCORE: 3

## 2020-05-11 ENCOUNTER — TELEPHONE (OUTPATIENT)
Dept: FAMILY MEDICINE | Facility: CLINIC | Age: 50
End: 2020-05-11

## 2020-05-11 NOTE — TELEPHONE ENCOUNTER
Our goal is to have forms completed with 72 hours, however some forms may require a visit or additional information.    Who is the form from?: [facility] (if other please explain)  Where the form came from: form was faxed in  What clinic location was the form placed at?: [facility]  Where the form was placed: placed in [facility] inbox     What number is listed as a contact on the form?: [phone]  Fax number to return form to:  [phone]        Call taken on 5/11/2020 at 10:54 AM by Virgie Dudley

## 2020-05-12 ENCOUNTER — TELEPHONE (OUTPATIENT)
Dept: FAMILY MEDICINE | Facility: CLINIC | Age: 50
End: 2020-05-12

## 2020-05-12 ENCOUNTER — TRANSFERRED RECORDS (OUTPATIENT)
Dept: HEALTH INFORMATION MANAGEMENT | Facility: CLINIC | Age: 50
End: 2020-05-12

## 2020-05-12 DIAGNOSIS — R30.0 DYSURIA: Primary | ICD-10-CM

## 2020-05-12 NOTE — TELEPHONE ENCOUNTER
Received results of UA dip and micro.     Findings not concerning. Awaiting results of culture.

## 2020-05-12 NOTE — TELEPHONE ENCOUNTER
Spoke to Wallace at [facility] to inform her the lab ordered has been placed.     Wallace requested lab order to be faxed to [phone].    Fax completed.    Cheryl Bermgan CMA

## 2020-05-12 NOTE — TELEPHONE ENCOUNTER
Reason for Call: Request for an order or referral:    Order or referral being requested: Urine order to check for bladder infection    Date needed: as soon as possible    Has the patient been seen by the PCP for this problem? YES previously    Additional comments: Please place urine order for patient and send to her home care     Phone number Patient can be reached at:  Home number on file [phone] (home)    Best Time:  any    Can we leave a detailed message on this number?  YES    Call taken on 5/12/2020 at 9:15 AM by Dave Stacy

## 2020-05-26 ENCOUNTER — TRANSFERRED RECORDS (OUTPATIENT)
Dept: HEALTH INFORMATION MANAGEMENT | Facility: CLINIC | Age: 50
End: 2020-05-26

## 2020-05-27 ENCOUNTER — TELEPHONE (OUTPATIENT)
Dept: FAMILY MEDICINE | Facility: CLINIC | Age: 50
End: 2020-05-27

## 2020-05-27 DIAGNOSIS — R10.9 ABDOMINAL CRAMPING: Primary | ICD-10-CM

## 2020-05-27 NOTE — TELEPHONE ENCOUNTER
Reason for Call:  Request for results:    Name of test or procedure: urine    Date of test of procedure: yesterday     Location of the test or procedure: Home Care Nurse did it for her    OK to leave the result message on voice mail or with a family member? NO    Phone number Patient can be reached at:  [phone]     Additional comments:     Call taken on 5/27/2020 at 3:55 PM by Ana Barillas

## 2020-05-27 NOTE — TELEPHONE ENCOUNTER
Results received from [facility].  email scanned to Provider to review.

## 2020-05-27 NOTE — TELEPHONE ENCOUNTER
Would like culture completed as well. Order placed.     Minimal white and red cells in urine. Await culture results.

## 2020-05-27 NOTE — TELEPHONE ENCOUNTER
Please reach out to her home care nurse. I do not have results. Please see if results can be obtained.

## 2020-05-28 ENCOUNTER — TRANSFERRED RECORDS (OUTPATIENT)
Dept: HEALTH INFORMATION MANAGEMENT | Facility: CLINIC | Age: 50
End: 2020-05-28

## 2020-05-28 NOTE — TELEPHONE ENCOUNTER
Yes. Please send the culture order. Though I don't know if they can still have the sample.     I don't see that an order for the UA and micro was sent by me or my office in the last day or so.

## 2020-05-28 NOTE — TELEPHONE ENCOUNTER
Spoke to pt.  informed pt that I will fax the order to [facility], they will have to do a sample again and then send the order to the lab to be processed.    [facility] phone [phone]  Fax - [phone]    Faxed order

## 2020-05-28 NOTE — TELEPHONE ENCOUNTER
Spoke to pt.  Informed her of message from Provider.     Called [facility] to inquire if they did the UC as well.  Spoke to Betty at [facility].  She stated she did not do the Urine culture because the results of the Urine Analysis did not reflex to test further.  Also, Betty stated the order was written for for UA reflex to microscopic. (though we sent an order for UC bacterial culture only).    Provider, do we need to refax the UC order from yesterday to the lab or to the home care staff, to get it completed?  I am confused.

## 2020-06-01 ENCOUNTER — TELEPHONE (OUTPATIENT)
Dept: FAMILY MEDICINE | Facility: CLINIC | Age: 50
End: 2020-06-01

## 2020-06-01 DIAGNOSIS — R10.9 ABDOMINAL CRAMPING: Primary | ICD-10-CM

## 2020-06-01 DIAGNOSIS — T83.511A URINARY TRACT INFECTION ASSOCIATED WITH INDWELLING URETHRAL CATHETER, INITIAL ENCOUNTER (H): ICD-10-CM

## 2020-06-01 DIAGNOSIS — Z93.59 SUPRAPUBIC CATHETER (H): ICD-10-CM

## 2020-06-01 DIAGNOSIS — N39.0 URINARY TRACT INFECTION ASSOCIATED WITH INDWELLING URETHRAL CATHETER, INITIAL ENCOUNTER (H): ICD-10-CM

## 2020-06-01 RX ORDER — CEPHALEXIN 500 MG/1
500 CAPSULE ORAL 3 TIMES DAILY
Qty: 30 CAPSULE | Refills: 0 | Status: SHIPPED | OUTPATIENT
Start: 2020-06-01 | End: 2020-08-10

## 2020-06-01 NOTE — TELEPHONE ENCOUNTER
Results reviewed.     Culture growing staph aureus. Sensitive to cephalosporins.     Sending prescription for keflex 500 mg three times daily for 10 days. Sent to [facility].

## 2020-06-01 NOTE — TELEPHONE ENCOUNTER
Urine culture results faxed to [facility] nurse give them to Dr. Kasper to review and call telma results. And if medication is required to fax that information to  [facility] -   Fax - [phone] with information

## 2020-06-01 NOTE — TELEPHONE ENCOUNTER
Patient informed. Patient believes she is getting back to back infections due to her catheter changes. She stated she usually does not get infections so frequently like this.

## 2020-06-05 ENCOUNTER — TELEPHONE (OUTPATIENT)
Dept: FAMILY MEDICINE | Facility: CLINIC | Age: 50
End: 2020-06-05

## 2020-06-05 NOTE — TELEPHONE ENCOUNTER
Called and spoke with patient. Informed her I will get this submitted as quickly as possible.     Called [facility], they will not do this over the form. Printed form they directed me to and filled out.     Faxed form to Dr. Kasper to review PDMP and sign.     Bear Dick,

## 2020-06-05 NOTE — TELEPHONE ENCOUNTER
Patient has been stable on current regimen for some time. Has established with pain medicine and next visit is June 8th

## 2020-06-05 NOTE — TELEPHONE ENCOUNTER
Received form back from Dr. Kasper. Faxed form with office notes to show pain management plan and other requirements. They do have that they need a patient signed pain contract which we do not have on file, at least not a current one for the last few years. I did fax what I had since I would not be able to obtain a signature from patient today. Hoping that because this is a renewal they will be okay with what we sent. Left form and notes at  desk in [place].     Bear Dick,

## 2020-06-05 NOTE — TELEPHONE ENCOUNTER
PLease call patient she would like to you about the prior auth she wants to know if there is anyway to get it done today.  She states Kelli has done it in the past for her.  Please call

## 2020-06-05 NOTE — TELEPHONE ENCOUNTER
Called pharmacy, it does need a PA because it exceeds maxium quantity.     Dr. Kasper, Do you have a rational you would like submitted with the PA request? Or change rx?    Bear Dick,

## 2020-06-08 NOTE — PROGRESS NOTES
"Agatha Canas is a 50 year old female who is being evaluated via a billable telephone visit.      The patient has been notified of following:     \"This telephone visit will be conducted via a call between you and your physician/provider. We have found that certain health care needs can be provided without the need for a physical exam.  This service lets us provide the care you need with a short phone conversation.  If a prescription is necessary we can send it directly to your pharmacy.  If lab work is needed we can place an order for that and you can then stop by our lab to have the test done at a later time.    Telephone visits are billed at different rates depending on your insurance coverage. During this emergency period, for some insurers they may be billed the same as an in-person visit.  Please reach out to your insurance provider with any questions.    If during the course of the call the physician/provider feels a telephone visit is not appropriate, you will not be charged for this service.\"    Patient has given verbal consent for Telephone visit?  Yes    What phone number would you like to be contacted at? [phone]    How would you like to obtain your AVS? Joan Lerma     Agatha Canas is a 50 year old female who presents via phone visit today for the following health issues:    HPI  Concern - Chronic lower extremity pain       Description:   Pt would like to talk about the possiblities of a refill on her percocet. She is having terrible back, knee and ankle pain as she has been without it. Pt did not want to elaborate on symptoms, will catch up the provider.     Spoke with Agatha.  She is having trouble getting her methadone.     She reports that she did not have her visit with pain management. She had a lot of spasticity starting last week. She reports the spasticity went away yesterday. She thinks it was related to her recent UTI. And now that is also feeling better. Hasn't " been able to sleep due to the spasticity.     Methadone was reduced by one pill. She is taking methadone 15 mg TID. She is using baclofen for her spasticity. The extra baclofen makes her sleepy.              Patient Active Problem List   Diagnosis     Paraplegia (H)     Injury, other and unspecified, other specified sites, including multiple     Acute posthemorrhagic anemia     Open fracture of ischium (H)     Open fracture of sacrum and coccyx (H)     Cellulitis and abscess     Open fracture of lumbar spine with spinal cord injury (H)     Prolonged depressive reaction     Other and unspecified alcohol dependence, unspecified drinking behavior     Cellulitis of leg     Constipation     Chronic pain syndrome     Uterovaginal prolapse, incomplete     Other disorder of menstruation and other abnormal bleeding from female genital tract     CARDIOVASCULAR SCREENING; LDL GOAL LESS THAN 160     Health Care Home     Dermatitis     Rosacea     Anxiety associated with depression     PTSD (post-traumatic stress disorder)     Urinary incontinence     Hypothyroidism     Tobacco use disorder     Chantel gangrene     Colostomy status (H)     Open wound of buttock, complicated     Controlled substance agreement signed     Anxiety     Latex sensitivity     Recurrent UTI     Benign essential hypertension     Past Surgical History:   Procedure Laterality Date     PAST SURGICAL HISTORY      bilateral ankle fusions secondary to fractures     PAST SURGICAL HISTORY      I & D open coccyx fractures       Social History     Tobacco Use     Smoking status: Current Every Day Smoker     Packs/day: 0.50     Years: 20.00     Pack years: 10.00     Types: Cigarettes     Smokeless tobacco: Never Used   Substance Use Topics     Alcohol use: No     Comment: none      Family History   Problem Relation Age of Onset     Hypertension Mother      Depression Mother      Hypertension Father      Depression Father      Depression Brother      Hypertension  Brother      Depression Sister      Hypertension Sister      Hypertension Sister      Asthma No family hx of      C.A.D. No family hx of      Diabetes No family hx of      Cerebrovascular Disease No family hx of      Breast Cancer No family hx of      Cancer - colorectal No family hx of      Prostate Cancer No family hx of      Cancer No family hx of      Alzheimer Disease No family hx of      Arthritis No family hx of      Blood Disease No family hx of      Cardiovascular No family hx of      Circulatory No family hx of      Eye Disorder No family hx of      Gastrointestinal Disease No family hx of      Heart Disease No family hx of      Genitourinary Problems No family hx of      Lipids No family hx of      Musculoskeletal Disorder No family hx of      Neurologic Disorder No family hx of      Respiratory No family hx of      Thyroid Disease No family hx of          BP Readings from Last 3 Encounters:   01/24/20 122/62   10/15/19 110/60   01/17/18 112/60    Wt Readings from Last 3 Encounters:   01/24/20 68 kg (150 lb)   09/30/11 68 kg (150 lb)   06/27/11 65.8 kg (145 lb)                    Reviewed and updated as needed this visit by Provider  Tobacco  Allergies  Meds  Problems  Med Hx  Surg Hx  Fam Hx         Review of Systems   CONSTITUTIONAL: NEGATIVE for fever, chills, change in weight  INTEGUMENTARY/SKIN: NEGATIVE for worrisome rashes, moles or lesions  ENT/MOUTH: NEGATIVE for ear, mouth and throat problems  RESP: NEGATIVE for significant cough or SOB  CV: NEGATIVE for chest pain, palpitations or peripheral edema  : NEGATIVE for frequency, dysuria, or hematuria  MUSCULOSKELETAL: NEGATIVE for significant arthralgias or myalgia  NEURO: see above.   PSYCHIATRIC: NEGATIVE for changes in mood or affect       Objective   Reported vitals:  There were no vitals taken for this visit.   alert and no distress on the phone  PSYCH: Alert and oriented times 3; coherent speech, normal   rate and volume, able to  articulate logical thoughts, able   to abstract reason, no tangential thoughts, no hallucinations   or delusions  Her affect is normal and pleasant  RESP: No cough, no audible wheezing, able to talk in full sentences  Remainder of exam unable to be completed due to telephone visits    Diagnostic Test Results:  none         Assessment/Plan:  1. Chronic pain syndrome  2. Controlled substance agreement signed  Patient with chronic pain history related to injury and spasticity related to her paraplegia.   She has had a slight reduction recently of her methadone and done ok with that.   She continues on her percocet.   She has upcoming visit with pain management.   Will recheck in 2 months with me.   Due to the current COVID 19 pandemic and inability to see her in person at this time, read the controlled substance agreement to her over the phone and she gave a verbal agreement.   Signed for her and will forward to insurance for prior authorization.     3. Suprapubic catheter (H)  4. Personal history of urinary tract infection  Patient with suprapubic catheter in place.   History of occasional UTIs. Recent UTI now improved. No concerns today.       Return in about 2 months (around 8/9/2020) for Recheck.      Phone call duration:  15 minutes    Dipti Kasper MD

## 2020-06-08 NOTE — TELEPHONE ENCOUNTER
Pt called and stated the PA for methadone was denied.    Called [facility] in [place] to gather more information again.  Pt needs to sign a form printed from the [place]  (clinic tool for assessment and management of persistent pain) and faxed to [phone] attn:  . - pt ID # [id number].  HID [id number]    I attempted to access this, but am unable.  Provider, can you access it and print the form in parentheses above?

## 2020-06-08 NOTE — TELEPHONE ENCOUNTER
I do not see this form on the [facility] website. Is this a controlled substance agreement? I have a visit with Agatha tomorrow. Please contact pharmacy prior to her appointment.     She also was to have a visit with her pain clinic on 6/8. If that is the case, please obtain any visit notes available.

## 2020-06-09 ENCOUNTER — VIRTUAL VISIT (OUTPATIENT)
Dept: FAMILY MEDICINE | Facility: OTHER | Age: 50
End: 2020-06-09
Payer: MEDICAID

## 2020-06-09 ENCOUNTER — TELEPHONE (OUTPATIENT)
Dept: FAMILY MEDICINE | Facility: CLINIC | Age: 50
End: 2020-06-09

## 2020-06-09 DIAGNOSIS — Z87.440 PERSONAL HISTORY OF URINARY TRACT INFECTION: ICD-10-CM

## 2020-06-09 DIAGNOSIS — Z93.59 SUPRAPUBIC CATHETER (H): ICD-10-CM

## 2020-06-09 DIAGNOSIS — Z79.899 CONTROLLED SUBSTANCE AGREEMENT SIGNED: ICD-10-CM

## 2020-06-09 DIAGNOSIS — G89.4 CHRONIC PAIN SYNDROME: Primary | ICD-10-CM

## 2020-06-09 PROCEDURE — 99442 ZZC PHYSICIAN TELEPHONE EVALUATION 11-20 MIN: CPT | Performed by: FAMILY MEDICINE

## 2020-06-09 NOTE — TELEPHONE ENCOUNTER
Our goal is to have forms completed with 72 hours, however some forms may require a visit or additional information.    Who [facility] the [facility] [employer]  (if other please explain)  Where the form came from: form was faxed in  What clinic location was the form placed at?: [facility]  Where the form was placed: placed in TC inbox     What number is listed as a contact on the form?: [phone]  Fax number to return form to:  [phone]        Call taken on 6/9/2020 at 4:32 PM by Virgie Dudley

## 2020-06-09 NOTE — TELEPHONE ENCOUNTER
Spoke to the pharmacy.  It is still unclear, from their perspective what is needed.  They have the same letter of denial that we have (placed at Provider desk to review).  Provider, once you review the letter in full, we may be able to better interpret what insurance/appeals needs.

## 2020-06-09 NOTE — TELEPHONE ENCOUNTER
Provider printed Controlled Substance Agreement and signed on behalf of pt - had telephone visit with pt today.    Faxed documentation to appeals dept with request for urgent review.  Awaiting decision.

## 2020-06-09 NOTE — TELEPHONE ENCOUNTER
Patient states she needs someone to try to speed this up, otherwise they will not think it is a priority. [phone]  She would like to get this done today

## 2020-06-09 NOTE — TELEPHONE ENCOUNTER
Called PA call center line [phone] requesting urgent review of the appeal.  The rep stated they do not review cases as urgent, ahead of other cases - they review them in the order as received.      Pt informed.  She stated we should call the Pharmacy help desk instead to request urgent review.  Pt stated she is concerned about withdrawals. Called the pharmacy - they gave me the same number as I have listed above.    Called them again.  Spoke to Jean-Paul - same rep as last time.  They will not review it as urgent.

## 2020-06-10 NOTE — TELEPHONE ENCOUNTER
Form is completed.  I have signed in her place as she is not in person. Plan stated in form was reviewed with patient yesterday.

## 2020-06-10 NOTE — TELEPHONE ENCOUNTER
Pt called back.  The PA appeal was denied.  Both pt and myself are unsure what to pursue at this point.  Pt stated she is experiencing withdrawals as a result of no medication availability.     I re-faxed the packet of 11 pages to the insurance requesting urgent review again -- noting that we need them to call us to outline why exactly they are denying it.  We have provided the information they requested.    Called help line again.. Spoke with Tran.  He directed me to [employer] to print the pain management form and have pt/Provider sign and return via fax at [phone].    Printed forms and provider is completing.

## 2020-06-11 DIAGNOSIS — G89.21 CHRONIC PAIN DUE TO INJURY: ICD-10-CM

## 2020-06-11 DIAGNOSIS — G82.20 PARAPLEGIA (H): ICD-10-CM

## 2020-06-11 RX ORDER — METHADONE HYDROCHLORIDE 5 MG/1
TABLET ORAL
Qty: 270 TABLET | Refills: 0 | Status: CANCELLED | OUTPATIENT
Start: 2020-06-11

## 2020-06-11 NOTE — TELEPHONE ENCOUNTER
PA denied again.  They were nitpicking on verbiage that did not match on two separate forms.  Dr. Dipti Kasper fixed this and we re faxed it as urgent.  Pt informed.  She is very concerned about withdrawals and having to go to the Emergency Dept as a result.  I assured pt that I will follow up with the insurance tomorrow - to give them time to approve it. Pt understood.

## 2020-06-11 NOTE — PROGRESS NOTES
Addendum:  Reviewed the Clinic Tool for the Assessment and Management of Persistent Pain with patient 6/10/2020. Care plan and prescriptions all reviewed at time of visit. Signed for patient as unable to see in person due to current pandemic.

## 2020-06-11 NOTE — TELEPHONE ENCOUNTER
Pending Prescriptions:                       Disp   Refills    methadone (DOLOPHINE) 5 MG tablet          270 ta*0        Sig: 15 mg po qam, 15 mg at 1 pm daily and 15 mg at           bedtime. To last 72 hours      Routing refill request to provider for review/approval because:  Drug not on the [facility] refill protocol     Susan Ordoñez, MSN, RN

## 2020-06-11 NOTE — TELEPHONE ENCOUNTER
Pending Prescriptions:                       Disp   Refills    methadone (DOLOPHINE) 5 MG tablet         270 ta*0            Sig: 15 mg po qam, 15 mg at 1 pm daily and 15 mg at           bedtime. To last 72 hours    Cheryl Bergman, CMA

## 2020-06-11 NOTE — TELEPHONE ENCOUNTER
Called help line to check the status of the PA.  Ken, rep, stated they received it this morning and will review it within 24 hours - regardless of it being marked as urgent.    Will wait a few hours to get response.

## 2020-06-12 ENCOUNTER — TELEPHONE (OUTPATIENT)
Dept: FAMILY MEDICINE | Facility: CLINIC | Age: 50
End: 2020-06-12

## 2020-06-12 NOTE — TELEPHONE ENCOUNTER
"Read through the pain management form with telma and signed it as \"patient verbally agreed to the plan\"    Faxed back with cover letter informing them that we need an approval by 5pm today to avoid pt having to go to emergency dept due to withdrawals from medication, as PA process has taken one week too long.  "

## 2020-06-12 NOTE — TELEPHONE ENCOUNTER
Reason for call:  Form  Reason for Call:  Form, our goal is to have forms completed with 72 hours, however, some forms may require a visit or additional information.    Type of letter, form or note:  medical    Who is the form from?:  [employer]    Where did the form come from: form was faxed in    What clinic location was the form placed at?: [facility] - [phone]    Where the form was placed: Dr's Box    What number is listed as a contact on the form?:  [phone]       Additional comments:  Fax to [phone]    This form requests location, information about pt's wounds.    Faxed the form to [facility] ([phone] fax) to complete and then they will fax it back to Dr. Dipti Kasper at the pod fax.  Once received, please place in SF inbox sign and then [facility].    created by Kelli Reza

## 2020-06-12 NOTE — TELEPHONE ENCOUNTER
PA for methadone was approved!  Note:  Approved for one fill.  The recipient will begin a prepaid health plan (PPHP) on 7/1/2020, will need to process through them ongoing.    Pt informed.  Pharmacy informed.    Forms, records scanned into chart.

## 2020-06-12 NOTE — TELEPHONE ENCOUNTER
Received a call from a RIKI Orellana from [facility] who is seeing the patient today. She had some questions regarding the last time we prescribed Methadone for the patient. I let her know a prescription was sent 6/4/20. Apparently there has been some prior auth issues and per Dr. Kasper it was approved today. Called the [place] clinic back and let Beatrice know.    Susan Ordoñez, MSN, RN

## 2020-06-12 NOTE — TELEPHONE ENCOUNTER
"PA denied again. The form is required to be signed by the patient, as to show agreement with the written plan.  The Doctor/Nurse may sign the MN  \"on behalf of the patient\" with documentation from someone in the providers office that the \"Patient Verbally Agreed to the Plan\" and patient is unable to sign.      Provider, I interpret this note as:  I can sign the form as witness  that \"Patient Verbally Agreed to the Plan\" and patient is unable to sign. What do you think?      "

## 2020-06-12 NOTE — TELEPHONE ENCOUNTER
Called Help Line [phone] to request review/approval today.  Spoke to rep Ken. He spoke with his  and they said they will review it asap and send a decision shortly.

## 2020-06-12 NOTE — TELEPHONE ENCOUNTER
Signed updated form. Please also speak to patient to review form and sign/date.     Form faxed back.

## 2020-06-15 ENCOUNTER — TELEPHONE (OUTPATIENT)
Dept: FAMILY MEDICINE | Facility: CLINIC | Age: 50
End: 2020-06-15

## 2020-06-15 DIAGNOSIS — G89.4 CHRONIC PAIN SYNDROME: ICD-10-CM

## 2020-06-15 NOTE — TELEPHONE ENCOUNTER
Patient was in touch with Dr. Amato last week for evaluation for suboxone.  Dr. Amato discussed a plan for weaning from her methadone and having her on bupenorphine. She would then work to wean her from the percocet before she can be on suboxone. Dr. Amato is interested in working with Agatha and will take over prescribing.

## 2020-06-15 NOTE — TELEPHONE ENCOUNTER
Reason for Call:  Other Medication    Detailed comments: Dr.Chasity Harris is calling in today on behalf of [facility] and would like to go over patients medications and get a plan set up do to patient being on so many medications and seeing so many different doctors.     Phone Number Patient can be reached at: Other phone number:  [phone](Nurse will answer and pull doctor out to talk)*    Best Time: any    Can we leave a detailed message on this number? YES    Call taken on 6/15/2020 at 8:27 AM by Agatha Segovia

## 2020-06-16 ENCOUNTER — TELEPHONE (OUTPATIENT)
Dept: FAMILY MEDICINE | Facility: CLINIC | Age: 50
End: 2020-06-16

## 2020-06-16 ENCOUNTER — MEDICAL CORRESPONDENCE (OUTPATIENT)
Dept: HEALTH INFORMATION MANAGEMENT | Facility: CLINIC | Age: 50
End: 2020-06-16

## 2020-06-16 DIAGNOSIS — Z93.59 SUPRAPUBIC CATHETER (H): Primary | ICD-10-CM

## 2020-06-16 DIAGNOSIS — R10.9 ABDOMINAL CRAMPING: ICD-10-CM

## 2020-06-16 DIAGNOSIS — G82.20 PARAPLEGIA (H): Primary | ICD-10-CM

## 2020-06-16 NOTE — PROGRESS NOTES
"Agatha Canas is a 50 year old female who is being evaluated via a billable telephone visit.      The patient has been notified of following:     \"This telephone visit will be conducted via a call between you and your physician/provider. We have found that certain health care needs can be provided without the need for a physical exam.  This service lets us provide the care you need with a short phone conversation.  If a prescription is necessary we can send it directly to your pharmacy.  If lab work is needed we can place an order for that and you can then stop by our lab to have the test done at a later time.    Telephone visits are billed at different rates depending on your insurance coverage. During this emergency period, for some insurers they may be billed the same as an in-person visit.  Please reach out to your insurance provider with any questions.    If during the course of the call the physician/provider feels a telephone visit is not appropriate, you will not be charged for this service.\"    Patient has given verbal consent for Telephone visit?  Yes    What phone number would you like to be contacted at? [phone]    How would you like to obtain your AVS? Mail a copy    Subjective     Agatha Canas is a 50 year old female who presents via phone visit today for the following health issues:    HPI  Medication Followup of  Buprenorphine     Taking Medication as prescribed: Patient has not started using patch yet. Patient was seen at [facility] on 06/12/2020 where she was prescribed the pain patch.     Side Effects:  N/A    Medication Helping Symptoms:  not applicable        Spoke with Agatha.   She was seen by [facility]. Saw Dr. Amato.   She reports that Dr. Amato told her to  her methadone  She reports that she is waiting for PA for the \"pain patch\".   Right now reports taking 4 tablets of methadone a day.   She reports that she still \"quite a bit of pain\"  She is " having occasional headaches and occasional stomach cramps.   She is worried about a potential UTI. She also has some sweats.   No fevers.   She has occasional shakes.   Overall, feels things are improved at this point. She feels determines to wean off the methadone.              Patient Active Problem List   Diagnosis     Paraplegia (H)     Injury, other and unspecified, other specified sites, including multiple     Acute posthemorrhagic anemia     Open fracture of ischium (H)     Open fracture of sacrum and coccyx (H)     Cellulitis and abscess     Open fracture of lumbar spine with spinal cord injury (H)     Prolonged depressive reaction     Other and unspecified alcohol dependence, unspecified drinking behavior     Cellulitis of leg     Constipation     Chronic pain syndrome     Uterovaginal prolapse, incomplete     Other disorder of menstruation and other abnormal bleeding from female genital tract     CARDIOVASCULAR SCREENING; LDL GOAL LESS THAN 160     Health Care Home     Dermatitis     Rosacea     Anxiety associated with depression     PTSD (post-traumatic stress disorder)     Urinary incontinence     Hypothyroidism     Tobacco use disorder     Chantel gangrene     Colostomy status (H)     Open wound of buttock, complicated     Controlled substance agreement signed     Anxiety     Latex sensitivity     Recurrent UTI     Benign essential hypertension     Past Surgical History:   Procedure Laterality Date     PAST SURGICAL HISTORY      bilateral ankle fusions secondary to fractures     PAST SURGICAL HISTORY      I & D open coccyx fractures       Social History     Tobacco Use     Smoking status: Current Every Day Smoker     Packs/day: 0.50     Years: 20.00     Pack years: 10.00     Types: Cigarettes     Smokeless tobacco: Never Used   Substance Use Topics     Alcohol use: No     Comment: none      Family History   Problem Relation Age of Onset     Hypertension Mother      Depression Mother      Hypertension  Father      Depression Father      Depression Brother      Hypertension Brother      Depression Sister      Hypertension Sister      Hypertension Sister      Asthma No family hx of      C.A.D. No family hx of      Diabetes No family hx of      Cerebrovascular Disease No family hx of      Breast Cancer No family hx of      Cancer - colorectal No family hx of      Prostate Cancer No family hx of      Cancer No family hx of      Alzheimer Disease No family hx of      Arthritis No family hx of      Blood Disease No family hx of      Cardiovascular No family hx of      Circulatory No family hx of      Eye Disorder No family hx of      Gastrointestinal Disease No family hx of      Heart Disease No family hx of      Genitourinary Problems No family hx of      Lipids No family hx of      Musculoskeletal Disorder No family hx of      Neurologic Disorder No family hx of      Respiratory No family hx of      Thyroid Disease No family hx of          BP Readings from Last 3 Encounters:   01/24/20 122/62   10/15/19 110/60   01/17/18 112/60    Wt Readings from Last 3 Encounters:   01/24/20 68 kg (150 lb)   09/30/11 68 kg (150 lb)   06/27/11 65.8 kg (145 lb)                    Reviewed and updated as needed this visit by Provider  Tobacco  Allergies  Meds  Problems  Med Hx  Surg Hx  Fam Hx         Review of Systems   CONSTITUTIONAL: NEGATIVE for fever, chills, change in weight  ENT/MOUTH: NEGATIVE for ear, mouth and throat problems  RESP: NEGATIVE for significant cough or SOB  CV: NEGATIVE for chest pain, palpitations or peripheral edema  GI: as above. No vomiting. No diarrhea.    female :see above. No blood in the urine. Suprapubic cath.   NEURO: see above.   PSYCHIATRIC: NEGATIVE for changes in mood or affect       Objective   Reported vitals:  There were no vitals taken for this visit.   alert and no distress  PSYCH: Alert and oriented times 3; coherent speech, normal   rate and volume, able to articulate logical  thoughts, able   to abstract reason, no tangential thoughts, no hallucinations   or delusions  Her affect is normal and pleasant  RESP: No cough, no audible wheezing, able to talk in full sentences  Remainder of exam unable to be completed due to telephone visits    Diagnostic Test Results:  none         Assessment/Plan:  1. Chronic pain syndrome  Patient with history of chronic pain.   She has been evaluated by a physician that does suboxone therapy.   Plan is to wean from her methadone. Starting buprenorphine. She is waiting on that PA.   She is still on the percocet but will be weaning from that as well.  Will be starting suboxone hopefully down the line.   She is to follow up for her pain management and I will not be prescribing for her.   She understands.   She will be seeing Dr. Amato at [facility] for this.   She reports some mild symptoms consistent with withdrawal from her methadone.   She is using much less methadone at this time.   She knows she can call with any questions.   All questions invited, asked and answered to the patient's apparent satisfaction.  Patient agrees to plan.      2. Abdominal cramping  May be related to withdrawal.   Will check UA. She will discuss with home care nurse at her visit as there is a standing order.   Will notify of results.       Return in about 2 months (around 8/17/2020) for Recheck.      Phone call duration:  14 minutes    Dipti Kasper MD

## 2020-06-16 NOTE — TELEPHONE ENCOUNTER
Please call Wallace from [facility] at [phone]  She would like standing UA orders for patient

## 2020-06-16 NOTE — TELEPHONE ENCOUNTER
Reason for Call:  Medication or medication refill:    Do you use a [facility]?  Name of the pharmacy and phone number for the current request:  [facility] [place][phone]    Name of the medication requested: Alternating Mattress Pad    Other request: Please fax orders to Erum at [facility]    Can we leave a detailed message on this number? YES    Phone number patient can be reached at: Home number on file [phone] (home)    Best Time: yes    Call taken on 6/16/2020 at 10:23 AM by Agatha Segovia

## 2020-06-16 NOTE — TELEPHONE ENCOUNTER
Spoke with Wallace. Patient doing well at this time. They would like standing order for UA and culture. Order placed.     Please print and fax order for UA and culture [phone].

## 2020-06-17 ENCOUNTER — VIRTUAL VISIT (OUTPATIENT)
Dept: FAMILY MEDICINE | Facility: OTHER | Age: 50
End: 2020-06-17
Payer: MEDICAID

## 2020-06-17 DIAGNOSIS — R10.9 ABDOMINAL CRAMPING: ICD-10-CM

## 2020-06-17 DIAGNOSIS — G89.4 CHRONIC PAIN SYNDROME: Primary | ICD-10-CM

## 2020-06-17 PROCEDURE — 99442 ZZC PHYSICIAN TELEPHONE EVALUATION 11-20 MIN: CPT | Performed by: FAMILY MEDICINE

## 2020-06-17 RX ORDER — BUPRENORPHINE 15 UG/H
PATCH TRANSDERMAL
COMMUNITY
Start: 2020-06-12 | End: 2022-02-09

## 2020-06-17 ASSESSMENT — ANXIETY QUESTIONNAIRES
5. BEING SO RESTLESS THAT IT IS HARD TO SIT STILL: NOT AT ALL
6. BECOMING EASILY ANNOYED OR IRRITABLE: NOT AT ALL
3. WORRYING TOO MUCH ABOUT DIFFERENT THINGS: NEARLY EVERY DAY
IF YOU CHECKED OFF ANY PROBLEMS ON THIS QUESTIONNAIRE, HOW DIFFICULT HAVE THESE PROBLEMS MADE IT FOR YOU TO DO YOUR WORK, TAKE CARE OF THINGS AT HOME, OR GET ALONG WITH OTHER PEOPLE: NOT DIFFICULT AT ALL
7. FEELING AFRAID AS IF SOMETHING AWFUL MIGHT HAPPEN: NOT AT ALL
GAD7 TOTAL SCORE: 9
1. FEELING NERVOUS, ANXIOUS, OR ON EDGE: NEARLY EVERY DAY
2. NOT BEING ABLE TO STOP OR CONTROL WORRYING: NOT AT ALL

## 2020-06-17 ASSESSMENT — PATIENT HEALTH QUESTIONNAIRE - PHQ9
5. POOR APPETITE OR OVEREATING: NEARLY EVERY DAY
SUM OF ALL RESPONSES TO PHQ QUESTIONS 1-9: 6

## 2020-06-18 ENCOUNTER — TELEPHONE (OUTPATIENT)
Dept: FAMILY MEDICINE | Facility: CLINIC | Age: 50
End: 2020-06-18

## 2020-06-18 ENCOUNTER — TRANSFERRED RECORDS (OUTPATIENT)
Dept: HEALTH INFORMATION MANAGEMENT | Facility: CLINIC | Age: 50
End: 2020-06-18

## 2020-06-18 ENCOUNTER — MEDICAL CORRESPONDENCE (OUTPATIENT)
Dept: HEALTH INFORMATION MANAGEMENT | Facility: CLINIC | Age: 50
End: 2020-06-18

## 2020-06-18 ASSESSMENT — ANXIETY QUESTIONNAIRES: GAD7 TOTAL SCORE: 9

## 2020-06-18 NOTE — TELEPHONE ENCOUNTER
Reason for call:  Form  Reason for Call:  Form, our goal is to have forms completed with 72 hours, however, some forms may require a visit or additional information.    Type of letter, form or note:  Medical    Who [facility] the [facility] [employer]     Where did the form come from: form was faxed in    What clinic location was the form placed at?: [facility] - [phone]    Where the form was placed: 's in box     What number is listed as a contact on the form?: [phone]       Additional comments: Fax back to [phone]    Call taken on 6/18/2020 at 4:42 PM by Leland Gaines

## 2020-06-25 ENCOUNTER — TELEPHONE (OUTPATIENT)
Dept: FAMILY MEDICINE | Facility: CLINIC | Age: 50
End: 2020-06-25

## 2020-06-25 NOTE — TELEPHONE ENCOUNTER
Summary:    Patient is due/failing the following:   PAP and PHYSICAL    Action needed:   Patient needs office visit for Physical and PAP.    Type of outreach:    Sent [employer] message.    Questions for provider review:    None                                                                                                                                    Cheryl Bergman CMA       Chart routed to Care Team .          Panel Management Review      Patient has the following on her problem list:     Hypertension   Last three blood pressure readings:  BP Readings from Last 3 Encounters:   01/24/20 122/62   10/15/19 110/60   01/17/18 112/60     Blood pressure: Passed    HTN Guidelines:  Less than 140/90      Composite cancer screening  Chart review shows that this patient is due/due soon for the following Pap Smear

## 2020-06-29 ENCOUNTER — TELEPHONE (OUTPATIENT)
Dept: FAMILY MEDICINE | Facility: CLINIC | Age: 50
End: 2020-06-29

## 2020-06-29 DIAGNOSIS — I10 BENIGN ESSENTIAL HYPERTENSION: ICD-10-CM

## 2020-06-29 RX ORDER — LISINOPRIL 20 MG/1
20 TABLET ORAL DAILY
Qty: 90 TABLET | Refills: 1 | OUTPATIENT
Start: 2020-06-29

## 2020-06-29 NOTE — TELEPHONE ENCOUNTER
Pt states that her medica coverage begins on July 1.  Creating PA encounter for Pregabalin (LYRICA) 200 MG capsule - postponing until July to start PA process then.    Will need to call [facility] pharmacy to gather [employer] ins phone number and pt insurance ID #

## 2020-06-29 NOTE — TELEPHONE ENCOUNTER
Received results of urine tests.  The culture showed yeast. No bacteria.     No treatment at this time. Let me know if anything has changed in symptoms.

## 2020-06-30 ENCOUNTER — TELEPHONE (OUTPATIENT)
Dept: FAMILY MEDICINE | Facility: CLINIC | Age: 50
End: 2020-06-30

## 2020-06-30 NOTE — TELEPHONE ENCOUNTER
Our goal is to have forms completed with 72 hours, however some forms may require a visit or additional information.    Who is the form from?: [employer] (if other please explain)  Where the form came from: form was faxed in  What clinic location was the form placed at?: [facility]  Where the form was placed: placed in TC inbox     What number is listed as a contact on the form?: [phone]  Fax number to return form to:  [phone]        Call taken on 6/30/2020 at 3:39 PM by Virgie Dudley

## 2020-07-01 ENCOUNTER — TELEPHONE (OUTPATIENT)
Dept: FAMILY MEDICINE | Facility: CLINIC | Age: 50
End: 2020-07-01

## 2020-07-01 DIAGNOSIS — G89.21 CHRONIC PAIN DUE TO INJURY: ICD-10-CM

## 2020-07-01 DIAGNOSIS — G82.20 PARAPLEGIA (H): ICD-10-CM

## 2020-07-01 RX ORDER — METHADONE HYDROCHLORIDE 5 MG/1
TABLET ORAL
Qty: 270 TABLET | Refills: 0 | OUTPATIENT
Start: 2020-07-01

## 2020-07-01 RX ORDER — OXYCODONE AND ACETAMINOPHEN 7.5; 325 MG/1; MG/1
TABLET ORAL
Qty: 180 TABLET | Refills: 0 | OUTPATIENT
Start: 2020-07-01

## 2020-07-01 NOTE — TELEPHONE ENCOUNTER
Pt informed.  Pt will reach out to [facility] office to address her need for a med refill to bridge her until her appt on July 8.

## 2020-07-01 NOTE — TELEPHONE ENCOUNTER
Pt informed and stated that she has appt scheduled July 8 with Dr. Amato  Pt still has some methadone to get through until that appt on July 8  Pt is wondering if SF would be willing refill the oxycodone, as pt has enough to get through July 4

## 2020-07-01 NOTE — TELEPHONE ENCOUNTER
Pt called stated that her appt with Dr. Gray at [facility] was cancelled.  Would Dr. Dipti Kasper be able to send refills for methadone and percocet to [facility]?    oxyCODONE-acetaminophen (PERCOCET) 7.5-325 MG per tablet       Last Written Prescription Date:  6/4/2020  Last Fill Quantity: 180,   # refills: 0  Last Office Visit:  6/17/2020  Future Office visit:       Routing refill request to provider for review/approval because:  Drug not on the [facility], [facility] or [facility] refill protocol or controlled substance      methadone (DOLOPHINE) 5 MG tablet       Last Written Prescription Date:  6/4/2020  Last Fill Quantity: 270,   # refills: 0  Last Office Visit:  6/17/2020  Future Office visit:       Routing refill request to provider for review/approval because:  Drug not on the [facility], [facility] or [facility] refill protocol or controlled substance

## 2020-07-01 NOTE — TELEPHONE ENCOUNTER
Reason for call:  Form  Reason for Call:  Form, our goal is to have forms completed with 72 hours, however, some forms may require a visit or additional information.    Type of letter, form or note:  Medical    Who is the form from?: [facility]      Where did the form come from: form was faxed in    What clinic location was the form placed at?: [facility] - [phone]    Where the form was placed: 's in box     What number is listed as a contact on the form?: [phone]       Additional comments: Fax back to [phone]    Call taken on 7/1/2020 at 3:32 PM by Leland Gaines

## 2020-07-01 NOTE — TELEPHONE ENCOUNTER
Reaching out to Dr. Amato to discuss Agatha's care and plan. Unable to speak to Dr. Amato as she is out.  As discussed at Agatha's last visit, this portion of her care is transferred to Dr. Amato.  I will not be refilling her medications.

## 2020-07-01 NOTE — TELEPHONE ENCOUNTER
Called [facility],. They stated they filled the lyrica on 6/30 for one month.    Spoke to pt.  She stated she has this one month supply but will need PA through [employer] to be completed for her to fill it July 30.  Postponing encounter until 7/20 to get an early start on the PA.

## 2020-07-01 NOTE — TELEPHONE ENCOUNTER
Unable to read forms that were faxed. I'm at [facility] second floor if you want to send there or can resend by [employer].

## 2020-07-03 NOTE — TELEPHONE ENCOUNTER
Form placed in Providers in-box at [facility] to be completed.  SF will come to clinic Monday to complete

## 2020-07-03 NOTE — TELEPHONE ENCOUNTER
Form placed in out of provider box to review as SF is out of office.  Dipti Kennedy CMA ([facility])

## 2020-07-03 NOTE — TELEPHONE ENCOUNTER
[facility] sent fax back to us requesting wound assessment medical records to support patient's condition and need for a mattress.    Provider, please advise which OV notes or if the wound assessment form completed by Home care need to be printed to satisfy [facility]'s request.    Fax to [phone]

## 2020-07-06 ENCOUNTER — TELEPHONE (OUTPATIENT)
Dept: FAMILY MEDICINE | Facility: CLINIC | Age: 50
End: 2020-07-06

## 2020-07-06 NOTE — TELEPHONE ENCOUNTER
Our goal is to have forms completed with 72 hours, however some forms may require a visit or additional information.    Who is the form from?: [employer]  (if other please explain)  Where the form came from: form was faxed in  What clinic location was the form placed at?: [facility]  Where the form was placed: placed in TC inbox     What number is listed as a contact on the form?: [phone]  Fax number to return form to:  [phone]        Call taken on 7/6/2020 at 3:05 PM by Virgie Dudley

## 2020-07-06 NOTE — TELEPHONE ENCOUNTER
Form placed at provider's desk in her office.  Please advise if you would like faxed to [place].

## 2020-07-13 ENCOUNTER — TELEPHONE (OUTPATIENT)
Dept: FAMILY MEDICINE | Facility: CLINIC | Age: 50
End: 2020-07-13

## 2020-07-13 NOTE — TELEPHONE ENCOUNTER
Our goal is to have forms completed with 72 hours, however some forms may require a visit or additional information.    Who is the form from?: [facility] (if other please explain)  Where the form came from: form was faxed in  What clinic location was the form placed at?: [facility]  Where the form was placed: placed in TC inbox     What number is listed as a contact on the form?: [phone]  Fax number to return form to:  [phone]        Call taken on 7/13/2020 at 3:40 PM by Virgie Dudley

## 2020-07-17 ENCOUNTER — TELEPHONE (OUTPATIENT)
Dept: FAMILY MEDICINE | Facility: CLINIC | Age: 50
End: 2020-07-17

## 2020-07-17 NOTE — TELEPHONE ENCOUNTER
Received a fax from [facility]  Phone [phone]  Fax [phone]    Note states:  Agatha had called me and asked to send over the measurements on her wound for a new air flow mattress.  measurements as follows:  Coccyx:  4.6 cm x 2.3 cm x 0.9 cm  Right Trochanter: 7 cm x 5 cm x 2.3 cm    Thanks,  Coral Allen RN with [facility]

## 2020-07-17 NOTE — TELEPHONE ENCOUNTER
Reviewed by SF  Per SF, [facility] the wound [facility], whom was requesting wound information in previous encounters.  Faxed to [phone]  [facility] phone - [phone]

## 2020-07-20 NOTE — TELEPHONE ENCOUNTER
Pharmacy does not have [employer] insurance for this patient nor is it in her chart.  I am unable to complete a PA without insurance information.  VM left for patient to call pharmacy and PA team with information.

## 2020-07-20 NOTE — TELEPHONE ENCOUNTER
Prior Authorization Retail Medication Request    Medication/Dose: Pregabalin (LYRICA) 200 MG capsule   ICD code (if different than what is on RX):    Previously Tried and Failed:    Rationale:      Insurance Name:    Insurance ID:        Pharmacy Information (if different than what is on RX)  Name:    Phone:

## 2020-07-21 NOTE — TELEPHONE ENCOUNTER
Spoke to pt.  Informed her that this was already completed.  She had not followed up with [facility] since last week about this, so perhaps the information just missed each other.

## 2020-07-27 DIAGNOSIS — E03.4 HYPOTHYROIDISM DUE TO ACQUIRED ATROPHY OF THYROID: ICD-10-CM

## 2020-07-27 DIAGNOSIS — I10 BENIGN ESSENTIAL HYPERTENSION: ICD-10-CM

## 2020-07-27 DIAGNOSIS — G89.4 CHRONIC PAIN SYNDROME: ICD-10-CM

## 2020-07-27 RX ORDER — HYDROXYZINE HYDROCHLORIDE 25 MG/1
TABLET, FILM COATED ORAL
Qty: 90 TABLET | Refills: 1 | Status: SHIPPED | OUTPATIENT
Start: 2020-07-27 | End: 2020-09-23

## 2020-07-27 RX ORDER — LISINOPRIL 20 MG/1
20 TABLET ORAL DAILY
Qty: 90 TABLET | Refills: 1 | Status: SHIPPED | OUTPATIENT
Start: 2020-07-27 | End: 2020-12-21

## 2020-07-27 RX ORDER — LEVOTHYROXINE SODIUM 75 UG/1
TABLET ORAL
Qty: 90 TABLET | Refills: 0 | Status: SHIPPED | OUTPATIENT
Start: 2020-07-27 | End: 2020-12-21

## 2020-07-27 NOTE — TELEPHONE ENCOUNTER
Pending Prescriptions:                       Disp   Refills    hydrOXYzine (ATARAX) 25 MG tablet [Pharma*90 tab*1            Sig: TAKE 1 TABLET (25 MG) BY MOUTH 3 TIMES DAILY AS           NEEDED FOR ITCHING    levothyroxine (SYNTHROID/LEVOTHROID) 75 M*90 tab*0            Sig: TAKE 1 TABLET (75 MCG) BY MOUTH DAILY    lisinopril (ZESTRIL) 20 MG tablet [Pharma*90 tab*1            Sig: TAKE 1 TABLET (20 MG) BY MOUTH DAILY    Prescription approved per [facility] Refill Protocol.    Susan Ordoñez, MSN, RN

## 2020-08-03 ENCOUNTER — TELEPHONE (OUTPATIENT)
Dept: FAMILY MEDICINE | Facility: CLINIC | Age: 50
End: 2020-08-03

## 2020-08-03 NOTE — TELEPHONE ENCOUNTER
Our goal is to have forms completed with 72 hours, however some forms may require a visit or additional information.    Who is the form from?: Keke [facility]  (if other please explain)  Where the form came from: form was faxed in  What clinic location was the form placed at?: [facility]  Where the form was placed: placed in TC inbox     What number is listed as a contact on the form?: [phone]  Fax number to return form to:  [phone]        Call taken on 8/3/2020 at 3:46 PM by Virgie Dudley

## 2020-08-04 ENCOUNTER — MEDICAL CORRESPONDENCE (OUTPATIENT)
Dept: HEALTH INFORMATION MANAGEMENT | Facility: CLINIC | Age: 50
End: 2020-08-04

## 2020-08-06 ENCOUNTER — TRANSFERRED RECORDS (OUTPATIENT)
Dept: HEALTH INFORMATION MANAGEMENT | Facility: CLINIC | Age: 50
End: 2020-08-06

## 2020-08-06 ENCOUNTER — TELEPHONE (OUTPATIENT)
Dept: FAMILY MEDICINE | Facility: CLINIC | Age: 50
End: 2020-08-06

## 2020-08-06 DIAGNOSIS — N39.0 URINARY TRACT INFECTION ASSOCIATED WITH INDWELLING URETHRAL CATHETER, INITIAL ENCOUNTER (H): Primary | ICD-10-CM

## 2020-08-06 DIAGNOSIS — T83.511A URINARY TRACT INFECTION ASSOCIATED WITH INDWELLING URETHRAL CATHETER, INITIAL ENCOUNTER (H): Primary | ICD-10-CM

## 2020-08-06 RX ORDER — CEPHALEXIN 500 MG/1
500 CAPSULE ORAL 3 TIMES DAILY
Qty: 30 CAPSULE | Refills: 0 | Status: SHIPPED | OUTPATIENT
Start: 2020-08-06 | End: 2020-08-10 | Stop reason: ALTCHOICE

## 2020-08-06 NOTE — TELEPHONE ENCOUNTER
Patient informed of message below. No further questions at this time.   Sania Adler CMA ([facility])

## 2020-08-06 NOTE — TELEPHONE ENCOUNTER
Labs received from outside source. Per this provider faxed via [employer].  Sania Adler CMA ([facility])

## 2020-08-06 NOTE — TELEPHONE ENCOUNTER
Please notify patient that I have reviewed her urine results. Appears that she may have a bladder infection. Keflex sent to pharmacy.

## 2020-08-10 ENCOUNTER — TELEPHONE (OUTPATIENT)
Dept: FAMILY MEDICINE | Facility: CLINIC | Age: 50
End: 2020-08-10

## 2020-08-10 DIAGNOSIS — T83.511A URINARY TRACT INFECTION ASSOCIATED WITH INDWELLING URETHRAL CATHETER, INITIAL ENCOUNTER (H): Primary | ICD-10-CM

## 2020-08-10 DIAGNOSIS — N39.0 URINARY TRACT INFECTION ASSOCIATED WITH INDWELLING URETHRAL CATHETER, INITIAL ENCOUNTER (H): Primary | ICD-10-CM

## 2020-08-10 RX ORDER — CIPROFLOXACIN 500 MG/1
500 TABLET, FILM COATED ORAL 2 TIMES DAILY
Qty: 20 TABLET | Refills: 0 | Status: SHIPPED | OUTPATIENT
Start: 2020-08-10 | End: 2020-08-10

## 2020-08-10 RX ORDER — CIPROFLOXACIN 500 MG/1
500 TABLET, FILM COATED ORAL 2 TIMES DAILY
Qty: 20 TABLET | Refills: 0
Start: 2020-08-10 | End: 2020-10-27

## 2020-08-10 NOTE — TELEPHONE ENCOUNTER
Pt informed.    She wanted Dr. Ksaper to  know that she is totally off of methadone.  This will be the fifth week.  She started feeling better on the fourth week off of it.  She wanted to share this news and celebrate.  :)

## 2020-08-10 NOTE — TELEPHONE ENCOUNTER
Received culture results.     The bacteria growing on the culture is not responsive to the Keflex sent.     It is only responsive to Cipro. Will send a new prescription for this to the pharmacy. Stop the keflex.     Hold the multivitamin with iron while on cipro.

## 2020-08-18 ENCOUNTER — TELEPHONE (OUTPATIENT)
Dept: FAMILY MEDICINE | Facility: CLINIC | Age: 50
End: 2020-08-18

## 2020-08-18 DIAGNOSIS — B35.1 ONYCHOMYCOSIS: ICD-10-CM

## 2020-08-18 RX ORDER — CICLOPIROX OLAMINE 7.7 MG/G
CREAM TOPICAL 2 TIMES DAILY
Qty: 30 G | Refills: 2 | Status: SHIPPED | OUTPATIENT
Start: 2020-08-18 | End: 2021-12-10

## 2020-08-18 NOTE — TELEPHONE ENCOUNTER
Reason for Call:  Other Update    Detailed comments: Beatrice Motta is calling in today on behalf of [facility] just wanting to give the doctor a heads up on her patient. She tried to Taper off of Methadone and patient didn't tolerate it so they referred her to a pain psychiatrist. Beatrice Motta stated that she will take care of the pain medication tell patient gets in to see the psychiatrist. Thank you    Phone Number Patient can be reached at: Other phone number:  [phone]    Best Time: anytime    Can we leave a detailed message on this number? YES    Call taken on 8/18/2020 at 7:43 AM by Agatha Segovia

## 2020-08-18 NOTE — TELEPHONE ENCOUNTER
Reason for Call:  Other call back    Detailed comments: Agatha would like Kelli to call her back she would like to talk to her about medication and prior auth.  She didn't want to go into much more detail.  Please call    Phone Number Patient can be reached at: Home number on file [phone] (home)    Best Time: any    Can we leave a detailed message on this number? YES    Call taken on 8/18/2020 at 1:16 PM by Hillary Mcgraw

## 2020-08-18 NOTE — TELEPHONE ENCOUNTER
Refill sent.     I'm sure it is challenging to get off the methadone. I'm really proud of the work!

## 2020-08-18 NOTE — TELEPHONE ENCOUNTER
Spoke to pt.  She had four concerns:  1. Asked that we call the pharmacy to check and see if a PA will be needed before her next refill for Lyrica --- spoke to pharmacy.  No PA is needed at this time.  2. Needs to give her new insurance information to the  staff to enter into chart  --- will help pt when I call her back  3. Requesting refill of ciclopirox (LOPROX) 0.77 % cream for her toes - [facility] please  --- Provider please address  4. Inform Dr. Dipti Kasper that pt is seeing another Provider named Dr. Overton in [place] to address her pain medication.  Pt had been off of methadone for six weeks and it was quite challenging for her in many aspects.  She just started on it again and wanted SF to know this.

## 2020-08-19 ENCOUNTER — TELEPHONE (OUTPATIENT)
Dept: FAMILY MEDICINE | Facility: CLINIC | Age: 50
End: 2020-08-19

## 2020-08-19 DIAGNOSIS — B35.1 ONYCHOMYCOSIS: Primary | ICD-10-CM

## 2020-08-19 RX ORDER — EFINACONAZOLE 100 MG/ML
SOLUTION TOPICAL DAILY
Qty: 8 ML | Refills: 1 | Status: SHIPPED | OUTPATIENT
Start: 2020-08-19 | End: 2020-12-01

## 2020-08-19 NOTE — TELEPHONE ENCOUNTER
Spoke with patient she stated the medication that was called in yesterday is not the one she's looking for.  She stated the one she would like was discussed with Dr Kasper in the last year or so and it's a liquid solution that is used on the fingernails twice daily.  Apparently her insurance at the time would not cover the medication but now she has different insurance.  Looked through the notes & meds and I was unable to come up with any other medications given for this, she said Dr Kasper would know what the medication was.

## 2020-08-19 NOTE — TELEPHONE ENCOUNTER
Left message asking patient to return call.    Please gather more information about what pt needs for ciclopirox (LOPROX) 0.77 % cream.

## 2020-08-19 NOTE — TELEPHONE ENCOUNTER
Left detailed message informing patient.   Will have a pt rep call pt to update the insurance info.

## 2020-08-19 NOTE — TELEPHONE ENCOUNTER
Reason for Call:  Medication or medication refill: Medication Refill     Do you use a [facility]?  Name of the pharmacy and phone number for the current request:  [id number] - [place], [place] - [address]     Name of the medication requested: Solution for the fingernail for fungus     Other request: n/a     Can we leave a detailed message on this number? YES    Phone number patient can be reached at: Home number on file [phone] (home)    Best Time: Anytime     Call taken on 8/19/2020 at 11:43 AM by Virgie Dudley

## 2020-08-22 ENCOUNTER — TRANSFERRED RECORDS (OUTPATIENT)
Dept: HEALTH INFORMATION MANAGEMENT | Facility: CLINIC | Age: 50
End: 2020-08-22

## 2020-08-22 LAB
ALT SERPL-CCNC: 21 U/L (ref 12–78)
AST SERPL-CCNC: 21 U/L (ref 15–37)
CREAT SERPL-MCNC: 1.2 MG/DL (ref 0.6–1)
GFR SERPL CREATININE-BSD FRML MDRD: 48 ML/MIN/1.73M2
GLUCOSE SERPL-MCNC: 97 MG/DL (ref 74–106)
POTASSIUM SERPL-SCNC: 4.6 MMOL/L (ref 3.6–5.2)

## 2020-08-25 ENCOUNTER — TELEPHONE (OUTPATIENT)
Dept: FAMILY MEDICINE | Facility: CLINIC | Age: 50
End: 2020-08-25

## 2020-08-25 NOTE — TELEPHONE ENCOUNTER
Reason for call:  Form  Reason for Call:  Form, our goal is to have forms completed with 72 hours, however, some forms may require a visit or additional information.    Type of letter, form or note:  Medical    Who is the form from?:  [facility]     Where did the form come from: form was faxed in    What clinic location was the form placed at?: [facility] - [phone]    Where the form was placed: 's in box     What number is listed as a contact on the form?: [phone]       Additional comments: Fax back to [phone]    Call taken on 8/25/2020 at 2:01 PM by Leland Gaines

## 2020-08-27 ENCOUNTER — AMBULATORY - HEALTHEAST (OUTPATIENT)
Dept: PALLIATIVE MEDICINE | Facility: OTHER | Age: 50
End: 2020-08-27

## 2020-08-27 DIAGNOSIS — G89.4 CHRONIC PAIN SYNDROME: ICD-10-CM

## 2020-08-27 DIAGNOSIS — G82.20 PARAPLEGIA (H): ICD-10-CM

## 2020-08-27 DIAGNOSIS — F41.9 ANXIETY: ICD-10-CM

## 2020-08-28 RX ORDER — VENLAFAXINE 75 MG/1
TABLET ORAL
Qty: 270 TABLET | Refills: 0 | Status: SHIPPED | OUTPATIENT
Start: 2020-08-28 | End: 2020-11-19

## 2020-08-28 NOTE — TELEPHONE ENCOUNTER
Prescription approved per [facility] Refill Protocol.  Routing refill request to provider for review/approval because:  Drug not on the [facility] refill protocol     Naseem Melendez, OTONIELN, RN, PHN

## 2020-08-30 RX ORDER — PREGABALIN 200 MG/1
200 CAPSULE ORAL 3 TIMES DAILY
Qty: 270 CAPSULE | Refills: 1 | Status: SHIPPED | OUTPATIENT
Start: 2020-08-30 | End: 2021-03-12

## 2020-09-11 ENCOUNTER — TELEPHONE (OUTPATIENT)
Dept: FAMILY MEDICINE | Facility: CLINIC | Age: 50
End: 2020-09-11

## 2020-09-11 NOTE — TELEPHONE ENCOUNTER
Reason for call:  Form  Reason for Call:  Form, our goal is to have forms completed with 72 hours, however, some forms may require a visit or additional information.    Type of letter, form or note:  Medical    Who is the form from?:  [facility]     Where did the form come from: form was faxed in    What clinic location was the form placed at?: [facility] - [phone]    Where the form was placed: 's in box     What number is listed as a contact on the form?: [phone]       Additional comments: Fax back to [phone]    Call taken on 9/11/2020 at 4:10 PM by Leland Gaines

## 2020-09-11 NOTE — TELEPHONE ENCOUNTER
Reason for call:  Form  Reason for Call:  Form, our goal is to have forms completed with 72 hours, however, some forms may require a visit or additional information.    Type of letter, form or note:  Medical    Who [facility] the [facility]    Where did the form come from: form was faxed in    What clinic location was the form placed at?: [facility] - [phone]    Where the form was placed: 's in box     What number is listed as a contact on the form?: [phone]       Additional comments: Fax back to [phone]    Call taken on 9/11/2020 at 3:37 PM by Leland Gaines

## 2020-09-15 ENCOUNTER — TELEPHONE (OUTPATIENT)
Dept: FAMILY MEDICINE | Facility: CLINIC | Age: 50
End: 2020-09-15

## 2020-09-15 NOTE — TELEPHONE ENCOUNTER
Reason for call:  Form  Reason for Call:  Form, our goal is to have forms completed with 72 hours, however, some forms may require a visit or additional information.    Type of letter, form or note:  Medical    Who is the form from?:   [facility]    Where did the form come from: form was faxed in    What clinic location was the form placed at?: [facility] - [phone]    Where the form was placed: 's in box     What number is listed as a contact on the form?: [phone]       Additional comments: Fax back to [phone]    Call taken on 9/15/2020 at 7:58 AM by Leland Gaines

## 2020-09-21 DIAGNOSIS — G89.4 CHRONIC PAIN SYNDROME: ICD-10-CM

## 2020-09-22 ENCOUNTER — TELEPHONE (OUTPATIENT)
Dept: FAMILY MEDICINE | Facility: CLINIC | Age: 50
End: 2020-09-22

## 2020-09-22 DIAGNOSIS — G82.20 PARAPLEGIA (H): ICD-10-CM

## 2020-09-22 DIAGNOSIS — S31.809S OPEN WOUND OF BUTTOCK WITH COMPLICATION, UNSPECIFIED LATERALITY, SEQUELA: Primary | ICD-10-CM

## 2020-09-22 NOTE — TELEPHONE ENCOUNTER
Reason for call:  Form  Reason for Call:  Form, our goal is to have forms completed with 72 hours, however, some forms may require a visit or additional information.    Type of letter, form or note:  Medical    Who is the form from?: [facility]      Where did the form come from: form was faxed in    What clinic location was the form placed at?: [facility] - [phone]    Where the form was placed: 's in box     What number is listed as a contact on the form?: [phone]       Additional comments: Fax back to [phone]    Call taken on 9/22/2020 at 2:48 PM by Leland Gaines

## 2020-09-23 ENCOUNTER — PATIENT OUTREACH (OUTPATIENT)
Dept: CARE COORDINATION | Facility: CLINIC | Age: 50
End: 2020-09-23

## 2020-09-23 DIAGNOSIS — B37.2 YEAST INFECTION OF THE SKIN: ICD-10-CM

## 2020-09-23 RX ORDER — HYDROXYZINE HYDROCHLORIDE 25 MG/1
25 TABLET, FILM COATED ORAL EVERY 8 HOURS PRN
Qty: 90 TABLET | Refills: 0 | Status: SHIPPED | OUTPATIENT
Start: 2020-09-23 | End: 2021-01-13

## 2020-09-23 NOTE — LETTER
[place] CARE COORDINATION  [address] [facility]  [place] [address]       September 24, 2020    Agatha Canas  [address] [place] [address]      Dear Agatha,    I am a clinic care coordinator who works with Dipti Kasper MD at [facility].  I wanted to introduce myself and provide you with my contact information for you to be able to call me with any questions or concerns. Below is a description of clinic care coordination and how I can further assist you.      The clinic care coordination team is made up of a registered nurse,  and community health worker who understand the health care system. The goal of clinic care coordination is to help you manage your health and improve access to the health care system in the most efficient manner. The team can assist you in meeting your health care goals by providing education, coordinating services, strengthening the communication among your providers and supporting you with any resource needs.    Please feel free to contact me at [phone] with any questions or concerns. We are focused on providing you with the highest-quality healthcare experience possible and that all starts with you.     Sincerely,     Neena Pettit, NOEMI, MSW Clinic   [facility]  Care Coordination  [place], [email or web address]  Office: [phone]  Employed by [facility]

## 2020-09-23 NOTE — TELEPHONE ENCOUNTER
Medication is being filled for 1 time refill only due to:  Patient needs to be seen because need med check.     Please assist in scheduling an appt. Lou refill given.
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
## 2020-09-24 NOTE — PROGRESS NOTES
Clinic Care Coordination Contact  [facility]    Referral Source: PCP  Clinical Data: Care Coordinator Outreach  Outreach attempted x 2.  Vm full  Plan: Care Coordinator sent care coordination introduction letter today via mail. Care Coordinator will try to reach patient again in 3-5 business days.    NOEMI Simmons, MSW   [facility]  Care Coordination  [facility]  [phone]  9/24/2020 2:26 PM

## 2020-09-25 DIAGNOSIS — R56.9 SEIZURES (H): ICD-10-CM

## 2020-09-25 RX ORDER — NYSTATIN 100000 U/G
CREAM TOPICAL
Qty: 60 G | Refills: 0 | Status: SHIPPED | OUTPATIENT
Start: 2020-09-25 | End: 2022-06-14

## 2020-09-25 NOTE — TELEPHONE ENCOUNTER
Reason for call:  Form  Reason for Call:  Form, our goal is to have forms completed with 72 hours, however, some forms may require a visit or additional information.    Type of letter, form or note:  medical    Who is the form from?: [facility]    Where did the form come from: form was faxed in    What clinic location was the form placed at?: [facility] - [phone]    Where the form was placed:  in box     What number is listed as a contact on the form?: [phone]       Additional comments: Fax to [phone]    Call taken on 11/8/2016 at 3:08 PM by Daiana Barajas   H/O Fellow

## 2020-09-25 NOTE — TELEPHONE ENCOUNTER
Prescription approved per [facility] Refill Protocol.  Jessica Penn RN

## 2020-09-26 NOTE — TELEPHONE ENCOUNTER
Pending Prescriptions:                       Disp   Refills    levETIRAcetam (KEPPRA) 750 MG tablet       180 ta*1        Sig: Take 1 tablet (750 mg) by mouth 2 times daily      Last Written Prescription Date:  04/06/2020  Last Fill Quantity: 180,   # refills: 1  Last Office Visit: 06/17/2020  Future Office visit:         Routing refill request to provider for review/approval because:  Drug not on the [facility] refill protocol     Jessica Penn RN

## 2020-09-27 RX ORDER — LEVETIRACETAM 750 MG/1
750 TABLET ORAL 2 TIMES DAILY
Qty: 180 TABLET | Refills: 0 | Status: SHIPPED | OUTPATIENT
Start: 2020-09-27 | End: 2020-10-16

## 2020-09-28 ENCOUNTER — PATIENT OUTREACH (OUTPATIENT)
Dept: CARE COORDINATION | Facility: CLINIC | Age: 50
End: 2020-09-28

## 2020-09-28 NOTE — PROGRESS NOTES
Clinic Care Coordination Contact  [facility]    Referral Source: PCP  Clinical Data: Care Coordinator Outreach  Outreach attempted x 3.  VM full  Plan: Care Coordinator will do no further outreaches at this time.    NOEMI Simmons, MSW   [facility]  Care Coordination  [facility]  [phone]  9/28/2020 4:15 PM

## 2020-10-02 ENCOUNTER — TELEPHONE (OUTPATIENT)
Dept: FAMILY MEDICINE | Facility: CLINIC | Age: 50
End: 2020-10-02

## 2020-10-02 NOTE — TELEPHONE ENCOUNTER
Our goal is to have forms completed with 72 hours, however some forms may require a visit or additional information.    Who is the form from?: [facility]if other please explain[facility]  Where the form came from: form was faxed in  What clinic location was the form placed at?: [facility]  Where the form was placed: placed in TC inbox     What number is listed as a contact on the form?: [phone]  Fax number to return form to:  [phone]        Call taken on 10/2/2020 at 3:48 PM by Virgie Dudley

## 2020-10-06 ENCOUNTER — MEDICAL CORRESPONDENCE (OUTPATIENT)
Dept: HEALTH INFORMATION MANAGEMENT | Facility: CLINIC | Age: 50
End: 2020-10-06

## 2020-10-09 ENCOUNTER — TELEPHONE (OUTPATIENT)
Dept: FAMILY MEDICINE | Facility: CLINIC | Age: 50
End: 2020-10-09

## 2020-10-09 NOTE — TELEPHONE ENCOUNTER
Our goal is to have forms completed with 72 hours, however some forms may require a visit or additional information.    Who is the form from?: [facility] (if other please explain)  Where the form came from: form was faxed in  What clinic location was the form placed at?: [facility]  Where the form was placed: placed in TC inbox     What number is listed as a contact on the form?: [phone]  Fax number to return form to:  [phone]        Call taken on 10/9/2020 at 12:59 PM by Virgie Dudley

## 2020-10-15 DIAGNOSIS — R56.9 SEIZURES (H): ICD-10-CM

## 2020-10-15 NOTE — TELEPHONE ENCOUNTER
Pending Prescriptions:                       Disp   Refills    levETIRAcetam (KEPPRA) 750 MG tablet [Phar*180 ta*0        Sig: Take 1 tablet (750 mg) by mouth 2 times daily        Routing refill request to provider for review/approval because:  Drug not on the [facility] refill protocol   Last Seen: 08/17/20  Last Refilled: 09/27/20  Next Visit: JOSÉ MIGUEL Quintanilla RN  October 15, 2020

## 2020-10-16 RX ORDER — LEVETIRACETAM 750 MG/1
750 TABLET ORAL 2 TIMES DAILY
Qty: 180 TABLET | Refills: 0 | Status: SHIPPED | OUTPATIENT
Start: 2020-10-16 | End: 2021-03-17

## 2020-10-16 NOTE — TELEPHONE ENCOUNTER
[facility] Hospitalist Team  Progress Note    Tono Proctor Patient Status:  Inpatient    1958 MRN [id number]   Location [facility] 4W/SW/SE Attending Tino Arnold MD   Hosp Day # 2 PCP Fannie Reveles DO     CC: Follow Up  PCP: Fannie Reveles oxyCODONE-acetaminophen (PERCOCET) 7.5-325 MG per tablet      Last Written Prescription Date:  7/9/2019  Last Fill Quantity: 180,   # refills: 0  Last Office Visit:  2018  Future Office visit:    Next 5 appointments (look out 90 days)    Aug 16, 2019  1:00 PM CDT  [facility] Visit [facility] Dipti [facility] MD  [facility] [address] [place] [address], [address]  [facility] [address]  [phone]           Routing refill request to provider for review/approval because:  Drug not on the [facility], [facility] or [facility] refill protocol or controlled substance    methadone (DOLOPHINE) 5 MG tablet      Last Written Prescription Date:  7/9/2019  Last Fill Quantity: 300,   # refills: 0  Last Office Visit: 2018  Future Office visit:    Next 5 appointments (look out 90 days)    Aug 16, 2019  1:00 PM CDT  Office Visit with Dipti Kasper MD  [facility] ([facility] [address] [place] [address], [address]  [facility] [address]  [phone]           Routing refill request to provider for review/approval because:  Drug not on the [facility], [facility] or [facility] refill protocol or controlled substance       agrees with plan as detailed above.  Discussed plan of care with Dr. Kely Vicente RN, NP  [address] [facility] Team  Pager [phone]  Answering Service number: [phone]  10/16/2020    SUBJECTIVE:   Event of RRT discussed with Patricia mL IV push, 40 mg, Intravenous, Daily    •  0.9% NaCl infusion, , Intravenous, Continuous    •  dexamethasone PF (DECADRON) 10 MG/ML injection 10 mg, 10 mg, Intravenous, Once    •  oxyCODONE-acetaminophen (PERCOCET)  MG per tab 1 tablet, 1 tablet, Or PRN          IMAGING     Xr Fluoroscopy C-arm Time <1 Hour  (cpt=76000)    Result Date: 10/14/2020  CONCLUSION: Intraoperative fluoroscopy provided. Please see surgical note for specific details.     Dictated by (CST): Federico Alvarez MD on 10/14/2020 at      HL  -continue fenofibrate     Rest as above with above

## 2020-10-19 DIAGNOSIS — Z00.00 ROUTINE GENERAL MEDICAL EXAMINATION AT A HEALTH CARE FACILITY: ICD-10-CM

## 2020-10-20 ENCOUNTER — TRANSFERRED RECORDS (OUTPATIENT)
Dept: HEALTH INFORMATION MANAGEMENT | Facility: CLINIC | Age: 50
End: 2020-10-20

## 2020-10-20 RX ORDER — FOLIC ACID/MV,IRON,MIN/LUTEIN 0.4-18-25
TABLET ORAL
Qty: 90 TABLET | Refills: 2 | Status: SHIPPED | OUTPATIENT
Start: 2020-10-20 | End: 2021-03-17

## 2020-10-20 NOTE — TELEPHONE ENCOUNTER
Pending Prescriptions:                       Disp   Refills    CERTAVITE/ANTIOXIDANTS tablet [Pharmacy M*90 tab*2            Sig: TAKE 1 TABLET BY MOUTH DAILY      Prescription approved per [facility] Refill Protocol.    Neeta Millan RN BSN

## 2020-10-27 ENCOUNTER — TELEPHONE (OUTPATIENT)
Dept: FAMILY MEDICINE | Facility: CLINIC | Age: 50
End: 2020-10-27

## 2020-10-27 DIAGNOSIS — N39.0 URINARY TRACT INFECTION ASSOCIATED WITH INDWELLING URETHRAL CATHETER, INITIAL ENCOUNTER (H): Primary | ICD-10-CM

## 2020-10-27 DIAGNOSIS — T83.511A URINARY TRACT INFECTION ASSOCIATED WITH INDWELLING URETHRAL CATHETER, INITIAL ENCOUNTER (H): Primary | ICD-10-CM

## 2020-10-27 RX ORDER — SULFAMETHOXAZOLE/TRIMETHOPRIM 800-160 MG
1 TABLET ORAL 2 TIMES DAILY
Qty: 20 TABLET | Refills: 0 | Status: SHIPPED | OUTPATIENT
Start: 2020-10-27 | End: 2020-12-23

## 2020-10-27 NOTE — TELEPHONE ENCOUNTER
Our goal is to have forms completed with 72 hours, however some forms may require a visit or additional information.    Who is the form from?: [facility] (if other please explain)  Where the form came from: form was faxed in  What clinic location was the form placed at?: [facility]  Where the form was placed: placed in TC inbox     What number is listed as a contact on the form?: [phone]  Fax number to return form to:  [phone]    Call taken on 10/27/2020 at 5:32 PM by Virgie Dudley

## 2020-10-27 NOTE — TELEPHONE ENCOUNTER
Received UA and culture results for Agatha which shows UTI.     Recommend course of Bactrim DS twice daily for 10 days. Sent to [facility].     Recheck if not improving.

## 2020-10-30 ENCOUNTER — TELEPHONE (OUTPATIENT)
Dept: FAMILY MEDICINE | Facility: CLINIC | Age: 50
End: 2020-10-30

## 2020-10-30 NOTE — TELEPHONE ENCOUNTER
Our goal is to have forms completed with 72 hours, however some forms may require a visit or additional information.    Who is the form from?: [facility]  (if other please explain)  Where the form came from: form was faxed in  What clinic location was the form placed at?: [facility]  Where the form was placed: placed in TC inbox     What number is listed as a contact on the form?: [phone]  Fax number to return form to:  [phone]      Call taken on 10/30/2020 at 4:28 PM by Virgie Dudley

## 2020-11-03 ENCOUNTER — TELEPHONE (OUTPATIENT)
Dept: FAMILY MEDICINE | Facility: CLINIC | Age: 50
End: 2020-11-03

## 2020-11-03 NOTE — TELEPHONE ENCOUNTER
Our goal is to have forms completed with 72 hours, however some forms may require a visit or additional information.    Who is the form from?: [facility] (if other please explain)  Where the form came from: form was faxed in  What clinic location was the form placed at?: [facility]  Where the form was placed: placed in TC inbox     What number is listed as a contact on the form?: [phone]  Fax number to return form to:  [phone]        Call taken on 11/3/2020 at 1:21 PM by Virgie Dudley

## 2020-11-04 ENCOUNTER — TELEPHONE (OUTPATIENT)
Dept: FAMILY MEDICINE | Facility: CLINIC | Age: 50
End: 2020-11-04

## 2020-11-04 NOTE — TELEPHONE ENCOUNTER
Patient is calling in today in regards to the form from [employer] asking if you received the wound measurements or was the old wound measurements put down on the form? Can someone call Agatha the patient at [phone] to talk about this. Thank you

## 2020-11-04 NOTE — TELEPHONE ENCOUNTER
Have we received forms?    Spoke with patient informed her that we have not received any new forms requesting wound measurements, she state that she will have them faxed again  Thanks  Padmini Rangel RT (R)

## 2020-11-05 ENCOUNTER — TELEPHONE (OUTPATIENT)
Dept: FAMILY MEDICINE | Facility: CLINIC | Age: 50
End: 2020-11-05

## 2020-11-05 NOTE — TELEPHONE ENCOUNTER
I spoke with patient informed her that we have not received a form from [facility] requesting wound measurements, she will call them today to see what they are needing and have them fax form to us if needed  Closing encounter  Padmini Rangel RT (R)

## 2020-11-05 NOTE — TELEPHONE ENCOUNTER
Flagging for RN     I have tried several times to get them to fax us the form, everytime it comes with a black out on the form so we are unable to see all the doumentations.    I tried to call measurements to give them over the phone but they need to come from a RN, please see message below from [facility] and please call in wound measurements to [facility]#[phone]  Thank you  Padmini Rangel RT (R)         Coral from [facility] called with patients wound measurements. [phone]  Her coccyx is 4 x1.5x0.7 and her right hip is 6x5x2

## 2020-11-05 NOTE — TELEPHONE ENCOUNTER
Coral from [facility] called with patients wound measurements. [phone]  Her coccyx is 4 x1.5x0.7 and her right hip is 6x5x2

## 2020-11-05 NOTE — TELEPHONE ENCOUNTER
Spoke with [facility] she states that they have refaxed the form this morning at 8:49 to [facility] [phone]  Thanks  Padmini Rangel RT (R)

## 2020-11-06 NOTE — TELEPHONE ENCOUNTER
Attempted to call [facility] medical. Unfortunately they are requesting additional information on patient's wounds which I do not have. I contacted home health nurse as well who also did not specifics on wounds that supply company is requesting. [facility] states they have newer had any issues with supplies before.    Can we try to have the company fax the request again as it appears there has not been a problem with the supplies before?    Raudel Newby, RN, BSN

## 2020-11-10 NOTE — TELEPHONE ENCOUNTER
Spoke with [facility], they stated that it's not a form they need medical records stating plan of care must come from a RN    Assessment from RN    They need # of wounds =2 smaller one on her back and a larger one on her Right Buttocks  Type=Pressure Ulcer, unstageable, full thickness, with minimal drainage, please confirm   Is Wound surgical or debrided  Is Primary and secondary dressing being used   Frequency of changes is twice daily      Her mother which is also her PCA changes her wounds she uses For[employer] pads and ABD pads     Thanks  Padmini Rangel RT (R)

## 2020-11-11 NOTE — TELEPHONE ENCOUNTER
PT is calling on the status of [facility]. It has  Been well over a week and she has been going back and forth. Can someone take care of this and call pt back when completed. Pt is requesting for Agatha Rangel to call back since she is the last one to handle this.

## 2020-11-11 NOTE — TELEPHONE ENCOUNTER
Chart review there is not mepilex form pads are her med list?    When was was her last office visit for this?   She had a home assesment done from home care RN on 10/30/2020 and this is her documentation pasted below.   SF: are you ok with changing this dressing twice daily as mepilex can be used for a few days at a time? Is this truly the best dressing to use?    Please advise.    Naseem Melendez, OTONIELN, RN, PHN
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
## 2020-11-12 NOTE — TELEPHONE ENCOUNTER
I understand that Agatha is seen by a wound specialist outside of the system. I have not been managing but am signing the home care orders.     Generally the [facility] form has been completed with measurements from home care nursing. The management of the wound care is with the wound specialist.

## 2020-11-12 NOTE — TELEPHONE ENCOUNTER
Left detailed message on [employer] phone regarding okay for orders.   Lauri Quintanilla RN  November 12, 2020

## 2020-11-13 NOTE — TELEPHONE ENCOUNTER
Left detailed message on phone. Gave verbal okay for orders.  If anything is needed the agency will reach out to us.   Lauri Quintanilla RN  November 13, 2020

## 2020-11-14 ENCOUNTER — HEALTH MAINTENANCE LETTER (OUTPATIENT)
Age: 50
End: 2020-11-14

## 2020-11-16 NOTE — TELEPHONE ENCOUNTER
Patient is calling in today in regards to orders. Patient is stating that she spoke with [employer] today and they stated that they didn't receive any orders. Can someone give them a call at [phone]. Thank you

## 2020-11-17 NOTE — TELEPHONE ENCOUNTER
I spoke with [facility] RN she states that she has called [facility] to discuss this and get them the information they need.  Also spoke with Dr Kasper  prior visits did not discuss wound cause Home Care has been taking care of the wound, and she has also been seeing a wound care provider, if a visit is needed it would be best fit to be seen by wound provider.     We received another fax from [facility] but it was not legible,  So I have faxed it back to them requesting a new fax be sent over.     Patient has been informed of message above she states that she will try to call and schedule with wound provider to see if they can schedule virtual visit or send info that is needed to [facility]     Padmini Rangel RT (R)

## 2020-11-19 DIAGNOSIS — F41.9 ANXIETY: ICD-10-CM

## 2020-11-19 NOTE — TELEPHONE ENCOUNTER
[facility], signature is needed and per [facility] documentation is needed in patients chart monthly with the following information:  Number of Wounds  Location  Size  Depth  Amount of drainage from wound  Stage and thickness  Reason for dressing use (surgical or debrided)  Dressing to be used on each wound  Primary/secondary/cleaning  Frequency of dressing change    I do not see that this is documented in chart.     Patient calling stating that she would like Dr Kasper to review and sign or if Dr Kasper is not available can Jayne Garcia review and sign, patient states she needs her medical supplies ASAP    Please see other encounters, patient is aware that she needs a visit with wound clinic, but states that she did not think that she could be seen due to COVID.     Thanks  Padmini Rangel RT (R)

## 2020-11-19 NOTE — TELEPHONE ENCOUNTER
Flagging for provider to review/sign, form has been placed in providers bin     I called [facility] again  and Spoke With Nancy, I explained to her that we have not seen patient for wound care and that Dr Kasper has not seen the wound at all, patient was last seen by Dr Marcos Doe in regards to the wound.  Nancy states that this should never have come to us for wound supplies due to us not seeing patient for this.  I informed her that patient was told that she should follow up with her wound care provider.  Patient gave [facility] Dr Potter name as provider.  [facility] will contact patient and inform her that this request for wound supplies needs to go to wound provider.  [facility] will call patient and explain that wound supplies need to go to the wound provider that she is seeing Marcos Doe    What they are needing Dr Kasper to sign orders for are the urological supplies for catheter, there are 2 forms for cath supplies/Rx, please sign and fax back with medical records regarding cathing.       Padmini Rangel RT (R)

## 2020-11-20 ENCOUNTER — TELEPHONE (OUTPATIENT)
Dept: FAMILY MEDICINE | Facility: CLINIC | Age: 50
End: 2020-11-20

## 2020-11-20 NOTE — TELEPHONE ENCOUNTER
Our goal is to have forms completed with 72 hours, however some forms may require a visit or additional information.    Who is the form from?: [facility]  (if other please explain)  Where the form came from: form was faxed in  What clinic location was the form placed at?: [facility]  Where the form was placed: placed in [facility] inbox     What number is listed as a contact on the form?: [phone]  Fax number to return form to:  [phone]        Call taken on 11/20/2020 at 3:11 PM by Virgie Dudley

## 2020-11-21 RX ORDER — BUPROPION HYDROCHLORIDE 150 MG/1
TABLET, EXTENDED RELEASE ORAL
Qty: 180 TABLET | Refills: 0 | Status: SHIPPED | OUTPATIENT
Start: 2020-11-21 | End: 2021-03-17

## 2020-11-21 NOTE — TELEPHONE ENCOUNTER
Prescription approved per [facility] Refill Protocol.  Jessica Penn RN

## 2020-11-25 ENCOUNTER — TELEPHONE (OUTPATIENT)
Dept: FAMILY MEDICINE | Facility: CLINIC | Age: 50
End: 2020-11-25

## 2020-11-25 DIAGNOSIS — G89.21 CHRONIC PAIN DUE TO INJURY: ICD-10-CM

## 2020-11-25 RX ORDER — IBUPROFEN 200 MG
TABLET ORAL
Qty: 120 TABLET | Refills: 0 | Status: SHIPPED | OUTPATIENT
Start: 2020-11-25 | End: 2021-02-12

## 2020-11-25 NOTE — TELEPHONE ENCOUNTER
Prescription approved per [facility] Refill Protocol.  Lauri Quintanilla RN  November 25, 2020

## 2020-11-25 NOTE — PROGRESS NOTES
"Agatha Canas is a 50 year old female who is being evaluated via a billable telephone visit.      The patient has been notified of following:     \"This telephone visit will be conducted via a call between you and your physician/provider. We have found that certain health care needs can be provided without the need for a physical exam.  This service lets us provide the care you need with a short phone conversation.  If a prescription is necessary we can send it directly to your pharmacy.  If lab work is needed we can place an order for that and you can then stop by our lab to have the test done at a later time.    Telephone visits are billed at different rates depending on your insurance coverage. During this emergency period, for some insurers they may be billed the same as an in-person visit.  Please reach out to your insurance provider with any questions.    If during the course of the call the physician/provider feels a telephone visit is not appropriate, you will not be charged for this service.\"    Patient has given verbal consent for Telephone visit?  Yes    What phone number would you like to be contacted at?  [phone]    How would you like to obtain your AVS? Joan Lerma     Agatha Canas is a 50 year old female who presents via phone visit today for the following health issues:    HPI     Hypertension Follow-up      Do you check your blood pressure regularly outside of the clinic? No     Are you following a low salt diet? No    Are your blood pressures ever more than 140 on the top number (systolic) OR more   than 90 on the bottom number (diastolic), for example 140/90? No    Depression and Anxiety Follow-Up    How are you doing with your depression since your last visit? No change    How are you doing with your anxiety since your last visit?  No change    Are you having other symptoms that might be associated with depression or anxiety? No    Have you had a significant life event? " No     Do you have any concerns with your use of alcohol or other drugs? No    Social History     Tobacco Use     Smoking status: Current Every Day Smoker     Packs/day: 0.50     Years: 20.00     Pack years: 10.00     Types: Cigarettes     Smokeless tobacco: Never Used   Substance Use Topics     Alcohol use: No     Comment: none      Drug use: No     Comment: none      PHQ 1/24/2020 5/6/2020 6/17/2020   PHQ-9 Total Score 7 4 6   Q9: Thoughts of better off dead/self-harm past 2 weeks Not at all Not at all Not at all     SHYLA-7 SCORE 1/24/2020 5/6/2020 6/17/2020   Total Score - - -   Total Score 5 (mild anxiety) - -   Total Score 5 3 9         Suicide Assessment Five-step Evaluation and Treatment (SAFE-T)    Hypothyroidism Follow-up      Since last visit, patient describes the following symptoms: dry skin and hair loss    Has noted a lot of hair loss and also weight has been increasing.  Over the last year.     Noticing dry skin too. Moisturizer helps some.     TSH   Date Value Ref Range Status   10/15/2019 3.41 0.40 - 4.00 mU/L Final       SUPRAPUBIC CATHETER  Cath change every 3 weeks due to leaking. Suprapubic catheter. No self-cathing.  She is paraplegic and requires catheter.    WOUND CARE  Seeing wound care specialist for care.  Wound care specialist is taking care of the medical supplies for wounds now at this time.    CHRONIC PAIN  Patient has been seeing Beatrice Amato, internal medicine and has been working on weaning from methadone.  Agatha has an appointment next week with a new pain clinic as there has been some difficulty with weaning from the methadone completely.      PRES  History of seizure with hospitalization 8/2019.  This was thought to be related to pres.  She was started on Keppra.  The plan per her consultation with neurology was to have a follow-up with neurology after 3 months and a CT of her head at that time.  Could consider eating off Keppra at 6 months post hospitalization.  She denies  any further seizure issue.  She would like to come off the Keppra.        Review of Systems   CONSTITUTIONAL: As above  ENT/MOUTH: NEGATIVE for ear, mouth and throat problems  RESP: NEGATIVE for significant cough or SOB  CV: NEGATIVE for chest pain, palpitations or peripheral edema   female: See above  MUSCULOSKELETAL: As above  PSYCHIATRIC: NEGATIVE for changes in mood or affect       Objective          Vitals:  No vitals were obtained today due to virtual visit.    alert and no distress  PSYCH: Alert and oriented times 3; coherent speech, normal   rate and volume, able to articulate logical thoughts, able   to abstract reason, no tangential thoughts, no hallucinations   or delusions  Her affect is normal and pleasant  RESP: No cough, no audible wheezing, able to talk in full sentences  Remainder of exam unable to be completed due to telephone visits            Assessment/Plan:    Assessment & Plan     Anxiety associated with depression  Reports good control with anxiety and depression.  She reports tolerating the medications well.  No changes apparent at this point.  We will continue current medications.    PRES (posterior reversible encephalopathy syndrome)  Patient like to stop the Keppra.  She has had no further seizures.  Reviewed discharge summary from hospitalization August 2019 and consultation report from neurology.  It was recommended that patient have follow-up with neurology further imaging and then consideration of weaning off the Keppra.  Recommend patient have follow-up with neurology and imaging performed.  Then can consider weaning with approval of neurology.    Benign essential hypertension  Has been controlled at this time.  She has had other follow-up visits in person with a provider.  She is due for labs.  She will have these done near her.  Will notify of results  - Comprehensive metabolic panel; Future    Hypothyroidism due to acquired atrophy of thyroid  Agatha is having some issues with  hair loss and dry skin.  Also weight gain.  She is due at this time for recheck of her TSH.  Will notify of results and adjust therapy as needed.  - TSH with free T4 reflex; Future    Encounter for long-term (current) use of medications  Due for lab evaluation.  We will notify results.  - Comprehensive metabolic panel; Future    Onychomycosis  Patient would like to retry getting the Jublia prescription.  Previously not covered by her insurance and did not it approved with the prior Auth.  New prescription sent through.  - Efinaconazole (JUBLIA) 10 % SOLN; Externally apply topically daily    Suprapubic catheter  Agatha has had suprapubic catheter in for many years.  She does not self cath.  She has her catheter changed every 3 weeks.  She cannot wait longer in between due to leakage around the catheter after 3 weeks.  Continue current plan.      Tobacco Cessation:   reports that she has been smoking cigarettes. She has a 10.00 pack-year smoking history. She has never used smokeless tobacco.  Tobacco Cessation Action Plan: Information offered: Patient not interested at this time             Return in about 6 months (around 6/1/2021) for Follow up.    Dipti Kasper MD  [facility]    Phone call duration:  17 minutes        Addendum: patient has a suprapubic catheter (Z93.59) in place that is changed by home care every two weeks. See above. Reason for catheter need is Paraplegia G82.20.

## 2020-11-25 NOTE — TELEPHONE ENCOUNTER
Routing to Provider to review and advise, the last chart notes that I see that state this information was from 2011, are you willing to update/addend chart notes with this information,    Per [facility] they need office charts updated to state:  Member is self cathing  Diagnosis  Frequency of Cath change  Signature and date    Per Insurance requirements for catheter supplies and urological supplies.     Thanks  Padmini Rangel RT (R)

## 2020-11-27 ENCOUNTER — TELEPHONE (OUTPATIENT)
Dept: FAMILY MEDICINE | Facility: CLINIC | Age: 50
End: 2020-11-27

## 2020-11-27 NOTE — TELEPHONE ENCOUNTER
Returned call. Tried suboxone. Had to switch back to methadone per patient request. Provider is having a hard time getting Agatha to follow up with outside provider in pain. [facility] [place]. Beatrice will be seeing her in about 15 minutes for follow up. Was questioning whether this is a pattern for Agatha.     Wants her to see Adam Orellana, pain psychiatrist. Insurance doesn't cover.     She is going to work to have her seen by pain management.

## 2020-11-27 NOTE — TELEPHONE ENCOUNTER
Beatrice Amato PA-C from [facility] calling to speak with Dr Kasper.   She states she has been seeing this patient and they have been trying to wean off of methadone and have been trying substance abuse programs and pain clinics with no avail.  This provider noticed the patient is on her schedule again and she just wants to touch base with Dr Kasper about her.  She can be reached at [phone].  Thank you.

## 2020-12-01 ENCOUNTER — VIRTUAL VISIT (OUTPATIENT)
Dept: FAMILY MEDICINE | Facility: CLINIC | Age: 50
End: 2020-12-01
Payer: COMMERCIAL

## 2020-12-01 ENCOUNTER — TELEPHONE (OUTPATIENT)
Dept: FAMILY MEDICINE | Facility: CLINIC | Age: 50
End: 2020-12-01

## 2020-12-01 DIAGNOSIS — Z93.59 SUPRAPUBIC CATHETER (H): ICD-10-CM

## 2020-12-01 DIAGNOSIS — S31.809S OPEN WOUND OF BUTTOCK WITH COMPLICATION, UNSPECIFIED LATERALITY, SEQUELA: ICD-10-CM

## 2020-12-01 DIAGNOSIS — I10 BENIGN ESSENTIAL HYPERTENSION: ICD-10-CM

## 2020-12-01 DIAGNOSIS — B35.1 ONYCHOMYCOSIS: ICD-10-CM

## 2020-12-01 DIAGNOSIS — E03.4 HYPOTHYROIDISM DUE TO ACQUIRED ATROPHY OF THYROID: ICD-10-CM

## 2020-12-01 DIAGNOSIS — Z79.899 ENCOUNTER FOR LONG-TERM (CURRENT) USE OF MEDICATIONS: ICD-10-CM

## 2020-12-01 DIAGNOSIS — F41.8 ANXIETY ASSOCIATED WITH DEPRESSION: Primary | ICD-10-CM

## 2020-12-01 DIAGNOSIS — G82.20 PARAPLEGIA (H): ICD-10-CM

## 2020-12-01 DIAGNOSIS — I67.83 PRES (POSTERIOR REVERSIBLE ENCEPHALOPATHY SYNDROME): ICD-10-CM

## 2020-12-01 PROCEDURE — 99442 PR PHYSICIAN TELEPHONE EVALUATION 11-20 MIN: CPT | Mod: 95 | Performed by: FAMILY MEDICINE

## 2020-12-01 RX ORDER — EFINACONAZOLE 100 MG/ML
SOLUTION TOPICAL DAILY
Qty: 8 ML | Refills: 1 | Status: SHIPPED | OUTPATIENT
Start: 2020-12-01 | End: 2021-12-10

## 2020-12-01 ASSESSMENT — PATIENT HEALTH QUESTIONNAIRE - PHQ9
SUM OF ALL RESPONSES TO PHQ QUESTIONS 1-9: 3
5. POOR APPETITE OR OVEREATING: NOT AT ALL

## 2020-12-01 ASSESSMENT — ANXIETY QUESTIONNAIRES
7. FEELING AFRAID AS IF SOMETHING AWFUL MIGHT HAPPEN: NOT AT ALL
1. FEELING NERVOUS, ANXIOUS, OR ON EDGE: SEVERAL DAYS
5. BEING SO RESTLESS THAT IT IS HARD TO SIT STILL: NOT AT ALL
6. BECOMING EASILY ANNOYED OR IRRITABLE: NOT AT ALL
IF YOU CHECKED OFF ANY PROBLEMS ON THIS QUESTIONNAIRE, HOW DIFFICULT HAVE THESE PROBLEMS MADE IT FOR YOU TO DO YOUR WORK, TAKE CARE OF THINGS AT HOME, OR GET ALONG WITH OTHER PEOPLE: NOT DIFFICULT AT ALL
2. NOT BEING ABLE TO STOP OR CONTROL WORRYING: SEVERAL DAYS
3. WORRYING TOO MUCH ABOUT DIFFERENT THINGS: SEVERAL DAYS
GAD7 TOTAL SCORE: 3

## 2020-12-01 NOTE — TELEPHONE ENCOUNTER
Our goal is to have forms completed with 72 hours, however some forms may require a visit or additional information.    Who [facility] the [facility] from[facility]  (if other please explain)  Where the form came from: form was faxed in  What clinic location was the form placed at?: [facility]  Where the form was placed: placed in [facility] inbox     What number is listed as a contact on the form?: [phone]  Fax number to return form to:  [phone]        Call taken on 12/1/2020 at 4:09 PM by Virgie Dudley

## 2020-12-01 NOTE — TELEPHONE ENCOUNTER
Office notes updated today in regards to catheter. Please send these notes.     Also, sent Jublia prescription for Agatha per her request. Will see if this is covered this time once pharmacy runs her coverage.     In regards to the Keppra, the recommendation from neurology was to follow up 3-6 months after her discharge from the hospital with neurology and can then wean off Keppra. Please let me know if needs a referral for neurology. If needs referral, if she can come to this area for that.

## 2020-12-02 ASSESSMENT — ANXIETY QUESTIONNAIRES: GAD7 TOTAL SCORE: 3

## 2020-12-02 NOTE — TELEPHONE ENCOUNTER
Office notes have been faxed to [facility] and confirmed that they do not need any more documentation from us, the orders have all been placed  Closing encounter  Padmini Rangel RT (R)

## 2020-12-03 ENCOUNTER — TELEPHONE (OUTPATIENT)
Dept: FAMILY MEDICINE | Facility: CLINIC | Age: 50
End: 2020-12-03

## 2020-12-03 NOTE — TELEPHONE ENCOUNTER
Patient states that she will call her insurance to discuss   Closing encounter  Padmini Rangel RT (R)

## 2020-12-03 NOTE — TELEPHONE ENCOUNTER
Please let patient know that Jublia is still not covered by her insurance. She has been prescribed that alternative in the past.

## 2020-12-03 NOTE — TELEPHONE ENCOUNTER
PRIOR AUTHORIZATION is needed for  Efinaconazole (JUBLIA) 10 % SOLN      Per Pharmacy Ciclopirox is suggested, please send new Rx if appropriate     Thanks  Padmini Rangel RT (R)

## 2020-12-11 ENCOUNTER — TELEPHONE (OUTPATIENT)
Dept: FAMILY MEDICINE | Facility: CLINIC | Age: 50
End: 2020-12-11

## 2020-12-11 NOTE — TELEPHONE ENCOUNTER
Reason for Call:  Form, our goal is to have forms completed with 72 hours, however, some forms may require a visit or additional information.    Type of letter, form or note:  Home Health Certification    Who is the form from?: Home care    Where did the form come from: form was faxed in    What clinic location was the form placed at?: [facility] - [phone]    Where the form was placed:  Forms Box/Folder    What number is listed as a contact on the form?: [phone]       Additional comments: Please complete and fax to [phone]    Call taken on 12/11/2020 at 3:52 PM by Porsche Murillo

## 2020-12-21 ENCOUNTER — TELEPHONE (OUTPATIENT)
Dept: FAMILY MEDICINE | Facility: CLINIC | Age: 50
End: 2020-12-21

## 2020-12-21 DIAGNOSIS — L30.9 DERMATITIS: Primary | ICD-10-CM

## 2020-12-21 NOTE — TELEPHONE ENCOUNTER
Would like this sent to [employer]. Pended orders for review and approval. Please call patient to let her know when this completed.

## 2020-12-22 ENCOUNTER — TELEPHONE (OUTPATIENT)
Dept: FAMILY MEDICINE | Facility: CLINIC | Age: 50
End: 2020-12-22

## 2020-12-22 ENCOUNTER — TRANSFERRED RECORDS (OUTPATIENT)
Dept: HEALTH INFORMATION MANAGEMENT | Facility: CLINIC | Age: 50
End: 2020-12-22

## 2020-12-22 NOTE — TELEPHONE ENCOUNTER
Patient has been informed, she will call them and if need be call us back with the fax number   Closing encounter  Padmini Rangel RT (R)

## 2020-12-22 NOTE — TELEPHONE ENCOUNTER
[facility] got your derm referral but they do not have derm    *Giving you a heads up    Patient will need new referral

## 2020-12-23 ENCOUNTER — TELEPHONE (OUTPATIENT)
Dept: FAMILY MEDICINE | Facility: CLINIC | Age: 50
End: 2020-12-23

## 2020-12-23 DIAGNOSIS — T83.511A URINARY TRACT INFECTION ASSOCIATED WITH INDWELLING URETHRAL CATHETER, INITIAL ENCOUNTER (H): ICD-10-CM

## 2020-12-23 DIAGNOSIS — N39.0 URINARY TRACT INFECTION ASSOCIATED WITH INDWELLING URETHRAL CATHETER, INITIAL ENCOUNTER (H): ICD-10-CM

## 2020-12-23 RX ORDER — SULFAMETHOXAZOLE/TRIMETHOPRIM 800-160 MG
1 TABLET ORAL 2 TIMES DAILY
Qty: 20 TABLET | Refills: 0 | Status: SHIPPED | OUTPATIENT
Start: 2020-12-23 | End: 2021-02-10

## 2020-12-29 ENCOUNTER — TELEPHONE (OUTPATIENT)
Dept: FAMILY MEDICINE | Facility: CLINIC | Age: 50
End: 2020-12-29

## 2020-12-29 NOTE — TELEPHONE ENCOUNTER
Call received from patient asking if we received results of urine test and if antibiotic was sent. Advised antibiotic was sent 12/23. Patient did not receive notification of this but will call pharmacy now.

## 2020-12-29 NOTE — TELEPHONE ENCOUNTER
Bacteria in urine culture is responsive to the bactrim prescribed. Please complete full 10 day course.     Please let me know if symptoms do not resolve.

## 2021-01-05 ENCOUNTER — TELEPHONE (OUTPATIENT)
Dept: FAMILY MEDICINE | Facility: CLINIC | Age: 51
End: 2021-01-05

## 2021-01-05 NOTE — TELEPHONE ENCOUNTER
Our goal is to have forms completed with 72 hours, however some forms may require a visit or additional information.    Who is the form from?: [facility]  (if other please explain)  Where the form came from: form was faxed in  What clinic location was the form placed at?: [facility]  Where the form was placed: placed in [facility] inbox     What number is listed as a contact on the form?: [phone]  Fax number to return form to:  [phone]        Call taken on 1/5/2021 at 12:36 PM by Virgie Dudley

## 2021-01-08 ENCOUNTER — TELEPHONE (OUTPATIENT)
Dept: FAMILY MEDICINE | Facility: CLINIC | Age: 51
End: 2021-01-08

## 2021-01-08 DIAGNOSIS — L30.9 DERMATITIS: Primary | ICD-10-CM

## 2021-01-08 NOTE — TELEPHONE ENCOUNTER
Referral and demographics have been faxed to [facility] in [place] fax#[phone]  Ph#[phone]  Closing encounter  Padmini Rangel RT (R)

## 2021-01-08 NOTE — TELEPHONE ENCOUNTER
Our goal is to have forms completed with 72 hours, however some forms may require a visit or additional information.    Who is the form from?: [facility]  (if other please explain)  Where the form came from: form was faxed in  What clinic location was the form placed at?: [facility]  Where the form was placed: placed in [facility] inbox     What number is listed as a contact on the form?: [phone]  Fax number to return form to:  [phone]        Call taken on 1/8/2021 at 4:02 PM by Virgie Dudley

## 2021-01-08 NOTE — TELEPHONE ENCOUNTER
Reason for Call: Request for an order or referral:    Order or referral being requested: referral to Dermatology was not received to [facility] in [place]  I have placed referral please review sign and will need to fax referral and demographics to [facility].    Date needed: as soon as possible    Has the patient been seen by the PCP for this problem? YES    Additional comments: there was a generic referral to dermatology placed for patient but she stated that it was kicked back so needs a new one completed and faxed to them    Phone number Patient can be reached at:  Home number on file [phone] (home)    Best Time:      Can we leave a detailed message on this number?  YES    Call taken on 1/8/2021 at 12:37 PM by Kelsey Lucas

## 2021-01-12 ENCOUNTER — TRANSFERRED RECORDS (OUTPATIENT)
Dept: HEALTH INFORMATION MANAGEMENT | Facility: CLINIC | Age: 51
End: 2021-01-12

## 2021-01-13 DIAGNOSIS — G89.4 CHRONIC PAIN SYNDROME: ICD-10-CM

## 2021-01-13 RX ORDER — HYDROXYZINE HYDROCHLORIDE 25 MG/1
25 TABLET, FILM COATED ORAL EVERY 8 HOURS PRN
Qty: 90 TABLET | Refills: 1 | Status: SHIPPED | OUTPATIENT
Start: 2021-01-13 | End: 2021-02-12

## 2021-01-13 NOTE — TELEPHONE ENCOUNTER
Prescription approved per [facility].    Kelsey Winchester RN

## 2021-01-15 DIAGNOSIS — Z53.9 DIAGNOSIS NOT YET DEFINED: Primary | ICD-10-CM

## 2021-01-15 PROCEDURE — 99207 PR MD CERTIFICATION HHA PATIENT: CPT | Performed by: FAMILY MEDICINE

## 2021-01-21 ENCOUNTER — TRANSFERRED RECORDS (OUTPATIENT)
Dept: HEALTH INFORMATION MANAGEMENT | Facility: CLINIC | Age: 51
End: 2021-01-21

## 2021-01-22 ENCOUNTER — TELEPHONE (OUTPATIENT)
Dept: FAMILY MEDICINE | Facility: CLINIC | Age: 51
End: 2021-01-22

## 2021-01-22 NOTE — TELEPHONE ENCOUNTER
Our goal is to have forms completed with 72 hours, however some forms may require a visit or additional information.    Who is the form from?: [facility]  (if other please explain)  Where the form came from: form was faxed in  What clinic location was the form placed at?: [facility]  Where the form was placed: placed in [facility] inbox     What number is listed as a contact on the form?: [phone]  Fax number to return form to:  [phone]        Call taken on 1/22/2021 at 2:00 PM by Virgie Dudley

## 2021-01-28 ENCOUNTER — TRANSFERRED RECORDS (OUTPATIENT)
Dept: HEALTH INFORMATION MANAGEMENT | Facility: CLINIC | Age: 51
End: 2021-01-28

## 2021-01-28 ENCOUNTER — TELEPHONE (OUTPATIENT)
Dept: FAMILY MEDICINE | Facility: CLINIC | Age: 51
End: 2021-01-28

## 2021-01-28 NOTE — TELEPHONE ENCOUNTER
Our goal is to have forms completed with 72 hours, however some forms may require a visit or additional information.    Who is the form from?:[facility]  (if other please explain)  Where the form came from: form was faxed in  What clinic location was the form placed at?: [facility]  Where the form was placed: placed in [facility] inbox     What number is listed as a contact on the form?: [phone]  Fax number to return form to:  [phone]        Call taken on 1/28/2021 at 1:11 PM by Virgie Dudley

## 2021-02-01 ENCOUNTER — TELEPHONE (OUTPATIENT)
Dept: FAMILY MEDICINE | Facility: CLINIC | Age: 51
End: 2021-02-01

## 2021-02-05 ENCOUNTER — TRANSFERRED RECORDS (OUTPATIENT)
Dept: HEALTH INFORMATION MANAGEMENT | Facility: CLINIC | Age: 51
End: 2021-02-05

## 2021-02-06 ENCOUNTER — HEALTH MAINTENANCE LETTER (OUTPATIENT)
Age: 51
End: 2021-02-06

## 2021-02-09 ENCOUNTER — TELEPHONE (OUTPATIENT)
Dept: FAMILY MEDICINE | Facility: CLINIC | Age: 51
End: 2021-02-09

## 2021-02-09 NOTE — TELEPHONE ENCOUNTER
Our goal is to have forms completed with 72 hours, however some forms may require a visit or additional information.    Who is the form from?: [facility], [facility]  (if other please explain)  Where the form came from: form was faxed in  What clinic location was the form placed at?: [facility]  Where the form was placed: placed in TC inbox     What number is listed as a contact on the form?: [phone]  Fax number to return form to:  [phone]        Call taken on 2/9/2021 at 11:53 AM by Virgie Dudley

## 2021-02-10 ENCOUNTER — TELEPHONE (OUTPATIENT)
Dept: FAMILY MEDICINE | Facility: CLINIC | Age: 51
End: 2021-02-10

## 2021-02-10 DIAGNOSIS — T83.511A URINARY TRACT INFECTION ASSOCIATED WITH INDWELLING URETHRAL CATHETER, INITIAL ENCOUNTER (H): ICD-10-CM

## 2021-02-10 DIAGNOSIS — N39.0 URINARY TRACT INFECTION ASSOCIATED WITH INDWELLING URETHRAL CATHETER, INITIAL ENCOUNTER (H): ICD-10-CM

## 2021-02-10 RX ORDER — SULFAMETHOXAZOLE/TRIMETHOPRIM 800-160 MG
1 TABLET ORAL 2 TIMES DAILY
Qty: 20 TABLET | Refills: 0 | Status: SHIPPED | OUTPATIENT
Start: 2021-02-10 | End: 2022-02-08

## 2021-02-10 NOTE — TELEPHONE ENCOUNTER
LM for the patient to return call to the clinic. Please inform patient of the message below.     Lauri Quintanilla RN, BSN  [place]  February 10, 2021

## 2021-02-10 NOTE — TELEPHONE ENCOUNTER
Received results today forwarded from [facility] of urine results. Culture results obtained show >100,000 bacteria (Enterobacter cloacae)    Prescription sent for Bactrim DS one pill twice daily for 10 days.     Please notify Yashira

## 2021-02-11 DIAGNOSIS — G89.4 CHRONIC PAIN SYNDROME: ICD-10-CM

## 2021-02-11 DIAGNOSIS — G89.21 CHRONIC PAIN DUE TO INJURY: ICD-10-CM

## 2021-02-11 NOTE — TELEPHONE ENCOUNTER
Pending Prescriptions:                       Disp   Refills    ibuprofen (ADVIL/MOTRIN) 200 MG tablet [Ph*120 ta*0        Sig: TAKE 1 TABLET (200 MG) BY MOUTH EVERY 4 HOURS AS           NEEDED FOR PAIN    hydrOXYzine (ATARAX) 25 MG tablet [Pharmac*90 tab*1        Sig: TAKE 1 TABLET (25 MG) BY MOUTH EVERY 8 HOURS AS           NEEDED FOR ITCHING        Routing refill request to provider for review/approval because:    NSAID Medications Rnxyay2702/11/2021 01:43 PM   Blood pressure under 140/90 in past 12 months Protocol Details    Normal CBC on file in past 12 months     Normal serum creatinine on file in past 12 months      BP Readings from Last 6 Encounters:   01/24/20 122/62   10/15/19 110/60   01/17/18 112/60   06/27/17 110/60   11/25/16 132/72   07/06/16 138/88     Lab Results   Component Value Date    WBC 12.8 09/23/2013     Lab Results   Component Value Date    RBC 5.59 09/23/2013     Lab Results   Component Value Date    HGB 13.3 09/23/2013     Lab Results   Component Value Date    HCT 42.2 09/23/2013     No components found for: MCT  Lab Results   Component Value Date    MCV 76 09/23/2013     Lab Results   Component Value Date    MCH 24 09/23/2013     Lab Results   Component Value Date    MCHC 32 09/23/2013     Lab Results   Component Value Date    RDW 14.5 05/20/2011     Lab Results   Component Value Date     09/23/2013     05/20/2011     Lab Results   Component Value Date    CR 1.2 08/22/2020       Last Seen: 1/2021    Lauri Quintanilla, HARDEEP, BSN  [place]  February 11, 2021

## 2021-02-12 RX ORDER — HYDROXYZINE HYDROCHLORIDE 25 MG/1
TABLET, FILM COATED ORAL
Qty: 90 TABLET | Refills: 1 | Status: SHIPPED | OUTPATIENT
Start: 2021-02-12 | End: 2021-06-17

## 2021-02-12 RX ORDER — IBUPROFEN 200 MG
TABLET ORAL
Qty: 120 TABLET | Refills: 0 | Status: SHIPPED | OUTPATIENT
Start: 2021-02-12 | End: 2021-04-16

## 2021-02-19 ENCOUNTER — TELEPHONE (OUTPATIENT)
Dept: FAMILY MEDICINE | Facility: CLINIC | Age: 51
End: 2021-02-19

## 2021-02-19 NOTE — TELEPHONE ENCOUNTER
Our goal is to have forms completed with 72 hours, however some forms may require a visit or additional information.    Who is the form from?: [facility], [facility]  (if other please explain)  Where the form came from: form was faxed in  What clinic location was the form placed at?: [facility]  Where the form was placed: placed in TC inbox     What number is listed as a contact on the form?: [phone]  Fax number to return form to:  [phone]        Call taken on 2/19/2021 at 11:24 AM by Virgie Dudley

## 2021-02-24 ENCOUNTER — TELEPHONE (OUTPATIENT)
Dept: FAMILY MEDICINE | Facility: CLINIC | Age: 51
End: 2021-02-24

## 2021-02-24 NOTE — TELEPHONE ENCOUNTER
They need to do a certificate of Need through the [employer] portal so the patient can get her specialized transportation for patient to appointments.   Patient is covered for now until March 7th 2021    They would like to have the link sent to an Email so they are able to submit through that easier that writing it downs.    Any question please call the coordinator in the message.     Katelyn FAY

## 2021-02-26 ENCOUNTER — TRANSFERRED RECORDS (OUTPATIENT)
Dept: HEALTH INFORMATION MANAGEMENT | Facility: CLINIC | Age: 51
End: 2021-02-26

## 2021-03-01 NOTE — TELEPHONE ENCOUNTER
Form has been completed online from (unable to print form) Reviewed with Dr Kasper before sending     [email or web address]    If we have further questions we can call Trice burgess#[phone]    Closing encounter  Padmini Rangel RT (R)

## 2021-03-04 ENCOUNTER — TELEPHONE (OUTPATIENT)
Dept: FAMILY MEDICINE | Facility: CLINIC | Age: 51
End: 2021-03-04

## 2021-03-04 NOTE — TELEPHONE ENCOUNTER
Our goal is to have forms completed with 72 hours, however some forms may require a visit or additional information.    Who is the form from?: [facility]  (if other please explain)  Where the form came from: form was faxed in  What clinic location was the form placed at?: [facility]  Where the form was placed: placed in TC inbox     What number is listed as a contact on the form?: [phone]  Fax number to return form to:  [phone]        Call taken on 3/4/2021 at 4:15 PM by Virgie Dudley

## 2021-03-04 NOTE — TELEPHONE ENCOUNTER
Our goal is to have forms completed with 72 hours, however some forms may require a visit or additional information.    Who is the form from?: [employer]  (if other please explain)  Where the form came from: form was faxed in  What clinic location was the form placed at?: [facility]  Where the form was placed: placed in TC inbox     What number is listed as a contact on the form?: [phone]  Fax number to return form to:  [phone]        Call taken on 3/4/2021 at 5:09 PM by Virgie Dudley

## 2021-03-05 ENCOUNTER — TELEPHONE (OUTPATIENT)
Dept: FAMILY MEDICINE | Facility: CLINIC | Age: 51
End: 2021-03-05

## 2021-03-05 NOTE — TELEPHONE ENCOUNTER
Our goal is to have forms completed with 72 hours, however some forms may require a visit or additional information.    Who is the form from?: [facility]  (if other please explain)  Where the form came from: form was faxed in  What clinic location was the form placed at?: [facility]  Where the form was placed: placed in TC inbox     What number is listed as a contact on the form?:[phone]  Fax number to return form to:  [phone]        Call taken on 3/5/2021 at 3:15 PM by Virgie Dudley

## 2021-03-08 ENCOUNTER — MEDICAL CORRESPONDENCE (OUTPATIENT)
Dept: HEALTH INFORMATION MANAGEMENT | Facility: CLINIC | Age: 51
End: 2021-03-08

## 2021-03-08 ENCOUNTER — TELEPHONE (OUTPATIENT)
Dept: FAMILY MEDICINE | Facility: CLINIC | Age: 51
End: 2021-03-08

## 2021-03-08 DIAGNOSIS — Z53.9 DIAGNOSIS NOT YET DEFINED: Primary | ICD-10-CM

## 2021-03-08 PROCEDURE — G0179 MD RECERTIFICATION HHA PT: HCPCS | Performed by: FAMILY MEDICINE

## 2021-03-08 NOTE — TELEPHONE ENCOUNTER
Reason for Call:  Form, our goal is to have forms completed with 72 hours, however, some forms may require a visit or additional information.    Type of letter, form or note:  Home Health Certification    Who is the form from?: Home care    Where did the form come from: form was faxed in    What clinic location was the form placed at?: [facility] - [phone]    Where the form was placed:  Box/Folder    What number is listed as a contact on the form?: [phone]       Additional comments: Fax number [phone]    Call taken on 3/8/2021 at 9:29 AM by Michelle Ruiz

## 2021-03-09 ENCOUNTER — TRANSFERRED RECORDS (OUTPATIENT)
Dept: HEALTH INFORMATION MANAGEMENT | Facility: CLINIC | Age: 51
End: 2021-03-09

## 2021-03-10 ENCOUNTER — TELEPHONE (OUTPATIENT)
Dept: FAMILY MEDICINE | Facility: CLINIC | Age: 51
End: 2021-03-10

## 2021-03-10 ENCOUNTER — MYC MEDICAL ADVICE (OUTPATIENT)
Dept: FAMILY MEDICINE | Facility: CLINIC | Age: 51
End: 2021-03-10

## 2021-03-10 NOTE — TELEPHONE ENCOUNTER
Reason for Call:  Form, our goal is to have forms completed with 72 hours, however, some forms may require a visit or additional information.    Type of letter, form or note:  Medical supply    Who is the form from?: [employer] (if other please explain)    Where did the form come from: form was faxed in    What clinic location was the form placed at?: [facility] - [phone]    Where the form was placed: TC Box/Folder    What number is listed as a contact on the form?: [phone]       Additional comments: Fax [phone]    Call taken on 3/10/2021 at 10:05 AM by Michelle Ruiz

## 2021-03-10 NOTE — TELEPHONE ENCOUNTER
Called patient, no answer. Left message for return call back to clinic and to speak with a triage nurse. Please ask SF's questions below.    Lottie Lebron RN

## 2021-03-10 NOTE — TELEPHONE ENCOUNTER
Received urine results from [facility].     The urine shows a few white cells. Few bacteria.  Awaiting culture results.     Please see what symptoms patient is having at this time - any fever, abdominal or pelvic pain, nausea or other concerns.     Will notify once results of culture is available. If she has symptoms let me know so I can decide if treatment needed sooner.

## 2021-03-11 ENCOUNTER — MYC MEDICAL ADVICE (OUTPATIENT)
Dept: FAMILY MEDICINE | Facility: OTHER | Age: 51
End: 2021-03-11

## 2021-03-11 ENCOUNTER — TRANSFERRED RECORDS (OUTPATIENT)
Dept: HEALTH INFORMATION MANAGEMENT | Facility: CLINIC | Age: 51
End: 2021-03-11

## 2021-03-11 DIAGNOSIS — N39.0 URINARY TRACT INFECTION ASSOCIATED WITH INDWELLING URETHRAL CATHETER, INITIAL ENCOUNTER (H): Primary | ICD-10-CM

## 2021-03-11 DIAGNOSIS — T83.511A URINARY TRACT INFECTION ASSOCIATED WITH INDWELLING URETHRAL CATHETER, INITIAL ENCOUNTER (H): Primary | ICD-10-CM

## 2021-03-11 NOTE — TELEPHONE ENCOUNTER
RN Triage    Patient Contact    Attempt # 2    Was call answered?  No.  Unable to leave message. Unidentified voice mail.  Padmini Jones RN on 3/11/2021 at 12:02 PM

## 2021-03-11 NOTE — TELEPHONE ENCOUNTER
Attempt 3  LM for the patient to return call to the clinic. Please transfer the patient to any triage nurse.   Sent No Paper Just Vapor message.     BACKGROUND:      received urine results from [facility].     The urine shows a few white cells. Few bacteria.  Awaiting culture results.     Please see what symptoms patient is having at this time - any fever, abdominal or pelvic pain, nausea or other concerns.     Will notify once results of culture is available. If she has symptoms let me know so I can decide if treatment needed sooner.     Lauri Quintanilla RN, BSN  [place]  March 11, 2021

## 2021-03-12 DIAGNOSIS — G82.20 PARAPLEGIA (H): ICD-10-CM

## 2021-03-12 DIAGNOSIS — G89.4 CHRONIC PAIN SYNDROME: ICD-10-CM

## 2021-03-12 RX ORDER — PREGABALIN 200 MG/1
200 CAPSULE ORAL 3 TIMES DAILY
Qty: 90 CAPSULE | Refills: 1 | Status: SHIPPED | OUTPATIENT
Start: 2021-03-12 | End: 2021-05-17

## 2021-03-12 NOTE — TELEPHONE ENCOUNTER
Pending Prescriptions:                       Disp   Refills    Pregabalin (LYRICA) 200 MG capsule [Pharma*90 cap*1        Sig: TAKE 1 CAPSULE (200 MG) BY MOUTH 3 TIMES DAILY    Last Written Prescription Date:  8/30/20  Last Fill Quantity: 270,  # refills: 1   Last office visit: 1/20/2021 with prescribing provider:  [place]   Future Office Visit:        Routing refill request to provider for review/approval because:  Drug not on the [facility] refill protocol     Padmini Jones RN on 3/12/2021 at 10:59 AM

## 2021-03-14 RX ORDER — SULFAMETHOXAZOLE/TRIMETHOPRIM 800-160 MG
1 TABLET ORAL 2 TIMES DAILY
Qty: 20 TABLET | Refills: 0 | Status: SHIPPED | OUTPATIENT
Start: 2021-03-14 | End: 2021-03-24

## 2021-03-19 ENCOUNTER — TELEPHONE (OUTPATIENT)
Dept: FAMILY MEDICINE | Facility: CLINIC | Age: 51
End: 2021-03-19

## 2021-03-19 NOTE — TELEPHONE ENCOUNTER
Called and spoke with staff at [facility]. Asked that patient's urine cultures be faxed to [facility] at [phone].    Raudel Newby, RN, BSN

## 2021-03-19 NOTE — TELEPHONE ENCOUNTER
Spoke with pt and gave information below. Pt stated understanding.    Jyoti Faria CMA ([facility])

## 2021-03-19 NOTE — TELEPHONE ENCOUNTER
Reviewed culture results. Bactrim should resolve this infection.  I would like her to finish the full 10 day course.    If symptoms are not resolved by then, I would like to know right away.  As long as improving could extend the course.     If worsening, should also call.  If over the weekend, may need to be seen. The other option will be Rocephin IM which would need to arrange with home health. But the Bactrim she was prescribed should take care of it.

## 2021-03-19 NOTE — TELEPHONE ENCOUNTER
Reason for Call:  Other unchanged symptoms     Detailed comments: pt is on day 5 of her antibiotic, urine doesn't smell as strong but other symptoms of her UTI are unchanged. Her abdominal cramping is just as bad as before starting the medication.    Pt is wondering if she needs a different antibiotic or should she continue with the Bactrim for the next 5 days    Phone Number Patient can be reached at: Cell number on file:    Telephone Information:   Mobile [phone]       Best Time: anytime    Can we leave a detailed message on this number? YES    Call taken on 3/19/2021 at 10:00 AM by Aaliyah Wolf

## 2021-03-19 NOTE — TELEPHONE ENCOUNTER
Called and spoke with patient. Has in dwelling suprapubic catheter.    She reports she has been on Bactrim 5 days after UA showed possible UTI. Reports cramping around her bladder, underneath her belly button has not improved. Might have slightly worsened today.    Reports smell of her urine has improved. Believes output has been increased since before starting antibiotic, but not worsening recently.    Denies any nausea or vomiting, unsure of fever, denies any new flank pain.    Had urine completed at [place] clinic, received UA, but awaiting culture results.    Is in [place], [place] and would be unable to be seen in clinic.    Flagging for provider for recommendation.     Raudel Newby, RN, BSN

## 2021-03-19 NOTE — TELEPHONE ENCOUNTER
Sent the bactrim last week for her after reviewing culture results at that time.     I'm curious as to why only the 5 days were taken when sent for 10 days.     Once culture results located please email fax to me. I'm in [place].

## 2021-03-19 NOTE — TELEPHONE ENCOUNTER
Bee a rep from [facility] medical calling     [phone]  Fax: [phone]    Addendum needed from June 17th 2020 visit   In regards to catheterization supplies.     Fax was sent to station as well.     Addendum needs to state the Diagnosis, the frequency of change, type and supplies needed.     Routing to provider at this time.     Lucretia Allen RN Flex

## 2021-03-23 ENCOUNTER — TRANSFERRED RECORDS (OUTPATIENT)
Dept: HEALTH INFORMATION MANAGEMENT | Facility: CLINIC | Age: 51
End: 2021-03-23

## 2021-03-23 ENCOUNTER — MEDICAL CORRESPONDENCE (OUTPATIENT)
Dept: HEALTH INFORMATION MANAGEMENT | Facility: CLINIC | Age: 51
End: 2021-03-23

## 2021-03-23 NOTE — TELEPHONE ENCOUNTER
Fax request has been sent to [facility] fax#[phone] to gather the information that is needed for cath supplies.  We need to know  -Frequency of cathing ( Number of times per day/ number of changes)      Dr Kasper can addend the medical records once information above is collected  Will need to sign and date addendum then fax back to [facility] fax#[phone]    See paperwork on Padmini's desk in [place]  Thanks  Padmini Rangel RT (R)

## 2021-03-24 ENCOUNTER — TELEPHONE (OUTPATIENT)
Dept: FAMILY MEDICINE | Facility: CLINIC | Age: 51
End: 2021-03-24

## 2021-03-24 DIAGNOSIS — Z53.9 DIAGNOSIS NOT YET DEFINED: Primary | ICD-10-CM

## 2021-03-24 NOTE — TELEPHONE ENCOUNTER
Reason for Call:  Form, our goal is to have forms completed with 72 hours, however, some forms may require a visit or additional information.    Type of letter, form or note:  Home Health Certification    Who is the form from?: Home care    Where did the form come from: form was faxed in    What clinic location was the form placed at?: [facility] - [phone]    Where the form was placed: TC Box/Folder    What number is listed as a contact on the form?: [phone]       Additional comments: Please complete form and return to [phone]    Call taken on 3/24/2021 at 9:24 AM by Porsche Murillo

## 2021-03-25 NOTE — TELEPHONE ENCOUNTER
Per Bee at [facility] they have on record that patient is changing catheter every 2 weeks and to dispense 2 cathaters per month   frequency of change needs to match,  please addend to change of catheter every 2 weeks instead of every 3 weeks per insurance purposes.   Thank you  Padmini Rangel RT (R)

## 2021-03-28 ENCOUNTER — HEALTH MAINTENANCE LETTER (OUTPATIENT)
Age: 51
End: 2021-03-28

## 2021-03-30 DIAGNOSIS — Z93.59 SUPRAPUBIC CATHETER (H): Primary | ICD-10-CM

## 2021-03-30 NOTE — TELEPHONE ENCOUNTER
Reason for Call:  DME    Do you use a [facility]?  Name of the pharmacy and phone number for the current request: [facility]     Name of the DME requested: night bags to use with her catheters     Other request: pt currently gets 4 night bags a month, would like an additional 3 bags per month please to make life easier    Can we leave a detailed message on this number? YES    Phone number patient can be reached at: Cell number on file:    Telephone Information:   Mobile [phone]       Best Time: anytime     Call taken on 3/30/2021 at 9:29 AM by Aaliyah Wolf

## 2021-03-30 NOTE — TELEPHONE ENCOUNTER
Provider please review and advise pended order.     Lauri Quintanilla RN, BSN  [place] [place]  March 30, 2021

## 2021-03-31 NOTE — TELEPHONE ENCOUNTER
Patient Quality Outreach Summary      Summary:    Patient is due/failing the following:   Physical , PAP, Mammogram and Colon Cancer Screening     Type of outreach:    Sent [employer] message.    Questions for provider review:    None                                                                                                                    Cheryl Bergman CMA     Chart routed to Care Team.

## 2021-04-02 ENCOUNTER — TELEPHONE (OUTPATIENT)
Dept: FAMILY MEDICINE | Facility: CLINIC | Age: 51
End: 2021-04-02

## 2021-04-02 NOTE — TELEPHONE ENCOUNTER
Reason for Call:  Form, our goal is to have forms completed with 72 hours, however, some forms may require a visit or additional information.    Type of letter, form or note:  Medical supply orders    Who is the form from?: [facility] [employer] (if other please explain)    Where did the form come from: form was faxed in    What clinic location was the form placed at?: [facility] - [phone]    Where the form was placed: TC Box/Folder    What number is listed as a contact on the form?: [phone] Fax [phone]       Additional comments:     Call taken on 4/2/2021 at 11:20 AM by Michelle Ruiz

## 2021-04-06 ENCOUNTER — TRANSFERRED RECORDS (OUTPATIENT)
Dept: HEALTH INFORMATION MANAGEMENT | Facility: CLINIC | Age: 51
End: 2021-04-06

## 2021-04-14 ENCOUNTER — TRANSFERRED RECORDS (OUTPATIENT)
Dept: HEALTH INFORMATION MANAGEMENT | Facility: CLINIC | Age: 51
End: 2021-04-14

## 2021-04-21 ENCOUNTER — TELEPHONE (OUTPATIENT)
Dept: FAMILY MEDICINE | Facility: CLINIC | Age: 51
End: 2021-04-21

## 2021-04-21 DIAGNOSIS — G82.20 PARAPLEGIA (H): Primary | ICD-10-CM

## 2021-04-21 NOTE — TELEPHONE ENCOUNTER
Reason for Call:  Other DME    Detailed comments: Pt has both an electric and a manual wheelchair. Both need to be replaced as they are about 12-14 years old. Neither are working well and are in disrepair     Human Services would like a DME for these items that they can help the pt get filled.     Please fax to [facility] at [phone] (phone [phone])

## 2021-05-05 ENCOUNTER — TELEPHONE (OUTPATIENT)
Dept: FAMILY MEDICINE | Facility: CLINIC | Age: 51
End: 2021-05-05

## 2021-05-05 DIAGNOSIS — Z53.9 DIAGNOSIS NOT YET DEFINED: Primary | ICD-10-CM

## 2021-05-05 NOTE — TELEPHONE ENCOUNTER
Reason for Call:  Form, our goal is to have forms completed with 72 hours, however, some forms may require a visit or additional information.    Type of letter, form or note:  plan of care    Who is the form from?: [facility] (if other please explain)    Where did the form come from: form was faxed in    What clinic location was the form placed at?: [facility] - [phone]    Where the form was placed: TC Box/Folder    What number is listed as a contact on the form?: [phone] fax [phone]     Additional comments:     Call taken on 5/5/2021 at 9:54 AM by Michelle Ruiz

## 2021-05-11 ENCOUNTER — TELEPHONE (OUTPATIENT)
Dept: FAMILY MEDICINE | Facility: CLINIC | Age: 51
End: 2021-05-11

## 2021-05-11 DIAGNOSIS — Z53.9 DIAGNOSIS NOT YET DEFINED: Primary | ICD-10-CM

## 2021-05-11 PROCEDURE — 99207 PR MD RECERTIFICATION HHA PT: CPT | Performed by: FAMILY MEDICINE

## 2021-05-11 NOTE — TELEPHONE ENCOUNTER
Per Dr Kasper form was placed in TC bin in [facility] on 5/11/2021, form has not been located.  I have called [facility] to request that they re-fax the form to us, call was taken by answering service  Thanks  Padmini Rangel RT (R)

## 2021-05-12 ENCOUNTER — TRANSFERRED RECORDS (OUTPATIENT)
Dept: HEALTH INFORMATION MANAGEMENT | Facility: CLINIC | Age: 51
End: 2021-05-12

## 2021-05-12 NOTE — TELEPHONE ENCOUNTER
Physical Therapy  Visit Type: treatment  Precautions:  Medical precautions:  fall risk; standard precautions.    Lines:     Basic Lines: Port, PEG.      Lines in chart and on patient reviewed, cautions maintained throughout session.    SUBJECTIVE  Patient agreed to participate in therapy this date.  Patient verbally agrees to allow the following to be present during session: spouse  Patient laying in bed with family present at beginning of session.   Patient / Family Goal: return home     OBJECTIVE     Oriented to person, place, time and situation     Arousal alertness: appropriate responses to stimuli  Patient activity tolerance: 1 to 1 activity to rest    Bed Mobility:      Rolling right: modified independent      Side-lying to sit: modified independent  Training completed:      Education details: patient safety    Patient exits to the right, utilizes log roll technique appropriately to assist in limiting abdominal pain.   Transfers:    Assistive devices: gait belt, 1 person and 2-wheeled walker    Sit to stand: modified independent    Stand to sit: modified independent  Training completed:    Tasks: sit to stand and stand to sit    Education details: patient safety    Sit to stand from edge of bed and recliner chair x2. Utilizes upper extremities appropriately to assist with transfers. Demonstrates safety throughout.   Gait/Ambulation:     Assistance: supervision   Assistive device: 1 person and straight cane    Distance (ft): 500    Pattern: step through  Training Completed:      Patient ambulates 500' in the room and hallway with use of single point cane, supervision for management of lines. Demonstrates improved stability with ambulation. No signs of loss of balance throughout session.             ASSESSMENT    Impairments: activity tolerance, strength, balance deficits and pain  Functional Limitations: ambulation and stair climbing  Patient demonstrates improvement in balance and functional gait this session,  Reason for call:  Form  Reason for Call:  Form, our goal is to have forms completed with 72 hours, however, some forms may require a visit or additional information.    Type of letter, form or note:  Medical    Who is the form from?: [facility]      Where did the form come from: form was faxed in    What clinic location was the form placed at?: [facility] - [phone]    Where the form was placed: 's in box     What number is listed as a contact on the form?: [phone]       Additional comments: Fax back to [phone]    Call taken on 10/31/2019 at 6:51 AM by Leland Gaines         able to progress hallway distance with improved stability noted. Reports improved activity tolerance with mobility.   Skilled therapy is required to address these limitations in attempt to maximize the patient's independence.    End of Session:   Location: in chair  Safety measure end of session: RN Present in the room.  Handoff to: nurse    PLAN    Suggestions for next session as indicated: Hallway ambulation and stairs     PT Frequency: Once a day, 5 days/week  Frequency Comments: sat or sun      Interventions: balance, gait training, neuromuscular re-education, ROM, strengthening, HEP train/position, patient/family training, safety education, equipment eval/education, functional transfer training, stairs retraining and bed mobility  Agreement to plan and goals: patient agrees with goals and treatment plan      Documented in the chart in the following areas:  Services. Pain. Assessment.      Therapy procedure time and total treatment time can be found documented on the Time Entry flowsheet

## 2021-05-15 DIAGNOSIS — G82.20 PARAPLEGIA (H): ICD-10-CM

## 2021-05-15 DIAGNOSIS — E03.4 HYPOTHYROIDISM DUE TO ACQUIRED ATROPHY OF THYROID: ICD-10-CM

## 2021-05-15 DIAGNOSIS — R56.9 SEIZURES (H): ICD-10-CM

## 2021-05-15 DIAGNOSIS — G89.21 CHRONIC PAIN DUE TO INJURY: ICD-10-CM

## 2021-05-15 DIAGNOSIS — G89.4 CHRONIC PAIN SYNDROME: ICD-10-CM

## 2021-05-15 DIAGNOSIS — Z79.899 ENCOUNTER FOR LONG-TERM (CURRENT) USE OF MEDICATIONS: Primary | ICD-10-CM

## 2021-05-15 DIAGNOSIS — F41.9 ANXIETY: ICD-10-CM

## 2021-05-17 RX ORDER — LEVETIRACETAM 750 MG/1
750 TABLET ORAL 2 TIMES DAILY
Qty: 60 TABLET | Refills: 0 | Status: SHIPPED | OUTPATIENT
Start: 2021-05-17 | End: 2021-06-18

## 2021-05-17 RX ORDER — VENLAFAXINE 75 MG/1
TABLET ORAL
Qty: 90 TABLET | Refills: 0 | Status: SHIPPED | OUTPATIENT
Start: 2021-05-17 | End: 2021-06-18

## 2021-05-17 RX ORDER — PREGABALIN 200 MG/1
200 CAPSULE ORAL 3 TIMES DAILY
Qty: 90 CAPSULE | Refills: 1 | Status: SHIPPED | OUTPATIENT
Start: 2021-05-17 | End: 2021-06-18

## 2021-05-17 RX ORDER — LEVOTHYROXINE SODIUM 75 UG/1
TABLET ORAL
Qty: 30 TABLET | Refills: 0 | Status: SHIPPED | OUTPATIENT
Start: 2021-05-17 | End: 2021-06-18

## 2021-05-17 RX ORDER — IBUPROFEN 200 MG
TABLET ORAL
Qty: 120 TABLET | Refills: 0 | Status: SHIPPED | OUTPATIENT
Start: 2021-05-17 | End: 2021-06-18

## 2021-05-17 NOTE — TELEPHONE ENCOUNTER
Pending Prescriptions:                       Disp   Refills    ibuprofen (ADVIL/MOTRIN) 200 MG tablet [Ph*120 ta*0        Sig: TAKE 1 TABLET (200 MG) BY MOUTH EVERY 4 HOURS AS           NEEDED FOR PAIN    venlafaxine (EFFEXOR) 75 MG tablet [Pharma*90 tab*0        Sig: TAKE 2 TABLETS BY MOUTH EVERY MORNING AND ONE TABLET           AT BEDTIME    levothyroxine (SYNTHROID/LEVOTHROID) 75 MC*30 tab*0        Sig: TAKE 1 TABLET (75 MCG) BY MOUTH DAILY ++DUE FOR LAB           WORK++    levETIRAcetam (KEPPRA) 750 MG tablet [Phar*60 tab*0        Sig: TAKE 1 TABLET (750 MG) BY MOUTH 2 TIMES DAILY    Pregabalin (LYRICA) 200 MG capsule [Pharma*90 cap*1        Sig: TAKE 1 CAPSULE (200 MG) BY MOUTH 3 TIMES DAILY    Routing refill request to provider for review/approval because:  Drug not on the [facility] refill protocol   Labs out of range:    BP Readings from Last 3 Encounters:   01/24/20 122/62   10/15/19 110/60   01/17/18 112/60     TSH   Date Value Ref Range Status   10/15/2019 3.41 0.40 - 4.00 mU/L Final      over 13 months, RN unable to provide a junito refill.

## 2021-05-26 ENCOUNTER — TELEPHONE (OUTPATIENT)
Dept: FAMILY MEDICINE | Facility: CLINIC | Age: 51
End: 2021-05-26

## 2021-05-26 NOTE — TELEPHONE ENCOUNTER
Patient Quality Outreach Summary      Summary:    Patient is due/failing the following:   Cervical Cancer Screening - PAP Needed and Physical with  labs    Type of outreach:    Sent [employer] message.    Questions for provider review:    None                                                                                                                    Cheryl Bergman CMA       Chart routed to Care Team.

## 2021-05-26 NOTE — LETTER
[facility]  [address], [address]  [place] [address]  Phone: [phone]  Fax: [phone]  May 26, 2021      Agatha Canas  [address] [place], [place] [address] [address]      Dear Agatha,    We care about your health and have reviewed your health plan including your medical conditions, medications, and lab results.  Based on this review, it is recommended that you follow up regarding the following health topic(s):  -Cervical Cancer Screening  -Wellness (Physical) Visit     We recommend you take the following action(s):  -schedule a WELLNESS (Physical) APPOINTMENT.  We will perform the following labs: BMP (basic metabolic panel) and TSH (thyroid test).  -schedule a PAP SMEAR EXAM which is due.  Please disregard this reminder if you have had this exam elsewhere within the last year.  It would be helpful for us to have a copy of your recent pap smear report to update your records.     Please call us at the [facility] - [phone] (or use [employer]) to address the above recommendations.     Thank you for trusting [facility] and we appreciate the opportunity to serve you.  We look forward to supporting your healthcare needs in the future.    Healthy Regards,    Your Health Care Team  [facility]

## 2021-06-07 ENCOUNTER — TELEPHONE (OUTPATIENT)
Dept: FAMILY MEDICINE | Facility: CLINIC | Age: 51
End: 2021-06-07

## 2021-06-07 NOTE — TELEPHONE ENCOUNTER
Reason for Call:  Form, our goal is to have forms completed with 72 hours, however, some forms may require a visit or additional information.    Type of letter, form or note:  medical    Who is the form from?: [employer] (if other please explain)    Where did the form come from: form was faxed in    What clinic location was the form placed at?: [facility] - [phone]    Where the form was placed: TC Box/Folder    What number is listed as a contact on the form?: [phone] Fax [phone]       Additional comments:     Call taken on 6/7/2021 at 6:47 AM by Michelle Ruiz

## 2021-06-08 ENCOUNTER — MEDICAL CORRESPONDENCE (OUTPATIENT)
Dept: HEALTH INFORMATION MANAGEMENT | Facility: CLINIC | Age: 51
End: 2021-06-08

## 2021-06-08 ENCOUNTER — TRANSFERRED RECORDS (OUTPATIENT)
Dept: HEALTH INFORMATION MANAGEMENT | Facility: CLINIC | Age: 51
End: 2021-06-08

## 2021-06-10 ENCOUNTER — TRANSFERRED RECORDS (OUTPATIENT)
Dept: HEALTH INFORMATION MANAGEMENT | Facility: CLINIC | Age: 51
End: 2021-06-10

## 2021-06-11 ENCOUNTER — TELEPHONE (OUTPATIENT)
Dept: FAMILY MEDICINE | Facility: CLINIC | Age: 51
End: 2021-06-11

## 2021-06-11 DIAGNOSIS — N39.0 URINARY TRACT INFECTION ASSOCIATED WITH INDWELLING URETHRAL CATHETER, INITIAL ENCOUNTER (H): Primary | ICD-10-CM

## 2021-06-11 DIAGNOSIS — T83.511A URINARY TRACT INFECTION ASSOCIATED WITH INDWELLING URETHRAL CATHETER, INITIAL ENCOUNTER (H): Primary | ICD-10-CM

## 2021-06-11 RX ORDER — DOXYCYCLINE 100 MG/1
100 CAPSULE ORAL 2 TIMES DAILY
Qty: 20 CAPSULE | Refills: 0 | Status: SHIPPED | OUTPATIENT
Start: 2021-06-11 | End: 2021-12-10

## 2021-06-11 NOTE — TELEPHONE ENCOUNTER
Did see results from the UA earlier in the week. Was awaiting the urine culture results which are now available.     Culture shows >100k MRSA    Recommend doxycycline 100 mg twice daily for the next 10 days.

## 2021-06-11 NOTE — TELEPHONE ENCOUNTER
Spoke with salomon.  Rama Starks sent a fax to [facility] with Urine results.  Would like to be treated?  These results are from Tuesday 06/08/2021.      Majo has done this in the past for her 3-4 weeks ago.      Symptoms: lower back cramping really back, slight disorienting last night: better this morning.  Odor and nurse took the sample and it there is bacteria in there.     Nurse that works with her Coral with [facility]: [phone]    Uses pharmacy.  [facility] in [place]    Naseem Melendez, BSN, RN, PHN  [facility] ~ Registered Nurse  Clinic Triage ~ [place] [facility]  June 11, 2021

## 2021-06-16 DIAGNOSIS — G89.21 CHRONIC PAIN DUE TO INJURY: ICD-10-CM

## 2021-06-16 DIAGNOSIS — E03.4 HYPOTHYROIDISM DUE TO ACQUIRED ATROPHY OF THYROID: ICD-10-CM

## 2021-06-16 DIAGNOSIS — R56.9 SEIZURES (H): ICD-10-CM

## 2021-06-16 DIAGNOSIS — G89.4 CHRONIC PAIN SYNDROME: ICD-10-CM

## 2021-06-16 DIAGNOSIS — I10 BENIGN ESSENTIAL HYPERTENSION: ICD-10-CM

## 2021-06-16 DIAGNOSIS — Z00.00 ROUTINE GENERAL MEDICAL EXAMINATION AT A HEALTH CARE FACILITY: ICD-10-CM

## 2021-06-16 DIAGNOSIS — G82.20 PARAPLEGIA (H): ICD-10-CM

## 2021-06-16 DIAGNOSIS — F41.9 ANXIETY: ICD-10-CM

## 2021-06-17 RX ORDER — HYDROXYZINE HYDROCHLORIDE 25 MG/1
TABLET, FILM COATED ORAL
Qty: 90 TABLET | Refills: 0 | Status: SHIPPED | OUTPATIENT
Start: 2021-06-17 | End: 2021-11-16

## 2021-06-17 NOTE — TELEPHONE ENCOUNTER
Pending Prescriptions:                       Disp   Refills    Pregabalin (LYRICA) 200 MG capsule [Pharm*90 cap*1            Sig: TAKE 1 CAPSULE (200 MG) BY MOUTH 3 TIMES DAILY    levETIRAcetam (KEPPRA) 750 MG tablet [Pha*60 tab*0            Sig: TAKE 1 TABLET (750 MG) BY MOUTH 2 TIMES DAILY    levothyroxine (SYNTHROID/LEVOTHROID) 75 M*30 tab*0            Sig: TAKE 1 TABLET (75 MCG) BY MOUTH DAILY ++DUE FOR           LAB WORK++    ibuprofen (ADVIL/MOTRIN) 200 MG tablet [P*120 ta*0            Sig: TAKE 1 TABLET (200 MG) BY MOUTH EVERY 4 HOURS AS           NEEDED FOR PAIN    venlafaxine (EFFEXOR) 75 MG tablet [Pharm*90 tab*0            Sig: TAKE 2 TABLETS BY MOUTH EVERY MORNING AND ONE           TABLET AT BEDTIME    multivitamin, therapeutic with minerals (*90 tab*0            Sig: TAKE 1 TABLET DAILY REPLACES CERTAVITE    hydrOXYzine (ATARAX) 25 MG tablet [Pharma*90 tab*0            Sig: TAKE 1 TABLET (25 MG) BY MOUTH EVERY 8 HOURS AS           NEEDED FOR ITCHING    baclofen (LIORESAL) 20 MG tablet [Pharmac*120 ta*1            Sig: TAKE 1 TABLET (20 MG) BY MOUTH 4 TIMES DAILY    lisinopril (ZESTRIL) 20 MG tablet [Pharma*30 tab*1            Sig: TAKE 1 TABLET (20 MG) BY MOUTH DAILY    Medication is being filled for 1 time junito refill only due to:  Patient is due for medication follow up    Please call and help schedule.  Thank you!      Padmini Jones RN on 6/17/2021 at 3:14 PM

## 2021-06-17 NOTE — TELEPHONE ENCOUNTER
Pending Prescriptions:                       Disp   Refills    Pregabalin (LYRICA) 200 MG capsule [Pharma*90 cap*1        Sig: TAKE 1 CAPSULE (200 MG) BY MOUTH 3 TIMES DAILY    levETIRAcetam (KEPPRA) 750 MG tablet [Phar*60 tab*0        Sig: TAKE 1 TABLET (750 MG) BY MOUTH 2 TIMES DAILY    levothyroxine (SYNTHROID/LEVOTHROID) 75 MC*30 tab*0        Sig: TAKE 1 TABLET (75 MCG) BY MOUTH DAILY ++DUE FOR LAB           WORK++    ibuprofen (ADVIL/MOTRIN) 200 MG tablet [Ph*120 ta*0        Sig: TAKE 1 TABLET (200 MG) BY MOUTH EVERY 4 HOURS AS           NEEDED FOR PAIN    venlafaxine (EFFEXOR) 75 MG tablet [Pharma*90 tab*0        Sig: TAKE 2 TABLETS BY MOUTH EVERY MORNING AND ONE TABLET           AT BEDTIME    multivitamin, therapeutic with minerals (T*90 tab*0        Sig: TAKE 1 TABLET DAILY REPLACES CERTAVITE    baclofen (LIORESAL) 20 MG tablet [Pharmacy*120 ta*1        Sig: TAKE 1 TABLET (20 MG) BY MOUTH 4 TIMES DAILY    lisinopril (ZESTRIL) 20 MG tablet [Pharmac*30 tab*1        Sig: TAKE 1 TABLET (20 MG) BY MOUTH DAILY    Signed Prescriptions:                        Disp   Refills    hydrOXYzine (ATARAX) 25 MG tablet          90 tab*0        Sig: TAKE 1 TABLET (25 MG) BY MOUTH EVERY 8 HOURS AS           NEEDED FOR ITCHING  Authorizing Provider: STANLEY DAVILA  Ordering User: RAFFI ASKEW      Routing refill request to provider for review/approval because:  Drug not on the [facility] refill protocol   Drug interaction warning  Labs out of range:  creatinine  Labs not current:  Tsh, cbc, blood pressure    Raffi Askew RN on 6/17/2021 at 3:14 PM

## 2021-06-18 RX ORDER — ASPIRIN 81 MG
TABLET, DELAYED RELEASE (ENTERIC COATED) ORAL
Qty: 90 TABLET | Refills: 0 | Status: SHIPPED | OUTPATIENT
Start: 2021-06-18 | End: 2021-09-10

## 2021-06-18 RX ORDER — VENLAFAXINE 75 MG/1
TABLET ORAL
Qty: 90 TABLET | Refills: 0 | Status: SHIPPED | OUTPATIENT
Start: 2021-06-18 | End: 2021-07-14

## 2021-06-18 RX ORDER — BACLOFEN 20 MG/1
20 TABLET ORAL 4 TIMES DAILY
Qty: 120 TABLET | Refills: 1 | Status: SHIPPED | OUTPATIENT
Start: 2021-06-18 | End: 2021-09-13

## 2021-06-18 RX ORDER — LEVETIRACETAM 750 MG/1
750 TABLET ORAL 2 TIMES DAILY
Qty: 60 TABLET | Refills: 0 | Status: SHIPPED | OUTPATIENT
Start: 2021-06-18 | End: 2021-07-14

## 2021-06-18 RX ORDER — LEVOTHYROXINE SODIUM 75 UG/1
TABLET ORAL
Qty: 30 TABLET | Refills: 0 | Status: SHIPPED | OUTPATIENT
Start: 2021-06-18 | End: 2021-07-14

## 2021-06-18 RX ORDER — LISINOPRIL 20 MG/1
20 TABLET ORAL DAILY
Qty: 30 TABLET | Refills: 1 | Status: SHIPPED | OUTPATIENT
Start: 2021-06-18 | End: 2021-09-13

## 2021-06-18 RX ORDER — IBUPROFEN 200 MG
TABLET ORAL
Qty: 120 TABLET | Refills: 0 | Status: SHIPPED | OUTPATIENT
Start: 2021-06-18 | End: 2022-08-04

## 2021-06-18 RX ORDER — PREGABALIN 200 MG/1
200 CAPSULE ORAL 3 TIMES DAILY
Qty: 90 CAPSULE | Refills: 1 | Status: SHIPPED | OUTPATIENT
Start: 2021-06-18 | End: 2021-09-13

## 2021-06-18 NOTE — TELEPHONE ENCOUNTER
Spoke with pt, she is only in town once a month or everyother when she comes to [place] for her lab appt.     July lab appt does not line up with [facility] schedule. Pt is going to try to make appt for [place] and  on the same day in August.     Pt apologizes but states its an 8 hour day to come down here for appts and really exhausts her.

## 2021-06-18 NOTE — TELEPHONE ENCOUNTER
Refills sent.     Please assist in scheduling a follow up office visit.  Should be scheduled for 40 minute visit.

## 2021-07-01 ENCOUNTER — TELEPHONE (OUTPATIENT)
Dept: FAMILY MEDICINE | Facility: CLINIC | Age: 51
End: 2021-07-01

## 2021-07-01 NOTE — TELEPHONE ENCOUNTER
Reason for Call:  Form, our goal is to have forms completed with 72 hours, however, some forms may require a visit or additional information.    Type of letter, form or note:  Medical Supply    Who is the form from?: [employer] (if other please explain)    Where did the form come from: form was faxed in    What clinic location was the form placed at?: [facility] [phone]    Where the form was placed: TC Box/Folder    What number is listed as a contact on the form?: [phone]       Additional comments: fax [phone]    Call taken on 7/1/2021 at 10:47 AM by Michelle Ruiz

## 2021-07-06 ENCOUNTER — TRANSFERRED RECORDS (OUTPATIENT)
Dept: HEALTH INFORMATION MANAGEMENT | Facility: CLINIC | Age: 51
End: 2021-07-06

## 2021-07-08 ENCOUNTER — TRANSFERRED RECORDS (OUTPATIENT)
Dept: HEALTH INFORMATION MANAGEMENT | Facility: CLINIC | Age: 51
End: 2021-07-08

## 2021-07-08 ENCOUNTER — TELEPHONE (OUTPATIENT)
Dept: FAMILY MEDICINE | Facility: CLINIC | Age: 51
End: 2021-07-08

## 2021-07-08 NOTE — TELEPHONE ENCOUNTER
Reason for Call:  Form, our goal is to have forms completed with 72 hours, however, some forms may require a visit or additional information.    Type of letter, form or note:  Transportation Services    Who is the form from?: Insurance comp    Where did the form come from: form was faxed in    What clinic location was the form placed at?: [facility] [phone]    Where the form was placed: TC Box/Folder    What number is listed as a contact on the form?: [phone]       Additional comments:     Call taken on 7/8/2021 at 2:08 PM by Michelle Ruiz

## 2021-07-12 ENCOUNTER — TELEPHONE (OUTPATIENT)
Dept: FAMILY MEDICINE | Facility: CLINIC | Age: 51
End: 2021-07-12

## 2021-07-12 ENCOUNTER — VIRTUAL VISIT (OUTPATIENT)
Dept: FAMILY MEDICINE | Facility: CLINIC | Age: 51
End: 2021-07-12
Payer: COMMERCIAL

## 2021-07-12 DIAGNOSIS — B96.5 PSEUDOMONAS URINARY TRACT INFECTION: Primary | ICD-10-CM

## 2021-07-12 DIAGNOSIS — N39.0 PSEUDOMONAS URINARY TRACT INFECTION: Primary | ICD-10-CM

## 2021-07-12 PROCEDURE — 99213 OFFICE O/P EST LOW 20 MIN: CPT | Mod: 95 | Performed by: FAMILY MEDICINE

## 2021-07-12 RX ORDER — CIPROFLOXACIN 500 MG/1
500 TABLET, FILM COATED ORAL 2 TIMES DAILY
Qty: 14 TABLET | Refills: 0 | Status: SHIPPED | OUTPATIENT
Start: 2021-07-12 | End: 2021-07-19

## 2021-07-12 NOTE — PROGRESS NOTES
Agatha is a 51 year old who is being evaluated via a billable telephone visit.      What phone number would you like to be contacted at? [phone]  How would you like to obtain your AVS? Joan    Assessment & Plan   1. Pseudomonas urinary tract infection: Complicated UTI given catheter placement.  New growth of Pseudomonas noted compared to prior urine cultures.  Only oral option is Cipro which is intermediate.  Will treat for twice daily for 7 days and follow-up if needed.  Discussed side effects.  Discussed if symptoms are worsening or becoming more symptomatic to go to the emergency department for initiation of IV antibiotics.  Patient agreeable plan.  - ciprofloxacin (CIPRO) 500 MG tablet; Take 1 tablet (500 mg) by mouth 2 times daily for 7 days  Dispense: 14 tablet; Refill: 0      Return in about 1 week (around 7/19/2021), or if symptoms worsen or fail to improve.    Adrian Mathias MD  [facility]    This chart is completed utilizing dictation software; typos and/or incorrect word substitutions may unintentionally occur.      Subjective   Agatha is a 51 year old who presents for the following health issues:    HPI     Genitourinary - Female  Onset/Duration: 1 week  Description:   Painful urination (Dysuria): no           Frequency: Has a catheter  Blood in urine (Hematuria): no  Delay in urine (Hesitency): not applicable  Intensity: moderate  Progression of Symptoms:  worsening and same  Accompanying Signs & Symptoms:  Fever/chills: no  Flank pain: YES  Nausea and vomiting: no  Vaginal symptoms: none  Abdominal/Pelvic Pain: YES  History:   History of frequent UTI s: YES  History of kidney stones: no  Sexually Active: no  Possibility of pregnancy: No  Precipitating or alleviating factors: None  Therapies tried and outcome: Increase fluid intake    Patient reports 1 week of lower back pain, increased sediment, and suprapubic pressure.  Has a catheter in place he  states due to a rectal wound.  States her nursing staff sent a UA last week.  I have reviewed this few Care Everywhere and is growing Pseudomonas with intermediate sensitivity to Zosyn and Cipro.  Sensitive to gentamicin and ceftazidime.  She does have a penicillin allergy.    She states she does have a port available if needed.    She has not done anything for this yet other than increase fluid intake.    She has no other concerns today.  She denies asad flank pain, fever, nausea, vomiting etc.  She does have history of MRSA.  Review of prior urine culture show MRSA, Klebsiella, but never Pseudomonas.    Review of Systems   Constitutional, msk, derm, cardiovascular, pulmonary, gi and gu systems are negative, except as otherwise noted.      Objective           Vitals:  No vitals were obtained today due to virtual visit.    Physical Exam   healthy, alert and no distress  PSYCH: Alert and oriented times 3; coherent speech, normal   rate and volume, able to articulate logical thoughts, able   to abstract reason, no tangential thoughts, no hallucinations   or delusions  Her affect is normal  RESP: No cough, no audible wheezing, able to talk in full sentences  Remainder of exam unable to be completed due to telephone visits    Component 7 d ago   Urine Culture  >100,000 cfu/mL Pseudomonas aeruginosaAbnormal        Resulting Agency [facility]   Susceptibility    Organism Antibiotic Susceptibility   Pseudomonas aeruginosa Ceftazidime ($) 8 ug/mL: Sensitive   Pseudomonas aeruginosa Ciprofloxacin ($) 1 ug/mL: Intermediate   Pseudomonas aeruginosa Gentamicin ($) <=2 ug/mL: Sensitive   Pseudomonas aeruginosa Piperacillin/Tazobactam ($) 32/4 ug/mL: Intermediate             Phone call duration: 7 minutes

## 2021-07-12 NOTE — TELEPHONE ENCOUNTER
Patient is calling for the result of her UA. Patient was scheduled with .     Lauri Quintanilla RN, BSN  [place]  July 12, 2021

## 2021-07-13 ENCOUNTER — TELEPHONE (OUTPATIENT)
Dept: FAMILY MEDICINE | Facility: CLINIC | Age: 51
End: 2021-07-13

## 2021-07-13 ENCOUNTER — TRANSFERRED RECORDS (OUTPATIENT)
Dept: HEALTH INFORMATION MANAGEMENT | Facility: CLINIC | Age: 51
End: 2021-07-13

## 2021-07-13 NOTE — TELEPHONE ENCOUNTER
Reason for Call:  Form, our goal is to have forms completed with 72 hours, however, some forms may require a visit or additional information.    Type of letter, form or note:  Home Health Certification    Who is the form from?: Home care    Where did the form come from: form was faxed in    What clinic location was the form placed at?: [facility] [phone]    Where the form was placed: TC Box/Folder    What number is listed as a contact on the form?: fax [phone]       Additional comments:     Call taken on 7/13/2021 at 2:35 PM by Michelle Ruiz

## 2021-07-13 NOTE — TELEPHONE ENCOUNTER
Form has been faxed and scanned confirmed on Right Fax  Closing encounter  Padmini Rangel RT (R)

## 2021-07-14 DIAGNOSIS — Z53.9 DIAGNOSIS NOT YET DEFINED: Primary | ICD-10-CM

## 2021-07-14 PROCEDURE — G0179 MD RECERTIFICATION HHA PT: HCPCS | Performed by: FAMILY MEDICINE

## 2021-07-21 ENCOUNTER — TELEPHONE (OUTPATIENT)
Dept: FAMILY MEDICINE | Facility: CLINIC | Age: 51
End: 2021-07-21

## 2021-07-21 NOTE — TELEPHONE ENCOUNTER
Reason for Call:  Form, our goal is to have forms completed with 72 hours, however, some forms may require a visit or additional information.    Type of letter, form or note:  medical supply    Who is the form from?: [employer] (if other please explain)    Where did the form come from: form was faxed in    What clinic location was the form placed at?: [facility] [phone]    Where the form was placed: tc Box/Folder    What number is listed as a contact on the form?: [phone] fax [phone]       Additional comments:     Call taken on 7/21/2021 at 4:24 PM by Michelle Ruiz

## 2021-07-21 NOTE — TELEPHONE ENCOUNTER
Agatha is calling to see if you fill out the form for the mattress she needs that was faxed to you today.

## 2021-07-25 NOTE — PROGRESS NOTES
"  SUBJECTIVE:                                                    Agatha Canas is a 47 year old female who presents to clinic today for the following health issues:        Depression and Anxiety Follow-Up    Status since last visit: stated \" how can you feel better when you always feel bad\"    Other associated symptoms:None    Complicating factors:     Significant life event: No     Current substance abuse: None    She lives with her mom who helps with her dressing changes.   Reports \"my mom hates me\".  Denies any abuse.   She no longer has her apartment on hold in [place].       PHQ-9 SCORE 7/6/2016 11/25/2016 6/27/2017   Total Score - - -   Total Score 5 3 8     SHYLA-7 SCORE 7/6/2016 11/25/2016 6/27/2017   Total Score - - -   Total Score 4 2 4        PHQ-9  English      PHQ-9   Any Language     GAD7       Chronic Pain Follow-Up       Type / Location of Pain: Back, legs and ankles  Analgesia/pain control:       Recent changes:  same      Overall control: Tolerable with discomfort  Activity level/function:      Daily activities:  Unable to perform most daily activities - chores, hobbies, social activities, driving    Work:  Unable to work  Adverse effects:  No  Adherance    Taking medication as directed?  Yes    Participating in other treatments: no -   Risk Factors:    Sleep:  Poor. Sometimes she gets a hour a day.     Mood/anxiety:  slightly worsened    Recent family or social stressors:  none noted    Other aggravating factors: none  PHQ-9 SCORE 7/6/2016 11/25/2016 6/27/2017   Total Score - - -   Total Score 5 3 8     SHYLA-7 SCORE 7/6/2016 11/25/2016 6/27/2017   Total Score - - -   Total Score 4 2 4     Encounter-Level CSA - 10/13/2015:                 Controlled Substance Agreement - Scan on 10/19/2015 10:40 AM : CONTROLLED SUBSTANCE AGREEMENT (below)                 Amount of exercise or physical activity: None    Problems taking medications regularly: No    Medication side effects: none    Diet: regular (no " restrictions)      URINARY TRACT SYMPTOMS-Recheck     Onset: 6/5/17    Description:   Painful urination (Dysuria): no   Blood in urine (Hematuria): no   Delay in urine (Hesitency): no     Intensity: Mild to Moderate Pressure.     Progression of Symptoms:  worsening    Accompanying Signs & Symptoms:  Fever/chills: YES- Maybe some chills  Flank pain YES  Nausea and vomiting: no   Any vaginal symptoms: none, vaginal odor and Lots of sediment in her urine  Abdominal/Pelvic Pain: YES   History:   History of frequent UTI's: YES  History of kidney stones: YES  Sexually Active: no   Possibility of pregnancy: No    Precipitating factors:   subrapubic catheter in place.        Therapies Tried and outcome:Antibiotic    WOUND - still continued wound. Has wound clinic that she is seen at. They are waiting for her to quit smoking for her to have the surgery. She doesn't feel she has the ability to quit even with use of patches for right now. She has wound care nurses and continues with dressing changes.     HYPOTHYROIDISM- patient with hypothyroidism. She had a dose adjustment 2 months ago. No concerns with new dose.      Problem list and histories reviewed & adjusted, as indicated.  Additional history: as documented    Patient Active Problem List   Diagnosis     Paraplegia (H)     Injury, other and unspecified, other specified sites, including multiple     Acute posthemorrhagic anemia     Open fracture of ischium (H)     Open fracture of sacrum and coccyx (H)     Cellulitis and abscess     Open fracture of lumbar spine with spinal cord injury (H)     Prolonged depressive reaction     Other and unspecified alcohol dependence, unspecified drinking behavior     Cellulitis of leg     Constipation     Chronic pain syndrome     Uterovaginal prolapse, incomplete     Other disorder of menstruation and other abnormal bleeding from female genital tract     CARDIOVASCULAR SCREENING; LDL GOAL LESS THAN 160     [facility]      Dermatitis     Rosacea     Anxiety associated with depression     PTSD (post-traumatic stress disorder)     Urinary incontinence     Hypothyroidism     Tobacco use disorder     Chantel gangrene     Colostomy status (H)     Open wound of buttock, complicated     Controlled substance agreement signed     Anxiety     Latex sensitivity     Recurrent UTI     Past Surgical History:   Procedure Laterality Date     PAST SURGICAL HISTORY      bilateral ankle fusions secondary to fractures     PAST SURGICAL HISTORY      I & D open coccyx fractures       Social History   Substance Use Topics     Smoking status: Current Every Day Smoker     Packs/day: 1.00     Years: 15.00     Types: Cigarettes     Smokeless tobacco: Never Used      Comment: 3/4 pack per day     Alcohol use No     Family History   Problem Relation Age of Onset     Hypertension Mother      Depression Mother      Hypertension Father      Depression Father      Depression Brother      Hypertension Brother      Depression Sister      Hypertension Sister      Hypertension Sister          BP Readings from Last 3 Encounters:   06/27/17 110/60   11/25/16 132/72   07/06/16 138/88    Wt Readings from Last 3 Encounters:   09/30/11 150 lb (68 kg)   06/27/11 145 lb (65.8 kg)   10/18/10 171 lb 1.6 oz (77.6 kg)                    Reviewed and updated as needed this visit by clinical staff  Tobacco  Allergies  Meds  Problems  Med Hx  Surg Hx  Fam Hx  Soc Hx        Reviewed and updated as needed this visit by Provider  Allergies  Meds  Problems         ROS:  C: NEGATIVE for fever, chills, change in weight  E/M: NEGATIVE for ear, mouth and throat problems  R: NEGATIVE for significant cough or SOB  CV: NEGATIVE for chest pain, palpitations or peripheral edema  GI: as above.    female: as above.   MUSCULOSKELETAL:as above  PSYCHIATRIC: as above.     OBJECTIVE:     /60  Pulse 84  Temp 100.1  F (37.8  C) (Temporal)  Resp 20  There is no height or weight on file  to calculate BMI.  GENERAL: alert and no distress. Sitting in wheelchair  HENT: throat is clear, Mucous membranes are moist.   NECK: no adenopathy, no asymmetry, masses, or scars and thyroid normal to palpation  RESP: lungs clear to auscultation - no rales, rhonchi or wheezes  CV: regular rate and rhythm, normal S1 S2, no S3 or S4, no murmur, click or rub, no peripheral edema and peripheral pulses strong  ABDOMEN: soft, nontender, no hepatosplenomegaly, no masses and bowel sounds normal  MS: no lower extremity edema.  PSYCH: mentation appears normal and tearful at times. Makes good eye contact. Converses easily. Casually groomed    Diagnostic Test Results:  Results for orders placed or performed in visit on 06/27/17 (from the past 24 hour(s))   *UA reflex to Microscopic and Culture ([place] and [facility] (except [place] and [place])   Result Value Ref Range    Color Urine Yellow     Appearance Urine Clear     Glucose Urine Negative NEG mg/dL    Bilirubin Urine Negative NEG    Ketones Urine Negative NEG mg/dL    Specific Gravity Urine <=1.005 1.003 - 1.035    Blood Urine Small (A) NEG    pH Urine 5.5 5.0 - 7.0 pH    Protein Albumin Urine Negative NEG mg/dL    Urobilinogen Urine 0.2 0.2 - 1.0 EU/dL    Nitrite Urine Positive (A) NEG    Leukocyte Esterase Urine Large (A) NEG    Source Catheterized Urine    Urine Microscopic   Result Value Ref Range    WBC Urine 10-25 (A) 0 - 2 /HPF    RBC Urine 2-5 (A) 0 - 2 /HPF    Squamous Epithelial /LPF Urine Few FEW /LPF    Bacteria Urine Few (A) NEG /HPF    Amorphous Crystals Few (A) NEG /HPF    Mucous Urine Present (A) NEG /LPF   TSH with free T4 reflex   Result Value Ref Range    TSH 3.49 0.40 - 4.00 mU/L     *Note: Due to a large number of results and/or encounters for the requested time period, some results have not been displayed. A complete set of results can be found in Results Review.       ASSESSMENT/PLAN:         1. Urinary tract infection associated with  indwelling urethral catheter, initial encounter (H)  Patient with apparent UTI.   She had recent UTI that was MRSA. Treated with Bactrim.   Symptoms have returned.   Reviewed last culture. Treat with doxycycline  Awaiting culture results.   Recommend seeing urology for recurrent infection.   She has a urologist up [place] that she has seen in the past.   She declines help in scheduling.   - *UA reflex to Microscopic and Culture ([place] and [facility] (except [place] and [place])  - Urine Culture Aerobic Bacterial  - Urine Microscopic  - doxycycline (VIBRA-TABS) 100 MG tablet; Take 1 tablet (100 mg) by mouth 2 times daily  Dispense: 28 tablet; Refill: 0    2. 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  Discussed at length with patient.   She has been going to [place] but stopped when had to move in with her mom due to her open wound buttock.   Have been managing this for her for now about 3 years.   Discussed that valium and oxycodone are no longer recommended together.   Discussed referral back to pain management.   She was fine with weaning off the valium at this time.   She is willing to adjust medication but would like to remain here for this.   Discussed needed q3 month visit in the clinic.   She will comply.   Reduce valium to 2 mg daily for 2 weeks then as needed for the next 2 weeks.   All questions invited, asked and answered to the patient's apparent satisfaction.  Patient agrees to plan.    - diazepam (VALIUM) 2 MG tablet; 2 mg daily for 2 weeks then once daily as needed.  Dispense: 30 tablet; Refill: 0  - oxyCODONE-acetaminophen (PERCOCET) 7.5-325 MG per tablet; Take 2 tablets by mouth every 8 hours as needed for pain Max 6 per day  Dispense: 180 tablet; Refill: 0  - methadone (DOLOPHINE) 5 MG tablet; 20 mg po qam, 15 mg at 1 pm daily and 15 mg at bedtime. To last 30 days.  Dispense: 300 tablet; Refill: 0    3. Paraplegia (H)  See above.   - oxyCODONE-acetaminophen (PERCOCET) 7.5-325 MG per tablet; Take 2 tablets by  mouth every 8 hours as needed for pain Max 6 per day  Dispense: 180 tablet; Refill: 0  - methadone (DOLOPHINE) 5 MG tablet; 20 mg po qam, 15 mg at 1 pm daily and 15 mg at bedtime. To last 30 days.  Dispense: 300 tablet; Refill: 0    4. Chronic pain syndrome  As above.     5. Anxiety associated with depression  Patient with some worsening of anxiety and depression.   She declines any change in medication.   Discussed looking into other housing options but she declines    6. Open wound of buttock, complicated, unspecified laterality, sequela  She reports she is following with wound doctor.   Surgery has been recommended but she needs to quit smoking first.   She reports that she is not ready.   She will be in touch with them when ready.     7. Hypothyroidism due to acquired atrophy of thyroid  Awaiting results. Dose adjustment as needed.    - TSH with free T4 reflex        >50% of this 45 minute visit spent in counseling and plan of care for the above diagnoses        STANLEY DAVILA MD, MD  [facility]     no

## 2021-07-26 ENCOUNTER — MYC MEDICAL ADVICE (OUTPATIENT)
Dept: FAMILY MEDICINE | Facility: CLINIC | Age: 51
End: 2021-07-26

## 2021-07-26 ENCOUNTER — TELEPHONE (OUTPATIENT)
Dept: FAMILY MEDICINE | Facility: CLINIC | Age: 51
End: 2021-07-26

## 2021-07-26 NOTE — TELEPHONE ENCOUNTER
Pt is due to come in for a yearly med check in August. Wondering if you would work her in at all mid august.

## 2021-07-27 ENCOUNTER — MEDICAL CORRESPONDENCE (OUTPATIENT)
Dept: HEALTH INFORMATION MANAGEMENT | Facility: CLINIC | Age: 51
End: 2021-07-27

## 2021-07-27 ENCOUNTER — TRANSFERRED RECORDS (OUTPATIENT)
Dept: HEALTH INFORMATION MANAGEMENT | Facility: CLINIC | Age: 51
End: 2021-07-27

## 2021-08-02 ENCOUNTER — TELEPHONE (OUTPATIENT)
Dept: FAMILY MEDICINE | Facility: CLINIC | Age: 51
End: 2021-08-02

## 2021-08-02 NOTE — TELEPHONE ENCOUNTER
What type of form? Wound care  What day did you drop off your forms? Faxed 8.2.2021  Is there a due date? asap (7-10 business days to compete forms)   How would you like to receive these forms? Please fax to   [phone]    What is the best number to contact you? [phone]  What time works best to contact you with in 4 hrs? any  Is it okay to leave a message? Yes    Larisa Jordan (Auto signs name of person logged into Epic)

## 2021-08-03 ENCOUNTER — MEDICAL CORRESPONDENCE (OUTPATIENT)
Dept: HEALTH INFORMATION MANAGEMENT | Facility: CLINIC | Age: 51
End: 2021-08-03

## 2021-08-03 ENCOUNTER — MYC MEDICAL ADVICE (OUTPATIENT)
Dept: FAMILY MEDICINE | Facility: CLINIC | Age: 51
End: 2021-08-03

## 2021-08-03 DIAGNOSIS — B96.5 PSEUDOMONAS URINARY TRACT INFECTION: Primary | ICD-10-CM

## 2021-08-03 DIAGNOSIS — N39.0 PSEUDOMONAS URINARY TRACT INFECTION: Primary | ICD-10-CM

## 2021-08-03 RX ORDER — CIPROFLOXACIN 750 MG/1
750 TABLET, FILM COATED ORAL 2 TIMES DAILY
Qty: 20 TABLET | Refills: 0 | Status: SHIPPED | OUTPATIENT
Start: 2021-08-03 | End: 2021-08-13

## 2021-08-03 NOTE — TELEPHONE ENCOUNTER
,  results from outside facility are back. Please review.     OTONIEL RamosN, RN  [place]  August 3, 2021

## 2021-08-10 ENCOUNTER — MYC MEDICAL ADVICE (OUTPATIENT)
Dept: FAMILY MEDICINE | Facility: CLINIC | Age: 51
End: 2021-08-10

## 2021-08-11 ENCOUNTER — TRANSFERRED RECORDS (OUTPATIENT)
Dept: HEALTH INFORMATION MANAGEMENT | Facility: CLINIC | Age: 51
End: 2021-08-11

## 2021-08-11 DIAGNOSIS — R56.9 SEIZURES (H): ICD-10-CM

## 2021-08-11 DIAGNOSIS — E03.4 HYPOTHYROIDISM DUE TO ACQUIRED ATROPHY OF THYROID: ICD-10-CM

## 2021-08-11 DIAGNOSIS — F41.9 ANXIETY: ICD-10-CM

## 2021-08-11 NOTE — TELEPHONE ENCOUNTER
Pending Prescriptions:                       Disp   Refills    levETIRAcetam (KEPPRA) 750 MG tablet [Phar*24 tab*0        Sig: TAKE 1 TABLET (750 MG) BY MOUTH 2 TIMES DAILY    levothyroxine (SYNTHROID/LEVOTHROID) 75 MC*12 tab*0        Sig: TAKE 1 TABLET (75 MCG) BY MOUTH DAILY ++DUE FOR LAB           WORK++    venlafaxine (EFFEXOR) 75 MG tablet [Pharma*36 tab*0        Sig: TAKE 2 TABLETS BY MOUTH EVERY MORNING AND ONE TABLET           AT BEDTIME    Routing refill request to provider for review/approval because:  Drug not on the [facility] refill protocol   Labs out of range:    TSH   Date Value Ref Range Status   10/15/2019 3.41 0.40 - 4.00 mU/L Final     BP Readings from Last 3 Encounters:   01/24/20 122/62   10/15/19 110/60   01/17/18 112/60

## 2021-08-12 ENCOUNTER — TELEPHONE (OUTPATIENT)
Dept: FAMILY MEDICINE | Facility: CLINIC | Age: 51
End: 2021-08-12

## 2021-08-12 RX ORDER — LEVOTHYROXINE SODIUM 75 UG/1
TABLET ORAL
Qty: 12 TABLET | Refills: 0 | Status: SHIPPED | OUTPATIENT
Start: 2021-08-12 | End: 2021-09-13

## 2021-08-12 RX ORDER — VENLAFAXINE 75 MG/1
TABLET ORAL
Qty: 36 TABLET | Refills: 0 | Status: SHIPPED | OUTPATIENT
Start: 2021-08-12 | End: 2021-09-13

## 2021-08-12 RX ORDER — LEVETIRACETAM 750 MG/1
750 TABLET ORAL 2 TIMES DAILY
Qty: 24 TABLET | Refills: 0 | Status: SHIPPED | OUTPATIENT
Start: 2021-08-12 | End: 2021-09-13

## 2021-08-12 NOTE — TELEPHONE ENCOUNTER
Form has been placed in providers form bin for signature    [facility]   Fax#[phone]  Ph#[phone]    Thanks  Padmini MCKENZIE (R)

## 2021-08-12 NOTE — TELEPHONE ENCOUNTER
Patient will call she is trying to arrange transportation  Closing encounter  Padmini Rangel RT (R)

## 2021-08-12 NOTE — TELEPHONE ENCOUNTER
Patient is due for visit. Was scheduled for tomorrow but see it is now cancelled. Please assist in scheduling.

## 2021-08-16 NOTE — TELEPHONE ENCOUNTER
Form has been faxed and scanned and confirmed on [employer] Fax   Closing encounter  Padmini Rangel RT (R)

## 2021-08-17 ENCOUNTER — MYC MEDICAL ADVICE (OUTPATIENT)
Dept: FAMILY MEDICINE | Facility: CLINIC | Age: 51
End: 2021-08-17

## 2021-08-17 NOTE — TELEPHONE ENCOUNTER
I think this is in regards to her refill request needing a visit prior to further refills. Please review and advise.    Jyoti Faria CMA ([facility])

## 2021-08-18 ENCOUNTER — TRANSFERRED RECORDS (OUTPATIENT)
Dept: HEALTH INFORMATION MANAGEMENT | Facility: CLINIC | Age: 51
End: 2021-08-18

## 2021-08-19 ENCOUNTER — MYC MEDICAL ADVICE (OUTPATIENT)
Dept: FAMILY MEDICINE | Facility: CLINIC | Age: 51
End: 2021-08-19

## 2021-08-19 NOTE — TELEPHONE ENCOUNTER
1) patient was asking about urine results from yesterday was done at [facility].    These are not in epic yet. Patient notified.      2)  is still looking for a form for her wheelchair.  Patient does not currently have a wheelchair?  Please call her with questions.      Padmini Jones RN on 8/19/2021 at 3:01 PM

## 2021-08-20 NOTE — TELEPHONE ENCOUNTER
Patient called. No lab results as of yet.. appt scheduled 9/*10/21 for wheelchair evaluation with dr. Edward.

## 2021-08-20 NOTE — TELEPHONE ENCOUNTER
Provider please review and advise on labs when they are entered into Epic.     Jyoti Faria CMA ([facility])

## 2021-08-20 NOTE — TELEPHONE ENCOUNTER
No labs on care everywhere.  Patient would need an appointment for wheel chair evaluation and order.

## 2021-08-26 NOTE — TELEPHONE ENCOUNTER
The lab results are in CareEverywhere.  The culture from 8/18 shows no growth.     The results scanned in on 8/19/21 are from July.

## 2021-08-26 NOTE — TELEPHONE ENCOUNTER
Patient does have a UTI but with epithelial cells I wanted his urine rechecked and less contaminated before considering treatment. Thank you.

## 2021-08-26 NOTE — TELEPHONE ENCOUNTER
Called to discuss with the patient. The patient stated that the urine didn't come from the catch bag. The nurse used a syringe to take the sample.   Patient is wondering what the results are?   Do you want this repeated?       OTONIEL RamosN, RN, PRITIN  [place]  August 26, 2021

## 2021-09-03 ENCOUNTER — TELEPHONE (OUTPATIENT)
Dept: FAMILY MEDICINE | Facility: CLINIC | Age: 51
End: 2021-09-03

## 2021-09-03 NOTE — TELEPHONE ENCOUNTER
Reason for Call:  Form, our goal is to have forms completed with 72 hours, however, some forms may require a visit or additional information.    Type of letter, form or note:  Medical Supply    Who is the form from?: [employer] (if other please explain)    Where did the form come from: form was faxed in    What clinic location was the form placed at?: [facility] [phone]    Where the form was placed: TC Box/Folder    What number is listed as a contact on the form?: [phone]        Additional comments: Fax [phone]    Call taken on 9/3/2021 at 9:16 AM by Michelle Ruiz

## 2021-09-07 ENCOUNTER — TELEPHONE (OUTPATIENT)
Dept: FAMILY MEDICINE | Facility: CLINIC | Age: 51
End: 2021-09-07

## 2021-09-07 NOTE — TELEPHONE ENCOUNTER
Patient states she had an assessment with Dr. Kasper prior to covid.    Does she still need this assessment?  Call patient @ [phone]

## 2021-09-08 ENCOUNTER — TELEPHONE (OUTPATIENT)
Dept: FAMILY MEDICINE | Facility: CLINIC | Age: 51
End: 2021-09-08

## 2021-09-08 NOTE — CONFIDENTIAL NOTE
What type of form? Plan of Care  What day did you drop off your forms? Faxed 9.8.2021  Is there a due date? asap (7-10 business days to compete forms)   How would you like to receive these forms? Please fax to [phone]    What is the best number to contact you? Work [phone]  What time works best to contact you with in 4 hrs? any  Is it okay to leave a message? Yes    Larisa Jordan (Auto signs name of person logged into Epic)

## 2021-09-10 ENCOUNTER — TRANSFERRED RECORDS (OUTPATIENT)
Dept: HEALTH INFORMATION MANAGEMENT | Facility: CLINIC | Age: 51
End: 2021-09-10

## 2021-09-10 DIAGNOSIS — E03.4 HYPOTHYROIDISM DUE TO ACQUIRED ATROPHY OF THYROID: ICD-10-CM

## 2021-09-10 DIAGNOSIS — R56.9 SEIZURES (H): ICD-10-CM

## 2021-09-10 DIAGNOSIS — R05.9 COUGH: ICD-10-CM

## 2021-09-10 DIAGNOSIS — G82.20 PARAPLEGIA (H): ICD-10-CM

## 2021-09-10 DIAGNOSIS — I10 BENIGN ESSENTIAL HYPERTENSION: ICD-10-CM

## 2021-09-10 DIAGNOSIS — F41.9 ANXIETY: ICD-10-CM

## 2021-09-10 DIAGNOSIS — G89.4 CHRONIC PAIN SYNDROME: ICD-10-CM

## 2021-09-10 DIAGNOSIS — Z00.00 ROUTINE GENERAL MEDICAL EXAMINATION AT A HEALTH CARE FACILITY: ICD-10-CM

## 2021-09-10 RX ORDER — ALBUTEROL SULFATE 90 UG/1
2 AEROSOL, METERED RESPIRATORY (INHALATION) EVERY 6 HOURS PRN
Qty: 6.7 G | Refills: 3 | Status: SHIPPED | OUTPATIENT
Start: 2021-09-10 | End: 2023-09-27

## 2021-09-10 NOTE — TELEPHONE ENCOUNTER
Prescription approved per [facility] Refill Protocol.  Albuterol  Padmini Jones RN on 9/10/2021 at 3:28 PM

## 2021-09-10 NOTE — TELEPHONE ENCOUNTER
Pending Prescriptions:                       Disp   Refills    levETIRAcetam (KEPPRA) 750 MG tablet [Phar*24 tab*0        Sig: TAKE 1 TABLET (750 MG) BY MOUTH 2 TIMES DAILY    levothyroxine (SYNTHROID/LEVOTHROID) 75 MC*12 tab*0        Sig: TAKE 1 TABLET (75 MCG) BY MOUTH DAILY ++DUE FOR LAB           WORK++    venlafaxine (EFFEXOR) 75 MG tablet [Pharma*36 tab*0        Sig: TAKE 2 TABLETS BY MOUTH EVERY MORNING AND ONE TABLET           AT BEDTIME    Pregabalin (LYRICA) 200 MG capsule [Pharma*90 cap*1        Sig: TAKE 1 CAPSULE (200 MG) BY MOUTH 3 TIMES DAILY    baclofen (LIORESAL) 20 MG tablet [Pharmacy*120 ta*1        Sig: TAKE 1 TABLET (20 MG) BY MOUTH 4 TIMES DAILY    lisinopril (ZESTRIL) 20 MG tablet [Pharmac*30 tab*1        Sig: TAKE 1 TABLET (20 MG) BY MOUTH DAILY    MULTIVITAMIN, THERAPEUTIC WITH MINERALS ta*30 tab*5        Sig: TAKE 1 TABLET DAILY REPLACES CERTAVITE    Signed Prescriptions:                        Disp   Refills    albuterol (PROAIR HFA/PROVENTIL HFA/VENTOL*6.7 g  3        Sig: INHALE 2 PUFFS INTO THE LUNGS EVERY 6 HOURS AS NEEDED           FOR SHORTNESS OF BREATH / DYSPNEA OR WHEEZING  Authorizing Provider: STANLEY DAVILA  Ordering User: RAFFI ASKEW      Routing refill request to provider for review/approval because:  Drug not on the [facility] refill protocol   Drug interaction warning  Labs not current:  blood pressure, tsh, creatinine    Raffi Askew RN on 9/10/2021 at 3:28 PM

## 2021-09-12 ENCOUNTER — HEALTH MAINTENANCE LETTER (OUTPATIENT)
Age: 51
End: 2021-09-12

## 2021-09-13 RX ORDER — BACLOFEN 20 MG/1
20 TABLET ORAL 4 TIMES DAILY
Qty: 120 TABLET | Refills: 1 | Status: SHIPPED | OUTPATIENT
Start: 2021-09-13 | End: 2021-10-15

## 2021-09-13 RX ORDER — LEVETIRACETAM 750 MG/1
750 TABLET ORAL 2 TIMES DAILY
Qty: 24 TABLET | Refills: 0 | Status: SHIPPED | OUTPATIENT
Start: 2021-09-13 | End: 2021-09-14

## 2021-09-13 RX ORDER — ASPIRIN 81 MG
TABLET, DELAYED RELEASE (ENTERIC COATED) ORAL
Qty: 30 TABLET | Refills: 1 | Status: SHIPPED | OUTPATIENT
Start: 2021-09-13 | End: 2021-10-15

## 2021-09-13 RX ORDER — LEVOTHYROXINE SODIUM 75 UG/1
TABLET ORAL
Qty: 30 TABLET | Refills: 1 | Status: SHIPPED | OUTPATIENT
Start: 2021-09-13 | End: 2021-10-15

## 2021-09-13 RX ORDER — LISINOPRIL 20 MG/1
20 TABLET ORAL DAILY
Qty: 30 TABLET | Refills: 1 | Status: SHIPPED | OUTPATIENT
Start: 2021-09-13 | End: 2021-10-15

## 2021-09-13 RX ORDER — VENLAFAXINE 75 MG/1
TABLET ORAL
Qty: 90 TABLET | Refills: 1 | Status: SHIPPED | OUTPATIENT
Start: 2021-09-13 | End: 2021-10-15

## 2021-09-13 RX ORDER — PREGABALIN 200 MG/1
200 CAPSULE ORAL 3 TIMES DAILY
Qty: 90 CAPSULE | Refills: 1 | Status: SHIPPED | OUTPATIENT
Start: 2021-09-13 | End: 2021-10-15

## 2021-09-13 NOTE — TELEPHONE ENCOUNTER
Patient needs appointment.  Refilled for 30 days with one refill to allow time to complete a follow up visit.

## 2021-09-14 ENCOUNTER — TELEPHONE (OUTPATIENT)
Dept: FAMILY MEDICINE | Facility: CLINIC | Age: 51
End: 2021-09-14

## 2021-09-14 NOTE — TELEPHONE ENCOUNTER
Orders for both power and standard wheelchair were sent in April of this year. Do we know what the issue with these requests were? It appears these were sent to [facility] at that time. If possible, please resend. If they need something different, let me know.     I do not know why her visit was cancelled. I do not see a note about cancelling. It was not at my direction.

## 2021-09-14 NOTE — TELEPHONE ENCOUNTER
Reason for Call: Request for an order or referral:    Order or referral being requested: wheelchair request      Date needed: as soon as possible    Has the patient been seen by the PCP for this problem? YES    Additional comments: fax order to [facility]. fax [phone]  Attn: [email or web address].       Phone number Patient can be reached at:  Home number on file [phone] (home)    Best Time:  Anytime      Can we leave a detailed message on this number?  YES       Call taken on 9/14/2021 at 1:36 PM by Susan Jackson

## 2021-09-14 NOTE — TELEPHONE ENCOUNTER
Wheelchair orders have been faxed to [facility]. fax [phone]  Attn: zbigniew Franco message   Thanks  Padmini MCKENZIE (R)

## 2021-09-14 NOTE — TELEPHONE ENCOUNTER
Patient also wondering why her appt on oct 12th was cancelled and she was never notified and the reasoning behind it.   She only comes down to [facility] area when she has a few dr appts and tries to get them all done in the same day. Says nobody contacted her about this.

## 2021-09-17 ENCOUNTER — TELEPHONE (OUTPATIENT)
Dept: FAMILY MEDICINE | Facility: CLINIC | Age: 51
End: 2021-09-17

## 2021-09-17 NOTE — TELEPHONE ENCOUNTER
What type of form? Plan of Care  What day did you drop off your forms? Faxed 9.17.2021     Is there a due date? asap (7-10 business days to compete forms)   How would you like to receive these forms? Please fax to [phone]    What is the best number to contact you? Work [phone]  What time works best to contact you with in 4 hrs? anytime  Is it okay to leave a message? Yes    Larisa Jordan (Auto signs name of person logged into Epic)

## 2021-09-21 DIAGNOSIS — Z53.9 DIAGNOSIS NOT YET DEFINED: Primary | ICD-10-CM

## 2021-09-21 PROCEDURE — G0179 MD RECERTIFICATION HHA PT: HCPCS | Performed by: FAMILY MEDICINE

## 2021-09-22 ENCOUNTER — TRANSFERRED RECORDS (OUTPATIENT)
Dept: HEALTH INFORMATION MANAGEMENT | Facility: CLINIC | Age: 51
End: 2021-09-22

## 2021-09-22 ENCOUNTER — MEDICAL CORRESPONDENCE (OUTPATIENT)
Dept: HEALTH INFORMATION MANAGEMENT | Facility: CLINIC | Age: 51
End: 2021-09-22

## 2021-09-23 ENCOUNTER — MYC MEDICAL ADVICE (OUTPATIENT)
Dept: FAMILY MEDICINE | Facility: CLINIC | Age: 51
End: 2021-09-23

## 2021-09-23 DIAGNOSIS — G82.20 PARAPLEGIA (H): Primary | ICD-10-CM

## 2021-09-24 ENCOUNTER — MYC MEDICAL ADVICE (OUTPATIENT)
Dept: FAMILY MEDICINE | Facility: CLINIC | Age: 51
End: 2021-09-24

## 2021-09-24 DIAGNOSIS — T83.511A URINARY TRACT INFECTION ASSOCIATED WITH INDWELLING URETHRAL CATHETER, INITIAL ENCOUNTER (H): ICD-10-CM

## 2021-09-24 DIAGNOSIS — Z93.59 SUPRAPUBIC CATHETER (H): Primary | ICD-10-CM

## 2021-09-24 DIAGNOSIS — N39.0 URINARY TRACT INFECTION ASSOCIATED WITH INDWELLING URETHRAL CATHETER, INITIAL ENCOUNTER (H): ICD-10-CM

## 2021-09-24 RX ORDER — SULFAMETHOXAZOLE/TRIMETHOPRIM 800-160 MG
1 TABLET ORAL 2 TIMES DAILY
Qty: 20 TABLET | Refills: 0 | Status: SHIPPED | OUTPATIENT
Start: 2021-09-24 | End: 2021-10-04

## 2021-10-01 NOTE — CONFIDENTIAL NOTE
What type of form? Written Order Catheter [employer]  What day did you drop off your forms? Faxed 10.1.2021  Is there a due date? asap (7-10 business days to compete forms)   How would you like to receive these forms? Please fax to [phone]    What is the best number to contact you? Work [phone]  What time works best to contact you with in 4 hrs? any  Is it okay to leave a message? Yes    Larisa Jordan (Auto signs name of person logged into Epic)

## 2021-10-04 ENCOUNTER — TELEPHONE (OUTPATIENT)
Dept: FAMILY MEDICINE | Facility: CLINIC | Age: 51
End: 2021-10-04

## 2021-10-04 NOTE — TELEPHONE ENCOUNTER
Form has been placed in providers form darryl  Thanks  Padmini Rangel RT (R)       What type of form? Written Order Catheter [employer]  What day did you drop off your forms? Faxed 10.1.2021  Is there a due date? asap (7-10 business days to compete forms)   How would you like to receive these forms? Please fax to [phone]     What is the best number to contact you? Work [phone]  What time works best to contact you with in 4 hrs? any  Is it okay to leave a message? Yes     Larisa Jordan (Auto signs name of person logged into Epic)

## 2021-10-12 ENCOUNTER — TRANSFERRED RECORDS (OUTPATIENT)
Dept: HEALTH INFORMATION MANAGEMENT | Facility: CLINIC | Age: 51
End: 2021-10-12

## 2021-10-12 ENCOUNTER — TELEPHONE (OUTPATIENT)
Dept: FAMILY MEDICINE | Facility: CLINIC | Age: 51
End: 2021-10-12

## 2021-10-12 NOTE — CONFIDENTIAL NOTE
What type of form? [employer]A Frequency signature needed  What day did you drop off your forms? Faxed 10.12.2021  Is there a due date? asap (7-10 business days to compete forms)   How would you like to receive these forms? Please fax to [phone]    What is the best number to contact you? Work [phone]  What time works best to contact you with in 4 hrs? any  Is it okay to leave a message? Yes    Larisa Jordan (Auto signs name of person logged into Epic)

## 2021-10-15 ENCOUNTER — MYC MEDICAL ADVICE (OUTPATIENT)
Dept: FAMILY MEDICINE | Facility: CLINIC | Age: 51
End: 2021-10-15

## 2021-10-15 DIAGNOSIS — T83.511A URINARY TRACT INFECTION ASSOCIATED WITH INDWELLING URETHRAL CATHETER, INITIAL ENCOUNTER (H): Primary | ICD-10-CM

## 2021-10-15 DIAGNOSIS — N39.0 URINARY TRACT INFECTION ASSOCIATED WITH INDWELLING URETHRAL CATHETER, INITIAL ENCOUNTER (H): Primary | ICD-10-CM

## 2021-10-15 RX ORDER — CEFTRIAXONE 1 G/1
1000 INJECTION, POWDER, FOR SOLUTION INTRAMUSCULAR; INTRAVENOUS DAILY
Qty: 70 ML | Refills: 0 | Status: SHIPPED | OUTPATIENT
Start: 2021-10-15 | End: 2022-09-26

## 2021-10-15 NOTE — TELEPHONE ENCOUNTER
[place] back from patient. She reports she has a port at this time and she has done this at home.     Will send prescription. Sent to [facility].

## 2021-10-15 NOTE — TELEPHONE ENCOUNTER
Contacted [facility]    Recommend Rocephin 1 gm daily for 7 days.     [facility] recommends [facility] to send orders to. But would not be able to start until Monday.     Needs to be done at a hospital over the weekend.     Working on location.

## 2021-10-15 NOTE — TELEPHONE ENCOUNTER
Pt is hoping to get antibiotic before the weekend for this UTI    Routing to other in office providers as well, SF not in clinic today

## 2021-10-16 ENCOUNTER — MEDICAL CORRESPONDENCE (OUTPATIENT)
Dept: HEALTH INFORMATION MANAGEMENT | Facility: CLINIC | Age: 51
End: 2021-10-16
Payer: COMMERCIAL

## 2021-10-18 ENCOUNTER — TELEPHONE (OUTPATIENT)
Dept: FAMILY MEDICINE | Facility: CLINIC | Age: 51
End: 2021-10-18

## 2021-10-18 NOTE — TELEPHONE ENCOUNTER
Reason for Call:  Form, our goal is to have forms completed with 72 hours, however, some forms may require a visit or additional information.    Type of letter, form or note:  Home Care Orders    Who is the form from?: Home care    Where did the form come from: form was faxed in    What clinic location was the form placed at?: [facility] [phone]    Where the form was placed: TC Box/Folder    What number is listed as a contact on the form?: [phone] fax [phone]       Additional comments:       Call taken on 10/18/2021 at 7:52 AM by Michelle Ruiz

## 2021-10-20 ENCOUNTER — TELEPHONE (OUTPATIENT)
Dept: FAMILY MEDICINE | Facility: CLINIC | Age: 51
End: 2021-10-20

## 2021-10-20 ENCOUNTER — MEDICAL CORRESPONDENCE (OUTPATIENT)
Dept: HEALTH INFORMATION MANAGEMENT | Facility: CLINIC | Age: 51
End: 2021-10-20
Payer: COMMERCIAL

## 2021-10-20 NOTE — TELEPHONE ENCOUNTER
Reason for Call:  Form, our goal is to have forms completed with 72 hours, however, some forms may require a visit or additional information.    Type of letter, form or note:  Home Care Orders     Who is the form from?: Home care    Where did the form come from: form was faxed in    What clinic location was the form placed at?: [facility] [phone]    Where the form was placed: TC Box/Folder    What number is listed as a contact on the form?: fax [phone]       Additional comments:     Call taken on 10/20/2021 at 10:02 AM by Michelle Ruiz

## 2021-10-22 ENCOUNTER — MEDICAL CORRESPONDENCE (OUTPATIENT)
Dept: HEALTH INFORMATION MANAGEMENT | Facility: CLINIC | Age: 51
End: 2021-10-22
Payer: COMMERCIAL

## 2021-10-22 ENCOUNTER — TELEPHONE (OUTPATIENT)
Dept: FAMILY MEDICINE | Facility: CLINIC | Age: 51
End: 2021-10-22

## 2021-10-22 NOTE — TELEPHONE ENCOUNTER
Reason for Call:  Form, our goal is to have forms completed with 72 hours, however, some forms may require a visit or additional information.    Type of letter, form or note:  Written Order    Who is the form from?: [facility] [email or web address] (if other please explain)    Where did the form come from: form was faxed in    What clinic location was the form placed at?: [facility] [phone]    Where the form was placed: TC Box/Folder    What number is listed as a contact on the form?: [phone]       Additional comments:     Call taken on 10/22/2021 at 2:24 PM by Michelle Ruiz

## 2021-10-25 ENCOUNTER — TELEPHONE (OUTPATIENT)
Dept: FAMILY MEDICINE | Facility: CLINIC | Age: 51
End: 2021-10-25
Payer: COMMERCIAL

## 2021-10-25 NOTE — TELEPHONE ENCOUNTER
Patient Quality Outreach Summary      Summary:    Patient is due/failing the following:   Physical     Type of outreach:    Phone, spoke to patient/parent. patient will call back to schedule.     Questions for provider review:    None                                                                                                                    Cheryl Bergman CMA     Chart routed to Care Team.

## 2021-10-29 ENCOUNTER — TELEPHONE (OUTPATIENT)
Dept: FAMILY MEDICINE | Facility: CLINIC | Age: 51
End: 2021-10-29

## 2021-10-29 DIAGNOSIS — Z53.9 DIAGNOSIS NOT YET DEFINED: Primary | ICD-10-CM

## 2021-10-29 PROCEDURE — G0179 MD RECERTIFICATION HHA PT: HCPCS | Performed by: FAMILY MEDICINE

## 2021-10-29 NOTE — TELEPHONE ENCOUNTER
Reason for Call:  Form, our goal is to have forms completed with 72 hours, however, some forms may require a visit or additional information.    Type of letter, form or note:  Home Health Certification    Who is the form from?: Home care    Where did the form come from: form was faxed in    What clinic location was the form placed at?: [facility] [phone]    Where the form was placed: TC Box/Folder    What number is listed as a contact on the form?: [phone]        Additional comments: fax [phone]    Call taken on 10/29/2021 at 2:40 PM by Michelle Ruiz

## 2021-11-01 ENCOUNTER — TELEPHONE (OUTPATIENT)
Dept: FAMILY MEDICINE | Facility: CLINIC | Age: 51
End: 2021-11-01

## 2021-11-01 NOTE — CONFIDENTIAL NOTE
What type of form? [employer]  What day did you drop off your forms? Faxed 11.1.2021  Is there a due date? asap (7-10 business days to compete forms)   How would you like to receive these forms? Please fax to [phone]    What is the best number to contact you? Work [phone]  What time works best to contact you with in 4 hrs? any  Is it okay to leave a message? Yes    Larisa Jordan (Auto signs name of person logged into Epic)

## 2021-11-03 ENCOUNTER — MEDICAL CORRESPONDENCE (OUTPATIENT)
Dept: HEALTH INFORMATION MANAGEMENT | Facility: CLINIC | Age: 51
End: 2021-11-03
Payer: COMMERCIAL

## 2021-11-09 ENCOUNTER — MEDICAL CORRESPONDENCE (OUTPATIENT)
Dept: HEALTH INFORMATION MANAGEMENT | Facility: CLINIC | Age: 51
End: 2021-11-09
Payer: COMMERCIAL

## 2021-11-09 ENCOUNTER — TELEPHONE (OUTPATIENT)
Dept: FAMILY MEDICINE | Facility: CLINIC | Age: 51
End: 2021-11-09
Payer: COMMERCIAL

## 2021-11-09 NOTE — TELEPHONE ENCOUNTER
Reason for Call:  Form, our goal is to have forms completed with 72 hours, however, some forms may require a visit or additional information.    Type of letter, form or note:  Plan of care    Who is the form from?: Home care    Where did the form come from: form was faxed in    What clinic location was the form placed at?: [facility] [phone]    Where the form was placed:  Box/Folder    What number is listed as a contact on the form?: [phone]       Additional comments:     Call taken on 11/9/2021 at 10:11 AM by Michelle Ruiz

## 2021-11-10 ENCOUNTER — HOSPITAL ENCOUNTER (EMERGENCY)
Dept: HOSPITAL 11 - JP.ED | Age: 51
Discharge: HOME | End: 2021-11-10
Payer: MEDICAID

## 2021-11-10 VITALS — SYSTOLIC BLOOD PRESSURE: 115 MMHG | DIASTOLIC BLOOD PRESSURE: 74 MMHG | HEART RATE: 75 BPM

## 2021-11-10 DIAGNOSIS — N39.0: Primary | ICD-10-CM

## 2021-11-10 DIAGNOSIS — E03.9: ICD-10-CM

## 2021-11-10 DIAGNOSIS — Z88.0: ICD-10-CM

## 2021-11-10 DIAGNOSIS — Z88.1: ICD-10-CM

## 2021-11-10 DIAGNOSIS — Z72.0: ICD-10-CM

## 2021-11-10 DIAGNOSIS — Z79.82: ICD-10-CM

## 2021-11-10 DIAGNOSIS — I10: ICD-10-CM

## 2021-11-10 DIAGNOSIS — Z79.899: ICD-10-CM

## 2021-11-10 DIAGNOSIS — J45.909: ICD-10-CM

## 2021-11-10 PROCEDURE — 81001 URINALYSIS AUTO W/SCOPE: CPT

## 2021-11-10 PROCEDURE — 87086 URINE CULTURE/COLONY COUNT: CPT

## 2021-11-10 PROCEDURE — 99283 EMERGENCY DEPT VISIT LOW MDM: CPT

## 2021-11-10 PROCEDURE — 96372 THER/PROPH/DIAG INJ SC/IM: CPT

## 2021-11-12 DIAGNOSIS — G89.4 CHRONIC PAIN SYNDROME: ICD-10-CM

## 2021-11-16 RX ORDER — HYDROXYZINE HYDROCHLORIDE 25 MG/1
TABLET, FILM COATED ORAL
Qty: 90 TABLET | Refills: 0 | Status: SHIPPED | OUTPATIENT
Start: 2021-11-16 | End: 2022-01-09

## 2021-11-22 ENCOUNTER — TELEPHONE (OUTPATIENT)
Dept: FAMILY MEDICINE | Facility: CLINIC | Age: 51
End: 2021-11-22
Payer: COMMERCIAL

## 2021-11-22 DIAGNOSIS — G82.20 PARAPLEGIA (H): Primary | ICD-10-CM

## 2021-11-22 NOTE — TELEPHONE ENCOUNTER
Reason for Call: Request for an order or referral:    Order or referral being requested: wheelchair evaluation for essential health in [place]    Date needed: as soon as possible    Has the patient been seen by the PCP for this problem? YES    Additional comments:     Phone number Patient can be reached at:  Home number on file [phone] (home)    Best Time:  any    Can we leave a detailed message on this number?  YES    Call taken on 11/22/2021 at 12:02 PM by Odilia Mason

## 2021-11-24 ENCOUNTER — TRANSFERRED RECORDS (OUTPATIENT)
Dept: HEALTH INFORMATION MANAGEMENT | Facility: CLINIC | Age: 51
End: 2021-11-24
Payer: COMMERCIAL

## 2021-11-29 ENCOUNTER — TELEPHONE (OUTPATIENT)
Dept: FAMILY MEDICINE | Facility: CLINIC | Age: 51
End: 2021-11-29
Payer: COMMERCIAL

## 2021-11-29 NOTE — CONFIDENTIAL NOTE
Reason for Call:  Form, our goal is to have forms completed with 72 hours, however, some forms may require a visit or additional information.    Type of letter, form or note:  Vulnerable Adult    Who is the form from?: [facility]. (if other please explain)    Where did the form come from: form was faxed in    What clinic location was the form placed at?: [facility] [phone]    Where the form was placed:  BOX Box/Folder    What number is listed as a contact on the form?: [phone]       Additional comments: Fax to: [phone]    Call taken on 11/29/2021 at 2:16 PM by Larisa Jordan

## 2021-12-02 ENCOUNTER — TELEPHONE (OUTPATIENT)
Dept: FAMILY MEDICINE | Facility: CLINIC | Age: 51
End: 2021-12-02
Payer: COMMERCIAL

## 2021-12-02 NOTE — CONFIDENTIAL NOTE
Reason for Call:  Form, our goal is to have forms completed with 72 hours, however, some forms may require a visit or additional information.    Type of letter, form or note:  Colostomy supplies    Who is the form from?: [employer] (if other please explain)    Where did the form come from: form was faxed in    What clinic location was the form placed at?: [facility] [phone]    Where the form was placed: TC Box/Folder    What number is listed as a contact on the form?: [phone]       Additional comments: Fax: [phone]    Call taken on 12/2/2021 at 11:28 AM by Larisa Jordan

## 2021-12-03 ENCOUNTER — MEDICAL CORRESPONDENCE (OUTPATIENT)
Dept: HEALTH INFORMATION MANAGEMENT | Facility: CLINIC | Age: 51
End: 2021-12-03
Payer: COMMERCIAL

## 2021-12-07 ENCOUNTER — TRANSFERRED RECORDS (OUTPATIENT)
Dept: HEALTH INFORMATION MANAGEMENT | Facility: CLINIC | Age: 51
End: 2021-12-07
Payer: COMMERCIAL

## 2021-12-10 ENCOUNTER — TELEPHONE (OUTPATIENT)
Dept: FAMILY MEDICINE | Facility: CLINIC | Age: 51
End: 2021-12-10
Payer: COMMERCIAL

## 2021-12-10 DIAGNOSIS — N39.0 URINARY TRACT INFECTION ASSOCIATED WITH INDWELLING URETHRAL CATHETER, INITIAL ENCOUNTER (H): Primary | ICD-10-CM

## 2021-12-10 DIAGNOSIS — T83.511A URINARY TRACT INFECTION ASSOCIATED WITH INDWELLING URETHRAL CATHETER, INITIAL ENCOUNTER (H): Primary | ICD-10-CM

## 2021-12-10 RX ORDER — NITROFURANTOIN 25; 75 MG/1; MG/1
100 CAPSULE ORAL 2 TIMES DAILY
Qty: 20 CAPSULE | Refills: 0 | Status: SHIPPED | OUTPATIENT
Start: 2021-12-10 | End: 2021-12-20

## 2021-12-10 NOTE — TELEPHONE ENCOUNTER
Received urine culture results from [facility]. Reviewed.     Prescription sent to pharmacy.     Recheck if not improving.

## 2021-12-13 ENCOUNTER — TRANSFERRED RECORDS (OUTPATIENT)
Dept: HEALTH INFORMATION MANAGEMENT | Facility: CLINIC | Age: 51
End: 2021-12-13
Payer: COMMERCIAL

## 2021-12-13 ENCOUNTER — TELEPHONE (OUTPATIENT)
Dept: FAMILY MEDICINE | Facility: CLINIC | Age: 51
End: 2021-12-13
Payer: COMMERCIAL

## 2021-12-13 NOTE — CONFIDENTIAL NOTE
Reason for Call:  Form, our goal is to have forms completed with 72 hours, however, some forms may require a visit or additional information.    Type of letter, form or note:  Home Health Certification    Who is the form from?: Home care    Where did the form come from: form was faxed in    What clinic location was the form placed at?: [facility] [phone]    Where the form was placed: TC Box/Folder    What number is listed as a contact on the form?: [phone]       Additional comments: Fax and sign to:  [phone]    Call taken on 12/13/2021 at 4:21 PM by Larisa Jordan

## 2021-12-14 ENCOUNTER — TELEPHONE (OUTPATIENT)
Dept: FAMILY MEDICINE | Facility: CLINIC | Age: 51
End: 2021-12-14
Payer: COMMERCIAL

## 2021-12-14 DIAGNOSIS — Z53.9 DIAGNOSIS NOT YET DEFINED: Primary | ICD-10-CM

## 2021-12-14 PROCEDURE — G0179 MD RECERTIFICATION HHA PT: HCPCS | Performed by: FAMILY MEDICINE

## 2021-12-14 NOTE — CONFIDENTIAL NOTE
Reason for Call:  Form, our goal is to have forms completed with 72 hours, however, some forms may require a visit or additional information.    Type of letter, form or note:  Home Health Certification    Who is the form from?: Home care    Where did the form come from: form was faxed in    What clinic location was the form placed at?: [facility] [phone]    Where the form was placed: TC Box/Folder    What number is listed as a contact on the form?: [phone]       Additional comments: Fax to:  [phone]    Call taken on 12/14/2021 at 4:14 PM by Larisa Jordan

## 2021-12-16 NOTE — CONFIDENTIAL NOTE
Reason for Call:  Form, our goal is to have forms completed with 72 hours, however, some forms may require a visit or additional information.     Type of letter, form or note:  Home Health Certification     Who is the form from?: Home care Orders     Where did the form come from: form was faxed in     What clinic location was the form placed at?: [facility] [phone]     Where the form was placed: TC Box/Folder     What number is listed as a contact on the form?: [phone]    Fax:  Signature to:  [phone]

## 2021-12-17 NOTE — CONFIDENTIAL NOTE
Reason for Call:  Form, our goal is to have forms completed with 72 hours, however, some forms may require a visit or additional information.     Type of letter, form or note:  Home Health Certification     Who is the form from?: Home care Orders     Where did the form come from: form was faxed in     What clinic location was the form placed at?: [facility] [phone]     Where the form was placed: TC Box/Folder     What number is listed as a contact on the form?: [phone]     Fax:  Signature to:  [phone]

## 2021-12-21 NOTE — CONFIDENTIAL NOTE
Reason for Call:  Form, our goal is to have forms completed with 72 hours, however, some forms may require a visit or additional information.    Type of letter, form or note:  Home Health Certification    Who is the form from?: Home care    Where did the form come from: form was mailed in    What clinic location was the form placed at?: [facility] [phone]    Where the form was placed: TC Box/Folder    What number is listed as a contact on the form?: Juliane Prasad       Additional comments: Fax:  [phone]    Call taken on 12/21/2021 at 12:26 PM by Larisa Jordan

## 2021-12-23 ENCOUNTER — TELEPHONE (OUTPATIENT)
Dept: FAMILY MEDICINE | Facility: CLINIC | Age: 51
End: 2021-12-23
Payer: COMMERCIAL

## 2021-12-23 NOTE — TELEPHONE ENCOUNTER
Reason for Call:  Form, our goal is to have forms completed with 72 hours, however, some forms may require a visit or additional information.    Type of letter, form or note:  plan of care    Who is the form from?: Home Care (if other please explain)    Where did the form come from: form was faxed in    What clinic location was the form placed at?: [facility] [phone]    Where the form was placed: TC Box/Folder    What number is listed as a contact on the form?: fax [phone]       Additional comments:     Call taken on 12/23/2021 at 10:09 AM by Michelle Ruiz

## 2021-12-27 ENCOUNTER — MEDICAL CORRESPONDENCE (OUTPATIENT)
Dept: HEALTH INFORMATION MANAGEMENT | Facility: CLINIC | Age: 51
End: 2021-12-27
Payer: COMMERCIAL

## 2021-12-29 ENCOUNTER — TELEPHONE (OUTPATIENT)
Dept: FAMILY MEDICINE | Facility: CLINIC | Age: 51
End: 2021-12-29
Payer: COMMERCIAL

## 2021-12-29 NOTE — CONFIDENTIAL NOTE
Reason for Call:  Form, our goal is to have forms completed with 72 hours, however, some forms may require a visit or additional information.     Type of letter, form or note:  plan of care     Who is the form from?: Home Care (if other please explain)     Where did the form come from: form was faxed in     What clinic location was the form placed at?: [facility] [phone]     Where the form was placed: TC Box/Folder     What number is listed as a contact on the form?:   [phone]       Additional comments:   FAX SIGNATURE TO:  [phone]

## 2021-12-29 NOTE — CONFIDENTIAL NOTE
Reason for Call:  Form, our goal is to have forms completed with 72 hours, however, some forms may require a visit or additional information.     Type of letter, form or note:  Missed Visit Report     Who is the form from?: Home Care (if other please explain)     Where did the form come from: form was faxed in     What clinic location was the form placed at?: [facility] [phone]     Where the form was placed: TC Box/Folder     What number is listed as a contact on the form?:   [phone]       Additional comments:   FAX SIGNATURE TO:  [phone]

## 2022-01-03 ENCOUNTER — MEDICAL CORRESPONDENCE (OUTPATIENT)
Dept: HEALTH INFORMATION MANAGEMENT | Facility: CLINIC | Age: 52
End: 2022-01-03
Payer: COMMERCIAL

## 2022-01-03 NOTE — TELEPHONE ENCOUNTER
Form has been faxed and scanned  Closing encounter  Padmini Rangel RT (R)     
Form has been placed in providers form bin  Thanks  Padmini Rangel RT (R)     
Forms completed. Please fax.   
Reason for Call:  Form, our goal is to have forms completed with 72 hours, however, some forms may require a visit or additional information.    Type of letter, form or note:  medical    Who is the form from?: [employer]    Where did the form come from: form was faxed in    What clinic location was the form placed at?: [facility] - [phone]    Where the form was placed:  box Box/Folder    What number is listed as a contact on the form?: [phone]       Additional comments: sign and fax to [phone]    Call taken on 2/1/2021 at 5:17 PM by Lucero Saravia        
Assistance with ambulation/Assistance OOB with selected safe patient handling equipment/Communicate Risk of Fall with Harm to all staff/Discuss with provider need for PT consult/Monitor gait and stability/Reinforce activity limits and safety measures with patient and family/Tailored Fall Risk Interventions/Visual Cue: Yellow wristband and red socks/Bed in lowest position, wheels locked, appropriate side rails in place/Call bell, personal items and telephone in reach/Instruct patient to call for assistance before getting out of bed or chair/Non-slip footwear when patient is out of bed/[place] to call system/Physically safe environment - no spills, clutter or unnecessary equipment/Purposeful Proactive Rounding/Room/bathroom lighting operational, light cord in reach

## 2022-01-04 ENCOUNTER — TELEPHONE (OUTPATIENT)
Dept: FAMILY MEDICINE | Facility: CLINIC | Age: 52
End: 2022-01-04
Payer: COMMERCIAL

## 2022-01-04 ENCOUNTER — MEDICAL CORRESPONDENCE (OUTPATIENT)
Dept: HEALTH INFORMATION MANAGEMENT | Facility: CLINIC | Age: 52
End: 2022-01-04
Payer: COMMERCIAL

## 2022-01-04 ENCOUNTER — TRANSFERRED RECORDS (OUTPATIENT)
Dept: HEALTH INFORMATION MANAGEMENT | Facility: CLINIC | Age: 52
End: 2022-01-04
Payer: COMMERCIAL

## 2022-01-04 NOTE — TELEPHONE ENCOUNTER
Reason for Call:  Form, our goal is to have forms completed with 72 hours, however, some forms may require a visit or additional information.    Type of letter, form or note:  Home Care Orders    Who is the form from?: Home care    Where did the form come from: form was faxed in    What clinic location was the form placed at?: [facility] [phone]    Where the form was placed: TC Box/Folder    What number is listed as a contact on the form?: [phone]       Additional comments: fax [phone]    Call taken on 1/4/2022 at 8:21 AM by Michelle Ruiz

## 2022-01-06 ENCOUNTER — TELEPHONE (OUTPATIENT)
Dept: FAMILY MEDICINE | Facility: CLINIC | Age: 52
End: 2022-01-06
Payer: COMMERCIAL

## 2022-01-06 DIAGNOSIS — G89.4 CHRONIC PAIN SYNDROME: ICD-10-CM

## 2022-01-06 DIAGNOSIS — E03.4 HYPOTHYROIDISM DUE TO ACQUIRED ATROPHY OF THYROID: ICD-10-CM

## 2022-01-06 DIAGNOSIS — G82.20 PARAPLEGIA (H): ICD-10-CM

## 2022-01-06 DIAGNOSIS — F41.9 ANXIETY: ICD-10-CM

## 2022-01-06 DIAGNOSIS — R56.9 SEIZURES (H): ICD-10-CM

## 2022-01-06 DIAGNOSIS — I10 BENIGN ESSENTIAL HYPERTENSION: ICD-10-CM

## 2022-01-06 DIAGNOSIS — Z00.00 ROUTINE GENERAL MEDICAL EXAMINATION AT A HEALTH CARE FACILITY: ICD-10-CM

## 2022-01-06 NOTE — TELEPHONE ENCOUNTER
Please notify patient that urine culture did not show any growth of bacteria. No need for antibiotics

## 2022-01-07 NOTE — TELEPHONE ENCOUNTER
Pending Prescriptions:                       Disp   Refills    venlafaxine (EFFEXOR) 75 MG tablet [Pharma*90 tab*1        Sig: TAKE 2 TABLETS BY MOUTH EVERY MORNING AND ONE TABLET           AT BEDTIME    Pregabalin (LYRICA) 200 MG capsule [Pharma*90 cap*1        Sig: TAKE 1 CAPSULE (200 MG) BY MOUTH 3 TIMES DAILY    levothyroxine (SYNTHROID/LEVOTHROID) 75 MC*30 tab*1        Sig: TAKE 1 TABLET (75 MCG) BY MOUTH DAILY ++DUE FOR LAB           WORK++    MULTIVITAMIN, THERAPEUTIC WITH MINERALS ta*30 tab*1        Sig: TAKE 1 TABLET DAILY REPLACES CERTAVITE    levETIRAcetam (KEPPRA) 750 MG tablet [Phar*24 tab*0        Sig: TAKE 1 TABLET (750 MG) BY MOUTH 2 TIMES DAILY    lisinopril (ZESTRIL) 20 MG tablet [Pharmac*30 tab*1        Sig: TAKE 1 TABLET (20 MG) BY MOUTH DAILY    baclofen (LIORESAL) 20 MG tablet [Pharmacy*120 ta*1        Sig: TAKE 1 TABLET (20 MG) BY MOUTH 4 TIMES DAILY    hydrOXYzine (ATARAX) 25 MG tablet [Pharmac*90 tab*0        Sig: TAKE 1 TABLET (25 MG) BY MOUTH EVERY 8 HOURS AS           NEEDED FOR ITCHING    Routing refill request to provider for review/approval because:  Drug not on the [facility] refill protocol   Labs not current:    TSH   Date Value Ref Range Status   10/15/2019 3.41 0.40 - 4.00 mU/L Final     BP Readings from Last 3 Encounters:   01/24/20 122/62   10/15/19 110/60   01/17/18 112/60

## 2022-01-09 RX ORDER — HYDROXYZINE HYDROCHLORIDE 25 MG/1
TABLET, FILM COATED ORAL
Qty: 90 TABLET | Refills: 0 | Status: SHIPPED | OUTPATIENT
Start: 2022-01-09 | End: 2022-02-09

## 2022-01-09 RX ORDER — LEVOTHYROXINE SODIUM 75 UG/1
TABLET ORAL
Qty: 90 TABLET | Refills: 0 | Status: SHIPPED | OUTPATIENT
Start: 2022-01-09 | End: 2022-02-09

## 2022-01-09 RX ORDER — ASPIRIN 81 MG
TABLET, DELAYED RELEASE (ENTERIC COATED) ORAL
Qty: 30 TABLET | Refills: 1 | Status: SHIPPED | OUTPATIENT
Start: 2022-01-09 | End: 2022-02-08

## 2022-01-09 RX ORDER — VENLAFAXINE 75 MG/1
TABLET ORAL
Qty: 90 TABLET | Refills: 0 | Status: SHIPPED | OUTPATIENT
Start: 2022-01-09 | End: 2022-02-08

## 2022-01-09 RX ORDER — LISINOPRIL 20 MG/1
20 TABLET ORAL DAILY
Qty: 90 TABLET | Refills: 0 | Status: SHIPPED | OUTPATIENT
Start: 2022-01-09 | End: 2022-02-09

## 2022-01-09 RX ORDER — BACLOFEN 20 MG/1
20 TABLET ORAL 4 TIMES DAILY
Qty: 120 TABLET | Refills: 1 | Status: SHIPPED | OUTPATIENT
Start: 2022-01-09 | End: 2022-02-08

## 2022-01-09 RX ORDER — LEVETIRACETAM 750 MG/1
750 TABLET ORAL 2 TIMES DAILY
Qty: 180 TABLET | Refills: 0 | Status: SHIPPED | OUTPATIENT
Start: 2022-01-09 | End: 2022-02-09

## 2022-01-09 RX ORDER — PREGABALIN 200 MG/1
200 CAPSULE ORAL 3 TIMES DAILY
Qty: 90 CAPSULE | Refills: 0 | Status: SHIPPED | OUTPATIENT
Start: 2022-01-09 | End: 2022-02-02

## 2022-01-11 ENCOUNTER — TRANSFERRED RECORDS (OUTPATIENT)
Dept: HEALTH INFORMATION MANAGEMENT | Facility: CLINIC | Age: 52
End: 2022-01-11
Payer: COMMERCIAL

## 2022-01-24 NOTE — TELEPHONE ENCOUNTER
[employer] [facility] Employee Health Letter for Return to Work after Positive COVID test    1/24/2022     Dear Natacha Domínguez,      We have received your email reporting a positive COVID test 12/29/21.     Please complete and return the SBAR questionnaire emailed to you within 24 hours to [email or web address].    According to [facility] [facility]’s and CDC's COVID-19 guidelines, you are now outside of the recommended quarantine time. You are cleared to return to work if you meet the following criteria.    When You May (not must) Return to Work Criteria (all criteria must be met):  · A minimum of 5 days has passed since your symptoms started or positive test date if you do not have symptoms  · Your temperature has remained less than 100.0°F for at least 24 hours without using any fever reducing medications (ibuprofen, acetaminophen, etc.)  · Significant improvement in respiratory symptoms and cough  • No new or worsening symptoms   • If you are free of fever, and do not have a persistent cough, shortness of breath, nasal congestion or weakness     Remember this is guidance and we do not encourage sick health care workers to be at work, speak with your leader if you are still ill.     YOU DO NOT NEED TO RETEST TO RETURN TO WORK     If you do not meet the above criteria, you will need to alert your leader per your sick call policy . If your symptoms remain longer than 10 days, you will need to seek care from your primary care provider or Urgent Care for symptom management and for your return to work.     Absence:   • If you continue to have a medical reason to remain off work, you will need to follow your department’s normal unscheduled absence process or seek care from your primary care provider.   • If your absence requires a leave of absence you will need to apply by calling [facility] (in [place]) at [phone] or [facility] (in [place]) at [phone]. Medical certification by a Primary Care Provider  Urine culture did not show growth of bacteria. There is no evidence of bladder infection.     If she is having symptoms without evidence of UTI, she should be seen for an office visit to evaluate for other concerns.    is required to support the need for a medical leave of absence. [facility] is unable to provide this function. If you fail to provide the required medical certification, the leave will not be approved.    You must continue to adhere to strict use of appropriate personal protective equipment (PPE) while in the workplace.    Thank you,     [facility] Health  Email: [email or web address]  Hotline: [phone]    CC: Manager

## 2022-01-26 ENCOUNTER — TELEPHONE (OUTPATIENT)
Dept: FAMILY MEDICINE | Facility: CLINIC | Age: 52
End: 2022-01-26
Payer: COMMERCIAL

## 2022-01-26 DIAGNOSIS — N39.0 URINARY TRACT INFECTION ASSOCIATED WITH INDWELLING URETHRAL CATHETER, INITIAL ENCOUNTER (H): Primary | ICD-10-CM

## 2022-01-26 DIAGNOSIS — T83.511A URINARY TRACT INFECTION ASSOCIATED WITH INDWELLING URETHRAL CATHETER, INITIAL ENCOUNTER (H): Primary | ICD-10-CM

## 2022-01-26 RX ORDER — NITROFURANTOIN 25; 75 MG/1; MG/1
100 CAPSULE ORAL 2 TIMES DAILY
Qty: 20 CAPSULE | Refills: 0 | Status: SHIPPED | OUTPATIENT
Start: 2022-01-26 | End: 2022-02-05

## 2022-01-27 ENCOUNTER — TELEPHONE (OUTPATIENT)
Dept: FAMILY MEDICINE | Facility: CLINIC | Age: 52
End: 2022-01-27
Payer: COMMERCIAL

## 2022-01-27 NOTE — TELEPHONE ENCOUNTER
Can we please find out if this if for an issue or if this is for her routine followup.  It was sent as high priority so want to be sure.     It has been a years since she has been in person and would like her to be seen in person for this next visit.     Once I know if this is routine or for an acute issue, can fully answer the question as to when.  It may be that it waits a bit if a routine visit

## 2022-01-27 NOTE — TELEPHONE ENCOUNTER
Patient requesting to worked into Dr Potter schedule for a virtual visit.  She was told by scheduling that the first available was April.  Please call to notify patient if able to seen sooner.  Ok to leave message.

## 2022-01-31 NOTE — TELEPHONE ENCOUNTER
Left detailed message on voicemail for patient to call back and to review her MyChart, as previous message has not been read    Will post pone for a day--Has patient returned call to schedule,  Or has MyChart message been read?    If patient calls please gather more information:   Can we please find out if this if for an issue or if this is for her routine followup.  It was sent as high priority so want to be sure.      It has been a years since she has been in person and would like her to be seen in person for this next visit.      Once I know if this is routine or for an acute issue, can fully answer the question as to when.  It may be that it waits a bit if a routine visit

## 2022-01-31 NOTE — TELEPHONE ENCOUNTER
LMTC  See other encounter also:    Left detailed message on voicemail for patient to call back and to review her MyChart, as previous message has not been read     Will post pone for a day--Has patient returned call to schedule,  Or has MyChart message been read?     If patient calls please gather more information:   Can we please find out if this if for an issue or if this is for her routine followup.  It was sent as high priority so want to be sure.      It has been a years since she has been in person and would like her to be seen in person for this next visit.      Once I know if this is routine or for an acute issue, can fully answer the question as to when.  It may be that it waits a bit if a routine visit

## 2022-02-01 NOTE — TELEPHONE ENCOUNTER
Please offer virtual visit on 2/9 at 220 with arrival 2 pm.  Will still also need an inperson visit in the next 3 months.

## 2022-02-01 NOTE — TELEPHONE ENCOUNTER
Spoke with pt, she states it would be for her meds and wheelchair assessment. She said that insurance said the assessment could be done virutally.     Understands she need to be seen in person for physical but cannot come in right now

## 2022-02-02 ENCOUNTER — MEDICAL CORRESPONDENCE (OUTPATIENT)
Dept: HEALTH INFORMATION MANAGEMENT | Facility: CLINIC | Age: 52
End: 2022-02-02
Payer: COMMERCIAL

## 2022-02-02 ENCOUNTER — TELEPHONE (OUTPATIENT)
Dept: FAMILY MEDICINE | Facility: CLINIC | Age: 52
End: 2022-02-02
Payer: COMMERCIAL

## 2022-02-02 NOTE — TELEPHONE ENCOUNTER
Left message for pt to return our call    Please offer virtual visit on 2/9 at 220 with arrival 2 pm.  Will still also need an inperson visit in the next 3 months.

## 2022-02-02 NOTE — CONFIDENTIAL NOTE
[facility] is seeking a physician signature for Colostomy supplies.    Please complete + sign and fax to:    [phone]

## 2022-02-03 NOTE — TELEPHONE ENCOUNTER
Spoke with patient appointment scheduled     Next 5 appointments (look out 90 days)    Feb 09, 2022  2:20 PM  (Arrive by 2:00 PM)  Provider Visit with Dipti Kasper MD  [facility] ([facility] ) [address]  [place] [address]  [phone]   Apr 12, 2022 10:00 AM  (Arrive by 9:40 AM)  Provider Visit with Dipti Kasper MD  [facility] ([facility] ) [address], [address]  [place] [address]  [phone]        Closing encounter  Padmini MCKENZIE (R)

## 2022-02-08 DIAGNOSIS — G89.4 CHRONIC PAIN SYNDROME: ICD-10-CM

## 2022-02-08 DIAGNOSIS — G82.20 PARAPLEGIA (H): ICD-10-CM

## 2022-02-08 RX ORDER — PREGABALIN 200 MG/1
200 CAPSULE ORAL 3 TIMES DAILY
Qty: 90 CAPSULE | Refills: 0 | OUTPATIENT
Start: 2022-02-08

## 2022-02-08 ASSESSMENT — ANXIETY QUESTIONNAIRES
7. FEELING AFRAID AS IF SOMETHING AWFUL MIGHT HAPPEN: NOT AT ALL
4. TROUBLE RELAXING: NOT AT ALL
GAD7 TOTAL SCORE: 2
3. WORRYING TOO MUCH ABOUT DIFFERENT THINGS: SEVERAL DAYS
2. NOT BEING ABLE TO STOP OR CONTROL WORRYING: NOT AT ALL
6. BECOMING EASILY ANNOYED OR IRRITABLE: SEVERAL DAYS
5. BEING SO RESTLESS THAT IT IS HARD TO SIT STILL: NOT AT ALL
1. FEELING NERVOUS, ANXIOUS, OR ON EDGE: NOT AT ALL
7. FEELING AFRAID AS IF SOMETHING AWFUL MIGHT HAPPEN: NOT AT ALL

## 2022-02-08 ASSESSMENT — PATIENT HEALTH QUESTIONNAIRE - PHQ9
SUM OF ALL RESPONSES TO PHQ QUESTIONS 1-9: 3
10. IF YOU CHECKED OFF ANY PROBLEMS, HOW DIFFICULT HAVE THESE PROBLEMS MADE IT FOR YOU TO DO YOUR WORK, TAKE CARE OF THINGS AT HOME, OR GET ALONG WITH OTHER PEOPLE: NOT DIFFICULT AT ALL
SUM OF ALL RESPONSES TO PHQ QUESTIONS 1-9: 3

## 2022-02-09 ENCOUNTER — VIRTUAL VISIT (OUTPATIENT)
Dept: FAMILY MEDICINE | Facility: CLINIC | Age: 52
End: 2022-02-09
Payer: COMMERCIAL

## 2022-02-09 ENCOUNTER — TELEPHONE (OUTPATIENT)
Dept: FAMILY MEDICINE | Facility: CLINIC | Age: 52
End: 2022-02-09

## 2022-02-09 DIAGNOSIS — F41.9 ANXIETY: ICD-10-CM

## 2022-02-09 DIAGNOSIS — R56.9 SEIZURES (H): ICD-10-CM

## 2022-02-09 DIAGNOSIS — E03.4 HYPOTHYROIDISM DUE TO ACQUIRED ATROPHY OF THYROID: ICD-10-CM

## 2022-02-09 DIAGNOSIS — G89.4 CHRONIC PAIN SYNDROME: ICD-10-CM

## 2022-02-09 DIAGNOSIS — I10 BENIGN ESSENTIAL HYPERTENSION: ICD-10-CM

## 2022-02-09 DIAGNOSIS — G82.20 PARAPLEGIA (H): ICD-10-CM

## 2022-02-09 PROCEDURE — 99214 OFFICE O/P EST MOD 30 MIN: CPT | Mod: 95 | Performed by: FAMILY MEDICINE

## 2022-02-09 RX ORDER — HYDROXYZINE HYDROCHLORIDE 25 MG/1
TABLET, FILM COATED ORAL
Qty: 90 TABLET | Refills: 1 | Status: SHIPPED | OUTPATIENT
Start: 2022-02-09 | End: 2022-04-07

## 2022-02-09 RX ORDER — VENLAFAXINE 75 MG/1
TABLET ORAL
Qty: 180 TABLET | Refills: 1 | Status: SHIPPED | OUTPATIENT
Start: 2022-02-09 | End: 2022-06-14 | Stop reason: ALTCHOICE

## 2022-02-09 RX ORDER — LISINOPRIL 20 MG/1
20 TABLET ORAL DAILY
Qty: 90 TABLET | Refills: 1 | Status: SHIPPED | OUTPATIENT
Start: 2022-02-09 | End: 2022-08-04

## 2022-02-09 RX ORDER — LEVETIRACETAM 750 MG/1
750 TABLET ORAL 2 TIMES DAILY
Qty: 180 TABLET | Refills: 1 | Status: SHIPPED | OUTPATIENT
Start: 2022-02-09 | End: 2022-08-04

## 2022-02-09 RX ORDER — LEVOTHYROXINE SODIUM 75 UG/1
TABLET ORAL
Qty: 90 TABLET | Refills: 1 | Status: SHIPPED | OUTPATIENT
Start: 2022-02-09 | End: 2022-08-04

## 2022-02-09 RX ORDER — PREGABALIN 200 MG/1
200 CAPSULE ORAL 3 TIMES DAILY
Qty: 270 CAPSULE | Refills: 1 | Status: SHIPPED | OUTPATIENT
Start: 2022-02-09 | End: 2022-08-04

## 2022-02-09 ASSESSMENT — PATIENT HEALTH QUESTIONNAIRE - PHQ9: SUM OF ALL RESPONSES TO PHQ QUESTIONS 1-9: 3

## 2022-02-09 ASSESSMENT — ANXIETY QUESTIONNAIRES: GAD7 TOTAL SCORE: 2

## 2022-02-09 NOTE — PROGRESS NOTES
Quiana is a 51 year old who is being evaluated via a billable video visit.      How would you like to obtain your AVS? MyChart  If the video visit is dropped, the invitation should be resent by: Text to cell phone: [phone]  Will anyone else be joining your video visit? No      Video Start Time: 2:04 PM    Assessment & Plan     Paraplegia (H)  Chronic pain syndrome  Patient erin under the care of pain management.  She is currently reportedly being weaned from methadone and is noticed some increase in discomfort.  She is working on transportation for her to start on medical marijuana.  She reports she has a card for this but has not yet started.  She needs refills on the Lyrica which does help with her pain.  Refills are given.  She does require use of a wheelchair and currently reports her electric wheelchair is no longer working.  She has a manual wheelchair but she does not have the Roho for that.  DME order placed for Roho which will be sent to [facility] per patient request.  Order placed for Occupational Therapy for seating eval for wheelchair.  This is being sent to [facility].  She is requesting handicap parking sticker in the form is completed which will be mailed to her home.  Continue follow-up with pain management.  Recheck here in 3 months.  - Pregabalin (LYRICA) 200 MG capsule; Take 1 capsule (200 mg) by mouth 3 times daily  - Occupational Therapy Referral; Future  - Miscellaneous Order for DME - ONLY FOR DME  - hydrOXYzine (ATARAX) 25 MG tablet; TAKE 1 TABLET (25 MG) BY MOUTH EVERY 8 HOURS AS NEEDED FOR ITCHING    Seizures (H)  Reported history of a seizure while hospitalized.  Patient left that hospitalization feeling as though she was told it was related to some medications that were given to her or potentially not given to her during that hospitalization.  Previously we had discussed seeing neurology for evaluation and follow-up as she remains on antiseizure medication.  She has not yet seen  neurology but she would like to be off the Keppra.  She understands and will schedule with neurology closer to home.  She will reach out if assistance needed.  - levETIRAcetam (KEPPRA) 750 MG tablet; Take 1 tablet (750 mg) by mouth 2 times daily    Benign essential hypertension  Patient reviewed home readings and seems well controlled.  Reviewed labs in care everywhere from June 2021.  No changes made to lisinopril continue at current dose recheck in 6 months  - lisinopril (ZESTRIL) 20 MG tablet; Take 1 tablet (20 mg) by mouth daily    Anxiety  Well-controlled at this time.  She has some questions regarding the venlafaxine and whether it is causing her some nausea.  We discussed potentially making a change with this however she feels its controlling things so well she would like to give it a little longer chance.  She will take regularly and will reach out in 3 months for visit or sooner if needed.  - venlafaxine (EFFEXOR) 75 MG tablet; TAKE 2 TABLETS BY MOUTH EVERY MORNING AND ONE TABLET AT BEDTIME    Hypothyroidism due to acquired atrophy of thyroid  Last TSH done June 2021.  In good range.  No change in medication.  Recheck labs in 6 months.  - levothyroxine (SYNTHROID/LEVOTHROID) 75 MCG tablet; TAKE 1 TABLET (75 MCG) BY MOUTH DAILY             Tobacco Cessation:   reports that she has been smoking cigarettes. She has a 10.00 pack-year smoking history. She has never used smokeless tobacco.  Tobacco Cessation Action Plan: Information offered: Patient not interested at this time        Return in about 3 months (around 5/9/2022) for Routine preventive.    Dipti Kasper MD  [facility] ARSENIO Araya is a 51 year old who presents for the following health issues  accompanied by her mother.    History of Present Illness       Mental Health Follow-up:  Patient presents to follow-up on Depression.Patient's depression since last visit has been:  Good  The patient is not having other  symptoms associated with depression.      Any significant life events: No  Patient is not feeling anxious or having panic attacks.  Patient has no concerns about alcohol or drug use.     Social History  Tobacco Use    Smoking status: Current Every Day Smoker      Packs/day: 0.50      Years: 20.00      Pack years: 10      Types: Cigarettes    Smokeless tobacco: Never Used  Alcohol use: No    Comment: none   Drug use: No    Comment: none       Today's PHQ-9         PHQ-9 Total Score:     (P) 3   PHQ-9 Q9 Thoughts of better off dead/self-harm past 2 weeks :   (P) Not at all   Thoughts of suicide or self harm:      Self-harm Plan:        Self-harm Action:          Safety concerns for self or others:           Hypertension: She presents for follow up of hypertension.  She does check blood pressure  regularly outside of the clinic. Outpatient blood pressures have not been over 140/90. She follows a low salt diet.     Hypothyroidism:     Since last visit, patient describes the following symptoms::  None    She eats 2-3 servings of fruits and vegetables daily.She consumes 3 sweetened beverage(s) daily.She exercises with enough effort to increase her heart rate 9 or less minutes per day.  She exercises with enough effort to increase her heart rate 3 or less days per week.   She is taking medications regularly.     Answers for HPI/ROS submitted by the patient on 2/8/2022  If you checked off any problems, how difficult have these problems made it for you to do your work, take care of things at home, or get along with other people?: Not difficult at all  PHQ9 TOTAL SCORE: 3  SHYLA 7 TOTAL SCORE: 2    Electric chair stopped working. Manual doesn't have Roho  Needs prescription for new ROHO for manual wheelchair to [facility] phone [phone] fax [phone].    Wants a handicap parking sticker. Needs it mailed.     Needs a new wheelchair. Needs an electric. Needs it done in [employer]. [facility].     Has had a change in  insurance. She isn't sure if she can be seen her with her insurance.     MOOD  Doing well with mood. Wondering if the Effexor could be causing her to have some nausea.  She tried taking at different times of the day.  She is not consistent with taking it because of the nausea.  She does feel like it controls her mood really well though.    PAIN CONTROL  Seen at pain clinic. Lowered the dose of Methadone recently. Caused an increase in pain.   Medical cannabis card. Hasn't done it yet.   Being seen every other month.       Hypertension Follow-up      Do you check your blood pressure regularly outside of the clinic? Yes     Are you following a low salt diet? Yes    Are your blood pressures ever more than 140 on the top number (systolic) OR more   than 90 on the bottom number (diastolic), for example 140/90? No      HYPOTHYROIDISM  Last TSH done 6/2021. Was 3.01. No concerns.  Denies any recent hair changes or weight changes.  No skin changes.    HISTORY OF SEIZURE  Was during a prior hospitalization. Did not have follow up with neurology. Wants to be off the seizure medication. Has not had any seizures since. Has remained on Wellbutrin for her mood. No history seizure before that hospitalization.             Review of Systems   CONSTITUTIONAL: NEGATIVE for fever, chills, change in weight  ENT/MOUTH: NEGATIVE for ear, mouth and throat problems  RESP: NEGATIVE for significant cough or SOB  CV: NEGATIVE for chest pain, palpitations or peripheral edema      Objective           Vitals:  No vitals were obtained today due to virtual visit.    Physical Exam   GENERAL: Healthy, alert and no distress  EYES: Eyes grossly normal to inspection.  No discharge or erythema, or obvious scleral/conjunctival abnormalities.  RESP: No audible wheeze, cough, or visible cyanosis.  No visible retractions or increased work of breathing.    SKIN: Visible skin clear. No significant rash, abnormal pigmentation or lesions.  NEURO: Cranial nerves  grossly intact.  Mentation and speech appropriate for age.  PSYCH: Mentation appears normal, affect normal/bright, judgement and insight intact, normal speech and appearance well-groomed.                Video-Visit Details    Type of service:  Video Visit    Video End Time:2:36 PM    Originating Location (pt. Location): Home    Distant Location (provider location):  [facility] [employer]     Platform used for Video Visit: [employer]

## 2022-02-09 NOTE — TELEPHONE ENCOUNTER
Per virtual visit check out:    Visit Disposition    Dispositions Check-out Note   0 Return in about 3 months (around 5/9/2022) for Routine preventive.   Print DME order and send to Rebecca fax # [phone]   Print OT order and fax to Neil Starks. I don't have the fax number.     Jyoti Faria CMA ([facility])

## 2022-02-10 ENCOUNTER — TELEPHONE (OUTPATIENT)
Dept: FAMILY MEDICINE | Facility: CLINIC | Age: 52
End: 2022-02-10
Payer: COMMERCIAL

## 2022-02-10 NOTE — TELEPHONE ENCOUNTER
Application for Disability parking certificate has been mailed to patients home address for completion     Closing encounter  Padmini Rangel RT (R)

## 2022-02-11 ENCOUNTER — HOSPITAL ENCOUNTER (EMERGENCY)
Dept: HOSPITAL 11 - JP.ED | Age: 52
LOS: 1 days | Discharge: HOME | End: 2022-02-12
Payer: MEDICAID

## 2022-02-11 ENCOUNTER — TRANSFERRED RECORDS (OUTPATIENT)
Dept: HEALTH INFORMATION MANAGEMENT | Facility: CLINIC | Age: 52
End: 2022-02-11
Payer: COMMERCIAL

## 2022-02-11 DIAGNOSIS — Z88.0: ICD-10-CM

## 2022-02-11 DIAGNOSIS — Z79.82: ICD-10-CM

## 2022-02-11 DIAGNOSIS — J45.909: ICD-10-CM

## 2022-02-11 DIAGNOSIS — Z72.0: ICD-10-CM

## 2022-02-11 DIAGNOSIS — I10: ICD-10-CM

## 2022-02-11 DIAGNOSIS — R40.4: Primary | ICD-10-CM

## 2022-02-11 DIAGNOSIS — Z88.1: ICD-10-CM

## 2022-02-11 RX ADMIN — DEXTROSE, SODIUM CHLORIDE, SODIUM LACTATE, POTASSIUM CHLORIDE, AND CALCIUM CHLORIDE ONE MG: 5; .6; .31; .03; .02 INJECTION, SOLUTION INTRAVENOUS at 22:35

## 2022-02-11 RX ADMIN — LORAZEPAM ONE MG: 2 INJECTION INTRAMUSCULAR; INTRAVENOUS at 23:18

## 2022-02-11 RX ADMIN — LORAZEPAM ONE MG: 2 INJECTION INTRAMUSCULAR; INTRAVENOUS at 22:57

## 2022-02-11 RX ADMIN — LORAZEPAM ONE: 2 INJECTION INTRAMUSCULAR; INTRAVENOUS at 23:18

## 2022-02-12 VITALS — HEART RATE: 84 BPM

## 2022-02-12 VITALS — DIASTOLIC BLOOD PRESSURE: 75 MMHG | SYSTOLIC BLOOD PRESSURE: 138 MMHG

## 2022-02-27 ENCOUNTER — HEALTH MAINTENANCE LETTER (OUTPATIENT)
Age: 52
End: 2022-02-27

## 2022-03-04 ENCOUNTER — TELEPHONE (OUTPATIENT)
Dept: FAMILY MEDICINE | Facility: CLINIC | Age: 52
End: 2022-03-04
Payer: COMMERCIAL

## 2022-03-04 NOTE — TELEPHONE ENCOUNTER
Reason for Call:  Form, our goal is to have forms completed with 72 hours, however, some forms may require a visit or additional information.    Type of letter, form or note:  [facility] [employer]    Who is the form from?: Medical [employer] (if other please explain)    Where did the form come from: form was faxed in    What clinic location was the form placed at?: [facility] [phone]    Where the form was placed: [facility] Box/Folder    What number is listed as a contact on the form?: [phone]       Additional comments:     Call taken on 3/4/2022 at 9:55 AM by Michelle Ruiz

## 2022-03-04 NOTE — TELEPHONE ENCOUNTER
Reason for Call:  Form, our goal is to have forms completed with 72 hours, however, some forms may require a visit or additional information.    Type of letter, form or note:  Home Health Certification    Who is the form from?: Home care    Where did the form come from: form was faxed in    What clinic location was the form placed at?: [facility] [phone]    Where the form was placed: [facility] Box/Folder    What number is listed as a contact on the form?: [phone]       Additional comments:     Call taken on 3/4/2022 at 10:17 AM by Michelle Ruiz

## 2022-03-07 ENCOUNTER — MEDICAL CORRESPONDENCE (OUTPATIENT)
Dept: HEALTH INFORMATION MANAGEMENT | Facility: CLINIC | Age: 52
End: 2022-03-07

## 2022-03-07 DIAGNOSIS — F41.9 ANXIETY: ICD-10-CM

## 2022-03-07 DIAGNOSIS — Z53.9 DIAGNOSIS NOT YET DEFINED: Primary | ICD-10-CM

## 2022-03-07 PROCEDURE — G0179 MD RECERTIFICATION HHA PT: HCPCS | Performed by: FAMILY MEDICINE

## 2022-03-08 RX ORDER — BUPROPION HYDROCHLORIDE 150 MG/1
TABLET, EXTENDED RELEASE ORAL
Qty: 60 TABLET | Refills: 2 | Status: SHIPPED | OUTPATIENT
Start: 2022-03-08 | End: 2022-07-08

## 2022-03-11 ENCOUNTER — TELEPHONE (OUTPATIENT)
Dept: FAMILY MEDICINE | Facility: CLINIC | Age: 52
End: 2022-03-11
Payer: COMMERCIAL

## 2022-03-11 ENCOUNTER — TRANSFERRED RECORDS (OUTPATIENT)
Dept: HEALTH INFORMATION MANAGEMENT | Facility: CLINIC | Age: 52
End: 2022-03-11
Payer: COMMERCIAL

## 2022-03-11 NOTE — CONFIDENTIAL NOTE
Reason for Call:  Form, our goal is to have forms completed with 72 hours, however, some forms may require a visit or additional information.     Type of letter, form or note:  Home Health Certification  Annual Face to Face to continue Home care svcs     Who is the form from?: Home care     Where did the form come from: form was faxed in     What clinic location was the form placed at?: [facility] [phone]     Where the form was placed: [facility] Box/Folder     What number is listed as a contact on the form?: [phone]       Additional comments: Fax back to:  [facility] @ [phone]

## 2022-03-14 NOTE — CONFIDENTIAL NOTE
Reason for Call:  Form, our goal is to have forms completed with 72 hours, however, some forms may require a visit or additional information.     Type of letter, form or note:  Home Health Certification  Annual Face to Face to continue Home care svcs     Who is the form from?: Home care     Where did the form come from: form was faxed in     What clinic location was the form placed at?: [facility] [phone]     Where the form was placed: [facility] Box/Folder     What number is listed as a contact on the form?: [phone]       Additional comments: Fax back to:  [facility] @ [phone]

## 2022-03-24 ENCOUNTER — MYC MEDICAL ADVICE (OUTPATIENT)
Dept: FAMILY MEDICINE | Facility: CLINIC | Age: 52
End: 2022-03-24
Payer: COMMERCIAL

## 2022-04-05 ENCOUNTER — TELEPHONE (OUTPATIENT)
Dept: FAMILY MEDICINE | Facility: CLINIC | Age: 52
End: 2022-04-05
Payer: COMMERCIAL

## 2022-04-05 ENCOUNTER — MEDICAL CORRESPONDENCE (OUTPATIENT)
Dept: HEALTH INFORMATION MANAGEMENT | Facility: CLINIC | Age: 52
End: 2022-04-05
Payer: COMMERCIAL

## 2022-04-05 NOTE — TELEPHONE ENCOUNTER
Reason for Call:  Form, our goal is to have forms completed with 72 hours, however, some forms may require a visit or additional information.    Type of letter, form or note:  [employer]    Who is the form from?: [facility] [employer] (if other please explain)    Where did the form come from: form was faxed in    What clinic location was the form placed at?: [facility] [phone]    Where the form was placed: Forms Box/Folder    What number is listed as a contact on the form?: [phone]       Additional comments:     Call taken on 4/5/2022 at 7:18 AM by Michelle Ruiz

## 2022-04-06 DIAGNOSIS — G82.20 PARAPLEGIA (H): ICD-10-CM

## 2022-04-06 DIAGNOSIS — G89.4 CHRONIC PAIN SYNDROME: ICD-10-CM

## 2022-04-06 DIAGNOSIS — Z00.00 ROUTINE GENERAL MEDICAL EXAMINATION AT A HEALTH CARE FACILITY: ICD-10-CM

## 2022-04-07 RX ORDER — HYDROXYZINE HYDROCHLORIDE 25 MG/1
TABLET, FILM COATED ORAL
Qty: 90 TABLET | Refills: 1 | Status: SHIPPED | OUTPATIENT
Start: 2022-04-07 | End: 2022-06-14

## 2022-04-07 RX ORDER — BACLOFEN 20 MG/1
20 TABLET ORAL 4 TIMES DAILY
Qty: 120 TABLET | Refills: 0 | Status: SHIPPED | OUTPATIENT
Start: 2022-04-07 | End: 2022-06-07

## 2022-04-07 RX ORDER — ASPIRIN 81 MG
TABLET, DELAYED RELEASE (ENTERIC COATED) ORAL
Qty: 30 TABLET | Refills: 5 | Status: SHIPPED | OUTPATIENT
Start: 2022-04-07 | End: 2022-10-04

## 2022-04-07 NOTE — TELEPHONE ENCOUNTER
"Pending Prescriptions:                       Disp   Refills    baclofen (LIORESAL) 20 MG tablet [Pharmacy*120 ta*1        Sig: TAKE 1 TABLET (20 MG) BY MOUTH 4 TIMES DAILY    Signed Prescriptions:                        Disp   Refills    hydrOXYzine (ATARAX) 25 MG tablet          90 tab*1        Sig: TAKE 1 TABLET (25 MG) BY MOUTH EVERY 8 HOURS AS           NEEDED FOR ITCHING  Authorizing Provider: STANLEY DAVILA  Ordering User: DEXTER YOO    MULTIVITAMIN, THERAPEUTIC WITH MINERALS ta*30 tab*5        Sig: TAKE 1 TABLET DAILY  Authorizing Provider: STANLEY DAVILA  Ordering User: DEXTER YOO    Routing refill request to provider for review/approval because:  Drug not on the [facility] refill protocol     baclofen (LIORESAL) 20 MG tablet 120 tablet 1 2/8/2022  No   Sig - Route: TAKE 1 TABLET (20 MG) BY MOUTH 4 TIMES DAILY - Oral   Sent to pharmacy as: Baclofen 20 MG Oral Tablet (LIORESAL)   Class: E-Prescribe   Order: [id number]   E-Prescribing Status: Receipt confirmed by pharmacy (2/8/2022  2:44 PM CST)       Requested Prescriptions   Pending Prescriptions Disp Refills    baclofen (LIORESAL) 20 MG tablet [Pharmacy Med Name: BACLOFEN 20 MG TABS 20 Tablet] 120 tablet 1     Sig: TAKE 1 TABLET (20 MG) BY MOUTH 4 TIMES DAILY        There is no refill protocol information for this order       Signed Prescriptions Disp Refills    hydrOXYzine (ATARAX) 25 MG tablet 90 tablet 1     Sig: TAKE 1 TABLET (25 MG) BY MOUTH EVERY 8 HOURS AS NEEDED FOR ITCHING        Antihistamines Protocol Passed - 4/6/2022  1:37 PM        Passed - Recent (12 mo) or future (30 days) visit within the authorizing provider's specialty     Patient has had an office visit with the authorizing provider or a provider within the authorizing providers department within the previous 12 mos or has a future within next 30 days. See \"Patient Info\" tab in inbasket, or \"Choose Columns\" in Meds & Orders section of the refill encounter.              " "Passed - Patient is age 3 or older     Apply age and/or weight-based dosing for peds patients age 3 and older.    Forward request to provider for patients under the age of 3.            Passed - Medication is active on med list           MULTIVITAMIN, THERAPEUTIC WITH MINERALS tablet 30 tablet 5     Sig: TAKE 1 TABLET DAILY        Vitamin Supplements (Adult) Protocol Passed - 4/6/2022  1:37 PM        Passed - High dose Vitamin D not ordered        Passed - Recent (12 mo) or future (30 days) visit within the authorizing provider's specialty     Patient has had an office visit with the authorizing provider or a provider within the authorizing providers department within the previous 12 mos or has a future within next 30 days. See \"Patient Info\" tab in inbasket, or \"Choose Columns\" in Meds & Orders section of the refill encounter.              Passed - Medication is active on med list                  "

## 2022-04-11 ENCOUNTER — TRANSFERRED RECORDS (OUTPATIENT)
Dept: HEALTH INFORMATION MANAGEMENT | Facility: CLINIC | Age: 52
End: 2022-04-11
Payer: COMMERCIAL

## 2022-04-12 ENCOUNTER — TELEPHONE (OUTPATIENT)
Dept: FAMILY MEDICINE | Facility: CLINIC | Age: 52
End: 2022-04-12
Payer: COMMERCIAL

## 2022-04-12 NOTE — LETTER
April 18, 2022      Quiana Canas  [address] [place] [id number]              Curry Araya,      We have not seen you in office since 1/2020 therefore we cannot fill out the annual face to face visit to continue homecare orders.      Please set up an in person visit with Dr. Kasper as talked about over the last several months.      Your [facility] Team

## 2022-04-13 NOTE — TELEPHONE ENCOUNTER
She has not been in for an in-person visit since 1/2020.  She has done phone visits.      She will need to schedule an in-person visit for this to be completed.

## 2022-04-14 ENCOUNTER — HOSPITAL ENCOUNTER (EMERGENCY)
Dept: HOSPITAL 11 - JP.ED | Age: 52
Discharge: HOME | End: 2022-04-14
Payer: MEDICAID

## 2022-04-14 ENCOUNTER — MYC MEDICAL ADVICE (OUTPATIENT)
Dept: FAMILY MEDICINE | Facility: CLINIC | Age: 52
End: 2022-04-14
Payer: COMMERCIAL

## 2022-04-14 VITALS — SYSTOLIC BLOOD PRESSURE: 98 MMHG | DIASTOLIC BLOOD PRESSURE: 46 MMHG | HEART RATE: 68 BPM

## 2022-04-14 DIAGNOSIS — Z79.82: ICD-10-CM

## 2022-04-14 DIAGNOSIS — T14.8XXA: ICD-10-CM

## 2022-04-14 DIAGNOSIS — Z79.899: ICD-10-CM

## 2022-04-14 DIAGNOSIS — Z43.3: ICD-10-CM

## 2022-04-14 DIAGNOSIS — Z88.0: ICD-10-CM

## 2022-04-14 DIAGNOSIS — Z88.1: ICD-10-CM

## 2022-04-14 DIAGNOSIS — F17.200: ICD-10-CM

## 2022-04-14 DIAGNOSIS — T40.2X1A: Primary | ICD-10-CM

## 2022-04-14 DIAGNOSIS — D72.829: ICD-10-CM

## 2022-04-14 DIAGNOSIS — N18.31: ICD-10-CM

## 2022-04-14 DIAGNOSIS — G89.4: ICD-10-CM

## 2022-04-24 ENCOUNTER — HEALTH MAINTENANCE LETTER (OUTPATIENT)
Age: 52
End: 2022-04-24

## 2022-04-27 NOTE — TELEPHONE ENCOUNTER
Patient returning call and upset and wanting to know who scheduled the appointment on April 12 th that she did not know about it. Patient stating nobody called her about the appointment. Explained the reason for the needed appointment. Was able to schedule an office visit on June 14th for patient as this was one of the days that would work for her. Informed to arrive and check in at the registration desk by 10:40 am. Whitley Faria LPN

## 2022-04-29 NOTE — TELEPHONE ENCOUNTER
Form placed in Providers in-box to be completed.    
Forms completed. Please fax.   
Reason for Call:  Form, our goal is to have forms completed with 72 hours, however, some forms may require a visit or additional information.    Type of letter, form or note:  medical    Who is the form from?: Home care    Where did the form come from: form was faxed in    What clinic location was the form placed at?: [facility] [address] - [phone]    Where the form was placed: [facility] Box/Folder    What number is listed as a contact on the form?: [phone]       Additional comments: Please review sign and return. Fax [phone]    Call taken on 10/11/2019 at 10:42 AM by Agatha Lockwood        
faxed  
Anxiety disorder, unspecified type

## 2022-05-02 ENCOUNTER — TELEPHONE (OUTPATIENT)
Dept: FAMILY MEDICINE | Facility: CLINIC | Age: 52
End: 2022-05-02
Payer: COMMERCIAL

## 2022-05-02 NOTE — TELEPHONE ENCOUNTER
Reason for call:  Form  Reason for Call:  Form, our goal is to have forms completed with 72 hours, however, some forms may require a visit or additional information.    Type of letter, form or note:  Home Health Certification    Who is the form from?: Home care    Where did the form come from: form was faxed in    What clinic location was the form placed at?: [facility] [phone]    Where the form was placed: Forms Box/Folder    What number is listed as a contact on the form?: fax [phone]       Additional comments: may only be signed by a physician or co signed by a physician per medicare guidelines. Fax back all pages for complicance    Call taken on 5/2/2022 at 7:22 AM by Jyoti Faria CMA

## 2022-05-03 ENCOUNTER — MEDICAL CORRESPONDENCE (OUTPATIENT)
Dept: HEALTH INFORMATION MANAGEMENT | Facility: CLINIC | Age: 52
End: 2022-05-03

## 2022-05-03 DIAGNOSIS — Z53.9 DIAGNOSIS NOT YET DEFINED: Primary | ICD-10-CM

## 2022-05-03 PROCEDURE — G0179 MD RECERTIFICATION HHA PT: HCPCS | Performed by: FAMILY MEDICINE

## 2022-05-18 ENCOUNTER — TRANSFERRED RECORDS (OUTPATIENT)
Dept: HEALTH INFORMATION MANAGEMENT | Facility: CLINIC | Age: 52
End: 2022-05-18
Payer: COMMERCIAL

## 2022-05-18 ENCOUNTER — TELEPHONE (OUTPATIENT)
Dept: FAMILY MEDICINE | Facility: CLINIC | Age: 52
End: 2022-05-18
Payer: COMMERCIAL

## 2022-05-18 NOTE — CONFIDENTIAL NOTE
Reason for call:  Form  Reason for Call:  Form, our goal is to have forms completed with 72 hours, however, some forms may require a visit or additional information.     Type of letter, form or note:  Home Health Certification     Who is the form from?: Home care Face to Face Visits     Where did the form come from: form was faxed in     What clinic location was the form placed at?: [facility] [phone]     Where the form was placed: Forms Box/Folder     What number is listed as a contact on the form?: fax [phone]       Additional comments: may only be signed by a physician or co signed by a physician per medicare guidelines. Fax back all pages for complicance

## 2022-05-23 ENCOUNTER — MYC MEDICAL ADVICE (OUTPATIENT)
Dept: FAMILY MEDICINE | Facility: CLINIC | Age: 52
End: 2022-05-23
Payer: COMMERCIAL

## 2022-05-23 NOTE — LETTER
May 31, 2022      Quiana Canas  [address] [place], [place] [address]        To Whom It May Concern,        Quiana Canas is a patient that has been under my care for the last 14 years.  I have worked with her to manage her chronic physical and mental health concerns over this time.    She has moved distance away from my clinic location but wishes to remain in contact.  Since moving away she has attempted to establish with other providers but has not found it easy to communicate with and trust these providers.    She and I have had phone visits and video visits in order to continue to work together in her care.  There is great value to an in person visit and would recommend that she have an in person visit with me every 6 to 12 months.    Quiana feels that changing providers would have a significant impact on her mental health which would then, I believe, impact her physical health as well.    Please consider making an exception for long distance transportation in order for this patient to continue her care with me.          Sincerely,        Dipti Kasper MD

## 2022-05-25 NOTE — TELEPHONE ENCOUNTER
Care coordinator for [facility] calling and is sending paperwork via fax today. If you are able to get done asap she would appreciate it as  it takes awhile for transportation to be set up    Once done, fax back to public health. Number will be on bottom of page.

## 2022-05-26 NOTE — TELEPHONE ENCOUNTER
It's not a form, they are asking for documentation/medical necessity to be completed, I have placed the letter from [facility] in your bin    Thanks  Padmini Rangel RT (R)

## 2022-05-26 NOTE — TELEPHONE ENCOUNTER
Need records from [facility]. Had reported previously that she was scheduled there end of November.    Tarsorrhaphy Performed?: No

## 2022-05-27 ENCOUNTER — MYC MEDICAL ADVICE (OUTPATIENT)
Dept: FAMILY MEDICINE | Facility: CLINIC | Age: 52
End: 2022-05-27
Payer: COMMERCIAL

## 2022-05-31 ENCOUNTER — TELEPHONE (OUTPATIENT)
Dept: FAMILY MEDICINE | Facility: CLINIC | Age: 52
End: 2022-05-31
Payer: COMMERCIAL

## 2022-05-31 NOTE — TELEPHONE ENCOUNTER
Reason for Call:  Faxed lab results    Name of test or procedure: labs      Date of test of procedure: 5/28/22    Location of the test or procedure: [place]    Phone number Patient can be reached at:  [facility] - [phone]    Additional comments: lab results pt has upcoming appointment 6/14    Call taken on 5/31/2022 at 2:09 PM by Kamilah Perez

## 2022-05-31 NOTE — TELEPHONE ENCOUNTER
Reason for Call:  Form, our goal is to have forms completed with 72 hours, however, some forms may require a visit or additional information.    Type of letter, form or note:  medical    Who is the form from?: Care coordinator (if other please explain)    Where did the form come from: form was faxed in    What clinic location was the form placed at?: [facility] [phone]    Where the form was placed: Placed in provider Box/Folder    What number is listed as a contact on the form?: Fax: [phone] Phone: [phone]       Additional comments: Care coordinator needing medical necessity for transportation    Call taken on 5/31/2022 at 2:04 PM by Kamilah Perez

## 2022-06-01 ENCOUNTER — MEDICAL CORRESPONDENCE (OUTPATIENT)
Dept: HEALTH INFORMATION MANAGEMENT | Facility: CLINIC | Age: 52
End: 2022-06-01
Payer: COMMERCIAL

## 2022-06-08 ENCOUNTER — TELEPHONE (OUTPATIENT)
Dept: FAMILY MEDICINE | Facility: CLINIC | Age: 52
End: 2022-06-08
Payer: COMMERCIAL

## 2022-06-08 DIAGNOSIS — T83.511A URINARY TRACT INFECTION ASSOCIATED WITH INDWELLING URETHRAL CATHETER, INITIAL ENCOUNTER (H): Primary | ICD-10-CM

## 2022-06-08 DIAGNOSIS — N39.0 URINARY TRACT INFECTION ASSOCIATED WITH INDWELLING URETHRAL CATHETER, INITIAL ENCOUNTER (H): Primary | ICD-10-CM

## 2022-06-08 RX ORDER — NITROFURANTOIN 25; 75 MG/1; MG/1
100 CAPSULE ORAL 2 TIMES DAILY
Qty: 14 CAPSULE | Refills: 0 | Status: SHIPPED | OUTPATIENT
Start: 2022-06-08 | End: 2022-06-14

## 2022-06-08 NOTE — TELEPHONE ENCOUNTER
Culture results reviewed.     Patient with culture with >100k enterobacter cloacae complex. Multiple resistance to antibiotics.     Nitrofurantoin is intermediate activity.     Will send prescription and request follow up UA after treatment.  Follow up sooner for worsening or concerns.

## 2022-06-14 ENCOUNTER — OFFICE VISIT (OUTPATIENT)
Dept: FAMILY MEDICINE | Facility: CLINIC | Age: 52
End: 2022-06-14
Payer: COMMERCIAL

## 2022-06-14 ENCOUNTER — TELEPHONE (OUTPATIENT)
Dept: FAMILY MEDICINE | Facility: CLINIC | Age: 52
End: 2022-06-14

## 2022-06-14 ENCOUNTER — TRANSFERRED RECORDS (OUTPATIENT)
Dept: HEALTH INFORMATION MANAGEMENT | Facility: CLINIC | Age: 52
End: 2022-06-14

## 2022-06-14 VITALS
HEIGHT: 68 IN | WEIGHT: 155 LBS | OXYGEN SATURATION: 96 % | DIASTOLIC BLOOD PRESSURE: 60 MMHG | TEMPERATURE: 97.2 F | BODY MASS INDEX: 23.49 KG/M2 | RESPIRATION RATE: 20 BRPM | HEART RATE: 61 BPM | SYSTOLIC BLOOD PRESSURE: 118 MMHG

## 2022-06-14 DIAGNOSIS — T83.511A URINARY TRACT INFECTION ASSOCIATED WITH INDWELLING URETHRAL CATHETER, INITIAL ENCOUNTER (H): ICD-10-CM

## 2022-06-14 DIAGNOSIS — G89.4 CHRONIC PAIN SYNDROME: ICD-10-CM

## 2022-06-14 DIAGNOSIS — I10 BENIGN ESSENTIAL HYPERTENSION: ICD-10-CM

## 2022-06-14 DIAGNOSIS — Z79.899 ENCOUNTER FOR LONG-TERM (CURRENT) USE OF MEDICATIONS: ICD-10-CM

## 2022-06-14 DIAGNOSIS — Z13.220 SCREENING FOR HYPERLIPIDEMIA: ICD-10-CM

## 2022-06-14 DIAGNOSIS — F32.9 PROLONGED DEPRESSIVE REACTION: ICD-10-CM

## 2022-06-14 DIAGNOSIS — G82.20 PARAPLEGIA (H): Primary | ICD-10-CM

## 2022-06-14 DIAGNOSIS — N39.0 URINARY TRACT INFECTION ASSOCIATED WITH INDWELLING URETHRAL CATHETER, INITIAL ENCOUNTER (H): ICD-10-CM

## 2022-06-14 DIAGNOSIS — Z23 HIGH PRIORITY FOR 2019-NCOV VACCINE: ICD-10-CM

## 2022-06-14 DIAGNOSIS — E03.4 HYPOTHYROIDISM DUE TO ACQUIRED ATROPHY OF THYROID: ICD-10-CM

## 2022-06-14 DIAGNOSIS — Z12.11 SCREEN FOR COLON CANCER: ICD-10-CM

## 2022-06-14 LAB
ANION GAP SERPL CALCULATED.3IONS-SCNC: 3 MMOL/L (ref 3–14)
BUN SERPL-MCNC: 12 MG/DL (ref 7–30)
CALCIUM SERPL-MCNC: 9.5 MG/DL (ref 8.5–10.1)
CHLORIDE BLD-SCNC: 102 MMOL/L (ref 94–109)
CHOLEST SERPL-MCNC: 167 MG/DL
CO2 SERPL-SCNC: 31 MMOL/L (ref 20–32)
CREAT SERPL-MCNC: 1.11 MG/DL (ref 0.52–1.04)
FASTING STATUS PATIENT QL REPORTED: YES
GFR SERPL CREATININE-BSD FRML MDRD: 60 ML/MIN/1.73M2
GLUCOSE BLD-MCNC: 75 MG/DL (ref 70–99)
HDLC SERPL-MCNC: 29 MG/DL
LDLC SERPL CALC-MCNC: 70 MG/DL
NONHDLC SERPL-MCNC: 138 MG/DL
POTASSIUM BLD-SCNC: 4.4 MMOL/L (ref 3.4–5.3)
SODIUM SERPL-SCNC: 136 MMOL/L (ref 133–144)
T4 FREE SERPL-MCNC: 1.21 NG/DL (ref 0.76–1.46)
TRIGL SERPL-MCNC: 339 MG/DL
TSH SERPL DL<=0.005 MIU/L-ACNC: 11.84 MU/L (ref 0.4–4)

## 2022-06-14 PROCEDURE — 90471 IMMUNIZATION ADMIN: CPT | Performed by: FAMILY MEDICINE

## 2022-06-14 PROCEDURE — 80048 BASIC METABOLIC PNL TOTAL CA: CPT | Performed by: FAMILY MEDICINE

## 2022-06-14 PROCEDURE — 99214 OFFICE O/P EST MOD 30 MIN: CPT | Mod: 25 | Performed by: FAMILY MEDICINE

## 2022-06-14 PROCEDURE — 91306 COVID-19,PF,MODERNA (18+ YRS BOOSTER .25ML): CPT | Performed by: FAMILY MEDICINE

## 2022-06-14 PROCEDURE — 0064A COVID-19,PF,MODERNA (18+ YRS BOOSTER .25ML): CPT | Performed by: FAMILY MEDICINE

## 2022-06-14 PROCEDURE — 36415 COLL VENOUS BLD VENIPUNCTURE: CPT | Performed by: FAMILY MEDICINE

## 2022-06-14 PROCEDURE — 90715 TDAP VACCINE 7 YRS/> IM: CPT | Performed by: FAMILY MEDICINE

## 2022-06-14 PROCEDURE — 84439 ASSAY OF FREE THYROXINE: CPT | Performed by: FAMILY MEDICINE

## 2022-06-14 PROCEDURE — 80061 LIPID PANEL: CPT | Performed by: FAMILY MEDICINE

## 2022-06-14 PROCEDURE — 84443 ASSAY THYROID STIM HORMONE: CPT | Performed by: FAMILY MEDICINE

## 2022-06-14 RX ORDER — METAXALONE 800 MG/1
1 TABLET ORAL 3 TIMES DAILY PRN
COMMUNITY
Start: 2021-03-11 | End: 2023-06-13

## 2022-06-14 RX ORDER — HYDROXYZINE HYDROCHLORIDE 25 MG/1
25 TABLET, FILM COATED ORAL 3 TIMES DAILY PRN
Qty: 270 TABLET | Refills: 1 | Status: SHIPPED | OUTPATIENT
Start: 2022-06-14 | End: 2023-04-26

## 2022-06-14 RX ORDER — NITROFURANTOIN 25; 75 MG/1; MG/1
100 CAPSULE ORAL 2 TIMES DAILY
Qty: 14 CAPSULE | Refills: 0 | Status: SHIPPED | OUTPATIENT
Start: 2022-06-14

## 2022-06-14 RX ORDER — TIZANIDINE HYDROCHLORIDE 4 MG/1
4 CAPSULE, GELATIN COATED ORAL
COMMUNITY
Start: 2022-01-20

## 2022-06-14 RX ORDER — NICOTINE 4 MG/1
INHALANT RESPIRATORY (INHALATION)
COMMUNITY
Start: 2022-05-13 | End: 2022-06-14

## 2022-06-14 RX ORDER — METHADONE HYDROCHLORIDE 10 MG/1
1 TABLET ORAL 3 TIMES DAILY
COMMUNITY
Start: 2022-05-18

## 2022-06-14 RX ORDER — VENLAFAXINE HYDROCHLORIDE 75 MG/1
225 CAPSULE, EXTENDED RELEASE ORAL DAILY
Qty: 270 CAPSULE | Refills: 1 | Status: SHIPPED | OUTPATIENT
Start: 2022-06-14 | End: 2022-11-03

## 2022-06-14 RX ORDER — NICOTINE 21 MG/24HR
1 PATCH, TRANSDERMAL 24 HOURS TRANSDERMAL EVERY 24 HOURS
COMMUNITY
Start: 2022-05-10

## 2022-06-14 ASSESSMENT — ANXIETY QUESTIONNAIRES
IF YOU CHECKED OFF ANY PROBLEMS ON THIS QUESTIONNAIRE, HOW DIFFICULT HAVE THESE PROBLEMS MADE IT FOR YOU TO DO YOUR WORK, TAKE CARE OF THINGS AT HOME, OR GET ALONG WITH OTHER PEOPLE: NOT DIFFICULT AT ALL
2. NOT BEING ABLE TO STOP OR CONTROL WORRYING: SEVERAL DAYS
3. WORRYING TOO MUCH ABOUT DIFFERENT THINGS: MORE THAN HALF THE DAYS
GAD7 TOTAL SCORE: 8
5. BEING SO RESTLESS THAT IT IS HARD TO SIT STILL: SEVERAL DAYS
1. FEELING NERVOUS, ANXIOUS, OR ON EDGE: SEVERAL DAYS
7. FEELING AFRAID AS IF SOMETHING AWFUL MIGHT HAPPEN: SEVERAL DAYS
6. BECOMING EASILY ANNOYED OR IRRITABLE: SEVERAL DAYS
GAD7 TOTAL SCORE: 8

## 2022-06-14 ASSESSMENT — PAIN SCALES - GENERAL: PAINLEVEL: MODERATE PAIN (5)

## 2022-06-14 ASSESSMENT — PATIENT HEALTH QUESTIONNAIRE - PHQ9
SUM OF ALL RESPONSES TO PHQ QUESTIONS 1-9: 8
5. POOR APPETITE OR OVEREATING: SEVERAL DAYS

## 2022-06-14 NOTE — TELEPHONE ENCOUNTER
Reason for Call:  Form, our goal is to have forms completed with 72 hours, however, some forms may require a visit or additional information.    Type of letter, form or note:  medical    Who is the form from?: [employer] (if other please explain)    Where did the form come from: form was faxed in    What clinic location was the form placed at?: [facility] [phone]    Where the form was placed: [facility] Box/Folder    What number is listed as a contact on the form?: NA       Additional comments: Medical Safety information    Call taken on 6/14/2022 at 10:05 AM by Kamilah Perez

## 2022-06-14 NOTE — PROGRESS NOTES
Assessment & Plan       Paraplegia  Has home care.   Orders/referrals previously sent for wheelchair but patient reports has not received yet. She will reach out if further help needed.       Benign essential hypertension  Doing well. Well controlled. Tolerating medication.  No change in plan.    Will notify of results.   - Basic metabolic panel  (Ca, Cl, CO2, Creat, Gluc, K, Na, BUN); Future    Urinary tract infection associated with indwelling urethral catheter, initial encounter (H)  Patient with recent culture results as described in subjective.  We will treat with nitrofurantoin as per culture sensitivities.  Discussed with patient that there is intermediate susceptibility for the Macrobid.  She will watch for worsening symptoms or any concerns.  Consider IV treatment if needed.  Patient agrees to plan.  We will repeat UA once completed the antibiotic.  - nitroFURantoin macrocrystal-monohydrate (MACROBID) 100 MG capsule; Take 1 capsule (100 mg) by mouth 2 times daily    Hypothyroidism due to acquired atrophy of thyroid  Awaiting results. Dose adjustment as needed.    - TSH with free T4 reflex; Future  - **TSH with free T4 reflex FUTURE anytime  - T4 free    Chronic pain syndrome  Patient remains under the care of of pain management.  She is pursuing medical cannabis with awareness by the pain clinic.  She would like refills on the hydroxyzine that she utilizes when she is on the pain medication.  Refill given.  - hydrOXYzine (ATARAX) 25 MG tablet; Take 1 tablet (25 mg) by mouth 3 times daily as needed for itching TAKE 1 TABLET (25 MG) BY MOUTH EVERY 8 HOURS AS NEEDED FOR ITCHING    Prolonged depressive reaction  Patient has not been able to consistently take the venlafaxine due to nausea but she has if she has not eaten with the medication.  Will trial changing to Effexor XR in place of the immediate release.  Patient will recheck in 6 months or sooner as needed.  - venlafaxine (EFFEXOR XR) 75 MG 24 hr  capsule; Take 3 capsules (225 mg) by mouth daily    Screen for colon cancer  Patient is due for colon cancer screening.  Discussed with patient and she would like to proceed with Cologuard.  Orders placed.  - COLOGUARD([employer])    High priority for 2019-nCoV vaccine  Patient is eligible to receive a COVID booster.  Patient agrees to proceed and Moderna booster was given.  - COVID-19,PF,MODERNA (18+ Yrs BOOSTER .25mL)    Encounter for long-term (current) use of medications  Will notify results  - **Basic metabolic panel FUTURE anytime    Screening for hyperlipidemia  Will notify of results.  - Lipid panel reflex to direct LDL Fasting; Future  - Lipid panel reflex to direct LDL Fasting                 Return in about 6 months (around 12/14/2022) for Routine preventive.    Dipti Kasper MD  [facility] ARSENIO Araya is a 52 year old who presents for the following health issues     Healthy Habits:    Taking medications regularly:  0    PHQ-2 Total Score:  History of Present Illness       Mental Health Follow-up:  Patient presents to follow-up on Depression & Anxiety.Patient's depression since last visit has been:  No change  The patient is not having other symptoms associated with depression.  Patient's anxiety since last visit has been:  No change  The patient is not having other symptoms associated with anxiety.  Any significant life events: No  Patient is not feeling anxious or having panic attacks.  Patient has no concerns about alcohol or drug use.    Hypertension: She presents for follow up of hypertension.  She does check blood pressure  regularly outside of the clinic. Outpatient blood pressures have not been over 140/90. She does not follow a low salt diet.     Hypothyroidism:     Since last visit, patient describes the following symptoms::  Anxiety and Depression    Reason for visit:  Medication review    She eats 2-3 servings of fruits and vegetables daily.She  consumes 1 sweetened beverage(s) daily.She exercises with enough effort to increase her heart rate 10 to 19 minutes per day.  She exercises with enough effort to increase her heart rate 3 or less days per week.   She is taking medications regularly.     Followed at pain clinic. Will be pursuing medical marijuana to complement pain management.       Hypertension Follow-up      Do you check your blood pressure regularly outside of the clinic? Yes     Are you following a low salt diet? Yes    Are your blood pressures ever more than 140 on the top number (systolic) OR more   than 90 on the bottom number (diastolic), for example 140/90? No    Hypothyroidism Follow-up      Since last visit, patient describes the following symptoms: Weight stable, no hair loss, no skin changes, no constipation, no loose stools      History of seizure  No issues. Doesn't wish to be on the seizure medicine.  The previously recommended she be seen by neurology but has not yet.     UTI  Patient with recent urine culture which showed infection.  Macrobid showed intermediate susceptibility.  Other susceptible antibiotics available would be IV.  Prescription for the Macrobid was sent last week but patient reports she never did receive that from the pharmacy.  She has not had fevers or nausea or vomiting.  She has some abdominal cramping noted.    Depression  Patient has history of depression.  She is currently on Effexor immediate release tablets.  She has been taking this for some time.  She reports she takes it off and on as she feels she needs to take it with food otherwise it causes nausea.  She does not always want to eat regularly and so sometimes ends up skipping the venlafaxine.  She does not think that she is try the extended release formulation.  She denies any suicidal ideation or intention.        Review of Systems   CONSTITUTIONAL: NEGATIVE for fever, chills, change in weight  ENT/MOUTH: NEGATIVE for ear, mouth and throat  "problems  RESP: NEGATIVE for significant cough or SOB  CV: NEGATIVE for chest pain, palpitations or peripheral edema      Objective    /60   Pulse 61   Temp 97.2  F (36.2  C) (Temporal)   Resp 20   Ht 1.727 m (5' 8\")   Wt 70.3 kg (155 lb)   LMP 01/14/2022   SpO2 96%   Breastfeeding No   BMI 23.57 kg/m    Body mass index is 23.57 kg/m .  Physical Exam   GENERAL: healthy, alert and no distress.  Sitting in wheelchair.  HENT: Throat clear mucosa membranes are moist  NECK: no adenopathy, no asymmetry, masses, or scars and thyroid normal to palpation  RESP: lungs clear to auscultation - no rales, rhonchi or wheezes  CV: regular rate and rhythm, normal S1 S2, no S3 or S4, no murmur, click or rub, no peripheral edema and peripheral pulses strong  ABDOMEN: soft, nontender, no hepatosplenomegaly, no masses and bowel sounds normal  MS: no gross musculoskeletal defects noted, no edema                "

## 2022-06-14 NOTE — NURSING NOTE
Prior to immunization administration, verified patients identity using patient s name and date of birth. Please see Immunization Activity for additional information.     Screening Questionnaire for Adult Immunization    Are you sick today?   No   Do you have allergies to medications, food, a vaccine component or latex?   No   Have you ever had a serious reaction after receiving a vaccination?   No   Do you have a long-term health problem with heart, lung, kidney, or metabolic disease (e.g., diabetes), asthma, a blood disorder, no spleen, complement component deficiency, a cochlear implant, or a spinal fluid leak?  Are you on long-term aspirin therapy?   No   Do you have cancer, leukemia, HIV/AIDS, or any other immune system problem?   No   Do you have a parent, brother, or sister with an immune system problem?   No   In the past 3 months, have you taken medications that affect  your immune system, such as prednisone, other steroids, or anticancer drugs; drugs for the treatment of rheumatoid arthritis, Crohn s disease, or psoriasis; or have you had radiation treatments?   No   Have you had a seizure, or a brain or other nervous system problem?   No   During the past year, have you received a transfusion of blood or blood    products, or been given immune (gamma) globulin or antiviral drug?   No   For women: Are you pregnant or is there a chance you could become       pregnant during the next month?   No   Have you received any vaccinations in the past 4 weeks?   No     Immunization questionnaire answers were all negative.        Per orders of Dr. Kasper, injection of Tdap given by Vibha Lopez. Patient instructed to remain in clinic for 15 minutes afterwards, and to report any adverse reaction to me immediately.       Screening performed by Vibha Lopez on 6/14/2022 at 12:06 PM.

## 2022-06-21 ENCOUNTER — MEDICAL CORRESPONDENCE (OUTPATIENT)
Dept: ADMINISTRATIVE | Facility: CLINIC | Age: 52
End: 2022-06-21

## 2022-06-21 ENCOUNTER — TELEPHONE (OUTPATIENT)
Dept: FAMILY MEDICINE | Facility: CLINIC | Age: 52
End: 2022-06-21
Payer: COMMERCIAL

## 2022-06-21 NOTE — TELEPHONE ENCOUNTER
Reason for Call:  Form, our goal is to have forms completed with 72 hours, however, some forms may require a visit or additional information.    Type of letter, form or note:  medical    Who is the form from?: Home care    Where did the form come from: form was faxed in    What clinic location was the form placed at?: [facility] [phone]    Where the form was placed: [facility] Box/Folder    What number is listed as a contact on the form?: Fax: [phone]       Additional comments: renewal order to be signed and faxed back    Call taken on 6/21/2022 at 12:36 PM by Kamilah Perez

## 2022-06-21 NOTE — CONFIDENTIAL NOTE
Reason for Call:  Form, our goal is to have forms completed with 72 hours, however, some forms may require a visit or additional information.    Type of letter, form or note:  MEDICAL SUPPLIES    Who is the form from?: [employer] (if other please explain)    Where did the form come from: form was faxed in    What clinic location was the form placed at?: [facility] [phone]    Where the form was placed:     What number is listed as a contact on the form?: [phone]       Additional comments: Fax to [employer] @  [phone]    Call taken on 6/21/2022 at 10:33 AM by Larisa Jordan

## 2022-06-28 ENCOUNTER — MEDICAL CORRESPONDENCE (OUTPATIENT)
Dept: FAMILY MEDICINE | Facility: CLINIC | Age: 52
End: 2022-06-28

## 2022-07-05 ENCOUNTER — TELEPHONE (OUTPATIENT)
Dept: FAMILY MEDICINE | Facility: CLINIC | Age: 52
End: 2022-07-05

## 2022-07-05 DIAGNOSIS — F41.9 ANXIETY: ICD-10-CM

## 2022-07-05 DIAGNOSIS — G82.20 PARAPLEGIA (H): ICD-10-CM

## 2022-07-05 NOTE — TELEPHONE ENCOUNTER
Reason for Call:  Form, our goal is to have forms completed with 72 hours, however, some forms may require a visit or additional information.    Type of letter, form or note:  Home Health Certification    Who is the form from?: Home care    Where did the form come from: form was faxed in    What clinic location was the form placed at?: [facility] [phone]    Where the form was placed:     What number is listed as a contact on the form?: [phone]       Additional comments: Please fax back signed forms to [phone]    Call taken on 7/5/2022 at 10:30 AM by Lydia Sim

## 2022-07-08 RX ORDER — BACLOFEN 20 MG/1
20 TABLET ORAL 4 TIMES DAILY
Qty: 120 TABLET | Refills: 0 | Status: SHIPPED | OUTPATIENT
Start: 2022-07-08 | End: 2022-08-02

## 2022-07-08 RX ORDER — BUPROPION HYDROCHLORIDE 150 MG/1
TABLET, EXTENDED RELEASE ORAL
Qty: 60 TABLET | Refills: 5 | Status: SHIPPED | OUTPATIENT
Start: 2022-07-08 | End: 2023-01-04

## 2022-07-08 NOTE — TELEPHONE ENCOUNTER
"Pending Prescriptions:                       Disp   Refills    baclofen (LIORESAL) 20 MG tablet [Pharmacy*120 ta*0        Sig: TAKE 1 TABLET (20 MG) BY MOUTH 4 TIMES DAILY    Signed Prescriptions:                        Disp   Refills    buPROPion (WELLBUTRIN SR) 150 MG 12 hr tab*60 tab*5        Sig: TAKE 1 TABLET (150 MG) BY MOUTH 2 TIMES DAILY  Authorizing Provider: STANLEY DAVILA  Ordering User: NIC MUNGUIA    Routing refill request to provider for review/approval because:  Drug not on the [facility] refill protocol     Requested Prescriptions   Pending Prescriptions Disp Refills    baclofen (LIORESAL) 20 MG tablet [Pharmacy Med Name: BACLOFEN 20 MG TABS 20 Tablet] 120 tablet 0     Sig: TAKE 1 TABLET (20 MG) BY MOUTH 4 TIMES DAILY        There is no refill protocol information for this order       Signed Prescriptions Disp Refills    buPROPion (WELLBUTRIN SR) 150 MG 12 hr tablet 60 tablet 5     Sig: TAKE 1 TABLET (150 MG) BY MOUTH 2 TIMES DAILY        SSRIs Protocol Passed - 7/5/2022  3:40 PM        Passed - Recent (12 mo) or future (30 days) visit within the authorizing provider's specialty     Patient has had an office visit with the authorizing provider or a provider within the authorizing providers department within the previous 12 mos or has a future within next 30 days. See \"Patient Info\" tab in inbasket, or \"Choose Columns\" in Meds & Orders section of the refill encounter.              Passed - Medication is Bupropion     If the medication is Bupropion (Wellbutrin), and the patient is taking for smoking cessation; OK to refill.            Passed - Medication is active on med list        Passed - Patient is age 18 or older        Passed - No active pregnancy on record        Passed - No positive pregnancy test in last 12 months                    "

## 2022-07-14 ENCOUNTER — TRANSFERRED RECORDS (OUTPATIENT)
Dept: HEALTH INFORMATION MANAGEMENT | Facility: CLINIC | Age: 52
End: 2022-07-14

## 2022-07-14 DIAGNOSIS — Z53.9 DIAGNOSIS NOT YET DEFINED: Primary | ICD-10-CM

## 2022-07-14 PROCEDURE — G0179 MD RECERTIFICATION HHA PT: HCPCS | Performed by: FAMILY MEDICINE

## 2022-07-29 ENCOUNTER — TRANSFERRED RECORDS (OUTPATIENT)
Dept: HEALTH INFORMATION MANAGEMENT | Facility: CLINIC | Age: 52
End: 2022-07-29

## 2022-08-01 DIAGNOSIS — R56.9 SEIZURES (H): ICD-10-CM

## 2022-08-01 DIAGNOSIS — I10 BENIGN ESSENTIAL HYPERTENSION: ICD-10-CM

## 2022-08-01 DIAGNOSIS — G89.4 CHRONIC PAIN SYNDROME: ICD-10-CM

## 2022-08-01 DIAGNOSIS — E03.4 HYPOTHYROIDISM DUE TO ACQUIRED ATROPHY OF THYROID: ICD-10-CM

## 2022-08-01 DIAGNOSIS — G82.20 PARAPLEGIA (H): ICD-10-CM

## 2022-08-02 DIAGNOSIS — G82.20 PARAPLEGIA (H): ICD-10-CM

## 2022-08-02 RX ORDER — BACLOFEN 20 MG/1
20 TABLET ORAL 4 TIMES DAILY
Qty: 120 TABLET | Refills: 3 | Status: SHIPPED | OUTPATIENT
Start: 2022-08-02 | End: 2022-11-03

## 2022-08-02 NOTE — TELEPHONE ENCOUNTER
Routing refill request to provider for review/approval because:  Not on protocol  baclofen (LIORESAL) 20 MG tablet 120 tablet 0 7/8/2022  No   Sig - Route: TAKE 1 TABLET (20 MG) BY MOUTH 4 TIMES DAILY - Oral   Sent to pharmacy as: Baclofen 20 MG Oral Tablet (LIORESAL)   Class: E-Prescribe   Notes to Pharmacy: 7-5   Order: [id number]   E-Prescribing Status: Receipt confirmed by pharmacy (7/8/2022  1:03 PM CDT)

## 2022-08-03 NOTE — TELEPHONE ENCOUNTER
"Pending Prescriptions:                       Disp   Refills    levothyroxine (SYNTHROID/LEVOTHROID) 75 MC*30 tab*1        Sig: TAKE 1 TABLET (75 MCG) BY MOUTH DAILY    Pregabalin (LYRICA) 200 MG capsule [Pharma*90 cap*1        Sig: Take 1 capsule (200 mg) by mouth 3 times daily    lisinopril (ZESTRIL) 20 MG tablet [Pharmac*30 tab*1        Sig: Take 1 tablet (20 mg) by mouth daily    levETIRAcetam (KEPPRA) 750 MG tablet [Phar*60 tab*1        Sig: Take 1 tablet (750 mg) by mouth 2 times daily    Routing refill request to provider for review/approval because:  Lou given x1 and patient did not follow up, please advise  Labs out of range:      Requested Prescriptions   Pending Prescriptions Disp Refills    levothyroxine (SYNTHROID/LEVOTHROID) 75 MCG tablet [Pharmacy Med Name: LEVOTHYROXINE 75 MCG TABLET 75 Tablet] 30 tablet 1     Sig: TAKE 1 TABLET (75 MCG) BY MOUTH DAILY        Thyroid Protocol Failed - 8/1/2022  1:52 PM        Failed - Normal TSH on file in past 12 months     Recent Labs   Lab Test 06/14/22  1217   TSH 11.84*                Passed - Patient is 12 years or older        Passed - Recent (12 mo) or future (30 days) visit within the authorizing provider's specialty     Patient has had an office visit with the authorizing provider or a provider within the authorizing providers department within the previous 12 mos or has a future within next 30 days. See \"Patient Info\" tab in inbasket, or \"Choose Columns\" in Meds & Orders section of the refill encounter.              Passed - Medication is active on med list        Passed - No active pregnancy on record     If patient is pregnant or has had a positive pregnancy test, please check TSH.          Passed - No positive pregnancy test in past 12 months     If patient is pregnant or has had a positive pregnancy test, please check TSH.             Pregabalin (LYRICA) 200 MG capsule [Pharmacy Med Name: PREGABALIN 200 MG CAPS 200 Capsule] 90 capsule 1     Sig: Take " "1 capsule (200 mg) by mouth 3 times daily        There is no refill protocol information for this order        lisinopril (ZESTRIL) 20 MG tablet [Pharmacy Med Name: LISINOPRIL 20 MG TABLET 20 Tablet] 30 tablet 1     Sig: Take 1 tablet (20 mg) by mouth daily        ACE Inhibitors (Including Combos) Protocol Failed - 8/1/2022  1:52 PM        Failed - Normal serum creatinine on file in past 12 months     Recent Labs   Lab Test 06/14/22  1217   CR 1.11*       Ok to refill medication if creatinine is low          Passed - Blood pressure under 140/90 in past 12 months       BP Readings from Last 3 Encounters:   06/14/22 118/60   01/24/20 122/62   10/15/19 110/60                 Passed - Recent (12 mo) or future (30 days) visit within the authorizing provider's specialty     Patient has had an office visit with the authorizing provider or a provider within the authorizing providers department within the previous 12 mos or has a future within next 30 days. See \"Patient Info\" tab in inbasket, or \"Choose Columns\" in Meds & Orders section of the refill encounter.              Passed - Medication is active on med list        Passed - Patient is age 18 or older        Passed - No active pregnancy on record        Passed - Normal serum potassium on file in past 12 months     Recent Labs   Lab Test 06/14/22  1217   POTASSIUM 4.4               Passed - No positive pregnancy test within past 12 months           levETIRAcetam (KEPPRA) 750 MG tablet [Pharmacy Med Name: levETIRAcetam 750 MG TABS 750 Tablet] 60 tablet 1     Sig: Take 1 tablet (750 mg) by mouth 2 times daily        There is no refill protocol information for this order                 "

## 2022-08-04 DIAGNOSIS — G89.21 CHRONIC PAIN DUE TO INJURY: ICD-10-CM

## 2022-08-04 RX ORDER — LEVOTHYROXINE SODIUM 75 UG/1
TABLET ORAL
Qty: 90 TABLET | Refills: 1 | Status: SHIPPED | OUTPATIENT
Start: 2022-08-04 | End: 2023-01-04

## 2022-08-04 RX ORDER — LISINOPRIL 20 MG/1
20 TABLET ORAL DAILY
Qty: 90 TABLET | Refills: 1 | Status: SHIPPED | OUTPATIENT
Start: 2022-08-04 | End: 2023-01-04

## 2022-08-04 RX ORDER — IBUPROFEN 200 MG
TABLET ORAL
Qty: 120 TABLET | Refills: 0 | Status: SHIPPED | OUTPATIENT
Start: 2022-08-04 | End: 2023-03-18

## 2022-08-04 RX ORDER — PREGABALIN 200 MG/1
200 CAPSULE ORAL 3 TIMES DAILY
Qty: 90 CAPSULE | Refills: 1 | Status: SHIPPED | OUTPATIENT
Start: 2022-08-04 | End: 2022-11-02

## 2022-08-04 RX ORDER — LEVETIRACETAM 750 MG/1
750 TABLET ORAL 2 TIMES DAILY
Qty: 180 TABLET | Refills: 1 | Status: SHIPPED | OUTPATIENT
Start: 2022-08-04 | End: 2023-01-04

## 2022-08-04 NOTE — TELEPHONE ENCOUNTER
"Pending Prescriptions:                       Disp   Refills    ibuprofen (ADVIL/MOTRIN) 200 MG tablet     120 ta*0              Routing refill request to provider for review/approval because:    NSAID Medications Failed    Rerun Protocol (8/4/2022 2:06 PM)      Normal ALT on file in past 12 months        Recent Labs   Lab Test 08/22/20  0000   ALT 21         Normal AST on file in past 12 months        Recent Labs   Lab Test 08/22/20  0000   AST 21         Normal CBC on file in past 12 months    No lab results found.            Normal serum creatinine on file in past 12 months        Recent Labs   Lab Test 06/14/22  1217   CR 1.11*      Ok to refill medication if creatinine is low      Blood pressure under 140/90 in past 12 months        BP Readings from Last 3 Encounters:   06/14/22 118/60   01/24/20 122/62   10/15/19 110/60             Recent (12 mo) or future (30 days) visit within the authorizing provider's specialty    Patient has had an office visit with the authorizing provider or a provider within the authorizing providers department within the previous 12 mos or has a future within next 30 days. See \"Patient Info\" tab in inbasket, or \"Choose Columns\" in Meds & Orders section of the refill encounter.           Patient is age 6-64 years          Medication is active on med list          No active pregnancy on record          No positive pregnancy test in past 12 months        "

## 2022-08-08 ENCOUNTER — TELEPHONE (OUTPATIENT)
Dept: FAMILY MEDICINE | Facility: CLINIC | Age: 52
End: 2022-08-08

## 2022-08-08 DIAGNOSIS — T83.511A URINARY TRACT INFECTION ASSOCIATED WITH INDWELLING URETHRAL CATHETER, INITIAL ENCOUNTER (H): Primary | ICD-10-CM

## 2022-08-08 DIAGNOSIS — N39.0 URINARY TRACT INFECTION ASSOCIATED WITH INDWELLING URETHRAL CATHETER, INITIAL ENCOUNTER (H): Primary | ICD-10-CM

## 2022-08-08 RX ORDER — SULFAMETHOXAZOLE/TRIMETHOPRIM 800-160 MG
1 TABLET ORAL 2 TIMES DAILY
Qty: 20 TABLET | Refills: 0 | Status: SHIPPED | OUTPATIENT
Start: 2022-08-08 | End: 2022-08-10

## 2022-08-08 NOTE — TELEPHONE ENCOUNTER
Received fax with urine culture results.     Urine culture showed MRSA. Susceptible to bactrim and tetracycline.     Prescription sent for Bactrim.

## 2022-08-10 ENCOUNTER — TELEPHONE (OUTPATIENT)
Dept: FAMILY MEDICINE | Facility: CLINIC | Age: 52
End: 2022-08-10

## 2022-08-10 DIAGNOSIS — T83.511A URINARY TRACT INFECTION ASSOCIATED WITH INDWELLING URETHRAL CATHETER, INITIAL ENCOUNTER (H): Primary | ICD-10-CM

## 2022-08-10 DIAGNOSIS — N39.0 URINARY TRACT INFECTION ASSOCIATED WITH INDWELLING URETHRAL CATHETER, INITIAL ENCOUNTER (H): Primary | ICD-10-CM

## 2022-08-10 RX ORDER — DOXYCYCLINE 100 MG/1
100 CAPSULE ORAL 2 TIMES DAILY
Qty: 20 CAPSULE | Refills: 0 | Status: SHIPPED | OUTPATIENT
Start: 2022-08-10

## 2022-08-10 NOTE — TELEPHONE ENCOUNTER
"Dr. Kasper:  Please advise on request for alternate antibiotic for uti.  Per message below \"had allergic reaction to the bactrimDS last pm (large hives, redness, feeling like throat was closing) All resolved with benadryl\"  Are you wanting a virtual visit for this need?  Appears had had urine culture done at outside clinic/lab and you had reviewed and had ordered the antibiotic;  Patient is a paraplegic with an indwelling suprapubic cath.  Please advise.  Katelyn Goodwin RN    Preferred Pharmacy:      [facility] - [place], [place] - [address]  [address]  [address]  [facility] [address]  Phone: [phone] Fax: [phone]    I did leave a message for patient to return call to [facility] Mason Hernandez RN.  "

## 2022-08-10 NOTE — TELEPHONE ENCOUNTER
New Medication Request    Contacts       Type Contact Phone/Fax    08/10/2022 11:35 AM CDT Phone (Incoming) Quiana Canas (Self) [phone] (H)          What medication are you requesting?: Alternate Antibiotic    Pt had allergic reaction to bactrim last night. Large hives, redness, feeling like throat was closing. All resolved with benadryl       Reason for medication request: infection.    Have you taken this medication before?: No    Controlled Substance Agreement on file:   CSA -- Patient Level:    CSA: None found at the patient level.         Patient offered an appointment? No    Preferred Pharmacy:     [facility] - [place], [place] - [address]  [address]  [address]  [facility] [address]  Phone: [phone] Fax: [phone]          Could we send this information to you in aXess americaLaurens or would you prefer to receive a phone call?:   Patient would prefer a phone call   Okay to leave a detailed message?: Yes at Home number on file [phone] (home)

## 2022-08-10 NOTE — TELEPHONE ENCOUNTER
Reviewed message.  Updated allergies to show Bactrim caused hives.    New prescription sent for doxycycline twice daily for 10 days to take in place of the Bactrim.

## 2022-08-15 ENCOUNTER — TELEPHONE (OUTPATIENT)
Dept: FAMILY MEDICINE | Facility: CLINIC | Age: 52
End: 2022-08-15

## 2022-08-16 ENCOUNTER — TRANSFERRED RECORDS (OUTPATIENT)
Dept: HEALTH INFORMATION MANAGEMENT | Facility: CLINIC | Age: 52
End: 2022-08-16

## 2022-08-26 ENCOUNTER — TELEPHONE (OUTPATIENT)
Dept: FAMILY MEDICINE | Facility: CLINIC | Age: 52
End: 2022-08-26

## 2022-08-26 DIAGNOSIS — Z53.9 DIAGNOSIS NOT YET DEFINED: Primary | ICD-10-CM

## 2022-08-26 PROCEDURE — G0179 MD RECERTIFICATION HHA PT: HCPCS | Performed by: FAMILY MEDICINE

## 2022-08-26 NOTE — TELEPHONE ENCOUNTER
Reason for Call:  Form, our goal is to have forms completed with 72 hours, however, some forms may require a visit or additional information.    Type of letter, form or note:  Home Health Certification    Who is the form from?: Home care    Where did the form come from: form was faxed in    What clinic location was the form placed at?: [facility] [phone]    Where the form was placed: TC Box/Folder    What number is listed as a contact on the form?: [phone]       Additional comments: Fax Plan of Care:  [phone]    Call taken on 8/26/2022 at 12:59 PM by Larisa Jordan

## 2022-09-02 ENCOUNTER — TELEPHONE (OUTPATIENT)
Dept: FAMILY MEDICINE | Facility: CLINIC | Age: 52
End: 2022-09-02

## 2022-09-02 NOTE — TELEPHONE ENCOUNTER
Reason for Call:  Form, our goal is to have forms completed with 72 hours, however, some forms may require a visit or additional information.     Type of letter, form or note:  MEDICAL SUPPLIES     Who is the form from?: [employer] (if other please explain)     Where did the form come from: form was faxed in     What clinic location was the form placed at?: [facility] [phone]     Where the form was placed:      What number is listed as a contact on the form?: [phone]       Additional comments: Fax to [employer] @  [phone]

## 2022-09-06 ENCOUNTER — MEDICAL CORRESPONDENCE (OUTPATIENT)
Dept: HEALTH INFORMATION MANAGEMENT | Facility: CLINIC | Age: 52
End: 2022-09-06

## 2022-09-09 ENCOUNTER — TELEPHONE (OUTPATIENT)
Dept: FAMILY MEDICINE | Facility: CLINIC | Age: 52
End: 2022-09-09

## 2022-09-09 NOTE — TELEPHONE ENCOUNTER
Reason for Call:  Form, our goal is to have forms completed with 72 hours, however, some forms may require a visit or additional information.    Type of letter, form or note:  Home Health Certification    Who is the form from?: Home care    Where did the form come from: form was faxed in    What clinic location was the form placed at?: [facility] [phone]    Where the form was placed:     What number is listed as a contact on the form?: [phone]       Additional comments: Please fax back all pages of the signed plan of care to [phone]    Call taken on 9/9/2022 at 9:05 AM by Lydia Sim

## 2022-09-13 ENCOUNTER — TELEPHONE (OUTPATIENT)
Dept: FAMILY MEDICINE | Facility: CLINIC | Age: 52
End: 2022-09-13

## 2022-09-14 ENCOUNTER — MEDICAL CORRESPONDENCE (OUTPATIENT)
Dept: HEALTH INFORMATION MANAGEMENT | Facility: CLINIC | Age: 52
End: 2022-09-14

## 2022-09-14 NOTE — TELEPHONE ENCOUNTER
Faxed and sent to scanning   
Forms completed. Please fax.     Requesting last office visit note as well.  
Placed in provider basket   
Reason for Call:  Form, our goal is to have forms completed with 72 hours, however, some forms may require a visit or additional information.    Type of letter, form or note:  medical    Who is the form from?: [employer] [place] (if other please explain)    Where did the form come from: form was faxed in    What clinic location was the form placed at?: [facility] [phone]    Where the form was placed:     What number is listed as a contact on the form?: [phone]       Additional comments: Please sign form and fax back to [phone]    Call taken on 9/13/2022 at 9:36 AM by Lydia Sim        
Statement Selected

## 2022-09-15 ENCOUNTER — TRANSFERRED RECORDS (OUTPATIENT)
Dept: HEALTH INFORMATION MANAGEMENT | Facility: CLINIC | Age: 52
End: 2022-09-15

## 2022-09-22 ENCOUNTER — MEDICAL CORRESPONDENCE (OUTPATIENT)
Dept: FAMILY MEDICINE | Facility: CLINIC | Age: 52
End: 2022-09-22

## 2022-09-23 ENCOUNTER — TELEPHONE (OUTPATIENT)
Dept: FAMILY MEDICINE | Facility: CLINIC | Age: 52
End: 2022-09-23

## 2022-09-23 NOTE — TELEPHONE ENCOUNTER
Reason for Call:  Form, our goal is to have forms completed with 72 hours, however, some forms may require a visit or additional information.    Type of letter, form or note:  medical    Who is the form from?: [facility] (if other please explain)    Where did the form come from: form was faxed in    What clinic location was the form placed at?: [facility] [phone]    Where the form was placed: Given to physician    What number is listed as a contact on the form?: NA       Additional comments: Lab results from patient for review    Call taken on 9/23/2022 at 2:27 PM by Kamilah Perez

## 2022-09-26 ENCOUNTER — TELEPHONE (OUTPATIENT)
Dept: FAMILY MEDICINE | Facility: CLINIC | Age: 52
End: 2022-09-26

## 2022-09-26 DIAGNOSIS — N39.0 URINARY TRACT INFECTION ASSOCIATED WITH INDWELLING URETHRAL CATHETER, INITIAL ENCOUNTER (H): Primary | ICD-10-CM

## 2022-09-26 DIAGNOSIS — T83.511A URINARY TRACT INFECTION ASSOCIATED WITH INDWELLING URETHRAL CATHETER, INITIAL ENCOUNTER (H): Primary | ICD-10-CM

## 2022-09-26 RX ORDER — CEFTRIAXONE 1 G/1
1000 INJECTION, POWDER, FOR SOLUTION INTRAMUSCULAR; INTRAVENOUS DAILY
Qty: 70 ML | Refills: 0 | Status: SHIPPED | OUTPATIENT
Start: 2022-09-26 | End: 2022-10-03

## 2022-09-26 NOTE — TELEPHONE ENCOUNTER
Reason for Call:  Form, our goal is to have forms completed with 72 hours, however, some forms may require a visit or additional information.    Type of letter, form or note:  medical    Who is the form from?: [facility] (if other please explain)    Where did the form come from: form was faxed in    What clinic location was the form placed at?: [facility] [phone]    Where the form was placed: Provider Box/Folder    What number is listed as a contact on the form?: NA       Additional comments: Labs for provider to review    Call taken on 9/26/2022 at 3:23 PM by Kamilah Perez

## 2022-09-26 NOTE — TELEPHONE ENCOUNTER
Pharmacy states needs prescription sent to pharmacy for the 9.6ml 1% lidocaine to reconstitute the ceftriaxone.  Will need orders x7 doses.  [facility], [place].  Katelyn Goodwin, HARDEEP  Pharmacist states has used the lidocaine in past to reconstitute as well.

## 2022-09-26 NOTE — TELEPHONE ENCOUNTER
RN Called to discuss with patient and homecare. Orders were faxed to [facility] at [phone].     ERICA Ramos, RN, PRITIN  [place]  September 26, 2022

## 2022-09-26 NOTE — TELEPHONE ENCOUNTER
Please give verbal order for the lidocaine as noted below. Unable to order in Epic as the orders are all for Clinic Administered.

## 2022-09-26 NOTE — TELEPHONE ENCOUNTER
[facility] calling back.    She is a current patient with them.    If patient is going to have [facility] do home IV antibiotic than order will need to be sent over to them for Ceftriaxone, and they will check benefits and let patient know.    They will not just give supplies. They need to do both antibiotic and supplies or patient will have to do oral of going through different pharmacy/home care.      Fax #: [phone]     Callback with questions [phone] (ask for Intake)    ERICA Nick, RN  [facility]  September 26, 2022

## 2022-09-26 NOTE — TELEPHONE ENCOUNTER
Received urine culture results.     Recommend Rocephin IV 1 gm daily for 7 days.     She has had a port. Please be sure that this is still present. She also has home care nurse.     Will send prescription to pharmacy.     Please notify patient and home care.     Previously have recommended patient establish back with urology due to her suprapubic catheter and recurrent infection. I don't see this has been done.  If she is ok seeing a local urologist, will place referral.

## 2022-09-26 NOTE — TELEPHONE ENCOUNTER
Called and spoke to pharmacist to give verbal order.     Pharmacist states home care nurse called and said to cancel the order as patient does not want to pick this up.     Nikki Ingram BSN, RN

## 2022-09-26 NOTE — TELEPHONE ENCOUNTER
Osman Kimbrough called to discuss further with RN. [facility] informed patient was okay with doing this. [facility] mentioned that patients pharmacy does not have the needles to access the port, therefore a call was made to [facility] at [phone] to get the supplies.     They will call me back after they see if the patient has been approved insurance wise. Please transfer pharmacy to writer.       OTONIEL RamosN, RN, PHN  [place]  September 26, 2022

## 2022-09-27 ENCOUNTER — TELEPHONE (OUTPATIENT)
Dept: FAMILY MEDICINE | Facility: CLINIC | Age: 52
End: 2022-09-27

## 2022-09-27 ENCOUNTER — MEDICAL CORRESPONDENCE (OUTPATIENT)
Dept: HEALTH INFORMATION MANAGEMENT | Facility: CLINIC | Age: 52
End: 2022-09-27

## 2022-09-27 NOTE — TELEPHONE ENCOUNTER
Reason for Call:  Form, our goal is to have forms completed with 72 hours, however, some forms may require a visit or additional information.    Type of letter, form or note:  medical    Who is the form from?: [facility] (if other please explain)    Where did the form come from: form was faxed in    What clinic location was the form placed at?: [facility] [phone]    Where the form was placed:     What number is listed as a contact on the form?: NA       Additional comments: Please sign, date and return to [phone]    Call taken on 9/27/2022 at 3:15 PM by Lydia Sim

## 2022-10-03 DIAGNOSIS — Z00.00 ROUTINE GENERAL MEDICAL EXAMINATION AT A HEALTH CARE FACILITY: ICD-10-CM

## 2022-10-04 ENCOUNTER — TELEPHONE (OUTPATIENT)
Dept: FAMILY MEDICINE | Facility: CLINIC | Age: 52
End: 2022-10-04

## 2022-10-04 RX ORDER — ASPIRIN 81 MG
TABLET, DELAYED RELEASE (ENTERIC COATED) ORAL
Qty: 30 TABLET | Refills: 5 | Status: SHIPPED | OUTPATIENT
Start: 2022-10-04 | End: 2023-05-02

## 2022-10-04 NOTE — TELEPHONE ENCOUNTER
Forms/Letter Request    Type of form/letter: supplies    Have you been seen for this request: Yes       Do we have the form/letter: Yes:      When is form/letter needed by: asa[    How would you like the form/letter returned: Fax to [employer] @ [phone]    Patient Notified form requests are processed in 3-5 business days:Yes    Could we send this information to you in "[employer]" or would you prefer to receive a phone call?:   Patient would like to be contacted via "[employer]"

## 2022-10-07 ENCOUNTER — MEDICAL CORRESPONDENCE (OUTPATIENT)
Dept: HEALTH INFORMATION MANAGEMENT | Facility: CLINIC | Age: 52
End: 2022-10-07

## 2022-10-07 NOTE — TELEPHONE ENCOUNTER
Forms/Letter Request     Type of form/letter: supplies     Have you been seen for this request: Yes        Do we have the form/letter: Yes:       When is form/letter needed by: asap    How would you like the form/letter returned: Fax to [employer] @ [phone] signature and dated needed     Patient Notified form requests are processed in 3-5 business days:Yes     Could we send this information to you in [employer] or would you prefer to receive a phone call?:   Patient would like to be contacted via [employer]

## 2022-10-09 ENCOUNTER — MEDICAL CORRESPONDENCE (OUTPATIENT)
Dept: HEALTH INFORMATION MANAGEMENT | Facility: CLINIC | Age: 52
End: 2022-10-09

## 2022-10-18 ENCOUNTER — TRANSFERRED RECORDS (OUTPATIENT)
Dept: HEALTH INFORMATION MANAGEMENT | Facility: CLINIC | Age: 52
End: 2022-10-18

## 2022-10-26 ENCOUNTER — TELEPHONE (OUTPATIENT)
Dept: FAMILY MEDICINE | Facility: CLINIC | Age: 52
End: 2022-10-26

## 2022-10-26 NOTE — TELEPHONE ENCOUNTER
Forms/Letter Request    Type of form/letter: Missed Visit    Have you been seen for this request: Yes     Do we have the form/letter: Yes:     When is form/letter needed by: ASAP      How would you like the form/letter returned: Fax    Patient Notified form requests are processed in 3-5 business days:No    Could we send this information to you in [employer] or would you prefer to receive a phone call?:   No preference   Okay to leave a detailed message?: No at Other phone number:  Please FAX signed form to, [phone]. Thank you.

## 2022-10-28 ENCOUNTER — TELEPHONE (OUTPATIENT)
Dept: FAMILY MEDICINE | Facility: CLINIC | Age: 52
End: 2022-10-28

## 2022-10-28 NOTE — TELEPHONE ENCOUNTER
Forms/Letter Request    Type of form/letter: plan of care    Have you been seen for this request: N/A    Do we have the form/letter: Yes:     When is form/letter needed by: ASAP    How would you like the form/letter returned: Fax [phone]    Patient Notified form requests are processed in 3-5 business days:No    Could we send this information to you in [employer] or would you prefer to receive a phone call?:   Patient would like to be contacted via [employer]

## 2022-11-09 ENCOUNTER — TRANSFERRED RECORDS (OUTPATIENT)
Dept: HEALTH INFORMATION MANAGEMENT | Facility: CLINIC | Age: 52
End: 2022-11-09

## 2022-11-09 ENCOUNTER — HOSPITAL ENCOUNTER (EMERGENCY)
Dept: HOSPITAL 11 - JP.ED | Age: 52
Discharge: HOME | End: 2022-11-09
Payer: MEDICAID

## 2022-11-09 ENCOUNTER — TELEPHONE (OUTPATIENT)
Dept: FAMILY MEDICINE | Facility: CLINIC | Age: 52
End: 2022-11-09

## 2022-11-09 VITALS — SYSTOLIC BLOOD PRESSURE: 116 MMHG | DIASTOLIC BLOOD PRESSURE: 66 MMHG | HEART RATE: 86 BPM

## 2022-11-09 DIAGNOSIS — N39.0: ICD-10-CM

## 2022-11-09 DIAGNOSIS — N18.31: ICD-10-CM

## 2022-11-09 DIAGNOSIS — N31.9: Primary | ICD-10-CM

## 2022-11-09 DIAGNOSIS — G82.20: ICD-10-CM

## 2022-11-09 DIAGNOSIS — I12.9: ICD-10-CM

## 2022-11-09 DIAGNOSIS — Z79.899: ICD-10-CM

## 2022-11-09 DIAGNOSIS — T14.8XXA: ICD-10-CM

## 2022-11-09 DIAGNOSIS — Z79.82: ICD-10-CM

## 2022-11-09 LAB
ALT SERPL-CCNC: 21 U/L (ref 12–78)
AST SERPL-CCNC: 19 U/L (ref 15–37)
CREATININE (EXTERNAL): 1 MG/DL (ref 0.6–1)
GLUCOSE (EXTERNAL): 118 MG/DL (ref 74–106)
POTASSIUM (EXTERNAL): 4.3 MMOL/L (ref 3.6–5.2)

## 2022-11-09 PROCEDURE — 80053 COMPREHEN METABOLIC PANEL: CPT

## 2022-11-09 PROCEDURE — 85025 COMPLETE CBC W/AUTO DIFF WBC: CPT

## 2022-11-09 PROCEDURE — 96365 THER/PROPH/DIAG IV INF INIT: CPT

## 2022-11-09 PROCEDURE — 99283 EMERGENCY DEPT VISIT LOW MDM: CPT

## 2022-11-09 PROCEDURE — 36415 COLL VENOUS BLD VENIPUNCTURE: CPT

## 2022-11-09 NOTE — TELEPHONE ENCOUNTER
Spoke with Coral MEIER. Agreed to plan for patient to go to ED for evaluation and discussion/plan for treatment.

## 2022-11-09 NOTE — TELEPHONE ENCOUNTER
Called [facility] and spoke to a nurse. I was asked to call Coral RN  [phone] as she is patient's nurse.  There was no answer    Received results today for patient that included a urine culture.  The urine culture results are from last week and show Enterobacter cloaca complex greater than 100,000.  There is sensitivity to gentamicin and intermediate sensitivity to nitrofurantoin.    Patient has not been evaluated with this infection. She has a suprapubic catheter in place.  Called home care to let them know I would like her to be evaluated today if at all possible.  She lives at a distance from this location and unlikely to get here for a visit today.    Recommend local evaluation and help with plan of care as it sounds like she also needs IV access should gentamicin be the course recommended.

## 2022-11-17 ENCOUNTER — TRANSFERRED RECORDS (OUTPATIENT)
Dept: HEALTH INFORMATION MANAGEMENT | Facility: CLINIC | Age: 52
End: 2022-11-17

## 2022-11-19 ENCOUNTER — HEALTH MAINTENANCE LETTER (OUTPATIENT)
Age: 52
End: 2022-11-19

## 2022-11-29 ENCOUNTER — MEDICAL CORRESPONDENCE (OUTPATIENT)
Dept: HEALTH INFORMATION MANAGEMENT | Facility: CLINIC | Age: 52
End: 2022-11-29

## 2022-12-15 ENCOUNTER — TRANSFERRED RECORDS (OUTPATIENT)
Dept: HEALTH INFORMATION MANAGEMENT | Facility: CLINIC | Age: 52
End: 2022-12-15

## 2022-12-18 NOTE — TELEPHONE ENCOUNTER
Ok for either alternative. Please notify pharmacy   Reason for Disposition   [1] Localized rash is very painful AND [2] no fever    Answer Assessment - Initial Assessment Questions  1. APPEARANCE of RASH: \"What does the rash look like? \" \"What color is the rash? \"      Red raised in spot and clusters  2. PETECHIAE SUSPECTED: For purple or deep red rashes, assess: \"Does the rash sharon? \"      Denies  3. LOCATION: \"Where is the rash located? \"       Behind the ears neck and arm pits  4. NUMBER: \"How many spots are there?\"         5. SIZE: \"How big are the spots? \" (Inches, centimeters or compare to size of a coin)       Eraser ontop of pencil  6. ONSET: \"When did the rash start? \"       Two hours ago  7. ITCHING: \"Does the rash itch? \" If so, ask: \"How bad is the itch? \"      It doesn't seem to brother her.     Protocols used: Rash or Redness - Localized-PEDIATRIC-

## 2022-12-23 ENCOUNTER — TELEPHONE (OUTPATIENT)
Dept: FAMILY MEDICINE | Facility: CLINIC | Age: 52
End: 2022-12-23

## 2022-12-23 NOTE — TELEPHONE ENCOUNTER
Forms/Letter Request     Type of form/letter: Plan of Care   Have you been seen for this request: N/A     Do we have the form/letter: Yes:      When is form/letter needed by: ASAP     How would you like the form/letter returned:  Fax Signature to:  [facility] @   [phone]     Patient Notified form requests are processed in 3-5 business days:No     Could we send this information to you in [employer] or would you prefer to receive a phone call?:   Patient would like to be contacted via [employer]

## 2023-01-05 ENCOUNTER — TELEPHONE (OUTPATIENT)
Dept: FAMILY MEDICINE | Facility: CLINIC | Age: 53
End: 2023-01-05

## 2023-01-05 DIAGNOSIS — T83.511A URINARY TRACT INFECTION ASSOCIATED WITH INDWELLING URETHRAL CATHETER, INITIAL ENCOUNTER (H): Primary | ICD-10-CM

## 2023-01-05 DIAGNOSIS — N39.0 URINARY TRACT INFECTION ASSOCIATED WITH INDWELLING URETHRAL CATHETER, INITIAL ENCOUNTER (H): Primary | ICD-10-CM

## 2023-01-05 RX ORDER — NITROFURANTOIN 25; 75 MG/1; MG/1
100 CAPSULE ORAL 2 TIMES DAILY
Qty: 20 CAPSULE | Refills: 0 | Status: SHIPPED | OUTPATIENT
Start: 2023-01-05 | End: 2023-01-15

## 2023-01-05 NOTE — TELEPHONE ENCOUNTER
Spoke with Quiana and read back verbatim the message Dr Kasper wrote.    She asked if that was the antibiotic she was allergic to.  I did let her know that it was not and that she had taken this antibiotic before and then we went over her allergies.    Patient was grateful    Closing encounter.      Jannet Rdz RN  [facility] ~ Registered Nurse  Clinic Triage ~ [place] [facility] [place]  January 5, 2023

## 2023-01-05 NOTE — TELEPHONE ENCOUNTER
Received urine culture results. Recommend nitrofurantoin 100 mg twice daily for 10 days.     Prescription sent.

## 2023-01-05 NOTE — TELEPHONE ENCOUNTER
Nystatin seemed to improve some symptoms. Can continue this. If needs refill of medication, let me know.    Render Post-Care Instructions In Note?: no Number Of Freeze-Thaw Cycles: 1 freeze-thaw cycle Show Aperture Variable?: Yes Consent: The patient's verbal consent was obtained including but not limited to risks of crusting, scabbing, blistering, scarring, darker or lighter pigmentary change, recurrence, incomplete removal and infection. Detail Level: Simple Post-Care Instructions: I reviewed with the patient in detail post-care instructions. Patient is to wear sunprotection, and avoid picking at any of the treated lesions. Pt may apply Vaseline to crusted or scabbing areas. Duration Of Freeze Thaw-Cycle (Seconds): 5

## 2023-01-09 ENCOUNTER — TELEPHONE (OUTPATIENT)
Dept: FAMILY MEDICINE | Facility: CLINIC | Age: 53
End: 2023-01-09

## 2023-01-09 NOTE — TELEPHONE ENCOUNTER
Forms/Letter Request    Type of form/letter: Jennifer payton order    Have you been seen for this request: N/A    Do we have the form/letter: Yes:      When is form/letter needed by: asap    How would you like the form/letter returned: Fax to    [employer] @ [phone]    Patient Notified form requests are processed in 3-5 business days:No    Could we send this information to you in [employer]hu or would you prefer to receive a phone call?:   Patient would like to be contacted via [employer]

## 2023-01-10 ENCOUNTER — MEDICAL CORRESPONDENCE (OUTPATIENT)
Dept: HEALTH INFORMATION MANAGEMENT | Facility: CLINIC | Age: 53
End: 2023-01-10

## 2023-01-13 ENCOUNTER — TELEPHONE (OUTPATIENT)
Dept: FAMILY MEDICINE | Facility: CLINIC | Age: 53
End: 2023-01-13

## 2023-01-13 ENCOUNTER — MEDICAL CORRESPONDENCE (OUTPATIENT)
Dept: HEALTH INFORMATION MANAGEMENT | Facility: CLINIC | Age: 53
End: 2023-01-13

## 2023-01-13 NOTE — TELEPHONE ENCOUNTER
Order/Referral Request    Who is requesting: [facility]     Orders being requested: Cath Order      When are orders needed by: ASAP    Has this been discussed with Provider: Yes    Standard written order  Please sign, date and fax back to [phone]

## 2023-01-17 ENCOUNTER — TRANSFERRED RECORDS (OUTPATIENT)
Dept: HEALTH INFORMATION MANAGEMENT | Facility: CLINIC | Age: 53
End: 2023-01-17

## 2023-01-20 ENCOUNTER — TELEPHONE (OUTPATIENT)
Dept: FAMILY MEDICINE | Facility: CLINIC | Age: 53
End: 2023-01-20

## 2023-01-26 ENCOUNTER — TELEPHONE (OUTPATIENT)
Dept: FAMILY MEDICINE | Facility: CLINIC | Age: 53
End: 2023-01-26
Payer: COMMERCIAL

## 2023-01-26 NOTE — TELEPHONE ENCOUNTER
Forms/Letter Request    Type of form/letter: Annual Face to Face Encounter    Have you been seen for this request: Yes      Do we have the form/letter: Yes:      When is form/letter needed by: asap    How would you like the form/letter returned: Complete and Sign and Fax to [facility]. @ [phone]    Patient Notified form requests are processed in 3-5 business days:No    Could we send this information to you in [employer] or would you prefer to receive a phone call?:   Patient would like to be contacted via [employer]

## 2023-02-02 ENCOUNTER — MEDICAL CORRESPONDENCE (OUTPATIENT)
Dept: HEALTH INFORMATION MANAGEMENT | Facility: CLINIC | Age: 53
End: 2023-02-02

## 2023-02-03 ENCOUNTER — MEDICAL CORRESPONDENCE (OUTPATIENT)
Dept: HEALTH INFORMATION MANAGEMENT | Facility: CLINIC | Age: 53
End: 2023-02-03

## 2023-02-03 ENCOUNTER — TELEPHONE (OUTPATIENT)
Dept: FAMILY MEDICINE | Facility: CLINIC | Age: 53
End: 2023-02-03
Payer: COMMERCIAL

## 2023-02-03 NOTE — TELEPHONE ENCOUNTER
Forms/Letter Request    Type of form/letter: Medical Supplies    Have you been seen for this request: Yes     Do we have the form/letter: Yes:     When is form/letter needed by: ASAP    How would you like the form/letter returned: Fax    Standered written order for medical supplies. Please sign, date and fax back to [phone]

## 2023-02-09 ENCOUNTER — TELEPHONE (OUTPATIENT)
Dept: FAMILY MEDICINE | Facility: CLINIC | Age: 53
End: 2023-02-09
Payer: COMMERCIAL

## 2023-02-09 NOTE — TELEPHONE ENCOUNTER
Forms/Letter Request    Type of form/letter: [facility]    Have you been seen for this request: Yes     Do we have the form/letter: Yes:     When is form/letter needed by: ASAP    How would you like the form/letter returned: Fax    Patient Notified form requests are processed in 3-5 business days:No    Could we send this information to you in [employer] or would you prefer to receive a phone call?:   No preference   Okay to leave a detailed message?: No at Other phone number:  Please FAX signed form to, [phone]. Thank you.

## 2023-02-24 ENCOUNTER — TELEPHONE (OUTPATIENT)
Dept: FAMILY MEDICINE | Facility: CLINIC | Age: 53
End: 2023-02-24
Payer: COMMERCIAL

## 2023-02-24 NOTE — TELEPHONE ENCOUNTER
Forms/Letter Request    Type of form/letter: Plan of Care    Have you been seen for this request: Yes      Do we have the form/letter: Yes:      When is form/letter needed by: as soon as time permits    How would you like the form/letter returned: Fax to:    [facility] @ [phone]    Patient Notified form requests are processed in 3-5 business days:No    Could we send this information to you in [employer] or would you prefer to receive a phone call?:   Patient would like to be contacted via [employer]

## 2023-02-28 DIAGNOSIS — F32.9 PROLONGED DEPRESSIVE REACTION: ICD-10-CM

## 2023-02-28 NOTE — LETTER
March 9, 2023      Quiana Canas  [address] [place] [address]  [place] [address]              Dear Quiana,    Your provider has sent a junito refill for your venlafaxine (EFFEXOR XR). You are due for an in person office visit in May/June before any more refills will be sent.      Please schedule via Justyle or call [phone].      Have a good dayDONALD [facility] [place]

## 2023-03-01 RX ORDER — VENLAFAXINE HYDROCHLORIDE 75 MG/1
225 CAPSULE, EXTENDED RELEASE ORAL DAILY
Qty: 90 CAPSULE | Refills: 1 | Status: SHIPPED | OUTPATIENT
Start: 2023-03-01 | End: 2023-05-02

## 2023-03-01 NOTE — TELEPHONE ENCOUNTER
"Requested Prescriptions   Pending Prescriptions Disp Refills    venlafaxine (EFFEXOR XR) 75 MG 24 hr capsule [Pharmacy Med Name: VENLAFAXINE HCL ER 75 MG CP 75 Capsule] 90 capsule 0     Sig: TAKE 3 CAPSULES (225 MG) BY MOUTH DAILY       Serotonin-Norepinephrine Reuptake Inhibitors  Failed - 2/28/2023  2:32 PM        Failed - PHQ-9 score of less than 5 in past 6 months     Please review last PHQ-9 score.           Failed - Recent (6 mo) or future (30 days) visit within the authorizing provider's specialty     Patient had office visit in the last 6 months or has a visit in the next 30 days with authorizing provider or within the authorizing provider's specialty.  See \"Patient Info\" tab in inbasket, or \"Choose Columns\" in Meds & Orders section of the refill encounter.            Passed - Blood pressure under 140/90 in past 12 months     BP Readings from Last 3 Encounters:   06/14/22 118/60   01/24/20 122/62   10/15/19 110/60                 Passed - Medication is active on med list        Passed - Patient is age 18 or older        Passed - No active pregnancy on record        Passed - Normal serum creatinine on file in past 12 months     Recent Labs   Lab Test 06/14/22  1217   CR 1.11*       Ok to refill medication if creatinine is low          Passed - No positive pregnancy test in past 12 months             "

## 2023-03-06 ENCOUNTER — TELEPHONE (OUTPATIENT)
Dept: FAMILY MEDICINE | Facility: CLINIC | Age: 53
End: 2023-03-06
Payer: COMMERCIAL

## 2023-03-06 NOTE — TELEPHONE ENCOUNTER
Staff sent [email or web address] message to notify patient of required appointment for further medication refills.

## 2023-03-06 NOTE — TELEPHONE ENCOUNTER
Forms/Letter Request    Type of form/letter: Medical Supplies    Have you been seen for this request: Yes     Do we have the form/letter: Yes:     When is form/letter needed by: ASAP    How would you like the form/letter returned: Fax    Please sign, date and fax back to: [phone]

## 2023-03-19 NOTE — TELEPHONE ENCOUNTER
Reason for Call:  Medication or medication refill:    Do you use a [facility]?  Name of the pharmacy and phone number for the current request:  [facility]    Name of the medication requested: lyrica    Other request: will need refill. Declined calling pharmacy    Can we leave a detailed message on this number? YES    Phone number patient can be reached at: Home number on file [phone] (home)    Best Time: any    Call taken on 6/13/2019 at 9:02 AM by Promise Cantu       DISPLAY PLAN FREE TEXT

## 2023-03-28 ENCOUNTER — TRANSFERRED RECORDS (OUTPATIENT)
Dept: HEALTH INFORMATION MANAGEMENT | Facility: CLINIC | Age: 53
End: 2023-03-28

## 2023-03-28 ENCOUNTER — TELEPHONE (OUTPATIENT)
Dept: FAMILY MEDICINE | Facility: CLINIC | Age: 53
End: 2023-03-28
Payer: COMMERCIAL

## 2023-03-28 NOTE — TELEPHONE ENCOUNTER
Received UA results done at [facility].  Reviewed results.     Patient has suprapubic catheter.     Waiting for culture results.      Please reach out to patient to see what symptoms she is having.  If mild cramping but no other issues, will await her culture results to decide on treatment.     If she is having any fever, vomiting or other concerns, please send to triage for further evaluation.

## 2023-03-29 ENCOUNTER — MYC MEDICAL ADVICE (OUTPATIENT)
Dept: FAMILY MEDICINE | Facility: CLINIC | Age: 53
End: 2023-03-29
Payer: COMMERCIAL

## 2023-03-29 NOTE — TELEPHONE ENCOUNTER
Left message for Quiana to call back triage nurse line to see what her symptoms are or if she is having any symptoms.    Please transfer to triage with call back.    Jannet Rdz RN  [facility] ~ Registered Nurse  Clinic Triage ~ [place] [facility] [place]  March 29, 2023

## 2023-03-30 ENCOUNTER — TELEPHONE (OUTPATIENT)
Dept: FAMILY MEDICINE | Facility: CLINIC | Age: 53
End: 2023-03-30
Payer: COMMERCIAL

## 2023-03-30 DIAGNOSIS — N39.0 CHRONIC UTI (URINARY TRACT INFECTION): Primary | ICD-10-CM

## 2023-03-30 NOTE — TELEPHONE ENCOUNTER
See my chart encounter 3/29/23 - sent message with providers questions.    Nikki FREDERICKN, RN  [facility]

## 2023-03-30 NOTE — TELEPHONE ENCOUNTER
Coral calls from [facility]. She is looking for a new standing order for UA/UCs on patient. For chronic UTIs.     Please fax to [phone]    OTONIEL MenjivarN, RN, PHN  Registered Nurse-Clinic Triage  [facility]  3/30/2023 at 10:37 AM

## 2023-03-30 NOTE — TELEPHONE ENCOUNTER
They need the orders faxed [phone]    Jannet Rdz RN  [facility] ~ Registered Nurse  Clinic Triage ~ [place] [facility] [place]  March 30, 2023

## 2023-03-30 NOTE — TELEPHONE ENCOUNTER
"See telephone encounter 3/28/23  \"Received UA results done at [facility].  Reviewed results.      Patient has suprapubic catheter.      Waiting for culture results.       Please reach out to patient to see what symptoms she is having.  If mild cramping but no other issues, will await her culture results to decide on treatment.      If she is having any fever, vomiting or other concerns, please send to triage for further evaluation.\"      Sent my chart message with providers questions above.     Nikki FREDERICKN, RN  [facility]    "

## 2023-03-31 NOTE — TELEPHONE ENCOUNTER
Urine culture not back yet as of 5:08 PM 3/31/2023.     Adrian Mathias MD  [facility]    Disclaimer: This note consists of symbols derived from keyboarding, dictation and/or voice recognition software. As a result, there may be errors in the script that have gone undetected. Please consider this when interpreting information found in this chart.

## 2023-03-31 NOTE — TELEPHONE ENCOUNTER
Patient has responded to my chart message and this has been routed to provider.     Closing telephone encounter.     Nikki FREDERICKN, RN  [facility]

## 2023-04-03 DIAGNOSIS — F41.9 ANXIETY: ICD-10-CM

## 2023-04-03 DIAGNOSIS — G89.4 CHRONIC PAIN SYNDROME: ICD-10-CM

## 2023-04-03 DIAGNOSIS — G82.20 PARAPLEGIA (H): ICD-10-CM

## 2023-04-03 RX ORDER — PREGABALIN 200 MG/1
200 CAPSULE ORAL 3 TIMES DAILY
Qty: 90 CAPSULE | Refills: 0 | Status: SHIPPED | OUTPATIENT
Start: 2023-04-03 | End: 2023-05-02

## 2023-04-03 RX ORDER — BUPROPION HYDROCHLORIDE 150 MG/1
TABLET, EXTENDED RELEASE ORAL
Qty: 180 TABLET | Refills: 0 | Status: SHIPPED | OUTPATIENT
Start: 2023-04-03 | End: 2023-06-13

## 2023-04-03 RX ORDER — BACLOFEN 20 MG/1
20 TABLET ORAL 4 TIMES DAILY
Qty: 120 TABLET | Refills: 3 | Status: SHIPPED | OUTPATIENT
Start: 2023-04-03 | End: 2023-06-14

## 2023-04-03 NOTE — TELEPHONE ENCOUNTER
Pending Prescriptions:                       Disp   Refills    Pregabalin (LYRICA) 200 MG capsule [Pharma*90 cap*0        Sig: TAKE 1 CAPSULE (200 MG) BY MOUTH 3 TIMES DAILY           ++SCHEDULE FOLLOW UP VISIT++    baclofen (LIORESAL) 20 MG tablet [Pharmacy*120 ta*3        Sig: TAKE 1 TABLET (20 MG) BY MOUTH 4 TIMES DAILY    buPROPion (WELLBUTRIN SR) 150 MG 12 hr tab*60 tab*0        Sig: TAKE 1 TABLET (150 MG) BY MOUTH 2 TIMES DAILY        Routing refill request to provider for review/approval because:  Drug not on the [facility] refill protocol   Patient is out of medication. Needs refilled today.     Lauri Quintanilla, OTONIELN, RN, PHN  [place]  April 3, 2023

## 2023-04-09 ENCOUNTER — HEALTH MAINTENANCE LETTER (OUTPATIENT)
Age: 53
End: 2023-04-09

## 2023-04-14 ENCOUNTER — TRANSFERRED RECORDS (OUTPATIENT)
Dept: HEALTH INFORMATION MANAGEMENT | Facility: CLINIC | Age: 53
End: 2023-04-14
Payer: COMMERCIAL

## 2023-04-24 DIAGNOSIS — G89.4 CHRONIC PAIN SYNDROME: ICD-10-CM

## 2023-04-26 RX ORDER — HYDROXYZINE HYDROCHLORIDE 25 MG/1
TABLET, FILM COATED ORAL
Qty: 90 TABLET | Refills: 0 | Status: SHIPPED | OUTPATIENT
Start: 2023-04-26 | End: 2023-06-13

## 2023-04-26 NOTE — TELEPHONE ENCOUNTER
Lou    Appointments in Next Year    Jun 13, 2023  2:00 PM  (Arrive by 1:40 PM)  Provider Visit with Dipti Kasper MD  [facility] ([facility] ) [phone]

## 2023-04-28 ENCOUNTER — TELEPHONE (OUTPATIENT)
Dept: FAMILY MEDICINE | Facility: CLINIC | Age: 53
End: 2023-04-28
Payer: COMMERCIAL

## 2023-04-28 DIAGNOSIS — Z53.9 DIAGNOSIS NOT YET DEFINED: Primary | ICD-10-CM

## 2023-04-28 PROCEDURE — G0179 MD RECERTIFICATION HHA PT: HCPCS | Performed by: FAMILY MEDICINE

## 2023-04-28 NOTE — TELEPHONE ENCOUNTER
Forms/Letter Request    Type of form/letter: Home Care Plan of care    Have you been seen for this request: Yes     Do we have the form/letter: Yes:     Who is the form from? Home care    Where did/will the form come from? form was faxed in    When is form/letter needed by: ASAP    How would you like the form/letter returned: Fax : [phone]    Please sign, date and fax back
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
## 2023-05-12 ENCOUNTER — TRANSFERRED RECORDS (OUTPATIENT)
Dept: HEALTH INFORMATION MANAGEMENT | Facility: CLINIC | Age: 53
End: 2023-05-12
Payer: COMMERCIAL

## 2023-05-17 NOTE — TELEPHONE ENCOUNTER
Cecilia Cogan is a 80 y.o. male  Chief Complaint   Patient presents with    Other     Started a month ago or last office visit - dry red spot on his right side of his abdomen      Vitals:    05/17/23 1026   BP: 125/74   Pulse: 56   Resp: 14   Temp: 98.5 °F (36.9 °C)   SpO2: 99%      Wt Readings from Last 3 Encounters:   05/17/23 277 lb (125.6 kg)   04/03/23 279 lb 3.2 oz (126.6 kg)   01/30/23 272 lb 6.4 oz (123.6 kg)     BMI Readings from Last 3 Encounters:   05/17/23 36.55 kg/m²   04/03/23 36.84 kg/m²   01/30/23 35.94 kg/m²     Health Maintenance Due   Topic Date Due    Diabetic retinal exam  Never done    Hepatitis C screen  Never done    DTaP/Tdap/Td vaccine (1 - Tdap) Never done    Shingles vaccine (2 of 2) 10/05/2021    COVID-19 Vaccine (4 - Booster for Ansari Peter series) 12/27/2021    Diabetic foot exam  06/14/2023     HPI  DM II - Fasting AM glucose almost always under 120. Once had a reading in 200s after eating cake the night before. Avoid cake now. Hemoglobin A1C   Date Value Ref Range Status   04/03/2023 6.3 (H) 4.0 - 5.6 % Final     Comment:     NEW METHOD  PLEASE NOTE NEW REFERENCE RANGE  (NOTE)  HbA1C Interpretive Ranges  <5.7              Normal  5.7 - 6.4         Consider Prediabetes  >6.5              Consider Diabetes       Lab Results   Component Value Date    LDLCALC 76.4 04/03/2023     Lab Results   Component Value Date    CREATININE 1.17 04/03/2023       Currently taking:   Key Antihyperglycemic Medications       Patient is on no antihyperglycemic meds. Exercising regularly. Wt Readings from Last 3 Encounters:   05/17/23 277 lb (125.6 kg)   04/03/23 279 lb 3.2 oz (126.6 kg)   01/30/23 272 lb 6.4 oz (123.6 kg)     HTN with A Fib hx - seeing Cardiology regularly. - BP controlled. No recent known episodes of A Fib. Unable to take carvedilol as rx'd because the pill falls apart while cutting it.  I encouraged pt to call his cardiologist to inform them of this.:  BP Readings from Last 3 Signed by     Mailed to [facility] pharmacy    Pt informed

## 2023-05-28 ENCOUNTER — TELEPHONE (OUTPATIENT)
Dept: FAMILY MEDICINE | Facility: CLINIC | Age: 53
End: 2023-05-28
Payer: COMMERCIAL

## 2023-05-28 NOTE — TELEPHONE ENCOUNTER
Patient Quality Outreach    Patient is due for the following:   Colon Cancer Screening  Breast Cancer Screening - Mammogram  Depression  -  PHQ-9 needed  Physical Preventive Adult Physical      Topic Date Due     Hepatitis B Vaccine (2 of 3 - 19+ 3-dose series) 03/06/2003     Pneumococcal Vaccine (2 - PCV) 10/25/2014     Zoster (Shingles) Vaccine (1 of 2) Never done     COVID-19 Vaccine (4 - Moderna series) 08/09/2022     Flu Vaccine (1) 09/01/2022       Next Steps:   Patient has upcoming appointment, these items will be addressed at that time.    Type of outreach:    Chart review performed, no outreach needed.      Questions for provider review:    None           Jyoti Faria, CMA

## 2023-06-01 ENCOUNTER — HEALTH MAINTENANCE LETTER (OUTPATIENT)
Age: 53
End: 2023-06-01

## 2023-06-13 ENCOUNTER — TRANSFERRED RECORDS (OUTPATIENT)
Dept: HEALTH INFORMATION MANAGEMENT | Facility: CLINIC | Age: 53
End: 2023-06-13

## 2023-06-13 ENCOUNTER — OFFICE VISIT (OUTPATIENT)
Dept: FAMILY MEDICINE | Facility: CLINIC | Age: 53
End: 2023-06-13
Payer: COMMERCIAL

## 2023-06-13 VITALS
TEMPERATURE: 98.4 F | HEART RATE: 95 BPM | OXYGEN SATURATION: 98 % | RESPIRATION RATE: 21 BRPM | SYSTOLIC BLOOD PRESSURE: 110 MMHG | DIASTOLIC BLOOD PRESSURE: 64 MMHG

## 2023-06-13 DIAGNOSIS — I10 BENIGN ESSENTIAL HYPERTENSION: ICD-10-CM

## 2023-06-13 DIAGNOSIS — R56.9 SEIZURES (H): ICD-10-CM

## 2023-06-13 DIAGNOSIS — F43.10 PTSD (POST-TRAUMATIC STRESS DISORDER): ICD-10-CM

## 2023-06-13 DIAGNOSIS — G89.4 CHRONIC PAIN SYNDROME: ICD-10-CM

## 2023-06-13 DIAGNOSIS — Z93.59 SUPRAPUBIC CATHETER (H): ICD-10-CM

## 2023-06-13 DIAGNOSIS — E03.4 HYPOTHYROIDISM DUE TO ACQUIRED ATROPHY OF THYROID: ICD-10-CM

## 2023-06-13 DIAGNOSIS — F41.9 ANXIETY: ICD-10-CM

## 2023-06-13 DIAGNOSIS — N39.0 RECURRENT UTI: Primary | ICD-10-CM

## 2023-06-13 DIAGNOSIS — Z12.11 SCREEN FOR COLON CANCER: ICD-10-CM

## 2023-06-13 DIAGNOSIS — G82.20 PARAPLEGIA (H): ICD-10-CM

## 2023-06-13 DIAGNOSIS — Z12.31 VISIT FOR SCREENING MAMMOGRAM: ICD-10-CM

## 2023-06-13 PROCEDURE — 99214 OFFICE O/P EST MOD 30 MIN: CPT | Performed by: FAMILY MEDICINE

## 2023-06-13 ASSESSMENT — ANXIETY QUESTIONNAIRES
7. FEELING AFRAID AS IF SOMETHING AWFUL MIGHT HAPPEN: NOT AT ALL
GAD7 TOTAL SCORE: 4
2. NOT BEING ABLE TO STOP OR CONTROL WORRYING: NOT AT ALL
IF YOU CHECKED OFF ANY PROBLEMS ON THIS QUESTIONNAIRE, HOW DIFFICULT HAVE THESE PROBLEMS MADE IT FOR YOU TO DO YOUR WORK, TAKE CARE OF THINGS AT HOME, OR GET ALONG WITH OTHER PEOPLE: NOT DIFFICULT AT ALL
6. BECOMING EASILY ANNOYED OR IRRITABLE: SEVERAL DAYS
GAD7 TOTAL SCORE: 4
3. WORRYING TOO MUCH ABOUT DIFFERENT THINGS: SEVERAL DAYS
1. FEELING NERVOUS, ANXIOUS, OR ON EDGE: NOT AT ALL
5. BEING SO RESTLESS THAT IT IS HARD TO SIT STILL: SEVERAL DAYS

## 2023-06-13 ASSESSMENT — PATIENT HEALTH QUESTIONNAIRE - PHQ9
SUM OF ALL RESPONSES TO PHQ QUESTIONS 1-9: 7
5. POOR APPETITE OR OVEREATING: SEVERAL DAYS

## 2023-06-13 ASSESSMENT — PAIN SCALES - GENERAL: PAINLEVEL: NO PAIN (0)

## 2023-06-13 NOTE — PROGRESS NOTES
Assessment & Plan     Recurrent UTI  Suprapubic catheter (H)  Patient is requesting referral again to urology.  She has history of recurrent UTIs and they have been becoming more difficult to treat due to drug resistance.  Have requested that she see urology but has not been yet completed.  Patient is willing at this time to receive the referral and a referral is placed.  Patient agrees to this plan will reach out as needed in the meantime.  - Adult Urology  Referral; Future    Paraplegia (H)  Patient utilizes baclofen for muscle spasms.  She does well with the baclofen and needs a refill.  Refills given.  - baclofen (LIORESAL) 20 MG tablet; Take 1 tablet (20 mg) by mouth 4 times daily    Seizures (H)  No recent seizures.  We will have her continue on the Keppra at the current dose.  This was not discussed today but she has requested in the past to stop the Keppra.  Recommend that she be seen by neurology should she want to quit this in the future.  - levETIRAcetam (KEPPRA) 750 MG tablet; Take 1 tablet (750 mg) by mouth 2 times daily    Benign essential hypertension  Patient is tolerating the medication well.  Blood pressure looks well controlled today.  Recommend obtaining labs as soon as able.  Orders will be sent to her clinic near her home.  - lisinopril (ZESTRIL) 20 MG tablet; Take 1 tablet (20 mg) by mouth daily  - Comprehensive metabolic panel (BMP + Alb, Alk Phos, ALT, AST, Total. Bili, TP); Future    Posttraumatic stress disorder  Anxiety  Patient reports she is doing very well with anxiety and depression.  She is not having any concerns and would like to remain on the current medication.  She would like to keep things the same and no changes were made today.  - buPROPion (WELLBUTRIN SR) 150 MG 12 hr tablet; Take 1 tablet (150 mg) by mouth 2 times daily    Chronic pain syndrome  Patient remains under the care of [facility].  She uses hydroxyzine and metaxalone as needed for spasms and  itching related to medication.  Refills are given today.  - hydrOXYzine (ATARAX) 25 MG tablet; Take 1 tablet (25 mg) by mouth every 8 hours as needed for itching  - metaxalone (SKELAXIN) 800 MG tablet; Take 1 tablet (800 mg) by mouth 3 times daily as needed for muscle spasms    Hypothyroidism due to acquired atrophy of thyroid  Patient is due for recheck of her TSH.  She was given a refill until she is able to complete the labs.  Patient agrees to plan.  - TSH WITH FREE T4 REFLEX; Future  - levothyroxine (SYNTHROID/LEVOTHROID) 75 MCG tablet; Take 1 tablet (75 mcg) by mouth daily    Screen for colon cancer  Patient declines colonoscopy but does have a Cologuard kit that had previously been sent.  She says she will complete the kit.    Visit for screening mammogram  Patient is due for mammogram and orders placed.  - MA SCREENING DIGITAL BILAT - Future  (s+30); Future             Nicotine/Tobacco Cessation:  She reports that she has been smoking cigarettes. She has a 20.00 pack-year smoking history. She has never used smokeless tobacco.  Nicotine/Tobacco Cessation Plan:   Information offered: Patient not interested at this time          Dipti Kasper MD  [facility] ARSENIO Araya is a 53 year old, presenting for the following health issues:  Recheck Medication        6/13/2023     1:36 PM   Additional Questions   Roomed by Krista CHIANG   Accompanied by None     History of Present Illness       Reason for visit:  Medications    She eats 2-3 servings of fruits and vegetables daily.She consumes 5 sweetened beverage(s) daily.She exercises with enough effort to increase her heart rate 9 or less minutes per day.  She exercises with enough effort to increase her heart rate 3 or less days per week.   She is taking medications regularly.       Medication Followup of ALL MEDS     Taking Medication as prescribed: yes    Side Effects:  None    Medication Helping Symptoms:  yes    Hypertension  Follow-up      Do you check your blood pressure regularly outside of the clinic? Yes     Are you following a low salt diet? No    Are your blood pressures ever more than 140 on the top number (systolic) OR more   than 90 on the bottom number (diastolic), for example 140/90? No    Depression and Anxiety Follow-Up    How are you doing with your depression since your last visit? No change    How are you doing with your anxiety since your last visit?  No change    Are you having other symptoms that might be associated with depression or anxiety? No    Have you had a significant life event? No     Do you have any concerns with your use of alcohol or other drugs? No    Social History     Tobacco Use     Smoking status: Every Day     Packs/day: 1.00     Years: 20.00     Pack years: 20.00     Types: Cigarettes     Smokeless tobacco: Never   Vaping Use     Vaping status: Never Used     Passive vaping exposure: Yes   Substance Use Topics     Alcohol use: No     Comment: none      Drug use: No     Comment: none          2/8/2022     1:28 PM 6/14/2022    10:46 AM 6/13/2023     4:56 PM   PHQ   PHQ-9 Total Score 3 8 7   Q9: Thoughts of better off dead/self-harm past 2 weeks Not at all Not at all Not at all         2/8/2022     1:28 PM 6/14/2022    10:46 AM 6/13/2023     4:56 PM   SHYLA-7 SCORE   Total Score 2 (minimal anxiety)     Total Score 2 8 4         Suicide Assessment Five-step Evaluation and Treatment (SAFE-T)      SMOKING CESSATION she has patches. She has not used them. Still has them available and is considering. She will check back if assistance is needed.     Hypothyroidism Follow-up      Since last visit, patient describes the following symptoms: Weight stable, no hair loss, no skin changes, no constipation, no loose stools    Seizures  No history of recent seizures.  She has no concerns with this today.    She sees pain management.  She reports she has a appointment with [facility] pain in the next 30 minutes and is  very anxious to leave this appointment.  She reports she is unable to stay for labs today and request these be sent to [facility] in [facility].      Review of Systems   CONSTITUTIONAL: NEGATIVE for fever, chills, change in weight  ENT/MOUTH: NEGATIVE for ear, mouth and throat problems  RESP: NEGATIVE for significant cough or SOB  CV: NEGATIVE for chest pain, palpitations or peripheral edema      Objective    /64 (BP Location: Left arm, Patient Position: Chair, Cuff Size: Adult Regular)   Pulse 95   Temp 98.4  F (36.9  C) (Temporal)   Resp 21   LMP  (LMP Unknown)   SpO2 98%   Breastfeeding No   There is no height or weight on file to calculate BMI.  Physical Exam   GENERAL: alert, no distress and sitting in a wheelchair  NECK: no adenopathy, no asymmetry, masses, or scars and thyroid normal to palpation  RESP: lungs clear to auscultation - no rales, rhonchi or wheezes  CV: regular rate and rhythm, normal S1 S2, no S3 or S4, no murmur, click or rub, no peripheral edema and peripheral pulses strong  ABDOMEN: soft, nontender, no hepatosplenomegaly, no masses and bowel sounds normal  MS: no gross musculoskeletal defects noted, no edema  PSYCH: mentation appears normal, affect normal/bright

## 2023-06-13 NOTE — PATIENT INSTRUCTIONS
Can try Wegovy or Semaglutide for weight management. Speak with insurance regarding coverage.

## 2023-06-14 ENCOUNTER — TELEPHONE (OUTPATIENT)
Dept: FAMILY MEDICINE | Facility: CLINIC | Age: 53
End: 2023-06-14
Payer: COMMERCIAL

## 2023-06-14 RX ORDER — LEVETIRACETAM 750 MG/1
750 TABLET ORAL 2 TIMES DAILY
Qty: 180 TABLET | Refills: 0 | Status: SHIPPED | OUTPATIENT
Start: 2023-06-14 | End: 2023-11-13

## 2023-06-14 RX ORDER — LISINOPRIL 20 MG/1
20 TABLET ORAL DAILY
Qty: 90 TABLET | Refills: 0 | Status: SHIPPED | OUTPATIENT
Start: 2023-06-14 | End: 2023-12-14

## 2023-06-14 RX ORDER — BACLOFEN 20 MG/1
20 TABLET ORAL 4 TIMES DAILY
Qty: 120 TABLET | Refills: 3 | Status: SHIPPED | OUTPATIENT
Start: 2023-06-14 | End: 2023-09-25

## 2023-06-14 RX ORDER — LEVOTHYROXINE SODIUM 75 UG/1
75 TABLET ORAL DAILY
Qty: 90 TABLET | Refills: 0 | Status: SHIPPED | OUTPATIENT
Start: 2023-06-14 | End: 2023-12-14

## 2023-06-14 RX ORDER — HYDROXYZINE HYDROCHLORIDE 25 MG/1
25 TABLET, FILM COATED ORAL EVERY 8 HOURS PRN
Qty: 90 TABLET | Refills: 0 | Status: SHIPPED | OUTPATIENT
Start: 2023-06-14 | End: 2024-01-16

## 2023-06-14 RX ORDER — METAXALONE 800 MG/1
800 TABLET ORAL 3 TIMES DAILY PRN
Qty: 270 TABLET | Refills: 0 | Status: SHIPPED | OUTPATIENT
Start: 2023-06-14

## 2023-06-14 RX ORDER — BUPROPION HYDROCHLORIDE 150 MG/1
150 TABLET, EXTENDED RELEASE ORAL 2 TIMES DAILY
Qty: 180 TABLET | Refills: 0 | Status: SHIPPED | OUTPATIENT
Start: 2023-06-14 | End: 2023-11-13

## 2023-06-14 NOTE — TELEPHONE ENCOUNTER
Please fax lab orders to [facility] in [facility] for completion by patient.

## 2023-06-16 ENCOUNTER — TELEPHONE (OUTPATIENT)
Dept: FAMILY MEDICINE | Facility: CLINIC | Age: 53
End: 2023-06-16
Payer: COMMERCIAL

## 2023-06-16 NOTE — TELEPHONE ENCOUNTER
Forms/Letter Request    Type of form/letter: Standared writen order    Have you been seen for this request: Yes     Do we have the form/letter: Yes:     Who is the form from? [employer] (if other please explain)    Where did/will the form come from? form was faxed in    When is form/letter needed by: ASAP    How would you like the form/letter returned: Fax : [phone]    Please fax completed form, with Signature and date

## 2023-06-19 ENCOUNTER — TELEPHONE (OUTPATIENT)
Dept: FAMILY MEDICINE | Facility: CLINIC | Age: 53
End: 2023-06-19
Payer: COMMERCIAL

## 2023-06-19 ENCOUNTER — MEDICAL CORRESPONDENCE (OUTPATIENT)
Dept: HEALTH INFORMATION MANAGEMENT | Facility: CLINIC | Age: 53
End: 2023-06-19
Payer: COMMERCIAL

## 2023-06-19 NOTE — TELEPHONE ENCOUNTER
Forms/Letter Request    Type of form/letter: Plan of Care    Have you been seen for this request: Yes     Do we have the form/letter: Yes:      Who is the form from? Home care    Where did/will the form come from? form was faxed in    When is form/letter needed by: as soon as time permits    How would you like the form/letter returned: Fax : TO:  [facility] @ [phone]    Patient Notified form requests are processed in 3-5 business days:No    Could we send this information to you in [employer] or would you prefer to receive a phone call?:   Patient would like to be contacted via [employer]

## 2023-06-19 NOTE — TELEPHONE ENCOUNTER
Faxed, copy sent to scan and placed in scan bin.     Jyoti Faria CMA ([facility])

## 2023-06-20 DIAGNOSIS — Z53.9 DIAGNOSIS NOT YET DEFINED: Primary | ICD-10-CM

## 2023-06-20 PROCEDURE — G0179 MD RECERTIFICATION HHA PT: HCPCS | Performed by: FAMILY MEDICINE

## 2023-06-28 ENCOUNTER — TELEPHONE (OUTPATIENT)
Dept: FAMILY MEDICINE | Facility: CLINIC | Age: 53
End: 2023-06-28
Payer: COMMERCIAL

## 2023-06-28 DIAGNOSIS — G89.4 CHRONIC PAIN SYNDROME: ICD-10-CM

## 2023-06-28 DIAGNOSIS — G82.20 PARAPLEGIA (H): Primary | ICD-10-CM

## 2023-06-28 DIAGNOSIS — G89.21 CHRONIC PAIN DUE TO INJURY: ICD-10-CM

## 2023-06-28 NOTE — TELEPHONE ENCOUNTER
Order/Referral Request    Who is requesting: patient    Orders being requested: referral to [facility] in [place]    Reason service is needed/diagnosis: pain management    When are orders needed by: asap    Has this been discussed with Provider: Yes    Does patient have a preference on a Group/Provider/Facility? [facility]    Does patient have an appointment scheduled?: No    Where to send orders: Fax [phone]    Could we send this information to you in [employer] or would you prefer to receive a phone call?:   Patient would prefer a phone call   Okay to leave a detailed message?: Yes at Home number on file [phone] (home)   Fax to [phone]

## 2023-06-30 DIAGNOSIS — G89.4 CHRONIC PAIN SYNDROME: ICD-10-CM

## 2023-06-30 DIAGNOSIS — G82.20 PARAPLEGIA (H): ICD-10-CM

## 2023-06-30 RX ORDER — PREGABALIN 200 MG/1
200 CAPSULE ORAL 3 TIMES DAILY
Qty: 90 CAPSULE | Refills: 0 | Status: SHIPPED | OUTPATIENT
Start: 2023-06-30 | End: 2023-07-17

## 2023-06-30 NOTE — TELEPHONE ENCOUNTER
Pending Prescriptions:                       Disp   Refills    Pregabalin (LYRICA) 200 MG capsule         90 cap*0        Sig: Take 1 capsule (200 mg) by mouth 3 times daily    Routing refill request to provider for review/approval because:  Drug not on the [facility] refill protocol

## 2023-07-10 ENCOUNTER — TELEPHONE (OUTPATIENT)
Dept: FAMILY MEDICINE | Facility: CLINIC | Age: 53
End: 2023-07-10
Payer: COMMERCIAL

## 2023-07-10 NOTE — TELEPHONE ENCOUNTER
Forms/Letter Request    Type of form/letter: Colostomy Supplies    Have you been seen for this request: N/A    Do we have the form/letter: Yes:      Who is the form from? [employer] (if other please explain)    Where did/will the form come from? form was faxed in    When is form/letter needed by: as soon as time permits    How would you like the form/letter returned: Fax : to:  [employer] @ [phone]    Patient Notified form requests are processed in 3-5 business days:No    Could we send this information to you in [employer] or would you prefer to receive a phone call?:   Patient would like to be contacted via [employer]

## 2023-07-17 ENCOUNTER — MEDICAL CORRESPONDENCE (OUTPATIENT)
Dept: HEALTH INFORMATION MANAGEMENT | Facility: CLINIC | Age: 53
End: 2023-07-17
Payer: COMMERCIAL

## 2023-07-25 ENCOUNTER — MYC MEDICAL ADVICE (OUTPATIENT)
Dept: FAMILY MEDICINE | Facility: CLINIC | Age: 53
End: 2023-07-25
Payer: COMMERCIAL

## 2023-07-31 DIAGNOSIS — G89.21 CHRONIC PAIN DUE TO INJURY: ICD-10-CM

## 2023-08-01 RX ORDER — IBUPROFEN 200 MG
TABLET ORAL
Qty: 120 TABLET | Refills: 0 | Status: SHIPPED | OUTPATIENT
Start: 2023-08-01 | End: 2024-01-16

## 2023-08-01 NOTE — TELEPHONE ENCOUNTER
Forms completed. Please fax.    Fluconazole Pregnancy And Lactation Text: This medication is Pregnancy Category C and it isn't know if it is safe during pregnancy. It is also excreted in breast milk.

## 2023-08-08 ENCOUNTER — TELEPHONE (OUTPATIENT)
Dept: FAMILY MEDICINE | Facility: CLINIC | Age: 53
End: 2023-08-08
Payer: COMMERCIAL

## 2023-08-08 NOTE — TELEPHONE ENCOUNTER
Forms/Letter Request    Type of form/letter: Needs new letter for med supplies    Have you been seen for this request: N/A    Do we have the form/letter: Yes    Who is the form from? [employer] (if other please explain)    Where did/will the form come from? form was faxed in    When is form/letter needed by: as time permits     How would you like the form/letter returned: Fax : [phone]    Patient Notified form requests are processed in 3-5 business days:No    Could we send this information to you in [employer] or would you prefer to receive a phone call?:   Patient would like to be contacted via [employer]

## 2023-08-14 ENCOUNTER — TRANSFERRED RECORDS (OUTPATIENT)
Dept: HEALTH INFORMATION MANAGEMENT | Facility: CLINIC | Age: 53
End: 2023-08-14
Payer: COMMERCIAL

## 2023-08-24 ENCOUNTER — TELEPHONE (OUTPATIENT)
Dept: FAMILY MEDICINE | Facility: CLINIC | Age: 53
End: 2023-08-24
Payer: COMMERCIAL

## 2023-08-24 NOTE — CONFIDENTIAL NOTE
Type of form/letter: Plan of Care     Have you been seen for this request: Yes      Do we have the form/letter: Yes:       Who is the form from? Home care     Where did/will the form come from? form was faxed in     When is form/letter needed by: as soon as time permits     How would you like the form/letter returned: Fax : TO:  [facility] @ [phone]     Patient Notified form requests are processed in 3-5 business days:No     Could we send this information to you in [employer] or would you prefer to receive a phone call?:   Patient would like to be contacted via [employer]

## 2023-08-25 DIAGNOSIS — Z53.9 DIAGNOSIS NOT YET DEFINED: Primary | ICD-10-CM

## 2023-08-25 PROCEDURE — G0179 MD RECERTIFICATION HHA PT: HCPCS | Performed by: FAMILY MEDICINE

## 2023-09-11 ENCOUNTER — TRANSFERRED RECORDS (OUTPATIENT)
Dept: HEALTH INFORMATION MANAGEMENT | Facility: CLINIC | Age: 53
End: 2023-09-11
Payer: COMMERCIAL

## 2023-09-25 DIAGNOSIS — G82.20 PARAPLEGIA (H): ICD-10-CM

## 2023-09-25 DIAGNOSIS — Z00.00 ROUTINE GENERAL MEDICAL EXAMINATION AT A HEALTH CARE FACILITY: ICD-10-CM

## 2023-09-25 RX ORDER — ASPIRIN 81 MG
TABLET, DELAYED RELEASE (ENTERIC COATED) ORAL
Qty: 90 TABLET | Refills: 3 | Status: SHIPPED | OUTPATIENT
Start: 2023-09-25

## 2023-09-25 RX ORDER — BACLOFEN 20 MG/1
20 TABLET ORAL 4 TIMES DAILY
Qty: 120 TABLET | Refills: 0 | Status: SHIPPED | OUTPATIENT
Start: 2023-09-25 | End: 2023-11-28

## 2023-09-26 DIAGNOSIS — R05.9 COUGH: ICD-10-CM

## 2023-09-27 RX ORDER — ALBUTEROL SULFATE 90 UG/1
2 AEROSOL, METERED RESPIRATORY (INHALATION) EVERY 6 HOURS PRN
Qty: 6.7 G | Refills: 0 | Status: SHIPPED | OUTPATIENT
Start: 2023-09-27

## 2023-10-03 ENCOUNTER — TELEPHONE (OUTPATIENT)
Dept: FAMILY MEDICINE | Facility: CLINIC | Age: 53
End: 2023-10-03
Payer: COMMERCIAL

## 2023-10-03 ENCOUNTER — MEDICAL CORRESPONDENCE (OUTPATIENT)
Dept: HEALTH INFORMATION MANAGEMENT | Facility: CLINIC | Age: 53
End: 2023-10-03
Payer: COMMERCIAL

## 2023-10-03 NOTE — TELEPHONE ENCOUNTER
Form from [facility] received, signed by provider and faxed. Sent to scanning.

## 2023-10-11 ENCOUNTER — TRANSFERRED RECORDS (OUTPATIENT)
Dept: HEALTH INFORMATION MANAGEMENT | Facility: CLINIC | Age: 53
End: 2023-10-11
Payer: COMMERCIAL

## 2023-10-13 ENCOUNTER — TELEPHONE (OUTPATIENT)
Dept: FAMILY MEDICINE | Facility: CLINIC | Age: 53
End: 2023-10-13
Payer: COMMERCIAL

## 2023-10-13 DIAGNOSIS — G82.20 PARAPLEGIA (H): Primary | ICD-10-CM

## 2023-10-13 NOTE — TELEPHONE ENCOUNTER
HAMILTON Junior Care coordinator from the [facility] is calling and looking for a home care orders to have patient get measured and provided a wheelchair. She reports patient is home bound and for PT/OT.    Call back number [phone]    Fax: [phone]    [facility] would be the local home care provider.      Needs to state patient is homebound in the order for the wheel chair    Would also to have PT/OT evaluate and treat sent as well.    Last OV 06/13/2023    Naseem Melendez, MSN, RN, PHN  [facility] ~ Registered Nurse  Clinic Triage ~ [place] [facility] [place]  October 13, 2023

## 2023-10-20 ENCOUNTER — TELEPHONE (OUTPATIENT)
Dept: FAMILY MEDICINE | Facility: CLINIC | Age: 53
End: 2023-10-20
Payer: COMMERCIAL

## 2023-10-20 DIAGNOSIS — Z53.9 DIAGNOSIS NOT YET DEFINED: Primary | ICD-10-CM

## 2023-10-20 PROCEDURE — G0179 MD RECERTIFICATION HHA PT: HCPCS | Performed by: FAMILY MEDICINE

## 2023-10-20 NOTE — TELEPHONE ENCOUNTER
Forms/Letter Request    Type of form/letter:  HOME HEALTH     Have you been seen for this request: N/A    Do we have the form/letter: Yes    Who is the form from? Home care    Where did/will the form come from? form was faxed in    When is form/letter needed by: As soon as you are able to     How would you like the form/letter returned: Fax : [phone]    Patient Notified form requests are processed in 3-5 business days:No    Could we send this information to you in [employer] or would you prefer to receive a phone call?:   Patient would like to be contacted via [employer]

## 2023-10-28 ENCOUNTER — NURSE TRIAGE (OUTPATIENT)
Dept: NURSING | Facility: CLINIC | Age: 53
End: 2023-10-28
Payer: COMMERCIAL

## 2023-10-28 DIAGNOSIS — G89.4 CHRONIC PAIN SYNDROME: ICD-10-CM

## 2023-10-28 DIAGNOSIS — G82.20 PARAPLEGIA (H): ICD-10-CM

## 2023-10-28 NOTE — TELEPHONE ENCOUNTER
Call from [facility] who says patient is requesting refill of pregabalin. Patient takes this for nerve pain; last Rx was by Dr. Kasper to [facility], [place], [place].    Message routed to clinic for refill.      John Kumar RN, BSN  Triage Nurse Advisor      Reason for Disposition   Caller requesting a CONTROLLED substance prescription refill (e.g., narcotics, ADHD medicines)    Protocols used: Medication Refill and Renewal Call-A-

## 2023-10-30 ENCOUNTER — TELEPHONE (OUTPATIENT)
Dept: FAMILY MEDICINE | Facility: CLINIC | Age: 53
End: 2023-10-30
Payer: COMMERCIAL

## 2023-10-30 RX ORDER — PREGABALIN 200 MG/1
200 CAPSULE ORAL 3 TIMES DAILY
Qty: 270 CAPSULE | Refills: 0 | Status: SHIPPED | OUTPATIENT
Start: 2023-10-30 | End: 2024-01-16

## 2023-10-30 NOTE — TELEPHONE ENCOUNTER
Patient is due for labs. Orders were placed and faxed in June but don't appear done. Please reach out to patient.

## 2023-10-30 NOTE — TELEPHONE ENCOUNTER
Forms/Letter Request    Type of form/letter:  [facility]    Have you been seen for this request: N/A    Do we have the form/letter: Yes    Who is the form from? Home care    Where did/will the form come from? form was faxed in    When is form/letter needed by: as soon as you are able to complete and fax back     How would you like the form/letter returned: Fax : [phone]    Patient Notified form requests are processed in 3-5 business days:No    Could we send this information to you in [employer] or would you prefer to receive a phone call?:   Patient would like to be contacted via [employer]

## 2023-10-30 NOTE — TELEPHONE ENCOUNTER
Staff sent [employer] message to notify patient of required appointment for further medication refills.

## 2023-10-31 NOTE — TELEPHONE ENCOUNTER
Form requires provider signature only, routed to provider.    Jyoti Faria CMA ([facility])

## 2023-11-01 DIAGNOSIS — Z53.9 DIAGNOSIS NOT YET DEFINED: Primary | ICD-10-CM

## 2023-11-01 PROCEDURE — G0179 MD RECERTIFICATION HHA PT: HCPCS | Performed by: FAMILY MEDICINE

## 2023-11-01 PROCEDURE — 99207 PR MD RECERTIFICATION HHA PT: CPT | Performed by: FAMILY MEDICINE

## 2023-11-08 ENCOUNTER — TRANSFERRED RECORDS (OUTPATIENT)
Dept: HEALTH INFORMATION MANAGEMENT | Facility: CLINIC | Age: 53
End: 2023-11-08
Payer: COMMERCIAL

## 2023-11-13 DIAGNOSIS — F32.9 PROLONGED DEPRESSIVE REACTION: ICD-10-CM

## 2023-11-13 DIAGNOSIS — R56.9 SEIZURES (H): ICD-10-CM

## 2023-11-13 DIAGNOSIS — F41.9 ANXIETY: ICD-10-CM

## 2023-11-13 RX ORDER — LEVETIRACETAM 750 MG/1
750 TABLET ORAL 2 TIMES DAILY
Qty: 180 TABLET | Refills: 0 | Status: SHIPPED | OUTPATIENT
Start: 2023-11-13 | End: 2024-02-12

## 2023-11-13 RX ORDER — VENLAFAXINE HYDROCHLORIDE 75 MG/1
225 CAPSULE, EXTENDED RELEASE ORAL DAILY
Qty: 90 CAPSULE | Refills: 0 | Status: SHIPPED | OUTPATIENT
Start: 2023-11-13 | End: 2023-12-12

## 2023-11-13 RX ORDER — BUPROPION HYDROCHLORIDE 150 MG/1
150 TABLET, EXTENDED RELEASE ORAL 2 TIMES DAILY
Qty: 180 TABLET | Refills: 0 | Status: SHIPPED | OUTPATIENT
Start: 2023-11-13 | End: 2024-02-12

## 2023-11-14 DIAGNOSIS — Z53.9 DIAGNOSIS NOT YET DEFINED: Primary | ICD-10-CM

## 2023-11-14 PROCEDURE — 99207 PR MD RECERTIFICATION HHA PT: CPT | Performed by: FAMILY MEDICINE

## 2023-11-14 PROCEDURE — G0179 MD RECERTIFICATION HHA PT: HCPCS | Performed by: FAMILY MEDICINE

## 2023-11-28 ENCOUNTER — TELEPHONE (OUTPATIENT)
Dept: FAMILY MEDICINE | Facility: CLINIC | Age: 53
End: 2023-11-28
Payer: COMMERCIAL

## 2023-11-28 DIAGNOSIS — G82.20 PARAPLEGIA (H): ICD-10-CM

## 2023-11-28 RX ORDER — BACLOFEN 20 MG/1
20 TABLET ORAL 4 TIMES DAILY
Qty: 120 TABLET | Refills: 0 | Status: SHIPPED | OUTPATIENT
Start: 2023-11-28 | End: 2023-12-28

## 2023-11-28 NOTE — TELEPHONE ENCOUNTER
Pt calling for refill of baclofen     RN relayed medication was sent to   [id number] - [place], [place] - [address] AT [facility] & [address] [place] [address]       Patient verbalized understanding and in agreement with plan of care.     Vilma Dick RN

## 2023-12-01 ENCOUNTER — MEDICAL CORRESPONDENCE (OUTPATIENT)
Dept: HEALTH INFORMATION MANAGEMENT | Facility: CLINIC | Age: 53
End: 2023-12-01
Payer: COMMERCIAL

## 2023-12-01 ENCOUNTER — TELEPHONE (OUTPATIENT)
Dept: FAMILY MEDICINE | Facility: CLINIC | Age: 53
End: 2023-12-01
Payer: COMMERCIAL

## 2023-12-01 NOTE — TELEPHONE ENCOUNTER
Forms/Letter Request    Type of form/letter:  Order request     Have you been seen for this request: N/A    Do we have the form/letter: Yes    Who is the form from? [employer]  (if other please explain)    Where did/will the form come from? form was faxed in    When is form/letter needed by: at your earliest convenience     How would you like the form/letter returned: Fax : [phone]    Patient Notified form requests are processed in 3-5 business days:No    Could we send this information to you in [email or web address] or would you prefer to receive a phone call?:   Patient would like to be contacted via [email or web address]

## 2023-12-06 ENCOUNTER — MEDICAL CORRESPONDENCE (OUTPATIENT)
Dept: HEALTH INFORMATION MANAGEMENT | Facility: CLINIC | Age: 53
End: 2023-12-06
Payer: COMMERCIAL

## 2023-12-06 ENCOUNTER — TRANSFERRED RECORDS (OUTPATIENT)
Dept: HEALTH INFORMATION MANAGEMENT | Facility: CLINIC | Age: 53
End: 2023-12-06
Payer: COMMERCIAL

## 2023-12-12 DIAGNOSIS — F32.9 PROLONGED DEPRESSIVE REACTION: ICD-10-CM

## 2023-12-12 RX ORDER — VENLAFAXINE HYDROCHLORIDE 75 MG/1
225 CAPSULE, EXTENDED RELEASE ORAL DAILY
Qty: 90 CAPSULE | Refills: 0 | Status: SHIPPED | OUTPATIENT
Start: 2023-12-12 | End: 2024-01-16

## 2023-12-14 ENCOUNTER — TELEPHONE (OUTPATIENT)
Dept: FAMILY MEDICINE | Facility: CLINIC | Age: 53
End: 2023-12-14
Payer: COMMERCIAL

## 2023-12-14 DIAGNOSIS — I10 BENIGN ESSENTIAL HYPERTENSION: ICD-10-CM

## 2023-12-14 DIAGNOSIS — E03.4 HYPOTHYROIDISM DUE TO ACQUIRED ATROPHY OF THYROID: ICD-10-CM

## 2023-12-14 RX ORDER — LISINOPRIL 20 MG/1
20 TABLET ORAL DAILY
Qty: 30 TABLET | Refills: 0 | Status: SHIPPED | OUTPATIENT
Start: 2023-12-14 | End: 2024-01-16

## 2023-12-14 RX ORDER — LEVOTHYROXINE SODIUM 75 UG/1
75 TABLET ORAL DAILY
Qty: 30 TABLET | Refills: 0 | Status: SHIPPED | OUTPATIENT
Start: 2023-12-14 | End: 2024-01-16

## 2023-12-14 NOTE — TELEPHONE ENCOUNTER
Forms/Letter Request    Type of form/letter:  written orders     Have you been seen for this request: N/A    Do we have the form/letter: Yes    Who is the form from? [employer]  (if other please explain)    Where did/will the form come from? form was faxed in    When is form/letter needed by: at your earliest convenience     How would you like the form/letter returned: Fax : [phone]    Patient Notified form requests are processed in 3-5 business days:No    Could we send this information to you in [employer] or would you prefer to receive a phone call?:   Patient would like to be contacted via [employer]

## 2023-12-14 NOTE — CONFIDENTIAL NOTE
Forms/Letter Request    Type of form/letter:  disposable underpad    Have you been seen for this request: N/A    Do we have the form/letter: Yes:      Who is the form from? [employer] (if other please explain)    Where did/will the form come from? form was faxed in    When is form/letter needed by: complete @ your earliest convenience    How would you like the form/letter returned: Fax : to:    [phone]    Patient Notified form requests are processed in 3-5 business days:No    Could we send this information to you in [email or web address] or would you prefer to receive a phone call?:   Patient would like to be contacted via [email or web address]

## 2023-12-15 NOTE — TELEPHONE ENCOUNTER
Sent one month of each.     She was to do labs after her last visit in June. Do not see results. These need to be done prior to further refills. Orders previously faxed to location close to patient.

## 2023-12-18 RX ORDER — LEVOTHYROXINE SODIUM 75 UG/1
75 TABLET ORAL DAILY
Qty: 90 TABLET | OUTPATIENT
Start: 2023-12-18

## 2023-12-18 RX ORDER — LISINOPRIL 20 MG/1
20 TABLET ORAL DAILY
Qty: 90 TABLET | OUTPATIENT
Start: 2023-12-18

## 2023-12-18 NOTE — TELEPHONE ENCOUNTER
Denied 90 day supply. She is overdue for labs. Needs labs completed prior to further refills. Had sent lab orders to local clinic for her back in June at last visit.

## 2023-12-19 ENCOUNTER — MEDICAL CORRESPONDENCE (OUTPATIENT)
Dept: HEALTH INFORMATION MANAGEMENT | Facility: CLINIC | Age: 53
End: 2023-12-19

## 2023-12-19 ENCOUNTER — TELEPHONE (OUTPATIENT)
Dept: FAMILY MEDICINE | Facility: CLINIC | Age: 53
End: 2023-12-19
Payer: COMMERCIAL

## 2023-12-19 DIAGNOSIS — Z53.9 DIAGNOSIS NOT YET DEFINED: Primary | ICD-10-CM

## 2023-12-19 PROCEDURE — G0179 MD RECERTIFICATION HHA PT: HCPCS | Performed by: FAMILY MEDICINE

## 2023-12-19 NOTE — TELEPHONE ENCOUNTER
Forms/Letter Request    Type of form/letter:  plan of care     Have you been seen for this request: N/A    Do we have the form/letter: Yes    Who is the form from? Home care    Where did/will the form come from? form was faxed in    When is form/letter needed by: earliest convenience     How would you like the form/letter returned: Fax : [phone]    Patient Notified form requests are processed in 3-5 business days:No    Could we send this information to you in Doubles Alley or would you prefer to receive a phone call?:   Patient would like to be contacted via [employer]

## 2023-12-28 DIAGNOSIS — G82.20 PARAPLEGIA (H): ICD-10-CM

## 2023-12-28 RX ORDER — BACLOFEN 20 MG/1
TABLET ORAL
Qty: 120 TABLET | Refills: 0 | Status: SHIPPED | OUTPATIENT
Start: 2023-12-28 | End: 2024-01-16

## 2023-12-28 NOTE — TELEPHONE ENCOUNTER
Patient calling on status of her refill request. Hoping this can be filled today. Whitley Faria LPN

## 2023-12-28 NOTE — TELEPHONE ENCOUNTER
Patient calling on status of her refill, hoping that this can be done tonight! Informed the covering provider approved her refill of her Baclofen. Whitley Faria LPN

## 2023-12-28 NOTE — TELEPHONE ENCOUNTER
Patient called back to check on status.  Patient hoping to get medication tonight.  She would like a callback if provider is able to send to pharmacy.

## 2023-12-28 NOTE — TELEPHONE ENCOUNTER
Pt called, states she did her labs yesterday at an outside clinic and needs this rx asap. Would like covering providers to address if possible.     No results yet, they usually fax them the next day or so

## 2024-01-11 ENCOUNTER — TELEPHONE (OUTPATIENT)
Dept: FAMILY MEDICINE | Facility: CLINIC | Age: 54
End: 2024-01-11

## 2024-01-11 ENCOUNTER — TELEPHONE (OUTPATIENT)
Dept: FAMILY MEDICINE | Facility: CLINIC | Age: 54
End: 2024-01-11
Payer: COMMERCIAL

## 2024-01-11 NOTE — TELEPHONE ENCOUNTER
Forms/Letter Request     Type of form/letter:  Renewal Order Request      Have you been seen for this request: N/A     Do we have the form/letter: Yes     Who is the form from? Home care     Where did/will the form come from? form was faxed in     When is form/letter needed by: at your earliest convenience      How would you like the form/letter returned: Fax : [phone]     Patient Notified form requests are processed in 3-5 business days:No     Could we send this information to you in [employer] or would you prefer to receive a phone call?:   Patient would like to be contacted via [employer]

## 2024-01-11 NOTE — TELEPHONE ENCOUNTER
Forms/Letter Request    Type of form/letter:  Renewal Order Request     Have you been seen for this request: N/A    Do we have the form/letter: Yes    Who is the form from? Home care    Where did/will the form come from? form was faxed in    When is form/letter needed by: at your earliest convenience     How would you like the form/letter returned: Fax : [phone]    Patient Notified form requests are processed in 3-5 business days:No    Could we send this information to you in T[employer]or [employer] or would you prefer to receive a phone call?:   Patient would like to be contacted via [employer]

## 2024-01-15 ENCOUNTER — TELEPHONE (OUTPATIENT)
Dept: FAMILY MEDICINE | Facility: CLINIC | Age: 54
End: 2024-01-15
Payer: COMMERCIAL

## 2024-01-15 DIAGNOSIS — G89.21 CHRONIC PAIN DUE TO INJURY: ICD-10-CM

## 2024-01-15 DIAGNOSIS — E03.4 HYPOTHYROIDISM DUE TO ACQUIRED ATROPHY OF THYROID: ICD-10-CM

## 2024-01-15 DIAGNOSIS — G89.4 CHRONIC PAIN SYNDROME: ICD-10-CM

## 2024-01-15 DIAGNOSIS — G82.20 PARAPLEGIA (H): ICD-10-CM

## 2024-01-15 DIAGNOSIS — I10 BENIGN ESSENTIAL HYPERTENSION: ICD-10-CM

## 2024-01-15 DIAGNOSIS — F32.9 PROLONGED DEPRESSIVE REACTION: ICD-10-CM

## 2024-01-16 ENCOUNTER — MEDICAL CORRESPONDENCE (OUTPATIENT)
Dept: HEALTH INFORMATION MANAGEMENT | Facility: CLINIC | Age: 54
End: 2024-01-16
Payer: COMMERCIAL

## 2024-01-16 DIAGNOSIS — G89.21 CHRONIC PAIN DUE TO INJURY: ICD-10-CM

## 2024-01-16 DIAGNOSIS — G89.4 CHRONIC PAIN SYNDROME: ICD-10-CM

## 2024-01-16 DIAGNOSIS — G82.20 PARAPLEGIA (H): ICD-10-CM

## 2024-01-16 RX ORDER — VENLAFAXINE HYDROCHLORIDE 75 MG/1
225 CAPSULE, EXTENDED RELEASE ORAL DAILY
Qty: 90 CAPSULE | Refills: 1 | Status: SHIPPED | OUTPATIENT
Start: 2024-01-16 | End: 2024-03-11

## 2024-01-16 RX ORDER — LEVOTHYROXINE SODIUM 100 UG/1
100 TABLET ORAL DAILY
Qty: 90 TABLET | Refills: 1 | Status: SHIPPED | OUTPATIENT
Start: 2024-01-16 | End: 2024-07-08

## 2024-01-16 RX ORDER — LISINOPRIL 20 MG/1
20 TABLET ORAL DAILY
Qty: 90 TABLET | Refills: 1 | Status: SHIPPED | OUTPATIENT
Start: 2024-01-16 | End: 2024-07-08

## 2024-01-16 NOTE — TELEPHONE ENCOUNTER
While on call patient requested refills, medications pended and routed to refill pool.    Shira Le RN  [facility] - Registered Nurse  Clinic Triage [place]   January 16, 2024

## 2024-01-16 NOTE — TELEPHONE ENCOUNTER
Patient called and relayed results note from below. Patient would like repeat labs to be done at [facility]. Patient verbalized understanding and all questions answered regarding medication change.       [facility] Fax number [phone] please have order sent for recheck in 8-12 weeks.  Please send message in [employer] for patient that this was faxed.       Patient also requested refills.  See additional telephone encounter 1/16/24    Shira Le RN  [facility] - Registered Nurse  Clinic Triage [place]   January 16, 2024

## 2024-01-16 NOTE — TELEPHONE ENCOUNTER
Reviewed labs done at [facility].    TSH is a little elevated and T4 is on the low end.  Recommend increased dose to 100 mcg daily.  Recheck lab in 8-12 weeks. Order in and can be faxed to [facility] if patient desires.     Rest of prescriptions sent.

## 2024-01-17 RX ORDER — PREGABALIN 200 MG/1
200 CAPSULE ORAL 3 TIMES DAILY
Qty: 270 CAPSULE | Refills: 0 | Status: SHIPPED | OUTPATIENT
Start: 2024-01-17 | End: 2024-04-25

## 2024-01-17 RX ORDER — BACLOFEN 20 MG/1
TABLET ORAL
Qty: 120 TABLET | Refills: 0 | Status: SHIPPED | OUTPATIENT
Start: 2024-01-17 | End: 2024-02-26

## 2024-01-17 RX ORDER — IBUPROFEN 200 MG
TABLET ORAL
Qty: 120 TABLET | Refills: 0 | Status: SHIPPED | OUTPATIENT
Start: 2024-01-17 | End: 2024-04-23

## 2024-01-17 RX ORDER — HYDROXYZINE HYDROCHLORIDE 25 MG/1
25 TABLET, FILM COATED ORAL EVERY 8 HOURS PRN
Qty: 90 TABLET | Refills: 0 | Status: SHIPPED | OUTPATIENT
Start: 2024-01-17 | End: 2024-04-01

## 2024-01-26 ENCOUNTER — TELEPHONE (OUTPATIENT)
Dept: FAMILY MEDICINE | Facility: CLINIC | Age: 54
End: 2024-01-26
Payer: COMMERCIAL

## 2024-01-26 NOTE — CONFIDENTIAL NOTE
Forms/Letter Request    Type of form/letter: OTHER: [facility]       Do we have the form/letter: Yes:      Who is the form from? Home care    Where did/will the form come from? form was faxed in    When is form/letter needed by: complete @ your earliest convenience    How would you like the form/letter returned: Fax : to:  [phone]    Patient Notified form requests are processed in 5-7 business days:No    Could we send this information to you in Displair or would you prefer to receive a phone call?:   Patient would like to be contacted via Displair

## 2024-01-26 NOTE — TELEPHONE ENCOUNTER
Larisa Jordan    1/26/24  2:49 PM  Note     Forms/Letter Request     Type of form/letter: OTHER: [facility]     Do we have the form/letter: Yes:       Who is the form from? Home care     Where did/will the form come from? form was faxed in     When is form/letter needed by: complete @ your earliest convenience     How would you like the form/letter returned: Fax : to:  [phone]     Patient Notified form requests are processed in 5-7 business days:No     Could we send this information to you in [employer] or would you prefer to receive a phone call?:   Patient would like to be contacted via [employer]

## 2024-01-30 ENCOUNTER — TELEPHONE (OUTPATIENT)
Dept: FAMILY MEDICINE | Facility: CLINIC | Age: 54
End: 2024-01-30
Payer: COMMERCIAL

## 2024-01-30 ENCOUNTER — MEDICAL CORRESPONDENCE (OUTPATIENT)
Dept: HEALTH INFORMATION MANAGEMENT | Facility: CLINIC | Age: 54
End: 2024-01-30
Payer: COMMERCIAL

## 2024-01-30 ENCOUNTER — TRANSFERRED RECORDS (OUTPATIENT)
Dept: HEALTH INFORMATION MANAGEMENT | Facility: CLINIC | Age: 54
End: 2024-01-30
Payer: COMMERCIAL

## 2024-01-31 NOTE — TELEPHONE ENCOUNTER
Forms/Letter Request    Type of form/letter: Nursing Home/Assisted Living Orders      Do we have the form/letter: Yes    Who is the form from? Home care    Where did/will the form come from? form was faxed in    When is form/letter needed by: at your earliest conveniences     How would you like the form/letter returned: Fax : [phone]    Patient Notified form requests are processed in 5-7 business days:No    Could we send this information to you in Scirra or would you prefer to receive a phone call?:   Patient would like to be contacted via Scirra   
Faxed, copy sent to scan and placed in fax file.     Jyoti Faria CMA ([facility])    
Forms completed. Please fax.   
Placed form in provider basket    * Due: 3-5 Business Days   
normal rate and rhythm, no chest pain and no edema.

## 2024-02-10 DIAGNOSIS — F41.9 ANXIETY: ICD-10-CM

## 2024-02-10 DIAGNOSIS — R56.9 SEIZURES (H): ICD-10-CM

## 2024-02-12 RX ORDER — LEVETIRACETAM 750 MG/1
750 TABLET ORAL 2 TIMES DAILY
Qty: 180 TABLET | Refills: 0 | Status: SHIPPED | OUTPATIENT
Start: 2024-02-12 | End: 2024-03-15

## 2024-02-12 RX ORDER — BUPROPION HYDROCHLORIDE 150 MG/1
150 TABLET, EXTENDED RELEASE ORAL 2 TIMES DAILY
Qty: 180 TABLET | Refills: 0 | Status: SHIPPED | OUTPATIENT
Start: 2024-02-12 | End: 2024-05-07

## 2024-02-14 NOTE — TELEPHONE ENCOUNTER
Patient says he does not drink everyday; used to  Now has a beverage intermittently; last drink Tuesday evening.  Has about 2 or 3 mixed drinks; does not get drunk    Scheduled Librium; will taper   CIWA protocol      mychart sent

## 2024-02-19 ENCOUNTER — TELEPHONE (OUTPATIENT)
Dept: FAMILY MEDICINE | Facility: CLINIC | Age: 54
End: 2024-02-19
Payer: COMMERCIAL

## 2024-02-19 NOTE — TELEPHONE ENCOUNTER
Forms/Letter Request    Type of form/letter: Nursing Home/Assisted Living Orders      Do we have the form/letter: Yes    Who is the form from? Home care    Where did/will the form come from? form was faxed in    When is form/letter needed by: at your earliest time     How would you like the form/letter returned: Fax : [phone]    Patient Notified form requests are processed in 5-7 business days:No    Could we send this information to you in IdeaForest or would you prefer to receive a phone call?:   Patient would like to be contacted via [employer]

## 2024-02-20 DIAGNOSIS — Z53.9 DIAGNOSIS NOT YET DEFINED: Primary | ICD-10-CM

## 2024-02-20 PROCEDURE — G0179 MD RECERTIFICATION HHA PT: HCPCS | Performed by: FAMILY MEDICINE

## 2024-02-25 DIAGNOSIS — G82.20 PARAPLEGIA (H): ICD-10-CM

## 2024-02-25 NOTE — LETTER
February 26, 2024      Quiana Canas  [address]  [place] [address]          Curry Araya,    We just wanted to let you know that we sent in a refill for your medication but you are due for a routine care visit in June, please call or use [employer] to schedule this in person visit.     Please give us a call at [phone] or use [employer] to schedule.     Have a great day.    Your [facility] Team               Refilled medications sent for      baclofen (LIORESAL) 20 MG tablet

## 2024-02-26 RX ORDER — BACLOFEN 20 MG/1
TABLET ORAL
Qty: 120 TABLET | Refills: 0 | Status: SHIPPED | OUTPATIENT
Start: 2024-02-26 | End: 2024-03-18

## 2024-02-27 ENCOUNTER — TRANSFERRED RECORDS (OUTPATIENT)
Dept: HEALTH INFORMATION MANAGEMENT | Facility: CLINIC | Age: 54
End: 2024-02-27
Payer: COMMERCIAL

## 2024-03-09 DIAGNOSIS — F32.9 PROLONGED DEPRESSIVE REACTION: ICD-10-CM

## 2024-03-09 NOTE — LETTER
March 11, 2024      Quiana Canas  [address]  [facility] [address]            Curry Araya ,    We just wanted to let you know that we sent in a refill for your medication but you are due for a office visit before your next refill is due.     Please give us a call at [phone] or use [employer] to schedule.     Have a great day.    Your [facility] Team               Refilled medications sent for 30 days:     venlafaxine (EFFEXOR XR) 75 MG 24 hr capsule

## 2024-03-11 RX ORDER — VENLAFAXINE HYDROCHLORIDE 75 MG/1
225 CAPSULE, EXTENDED RELEASE ORAL DAILY
Qty: 90 CAPSULE | Refills: 0 | Status: SHIPPED | OUTPATIENT
Start: 2024-03-11 | End: 2024-04-08

## 2024-03-15 DIAGNOSIS — R56.9 SEIZURES (H): ICD-10-CM

## 2024-03-15 RX ORDER — LEVETIRACETAM 750 MG/1
750 TABLET ORAL 2 TIMES DAILY
Qty: 180 TABLET | Refills: 0 | Status: SHIPPED | OUTPATIENT
Start: 2024-03-15 | End: 2024-06-07

## 2024-03-15 NOTE — TELEPHONE ENCOUNTER
#90, NEEDS APPOINTMENT FOR FURTHER REFILLS    Large Joint Aspiration/Injection: R glenohumeral  Date/Time: 1/6/2020 9:00 AM  Performed by: Grant Wright MD  Authorized by: Grant Wright MD     Consent Done?:  Yes (Written)  Indications:  Diagnostic evaluation and pain  Procedure site marked: Yes    Timeout: Prior to procedure the correct patient, procedure, and site was verified    Anesthesia  Local anesthesia used  Anesthesia: local infiltration  Anesthetic: bupivacaine 0.25% without epinephrine  Anesthetic total: 4mL    Location:  Shoulder  Site:  R glenohumeral  Prep: Patient was prepped and draped in usual sterile fashion    Needle size:  22 G  Approach:  Posterior  Medications:  40 mg methylPREDNISolone acetate 40 mg/mL  Patient tolerance:  Patient tolerated the procedure well with no immediate complications

## 2024-03-15 NOTE — LETTER
March 15, 2024      Quiana Canas  [address] [place] [address]            Dr. Majo Araya is out of the office, covering provider refilled your    levETIRAcetam (KEPPRA) 750 MG tablet    For 90 days but states as well that no further refills can be given  without an appointment    Your [facility] Team

## 2024-03-17 DIAGNOSIS — G82.20 PARAPLEGIA (H): ICD-10-CM

## 2024-03-18 RX ORDER — BACLOFEN 20 MG/1
TABLET ORAL
Qty: 120 TABLET | Refills: 0 | Status: SHIPPED | OUTPATIENT
Start: 2024-03-18 | End: 2024-04-19

## 2024-03-21 ENCOUNTER — NURSE TRIAGE (OUTPATIENT)
Dept: FAMILY MEDICINE | Facility: CLINIC | Age: 54
End: 2024-03-21
Payer: COMMERCIAL

## 2024-03-21 NOTE — TELEPHONE ENCOUNTER
"RN placed call to patient and advised of the below. \"Patient reports I can't see my dermatologist until July but I guess I could see another doctor.\" RN recommended UC near her for assessment. Patient verbalized understanding and all questions answered.     ERICA Montiel, RN  [facility] ~ Registered Nurse  Clinic Triage ~ [place] [facility] [place]  March 21, 2024      "

## 2024-03-21 NOTE — TELEPHONE ENCOUNTER
Reviewed notes.     Patient will need an in person visit. This is not appropriate to be addressed virtually. If there is concern for infection, recommend urgent care evaluation closer to home due to her inability to come this distance.

## 2024-03-21 NOTE — TELEPHONE ENCOUNTER
Nurse Triage SBAR    Is this a 2nd Level Triage? YES, LICENSED PRACTITIONER REVIEW IS REQUIRED    Situation: Patient states she has multiple boils across body and started 4 days ago.     Background:  Hidradenitis suppurativa  Assessment: Patient has multiple cysts/boils deep under skin she can feel and they have been getting worse over the past couplle days. Painful to touch, denies reddened areas, swelling, fever. Patient has them across chest, under breasts, arm pits, back of neck, groin and upper thighs. Denies CP, SOB, HA, cough. Requesting antibiotic, but cannot come to clinic for assessment and dermatology cannot get her in until May.   Protocol Recommended Disposition:   See in Office Today or Tomorrow    Recommendation: RN stated she will send message to provider and will likely need to be seen F2F to assess and if appropriate prscribe an antibiotic. She stated she can only do virtuals, RN is up to MD lundy. Reviewed red flags with patient and when to go to the UC/ED and if she wanted urgent evaluation to seek care there. She declined.   Routed to provider    Does the patient meet one of the following criteria for ADS visit consideration? 16+ years old, with an [facility] PCP     Shira Le RN  [facility] - Registered Nurse  Clinic Triage [place]   March 21, 2024      Reason for Disposition   Boil overlying a joint    Additional Information   Negative: Widespread rash and bright red, sunburn-like and too weak to stand   Negative: Sounds like a life-threatening emergency to the triager   Negative: Painful lump or swelling at opening to anus (rectum)   Negative: Painful lump or swelling at opening to vagina (on labia)   Negative: Painful lump or swelling on scrotum   Negative: Doesn't match the SYMPTOMS of a boil   Negative: Widespread red rash   Negative: Black (necrotic), dark purple, or blisters develop in area of wound   Negative: Patient sounds very sick or weak to the triager   Negative: SEVERE  pain (e.g., excruciating)   Negative: Red streak from area of infection   Negative: Fever > 100.4 F (38.0 C)   Negative: Boil > 2 inches across (> 5 cm; larger than a golf ball or ping pong ball)   Negative: Boil > 1/2 inch across (> 12 mm; larger than a marble) and center is soft or pus colored   Negative: Spreading redness around the boil and no fever   Negative: Boil and diabetes mellitus or weak immune system (e.g., HIV positive, cancer chemo, splenectomy, organ transplant, chronic steroids)    Protocols used: Boil (Skin Abscess)-A-OH

## 2024-03-26 ENCOUNTER — TRANSFERRED RECORDS (OUTPATIENT)
Dept: HEALTH INFORMATION MANAGEMENT | Facility: CLINIC | Age: 54
End: 2024-03-26
Payer: COMMERCIAL

## 2024-03-30 DIAGNOSIS — G89.4 CHRONIC PAIN SYNDROME: ICD-10-CM

## 2024-04-01 RX ORDER — HYDROXYZINE HYDROCHLORIDE 25 MG/1
25 TABLET, FILM COATED ORAL EVERY 8 HOURS PRN
Qty: 90 TABLET | Refills: 0 | Status: SHIPPED | OUTPATIENT
Start: 2024-04-01 | End: 2024-05-07

## 2024-04-07 DIAGNOSIS — F32.9 PROLONGED DEPRESSIVE REACTION: ICD-10-CM

## 2024-04-07 NOTE — LETTER
April 11, 2024      Quiana Canas  [address] [place] [address]                  Hello,      Your provider has sent a junito refill for your venlafaxine. You are due for an office visit before any more refills will be sent.      Please schedule via [employer] or call [phone].      Have a good day,      [facility] [place]

## 2024-04-08 RX ORDER — VENLAFAXINE HYDROCHLORIDE 75 MG/1
225 CAPSULE, EXTENDED RELEASE ORAL DAILY
Qty: 90 CAPSULE | Refills: 0 | Status: SHIPPED | OUTPATIENT
Start: 2024-04-08 | End: 2024-05-07

## 2024-04-09 NOTE — TELEPHONE ENCOUNTER
Staff sent Nightpro message to notify patient of required appointment for further medication refills.

## 2024-04-18 ENCOUNTER — TRANSFERRED RECORDS (OUTPATIENT)
Dept: HEALTH INFORMATION MANAGEMENT | Facility: CLINIC | Age: 54
End: 2024-04-18
Payer: COMMERCIAL

## 2024-04-19 DIAGNOSIS — G82.20 PARAPLEGIA (H): ICD-10-CM

## 2024-04-19 RX ORDER — BACLOFEN 20 MG/1
TABLET ORAL
Qty: 120 TABLET | Refills: 0 | OUTPATIENT
Start: 2024-04-19

## 2024-04-19 RX ORDER — BACLOFEN 20 MG/1
TABLET ORAL
Qty: 120 TABLET | Refills: 0 | Status: SHIPPED | OUTPATIENT
Start: 2024-04-19 | End: 2024-05-22

## 2024-04-19 NOTE — TELEPHONE ENCOUNTER
Scheduled patient for Friday 04/26 at 3:10pm, she is going to check her calendar to confirm that works with another appt she has that day.

## 2024-04-20 NOTE — TELEPHONE ENCOUNTER
faxed   [TextBox_4] : She is an 87 year old woman who was referred for pulmonary nodules noted on a routine chest x-ray. Has a history of asthma. No history of pneumonia or tuberculosis. She stopped smoking in the 1970. No occupational exposures.    Feels better. On Symbicort and albuterol as needed. Uses albuterol about twice a day.  -  [YearQuit] : 1970 [Awakes Unrefreshed] : does not awaken unrefreshed [Fatigue] : no fatigue

## 2024-04-22 ENCOUNTER — TELEPHONE (OUTPATIENT)
Dept: FAMILY MEDICINE | Facility: CLINIC | Age: 54
End: 2024-04-22
Payer: COMMERCIAL

## 2024-04-22 NOTE — TELEPHONE ENCOUNTER
Forms/Letter Request    Type of form/letter: Nursing Home/Assisted Living Orders      Do we have the form/letter: Yes    Who is the form from? Home care    Where did/will the form come from? form was faxed in    When is form/letter needed by: at your earliest time    How would you like the form/letter returned: fax [phone]     Patient Notified form requests are processed in 5-7 business days:No    Could we send this information to you in ScubaTribe or would you prefer to receive a phone call?:   Patient would like to be contacted via ScubaTribe

## 2024-04-23 DIAGNOSIS — Z53.9 DIAGNOSIS NOT YET DEFINED: Primary | ICD-10-CM

## 2024-04-23 DIAGNOSIS — G89.21 CHRONIC PAIN DUE TO INJURY: ICD-10-CM

## 2024-04-23 PROCEDURE — G0179 MD RECERTIFICATION HHA PT: HCPCS | Performed by: FAMILY MEDICINE

## 2024-04-23 RX ORDER — IBUPROFEN 200 MG
TABLET ORAL
Qty: 120 TABLET | Refills: 0 | Status: SHIPPED | OUTPATIENT
Start: 2024-04-23 | End: 2024-06-13

## 2024-04-25 ENCOUNTER — MEDICAL CORRESPONDENCE (OUTPATIENT)
Dept: HEALTH INFORMATION MANAGEMENT | Facility: CLINIC | Age: 54
End: 2024-04-25
Payer: COMMERCIAL

## 2024-04-25 DIAGNOSIS — G82.20 PARAPLEGIA (H): ICD-10-CM

## 2024-04-25 DIAGNOSIS — G89.4 CHRONIC PAIN SYNDROME: ICD-10-CM

## 2024-04-25 RX ORDER — PREGABALIN 200 MG/1
CAPSULE ORAL
Qty: 270 CAPSULE | Refills: 0 | Status: SHIPPED | OUTPATIENT
Start: 2024-04-25 | End: 2024-07-22

## 2024-04-25 NOTE — CONFIDENTIAL NOTE
Reason for Call:  Form, our goal is to have forms completed with 72 hours, however, some forms may require a visit or additional information.    Type of letter, form or note:  Annual Face to Face visit for home care svcs.    Who is the form from?: Home care    Where did the form come from: form was mailed in    What clinic location was the form placed at?: [facility] [phone]    Where the form was placed: [facility] Box/Folder    What number is listed as a contact on the form?: [phone]       Additional comments: Fax to:  [phone]    Call taken on 4/12/2022 at 4:57 PM by Larisa Jordan        
no

## 2024-05-07 DIAGNOSIS — F32.9 PROLONGED DEPRESSIVE REACTION: ICD-10-CM

## 2024-05-07 DIAGNOSIS — F41.9 ANXIETY: ICD-10-CM

## 2024-05-07 DIAGNOSIS — G89.4 CHRONIC PAIN SYNDROME: ICD-10-CM

## 2024-05-07 RX ORDER — BUPROPION HYDROCHLORIDE 150 MG/1
150 TABLET, EXTENDED RELEASE ORAL 2 TIMES DAILY
Qty: 180 TABLET | Refills: 0 | Status: SHIPPED | OUTPATIENT
Start: 2024-05-07 | End: 2024-08-05

## 2024-05-07 RX ORDER — HYDROXYZINE HYDROCHLORIDE 25 MG/1
25 TABLET, FILM COATED ORAL EVERY 8 HOURS PRN
Qty: 90 TABLET | Refills: 0 | Status: SHIPPED | OUTPATIENT
Start: 2024-05-07 | End: 2024-06-06

## 2024-05-07 RX ORDER — VENLAFAXINE HYDROCHLORIDE 75 MG/1
225 CAPSULE, EXTENDED RELEASE ORAL DAILY
Qty: 90 CAPSULE | Refills: 0 | Status: SHIPPED | OUTPATIENT
Start: 2024-05-07 | End: 2024-06-06

## 2024-05-07 NOTE — TELEPHONE ENCOUNTER
Forms/Letter Request    Type of form/letter: OTHER: [facility]       Do we have the form/letter: Yes:      Who is the form from? [facility] (if other please explain)    Where did/will the form come from? form was faxed in    When is form/letter needed by: complete @ your earliest convenience    How would you like the form/letter returned: Fax : to:  [facility] @ [phone]    Patient Notified form requests are processed in 5-7 business days:No    Could we send this information to you in [employer] or would you prefer to receive a phone call?:   Patient would like to be contacted via [employer]

## 2024-05-09 ENCOUNTER — TELEPHONE (OUTPATIENT)
Dept: FAMILY MEDICINE | Facility: CLINIC | Age: 54
End: 2024-05-09
Payer: COMMERCIAL

## 2024-05-09 NOTE — TELEPHONE ENCOUNTER
Forms/Letter Request     Type of form/letter: Retention of Urine Supplies     Have you been seen for this request: N/A     Do we have the form/letter: Yes:       Who is the form from? [employer] (if other please explain)     Where did/will the form come from? form was faxed in     When is form/letter needed by: as soon as time permits     How would you like the form/letter returned: Fax : to:  [employer] @ [phone]     Patient Notified form requests are processed in 3-5 business days:No     Could we send this information to you in Micronotes or would you prefer to receive a phone call?:   Patient would like to be contacted via [employer]

## 2024-05-10 ENCOUNTER — MEDICAL CORRESPONDENCE (OUTPATIENT)
Dept: HEALTH INFORMATION MANAGEMENT | Facility: CLINIC | Age: 54
End: 2024-05-10
Payer: COMMERCIAL

## 2024-05-22 DIAGNOSIS — G82.20 PARAPLEGIA (H): ICD-10-CM

## 2024-05-22 RX ORDER — BACLOFEN 20 MG/1
TABLET ORAL
Qty: 120 TABLET | Refills: 0 | Status: SHIPPED | OUTPATIENT
Start: 2024-05-22 | End: 2024-06-25

## 2024-05-31 ENCOUNTER — TELEPHONE (OUTPATIENT)
Dept: FAMILY MEDICINE | Facility: CLINIC | Age: 54
End: 2024-05-31
Payer: COMMERCIAL

## 2024-05-31 NOTE — TELEPHONE ENCOUNTER
Forms/Letter Request     Type of form/letter: Colostomy supplies [employer] [place]     Have you been seen for this request: N/A     Do we have the form/letter: Yes:       Who is the form from? [employer] (if other please explain)     Where did/will the form come from? form was faxed in     When is form/letter needed by: as soon as time permits     How would you like the form/letter returned: Fax : to:  [employer] @ [phone]     Patient Notified form requests are processed in 3-5 business days:No     Could we send this information to you in [employer] or would you prefer to receive a phone call?:   Patient would like to be contacted via [employer]

## 2024-06-03 ENCOUNTER — MEDICAL CORRESPONDENCE (OUTPATIENT)
Dept: HEALTH INFORMATION MANAGEMENT | Facility: CLINIC | Age: 54
End: 2024-06-03
Payer: COMMERCIAL

## 2024-06-06 ENCOUNTER — TELEPHONE (OUTPATIENT)
Dept: FAMILY MEDICINE | Facility: CLINIC | Age: 54
End: 2024-06-06
Payer: COMMERCIAL

## 2024-06-06 DIAGNOSIS — F32.9 PROLONGED DEPRESSIVE REACTION: ICD-10-CM

## 2024-06-06 DIAGNOSIS — G89.4 CHRONIC PAIN SYNDROME: ICD-10-CM

## 2024-06-06 RX ORDER — HYDROXYZINE HYDROCHLORIDE 25 MG/1
25 TABLET, FILM COATED ORAL EVERY 8 HOURS PRN
Qty: 90 TABLET | Refills: 0 | Status: SHIPPED | OUTPATIENT
Start: 2024-06-06 | End: 2024-07-08

## 2024-06-06 RX ORDER — VENLAFAXINE HYDROCHLORIDE 75 MG/1
225 CAPSULE, EXTENDED RELEASE ORAL DAILY
Qty: 90 CAPSULE | Refills: 0 | Status: SHIPPED | OUTPATIENT
Start: 2024-06-06 | End: 2024-07-08

## 2024-06-06 NOTE — TELEPHONE ENCOUNTER
Forms/Letter Request    Type of form/letter: Nursing Home/Assisted Living Orders      Do we have the form/letter: Yes    Who is the form from? Home care    Where did/will the form come from? form was faxed in    When is form/letter needed by: at your earliest time     How would you like the form/letter returned: Fax : [phone]    Patient Notified form requests are processed in 5-7 business days:No    Could we send this information to you in Danlan or would you prefer to receive a phone call?:   Patient would like to be contacted via Danlan

## 2024-06-07 DIAGNOSIS — R56.9 SEIZURES (H): ICD-10-CM

## 2024-06-07 RX ORDER — LEVETIRACETAM 750 MG/1
750 TABLET ORAL 2 TIMES DAILY
Qty: 180 TABLET | Refills: 0 | Status: SHIPPED | OUTPATIENT
Start: 2024-06-07 | End: 2024-09-04

## 2024-06-07 NOTE — TELEPHONE ENCOUNTER
Home Health Care    Reason for call:  New Standing Orders Request per [facility]    Orders are needed for this patient.   New Standing Orders    Pt Provider: Dr. Kasper    Phone Number Homecare Nurse can be reached at: [phone]      Could we send this information to you in .com or would you prefer to receive a phone call?:   Patient would like to be contacted via .com

## 2024-06-11 ENCOUNTER — TELEPHONE (OUTPATIENT)
Dept: FAMILY MEDICINE | Facility: CLINIC | Age: 54
End: 2024-06-11
Payer: COMMERCIAL

## 2024-06-11 NOTE — TELEPHONE ENCOUNTER
Patient wanting to speak with only Dr. Kasper.  Refuses to discuss anything with this RN.  States wanting to speak with her about her medication.  This RN attempted multiple times to try to get her to share her concerns/questions with her but she refuses.  Wanting message to PCP; aware not in clinic today.  States can wait til back in clinic.  Katelyn JENSEN RN

## 2024-06-13 DIAGNOSIS — G89.21 CHRONIC PAIN DUE TO INJURY: ICD-10-CM

## 2024-06-13 RX ORDER — IBUPROFEN 200 MG
TABLET ORAL
Qty: 120 TABLET | Refills: 0 | Status: SHIPPED | OUTPATIENT
Start: 2024-06-13

## 2024-06-15 ENCOUNTER — HEALTH MAINTENANCE LETTER (OUTPATIENT)
Age: 54
End: 2024-06-15

## 2024-06-24 DIAGNOSIS — G82.20 PARAPLEGIA (H): ICD-10-CM

## 2024-06-25 RX ORDER — BACLOFEN 20 MG/1
TABLET ORAL
Qty: 120 TABLET | Refills: 0 | Status: SHIPPED | OUTPATIENT
Start: 2024-06-25 | End: 2024-07-22

## 2024-06-27 ENCOUNTER — TELEPHONE (OUTPATIENT)
Dept: FAMILY MEDICINE | Facility: CLINIC | Age: 54
End: 2024-06-27
Payer: COMMERCIAL

## 2024-06-27 NOTE — TELEPHONE ENCOUNTER
Type of form/letter: [facility]     Have you been seen for this request: Yes      Do we have the form/letter: Yes:       Who is the form from? Home care     Where did/will the form come from? form was faxed in     When is form/letter needed by: as soon as time permits     How would you like the form/letter returned: Fax : TO:  [facility] @ [phone]     Patient Notified form requests are processed in 3-5 business days:No     Could we send this information to you in [employer] or would you prefer to receive a phone call?:   Patient would like to be contacted via [employer]

## 2024-06-28 DIAGNOSIS — Z53.9 DIAGNOSIS NOT YET DEFINED: Primary | ICD-10-CM

## 2024-06-28 PROCEDURE — G0179 MD RECERTIFICATION HHA PT: HCPCS | Performed by: FAMILY MEDICINE

## 2024-07-02 ENCOUNTER — TELEPHONE (OUTPATIENT)
Dept: FAMILY MEDICINE | Facility: CLINIC | Age: 54
End: 2024-07-02
Payer: COMMERCIAL

## 2024-07-02 NOTE — TELEPHONE ENCOUNTER
Patient is requesting that Dr. Kasper call her back. She said it was regarding medication and other other issues. Patient stated it is really important She wouldn't let me help her she said she would only talk to Dr. Kasper. Please call patient back at [phone]

## 2024-07-02 NOTE — TELEPHONE ENCOUNTER
RN called called patient back. Patient refused to speak to RN and wanted Dr. Kasper and stated she will be out of office until next week.     Patient requesting MD be called at home for her to speak with her. RN declined request and offered she can leave message with me and I will relay to the appropriate person.     Patient states she cannot get into Pain clinic appt. Until 7/24 and is out of Methadone and oxycodone, requesting refill until appointment with pain team. Encouraged her to reach out to pain team as they are prescribing these medications.     Patient insisted ask covering provider regarding medication refill.

## 2024-07-02 NOTE — TELEPHONE ENCOUNTER
RN Huddled with Dr. Cee regarding methadone and oxycodone refills that are no longer prescribed from Dr. Kasper, to reach out to pain clinic and MD prescribing.    Called patient and no response left message.    Shira Le RN  [facility] - Registered Nurse  Clinic Triage [place]   July 2, 2024

## 2024-07-03 NOTE — TELEPHONE ENCOUNTER
"RN called patient and stated below message from MD. Patient asked if she could get a refill again. RN stated we have not refilled these prescriptions since 2022 and 2020. Patient requested appointment this week.       Patient states she is unable to come for an appointment today as she needs a \"few days\" to get rides. Patient states Dr. Perez from [facility] prescribed her last doses of methadone and oxycodone, unable to see on file and per MD RN stated to follow up with MD that has been prescribing.    Patient stated \"So you will not help me\" RN stated options are to contact pain clinic or Dr. Perez again. Patient verbalized understanding and all questions answered.     Shira Le RN  [facility] - Registered Nurse  Clinic Triage [place]   July 3, 2024        "

## 2024-07-06 DIAGNOSIS — E03.4 HYPOTHYROIDISM DUE TO ACQUIRED ATROPHY OF THYROID: ICD-10-CM

## 2024-07-06 DIAGNOSIS — I10 BENIGN ESSENTIAL HYPERTENSION: ICD-10-CM

## 2024-07-06 DIAGNOSIS — G89.4 CHRONIC PAIN SYNDROME: ICD-10-CM

## 2024-07-06 DIAGNOSIS — F32.9 PROLONGED DEPRESSIVE REACTION: ICD-10-CM

## 2024-07-08 RX ORDER — VENLAFAXINE HYDROCHLORIDE 75 MG/1
225 CAPSULE, EXTENDED RELEASE ORAL DAILY
Qty: 90 CAPSULE | Refills: 0 | Status: SHIPPED | OUTPATIENT
Start: 2024-07-08 | End: 2024-08-05

## 2024-07-08 RX ORDER — LISINOPRIL 20 MG/1
20 TABLET ORAL DAILY
Qty: 90 TABLET | Refills: 0 | Status: SHIPPED | OUTPATIENT
Start: 2024-07-08 | End: 2024-10-07

## 2024-07-08 RX ORDER — HYDROXYZINE HYDROCHLORIDE 25 MG/1
25 TABLET, FILM COATED ORAL EVERY 8 HOURS PRN
Qty: 90 TABLET | Refills: 0 | Status: SHIPPED | OUTPATIENT
Start: 2024-07-08 | End: 2024-08-05

## 2024-07-08 RX ORDER — LEVOTHYROXINE SODIUM 100 UG/1
100 TABLET ORAL DAILY
Qty: 90 TABLET | Refills: 0 | Status: SHIPPED | OUTPATIENT
Start: 2024-07-08 | End: 2024-10-07

## 2024-07-09 ENCOUNTER — TELEPHONE (OUTPATIENT)
Dept: FAMILY MEDICINE | Facility: CLINIC | Age: 54
End: 2024-07-09
Payer: COMMERCIAL

## 2024-07-09 NOTE — TELEPHONE ENCOUNTER
Team - Want records from [facility] if any visits since 3/26/24. That is the last visit I have available.     Called Agatha.     She has been going to [facility].     She is switching to a new place. She has an appointment July 24th.     Reports [facility] won't fill through her that visit.    Will be going to [place] in [place].     She reports she is taking percocet 6 per day. And 3 methadone per day.     Reports that the spasticity is worse.     She did say later that [facility] had her down to 4 percocet per day and methadone.     I let patient know that I will review and then let her know of my decision. Likely tomorrow.

## 2024-07-09 NOTE — TELEPHONE ENCOUNTER
Medication Question or Refill        What medication are you calling about (include dose and sig)?: New medications that were recently prescribed, patient would not discuss with writer        Who prescribed the medication?: PCP    When did you use the medication last? Patient would not discuss with RN, only will talk with PCP    Patient offered an appointment? Yes: Just wants a call back from PCP    Do you have any questions or concerns?  Yes: prefers to talk with PCP      Could we send this information to you in [facility] or would you prefer to receive a phone call?:   Patient would prefer a phone call   Okay to leave a detailed message?: No at Cell number on file:    Telephone Information:   Mobile [phone]     ERICA Hutchinson, RN

## 2024-07-09 NOTE — TELEPHONE ENCOUNTER
Forms/Letter Request    Type of form/letter: OTHER: [employer] needed a standard written order completed       Do we have the form/letter: Yes    Who is the form from? [employer] (if other please explain)    Where did/will the form come from? form was faxed in    When is form/letter needed by: at your earliest time     How would you like the form/letter returned: Fax : [phone]    Patient Notified form requests are processed in 5-7 business days:No    Could we send this information to you in [employer] or would you prefer to receive a phone call?:   Patient would like to be contacted via [employer]

## 2024-07-09 NOTE — TELEPHONE ENCOUNTER
Attempted to reach [facility] but they close at 5p and no option to leave a message. Will try again tomorrow.     Jyoti Faria CMA ([facility])

## 2024-07-10 ENCOUNTER — MEDICAL CORRESPONDENCE (OUTPATIENT)
Dept: HEALTH INFORMATION MANAGEMENT | Facility: CLINIC | Age: 54
End: 2024-07-10
Payer: COMMERCIAL

## 2024-07-10 NOTE — TELEPHONE ENCOUNTER
RN called patient and relayed message from Provider. Decline to refill methadone and oxycodone prescriptions, they were ordered by another facility.    Patient verbalized understanding and all questions answered.     Shira Le RN  [facility] - Registered Nurse  Clinic Triage [place]   July 10, 2024

## 2024-07-10 NOTE — TELEPHONE ENCOUNTER
Spoke with medical records at [facility].     3/26/24 was her last visit, she has been discharged from their clinic for missing to many appointments.

## 2024-07-10 NOTE — TELEPHONE ENCOUNTER
Since discharge from [facility], she had prescriptions for oxycodone and methadone on 3/27 (day of last visit) and smaller prescriptions on 4/24.  Also additional methadone on 5/22. Received additional methadone and oxycodone on 6/18 from another provider.     Was seen by provider at [facility] that had previously managed her chronic pain. That provider declined refills after seeing Agatha.     Decline to refill these prescriptions. This should be done with specialty. She has been on decline of dosing and currently out of medication.

## 2024-07-11 ENCOUNTER — TELEPHONE (OUTPATIENT)
Dept: FAMILY MEDICINE | Facility: CLINIC | Age: 54
End: 2024-07-11
Payer: COMMERCIAL

## 2024-07-11 NOTE — TELEPHONE ENCOUNTER
Forms/Letter Request    Type of form/letter: Nursing Home/Assisted Living Orders      Do we have the form/letter: Yes    Who is the form from? Home care    Where did/will the form come from? form was faxed in    When is form/letter needed by: at your earliest time     How would you like the form/letter returned: Fax : [phone]    Patient Notified form requests are processed in 5-7 business days:No    Could we send this information to you in [employer] or would you prefer to receive a phone call?:   Patient would like to be contacted via [employer]

## 2024-07-12 ENCOUNTER — MEDICAL CORRESPONDENCE (OUTPATIENT)
Dept: HEALTH INFORMATION MANAGEMENT | Facility: CLINIC | Age: 54
End: 2024-07-12
Payer: COMMERCIAL

## 2024-07-15 NOTE — TELEPHONE ENCOUNTER
Left detailed message informing patient that we are in process of pursuing the PA.    Faxed (a previous PA that we submitted that was approved) to insurance at [phone] - marked as urgent.   Awaiting decision   149.9

## 2024-07-18 ENCOUNTER — TELEPHONE (OUTPATIENT)
Dept: FAMILY MEDICINE | Facility: CLINIC | Age: 54
End: 2024-07-18
Payer: COMMERCIAL

## 2024-07-18 NOTE — TELEPHONE ENCOUNTER
Forms/Letter Request     Type of form/letter: Colostomy supplies [employer]     Have you been seen for this request: N/A     Do we have the form/letter: Yes:       Who is the form from? [employer] (if other please explain)     Where did/will the form come from? form was faxed in     When is form/letter needed by: as soon as time permits     How would you like the form/letter returned: Fax : to:  [employer] @ [phone]     Patient Notified form requests are processed in 3-5 business days:No     Could we send this information to you in [employer] or would you prefer to receive a phone call?:   Patient would like to be contacted via [employer]

## 2024-07-22 DIAGNOSIS — B37.2 YEAST INFECTION OF THE SKIN: ICD-10-CM

## 2024-07-22 DIAGNOSIS — G82.20 PARAPLEGIA (H): ICD-10-CM

## 2024-07-22 DIAGNOSIS — G89.4 CHRONIC PAIN SYNDROME: ICD-10-CM

## 2024-07-22 RX ORDER — PREGABALIN 200 MG/1
CAPSULE ORAL
Qty: 270 CAPSULE | Refills: 0 | Status: SHIPPED | OUTPATIENT
Start: 2024-07-22

## 2024-07-22 RX ORDER — BACLOFEN 20 MG/1
TABLET ORAL
Qty: 120 TABLET | Refills: 0 | Status: SHIPPED | OUTPATIENT
Start: 2024-07-22 | End: 2024-08-26

## 2024-07-22 RX ORDER — NYSTATIN 100000 U/G
CREAM TOPICAL 3 TIMES DAILY
Qty: 60 G | Refills: 0 | Status: SHIPPED | OUTPATIENT
Start: 2024-07-22

## 2024-07-22 NOTE — TELEPHONE ENCOUNTER
The patient is completely out of medication.including statin cream    nystatin (MYCOSTATIN) 961463 UNIT/GM external cream

## 2024-07-23 ENCOUNTER — NURSE TRIAGE (OUTPATIENT)
Dept: FAMILY MEDICINE | Facility: CLINIC | Age: 54
End: 2024-07-23
Payer: COMMERCIAL

## 2024-07-23 ENCOUNTER — TRANSFERRED RECORDS (OUTPATIENT)
Dept: HEALTH INFORMATION MANAGEMENT | Facility: CLINIC | Age: 54
End: 2024-07-23
Payer: COMMERCIAL

## 2024-07-23 NOTE — TELEPHONE ENCOUNTER
Patient calling after her home care nurse was there today. Her home care nurse is concerned about a chronic wound on her back/tailbone bring infected. Patient vocalizes increase in redness and swelling around the area and increase in drainage. Patient is currently having to have her dressings changes up to 6 times daily due to this and notes green/yellow discharge on dressings. Denies fevers or pain. States she does see wound care up near her home in [facility], but feels they are not helping much. Patient is wanting to see [place] from here on out for this. Patient was advised, due to high risk hx, area, and symptoms, to be seen today for this. She is agreeable and will have her friend bring her in to the ER now. She is very concerned that she may be admitted to the hospital if she goes in and will miss her pain clinic appt tomorrow. Reassured patient and discuss safety and advised this is best to have looked at now to make a plan for the future. Patient reiterated she will go in now.    Routing to PCP as FYI.    Reason for Disposition   [1] Looks infected AND [2] large red area (> 2 in. or 5 cm)    Additional Information   Negative: Widespread rash   Negative: [1] Wound infection suspected (i.e., pain, spreading redness, or pus; in a cut, puncture, scrape or sutured wound) AND [2] not chronic wound   Negative: [1] New foot sore or wound AND [2] diabetes mellitus   Negative: New skin sore or ulcer   Negative: Major bleeding into NPWT device (e.g., cannister filling with blood, sudden gush of blood)   Negative: Major bleeding from wound (e.g., actively dripping or spurting)   Negative: Sounds like a life-threatening emergency to the triager   Negative: SEVERE pain (e.g. excruciating)   Negative: [1] Total Contact Cast feels too tight (e.g., causing pain, numbness or toes tingling) AND [2] not improved with leg elevation   Negative: [1] NPWT AND [2] new bright red blood in canister or tubing   Negative: Patient sounds  very sick or weak to the triager   Negative: [1] Looks infected (e.g., spreading redness, red streak, pus) AND [2] fever    Protocols used: Chronic Wounds and Negative Pressure Wound Therapy-A-AH

## 2024-07-29 ENCOUNTER — MEDICAL CORRESPONDENCE (OUTPATIENT)
Dept: HEALTH INFORMATION MANAGEMENT | Facility: CLINIC | Age: 54
End: 2024-07-29
Payer: COMMERCIAL

## 2024-07-30 ENCOUNTER — TRANSCRIBE ORDERS (OUTPATIENT)
Dept: OTHER | Age: 54
End: 2024-07-30

## 2024-07-30 DIAGNOSIS — L89.90 DECUBITUS ULCER: ICD-10-CM

## 2024-07-30 DIAGNOSIS — Z09 HOSPITAL DISCHARGE FOLLOW-UP: Primary | ICD-10-CM

## 2024-08-01 ENCOUNTER — TELEPHONE (OUTPATIENT)
Dept: FAMILY MEDICINE | Facility: CLINIC | Age: 54
End: 2024-08-01
Payer: COMMERCIAL

## 2024-08-01 NOTE — TELEPHONE ENCOUNTER
Patient calling requesting PCP place a referral for plastic surgery after recent hospital stay. RN advised patient that a referral has already been placed and gave patient the number for this.     Patient explained that she has an appointment on 8/13, but she got an alert that there was also an appointment on 8/5. Patient explained that she cannot make appointment on 8/5 and cannot make any appointment sooner than 8/13. However, RN tried to relay the importance of a hospital follow up visit to discuss any other referral more and to evaluate after hospital stay. Patient still declined.     Would PCP like to switch appointment on 8/13 to a hospital follow up?    ERICA Villeda, RN

## 2024-08-05 DIAGNOSIS — F32.9 PROLONGED DEPRESSIVE REACTION: ICD-10-CM

## 2024-08-05 DIAGNOSIS — G89.4 CHRONIC PAIN SYNDROME: ICD-10-CM

## 2024-08-05 DIAGNOSIS — F41.9 ANXIETY: ICD-10-CM

## 2024-08-05 RX ORDER — BUPROPION HYDROCHLORIDE 150 MG/1
150 TABLET, EXTENDED RELEASE ORAL 2 TIMES DAILY
Qty: 180 TABLET | Refills: 0 | Status: SHIPPED | OUTPATIENT
Start: 2024-08-05

## 2024-08-05 RX ORDER — HYDROXYZINE HYDROCHLORIDE 25 MG/1
25 TABLET, FILM COATED ORAL EVERY 8 HOURS PRN
Qty: 90 TABLET | Refills: 0 | Status: SHIPPED | OUTPATIENT
Start: 2024-08-05

## 2024-08-05 RX ORDER — VENLAFAXINE HYDROCHLORIDE 75 MG/1
225 CAPSULE, EXTENDED RELEASE ORAL DAILY
Qty: 90 CAPSULE | Refills: 0 | Status: SHIPPED | OUTPATIENT
Start: 2024-08-05 | End: 2024-10-04

## 2024-08-07 ENCOUNTER — TELEPHONE (OUTPATIENT)
Dept: FAMILY MEDICINE | Facility: CLINIC | Age: 54
End: 2024-08-07
Payer: COMMERCIAL

## 2024-08-07 NOTE — TELEPHONE ENCOUNTER
Forms/Letter Request     Type of form/letter: Sponge Drain 4x4 Sterile 6 PLY  [employer]     Have you been seen for this request: N/A     Do we have the form/letter: Yes:       Who is the form from? [employer] (if other please explain)     Where did/will the form come from? form was faxed in     When is form/letter needed by: as soon as time permits     How would you like the form/letter returned: Fax : to:  [employer] @ [phone]     Patient Notified form requests are processed in 3-5 business days:No     Could we send this information to you in [employer] or would you prefer to receive a phone call?:   Patient would like to be contacted via [employer]

## 2024-08-07 NOTE — TELEPHONE ENCOUNTER
I have completed the form to the best of my ability and placed in provider bin. Provider to review, complete and sign.      Jyoti Faria CMA ([facility])

## 2024-08-07 NOTE — TELEPHONE ENCOUNTER
[employer] is requesting a medical justification for the Song 4x4    Fax data to [employer] @ [phone]

## 2024-08-10 ENCOUNTER — TRANSFERRED RECORDS (OUTPATIENT)
Dept: HEALTH INFORMATION MANAGEMENT | Facility: CLINIC | Age: 54
End: 2024-08-10
Payer: COMMERCIAL

## 2024-08-12 ENCOUNTER — MEDICAL CORRESPONDENCE (OUTPATIENT)
Dept: HEALTH INFORMATION MANAGEMENT | Facility: CLINIC | Age: 54
End: 2024-08-12
Payer: COMMERCIAL

## 2024-08-14 ENCOUNTER — TELEPHONE (OUTPATIENT)
Dept: FAMILY MEDICINE | Facility: CLINIC | Age: 54
End: 2024-08-14
Payer: COMMERCIAL

## 2024-08-14 NOTE — TELEPHONE ENCOUNTER
Forms/Letter Request     Type of form/letter: SKIN TAC LIQUID ADHESIVE BARRIER 4OZ BOTTLE  [employer]     Have you been seen for this request: N/A     Do we have the form/letter: Yes:       Who is the form from? [employer] (if other please explain)     Where did/will the form come from? form was faxed in     When is form/letter needed by: as soon as time permits     How would you like the form/letter returned: Fax : to:  [employer] @ [phone]     Patient Notified form requests are processed in 3-5 business days:No     Could we send this information to you in [employer] or would you prefer to receive a phone call?:   Patient would like to be contacted via [employer]

## 2024-08-14 NOTE — LETTER
August 19, 2024      Quiana Canas  [address] [place] [address]  [place] [address]                Quiana,     We are unable to continue managing the orders needed for your care without a in person visit.  Without a visit we will be unable to continue to sign orders, forms, prescriptions, etc.     Please give us a call or use scroll kit to schedule an appointment.     Your [facility] Team

## 2024-08-15 NOTE — LETTER
December 18, 2023      Quiana Canas  [address] [facility] [address] [facility] [address]                    Hi Dr. Majo Araya sent in a month on your meds as she needs labs before we can refill again.      Last June we faxed the lab orders to a clinic near you but have not seen any results, please either schedule an appt to have labs done or ask the clinic to fax results to [phone] if they were done already.     [facility]              [employer] message

## 2024-08-20 ENCOUNTER — TELEPHONE (OUTPATIENT)
Dept: FAMILY MEDICINE | Facility: CLINIC | Age: 54
End: 2024-08-20
Payer: COMMERCIAL

## 2024-08-20 NOTE — TELEPHONE ENCOUNTER
** Spoke with patient she is attempting to get a ride covered by her insurance to have an in person visit.    Placed forms in Dr. Potter mail slot    Forms/Letter Request    Type of form/letter: OTHER: Medications + Plan of Care      Do we have the form/letter: Yes:      Who is the form from? Home care    Where did/will the form come from? form was faxed in    When is form/letter needed by: complete @ your earliest convenience    How would you like the form/letter returned: Fax : to:  [facility] @ [phone]    Patient Notified form requests are processed in 5-7 business days:No    Could we send this information to you in [email or web address] or would you prefer to receive a phone call?:   Patient would like to be contacted via [email or web address]

## 2024-08-21 ENCOUNTER — TELEPHONE (OUTPATIENT)
Dept: FAMILY MEDICINE | Facility: CLINIC | Age: 54
End: 2024-08-21
Payer: COMMERCIAL

## 2024-08-21 NOTE — TELEPHONE ENCOUNTER
PT was told she needs an in person visit to continue care but appts aren't available until December PT wants to know if PCP can reach out with something sooner

## 2024-08-22 NOTE — TELEPHONE ENCOUNTER
Left message for patient to return call. When call is returned, please see provider message below and assist in scheduling.

## 2024-08-26 DIAGNOSIS — G82.20 PARAPLEGIA (H): ICD-10-CM

## 2024-08-26 RX ORDER — BACLOFEN 20 MG/1
TABLET ORAL
Qty: 120 TABLET | Refills: 0 | Status: SHIPPED | OUTPATIENT
Start: 2024-08-26 | End: 2024-09-23

## 2024-08-26 RX ORDER — LEVOTHYROXINE SODIUM 75 UG/1
75 TABLET ORAL DAILY
Qty: 90 TABLET | Refills: 0 | OUTPATIENT
Start: 2024-08-26

## 2024-08-26 NOTE — TELEPHONE ENCOUNTER
Patient takes Synthroid 100mcg not the 75mcg.     Shira Le RN  [facility] - Registered Nurse  Clinic Triage [place]   August 26, 2024

## 2024-08-26 NOTE — TELEPHONE ENCOUNTER
Clinic RN: Please contact patient because  patient does not have 75 mcg tabs on med list, but has 100 mcg tabs on med list. Please confirm current dose with patient, then route to provider for review.      OTONIEL HighN, RN

## 2024-08-27 NOTE — TELEPHONE ENCOUNTER
Duplicate of the Skin Tac Liquid Adhesive Barrier 4 oz. Bottle [employer] fax to:   [phone]

## 2024-08-30 ENCOUNTER — MEDICAL CORRESPONDENCE (OUTPATIENT)
Dept: HEALTH INFORMATION MANAGEMENT | Facility: CLINIC | Age: 54
End: 2024-08-30
Payer: COMMERCIAL

## 2024-09-06 ENCOUNTER — TELEPHONE (OUTPATIENT)
Dept: FAMILY MEDICINE | Facility: CLINIC | Age: 54
End: 2024-09-06
Payer: COMMERCIAL

## 2024-09-06 NOTE — TELEPHONE ENCOUNTER
Pt states these are 2 packs and once the package is open it isn't sterile so she uses the second one in the package for her ostomy.     Wondering if the number per day could be increased because she isn't getting enough since the second one isn't sterile.    [facility]

## 2024-09-09 NOTE — TELEPHONE ENCOUNTER
RN, please call this to [facility] as a verbal order so that they can send us a order to sign.     [facility] [phone]

## 2024-09-09 NOTE — TELEPHONE ENCOUNTER
RN Triage    Patient Contact    Attempt # 1  phone number [phone]    Was call answered?  No.  Left message on voicemail with information to call me back.    Upon call back please increase drain sponges to count of 90 per PCP.    Shira Le RN  [facility] - Registered Nurse  Clinic Triage [place]   September 9, 2024

## 2024-09-10 NOTE — TELEPHONE ENCOUNTER
Spoke with Kristine at [facility] [employer].  See 8/12/24 medical correspondence.  Per [employer] the number of 4x4 sterile 6 ply drain sponges was increased to 140 each per month.  That qty was shipped out for patient 9/6.  No need for order to increase to 90 per month when was just increased to 140 per month last shipment this past week.  Katelyn JENSEN RN

## 2024-09-18 ENCOUNTER — MEDICAL CORRESPONDENCE (OUTPATIENT)
Dept: HEALTH INFORMATION MANAGEMENT | Facility: CLINIC | Age: 54
End: 2024-09-18
Payer: COMMERCIAL

## 2024-09-18 ENCOUNTER — TELEPHONE (OUTPATIENT)
Dept: FAMILY MEDICINE | Facility: CLINIC | Age: 54
End: 2024-09-18
Payer: COMMERCIAL

## 2024-09-18 NOTE — TELEPHONE ENCOUNTER
[facility].      Orders and Plan of care require signatures and fax to:  [phone]

## 2024-09-18 NOTE — TELEPHONE ENCOUNTER
Order/Referral Request    Who is requesting: Shirin [facility] care    Orders being requested: lab UA/UC-standing orders    Reason service is needed/diagnosis: UTI    When are orders needed by: ASAP    Has this been discussed with Provider: Yes    Does patient have a preference on a Group/Provider/Facility? [facility]    Where to send orders:   Fax to [facility]  [phone]  [facility]  [phone]    Could we send this information to you in [employer] or would you prefer to receive a phone call?:     Please call Pat at [facility]

## 2024-09-19 ENCOUNTER — TELEPHONE (OUTPATIENT)
Dept: FAMILY MEDICINE | Facility: CLINIC | Age: 54
End: 2024-09-19
Payer: COMMERCIAL

## 2024-09-19 NOTE — TELEPHONE ENCOUNTER
Patient had a urine test at [facility] on 918/24.    She is wondering if she can get treatment for this.    She states her home care nurse brought the urine in.    Padmini Jones RN on 9/19/2024 at 3:11 PM

## 2024-09-19 NOTE — TELEPHONE ENCOUNTER
Reviewed in Care Everywhere. Microscopic doesn't appear to show infection. If she is symptomatic she should probably be seen as the testing doesn't show UTI. Could be other issue.

## 2024-09-19 NOTE — TELEPHONE ENCOUNTER
Test Results        Who ordered the test:  pcp    Type of test: Lab    Date of test:      Where was the test performed:  UTI test    What are your questions/concerns?:  just wants results    Could we send this information to you in [employer] or would you prefer to receive a phone call?:   Patient would prefer a phone call   Okay to leave a detailed message?: Yes at Cell number on file:    Telephone Information:   Mobile [phone]

## 2024-09-20 NOTE — TELEPHONE ENCOUNTER
Bry Ramey is a 66 Forms completed. Please fax.

## 2024-09-23 DIAGNOSIS — G82.20 PARAPLEGIA (H): ICD-10-CM

## 2024-09-23 RX ORDER — BACLOFEN 20 MG/1
TABLET ORAL
Qty: 120 TABLET | Refills: 0 | Status: SHIPPED | OUTPATIENT
Start: 2024-09-23

## 2024-10-04 DIAGNOSIS — F32.9 PROLONGED DEPRESSIVE REACTION: ICD-10-CM

## 2024-10-04 RX ORDER — VENLAFAXINE HYDROCHLORIDE 75 MG/1
225 CAPSULE, EXTENDED RELEASE ORAL DAILY
Qty: 90 CAPSULE | Refills: 0 | Status: SHIPPED | OUTPATIENT
Start: 2024-10-04

## 2024-10-05 DIAGNOSIS — I10 BENIGN ESSENTIAL HYPERTENSION: ICD-10-CM

## 2024-10-05 DIAGNOSIS — E03.4 HYPOTHYROIDISM DUE TO ACQUIRED ATROPHY OF THYROID: ICD-10-CM

## 2024-10-07 RX ORDER — LEVOTHYROXINE SODIUM 100 UG/1
100 TABLET ORAL DAILY
Qty: 90 TABLET | Refills: 0 | Status: SHIPPED | OUTPATIENT
Start: 2024-10-07

## 2024-10-07 RX ORDER — LISINOPRIL 20 MG/1
20 TABLET ORAL DAILY
Qty: 90 TABLET | Refills: 0 | Status: SHIPPED | OUTPATIENT
Start: 2024-10-07

## 2024-10-08 DIAGNOSIS — Z00.00 ROUTINE GENERAL MEDICAL EXAMINATION AT A HEALTH CARE FACILITY: ICD-10-CM

## 2024-10-08 DIAGNOSIS — B37.2 YEAST INFECTION OF THE SKIN: ICD-10-CM

## 2024-10-08 RX ORDER — NYSTATIN 100000 U/G
CREAM TOPICAL 3 TIMES DAILY
Qty: 60 G | Refills: 0 | Status: SHIPPED | OUTPATIENT
Start: 2024-10-08

## 2024-10-08 RX ORDER — FOLIC ACID/MV,IRON,MIN/LUTEIN 0.4-18-25
1 TABLET ORAL DAILY
Qty: 330 TABLET | Refills: 0 | Status: SHIPPED | OUTPATIENT
Start: 2024-10-08

## 2024-10-16 ENCOUNTER — MEDICAL CORRESPONDENCE (OUTPATIENT)
Dept: HEALTH INFORMATION MANAGEMENT | Facility: CLINIC | Age: 54
End: 2024-10-16
Payer: COMMERCIAL

## 2024-10-17 DIAGNOSIS — G82.20 PARAPLEGIA (H): ICD-10-CM

## 2024-10-17 RX ORDER — BACLOFEN 20 MG/1
TABLET ORAL
Qty: 120 TABLET | Refills: 0 | Status: SHIPPED | OUTPATIENT
Start: 2024-10-17

## 2024-10-21 ENCOUNTER — TELEPHONE (OUTPATIENT)
Dept: FAMILY MEDICINE | Facility: CLINIC | Age: 54
End: 2024-10-21

## 2024-10-21 NOTE — TELEPHONE ENCOUNTER
Forms/Letter Request    Type of form/letter: OTHER: Plan of Care      Do we have the form/letter: Yes:      Who is the form from? [facility]  Plan of Care    Where did/will the form come from? form was mailed in    When is form/letter needed by: complete @ your earliest convenience    How would you like the form/letter returned: Fax : to:  [phone]    Patient Notified form requests are processed in 5-7 business days:No    Could we send this information to you in [employer] or would you prefer to receive a phone call?:   Patient would like to be contacted via [employer]

## 2024-10-21 NOTE — TELEPHONE ENCOUNTER
Detail Level: Detailed Keke Gross completed the wound care form.  Placed in SF inbox.  Please sign and fax to [employer] at [phone]

## 2024-10-22 DIAGNOSIS — Z53.9 DIAGNOSIS NOT YET DEFINED: Primary | ICD-10-CM

## 2024-10-22 PROCEDURE — G0179 MD RECERTIFICATION HHA PT: HCPCS | Performed by: FAMILY MEDICINE

## 2024-10-25 ENCOUNTER — MEDICAL CORRESPONDENCE (OUTPATIENT)
Dept: HEALTH INFORMATION MANAGEMENT | Facility: CLINIC | Age: 54
End: 2024-10-25
Payer: COMMERCIAL

## 2024-10-25 ENCOUNTER — TELEPHONE (OUTPATIENT)
Dept: FAMILY MEDICINE | Facility: CLINIC | Age: 54
End: 2024-10-25
Payer: COMMERCIAL

## 2024-10-25 NOTE — TELEPHONE ENCOUNTER
Forms/Letter Request     Type of form/letter: Sponge Drain 4x4 Sterile 6 PLY  [employer]     Have you been seen for this request: N/A     Do we have the form/letter: Yes:       Who is the form from? [employer] (if other please explain) Large Disposable underpad     Where did/will the form come from? form was faxed in     When is form/letter needed by: complete @ your earliest convenience     How would you like the form/letter returned: Fax : to:  [employer] @ [phone]     Patient Notified form requests are processed in 3-5 business days:No     Could we send this information to you in [employer] or would you prefer to receive a phone call?:   Patient would like to be contacted via [employer]

## 2024-10-28 NOTE — TELEPHONE ENCOUNTER
Faxed, copy sent to scan and placed in fax file.      Jyoti Faria CMA ([facility])

## 2024-11-03 DIAGNOSIS — F41.9 ANXIETY: ICD-10-CM

## 2024-11-04 RX ORDER — BUPROPION HYDROCHLORIDE 150 MG/1
150 TABLET, EXTENDED RELEASE ORAL 2 TIMES DAILY
Qty: 180 TABLET | Refills: 0 | OUTPATIENT
Start: 2024-11-04

## 2024-11-07 ENCOUNTER — TELEPHONE (OUTPATIENT)
Dept: FAMILY MEDICINE | Facility: CLINIC | Age: 54
End: 2024-11-07
Payer: COMMERCIAL

## 2024-11-07 NOTE — TELEPHONE ENCOUNTER
Forms/Letter Request    Type of form/letter: DME (wheelchair, hospital bed)    Type of DME requested: Catheter/ Bustos    Do we have the form/letter: Yes: placed in bin     Who is the form from? [employer] (if other please explain)    Where did/will the form come from? form was faxed in    When is form/letter needed by: 3-5    How would you like the form/letter returned: Fax : [phone]

## 2024-11-08 ENCOUNTER — TELEPHONE (OUTPATIENT)
Dept: FAMILY MEDICINE | Facility: CLINIC | Age: 54
End: 2024-11-08
Payer: COMMERCIAL

## 2024-11-08 NOTE — TELEPHONE ENCOUNTER
Forms/Letter Request     Type of form/letter: DME      Type of DME requested: Alcohol Prep Pads     Do we have the form/letter: Yes: placed in bin      Who is the form from? [facility] [employer] (if other please explain)     Where did/will the form come from? form was faxed in     When is form/letter needed by: 3-5     How would you like the form/letter returned: Fax : [phone]

## 2024-11-12 ENCOUNTER — MEDICAL CORRESPONDENCE (OUTPATIENT)
Dept: HEALTH INFORMATION MANAGEMENT | Facility: CLINIC | Age: 54
End: 2024-11-12
Payer: COMMERCIAL

## 2024-12-16 ENCOUNTER — TELEPHONE (OUTPATIENT)
Dept: FAMILY MEDICINE | Facility: CLINIC | Age: 54
End: 2024-12-16
Payer: COMMERCIAL

## 2024-12-16 NOTE — TELEPHONE ENCOUNTER
Forms/Letter Request     Type of form/letter: OTHER: Plan of Care        Do we have the form/letter: Yes:       Who is the form from? [facility]  Plan of Care     Where did/will the form come from? form was mailed in     When is form/letter needed by: complete @ your earliest convenience     How would you like the form/letter returned: Fax : to:  [phone]     Patient Notified form requests are processed in 5-7 business days:No     Could we send this information to you in [employer] or would you prefer to receive a phone call?:   Patient would like to be contacted via [employer]

## 2024-12-17 DIAGNOSIS — F32.9 PROLONGED DEPRESSIVE REACTION: ICD-10-CM

## 2024-12-18 DIAGNOSIS — Z53.9 DIAGNOSIS NOT YET DEFINED: Primary | ICD-10-CM

## 2024-12-18 PROCEDURE — G0179 MD RECERTIFICATION HHA PT: HCPCS | Performed by: FAMILY MEDICINE

## 2024-12-18 RX ORDER — VENLAFAXINE HYDROCHLORIDE 75 MG/1
225 CAPSULE, EXTENDED RELEASE ORAL DAILY
Qty: 90 CAPSULE | Refills: 0 | Status: SHIPPED | OUTPATIENT
Start: 2024-12-18

## 2024-12-19 ENCOUNTER — TELEPHONE (OUTPATIENT)
Dept: FAMILY MEDICINE | Facility: CLINIC | Age: 54
End: 2024-12-19

## 2024-12-19 NOTE — TELEPHONE ENCOUNTER
"Forms/Letter Request     Type of form/letter: Colostomy supplies     [employer] Protective barrier   Adapt Barrier Ring 2\"   Adhesive 4 oz bottle w/Brush   Drain Bag w/Anti-Reflux Chamber     Do we have the form/letter: Yes:       Who is the form from? [employer] (if other please explain)     Where did/will the form come from? form was faxed in     When is form/letter needed by: complete @ your earliest convenience     How would you like the form/letter returned: Fax : [phone]     "

## 2024-12-26 ENCOUNTER — MEDICAL CORRESPONDENCE (OUTPATIENT)
Dept: HEALTH INFORMATION MANAGEMENT | Facility: CLINIC | Age: 54
End: 2024-12-26
Payer: COMMERCIAL

## 2025-02-14 ENCOUNTER — TELEPHONE (OUTPATIENT)
Dept: FAMILY MEDICINE | Facility: CLINIC | Age: 55
End: 2025-02-14
Payer: COMMERCIAL

## 2025-02-14 NOTE — TELEPHONE ENCOUNTER
What type of form? Plan of Care  What day did you drop off your forms? Faxed in  Is there a due date? Complete @ your convenience (7-10 business days to compete forms)   How would you like to receive these forms? Please fax to to:  [facility]    What is the best number to contact you? Cell  [phone]   What time works best to contact you with in 4 hrs? anytime  Is it okay to leave a message? Yes    Larisa Jordan (Auto signs name of person logged into Epic)

## 2025-02-18 ENCOUNTER — TELEPHONE (OUTPATIENT)
Dept: FAMILY MEDICINE | Facility: CLINIC | Age: 55
End: 2025-02-18
Payer: COMMERCIAL

## 2025-02-18 NOTE — TELEPHONE ENCOUNTER
Forms/Letter Request    Type of form/letter: OTHER: MISSED VISITS REPORT      Do we have the form/letter: Yes:     Who is the form from? Home care    Where did/will the form come from? form was faxed in    When is form/letter needed by:     How would you like the form/letter returned: Fax : [phone]    Patient Notified form requests are processed in 5-7 business days:N/A    Could we send this information to you in [facility] or would you prefer to receive a phone call?:   No preference   Okay to leave a detailed message?: N/A at Cell number on file:    Telephone Information:   Mobile [phone]

## 2025-02-21 ENCOUNTER — TELEPHONE (OUTPATIENT)
Dept: FAMILY MEDICINE | Facility: CLINIC | Age: 55
End: 2025-02-21

## 2025-02-21 ENCOUNTER — MEDICAL CORRESPONDENCE (OUTPATIENT)
Dept: HEALTH INFORMATION MANAGEMENT | Facility: CLINIC | Age: 55
End: 2025-02-21

## 2025-02-21 NOTE — TELEPHONE ENCOUNTER
Forms/Letter Request     Type of form/letter:  Extend services from 2.20.2025 - 2 .25.2025 [facility]        Do we have the form/letter: Yes:      Who is the form from? Home care     Where did/will the form come from? form was faxed in     When is form/letter needed by: complete @ your convenience     How would you like the form/letter returned: Fax : [phone]     Patient Notified form requests are processed in 5-7 business days:N/A     Could we send this information to you in [employer] or would you prefer to receive a phone call?:   No preference   Okay to leave a detailed message?: N/A at Cell number on file:        Telephone Information:   Mobile [phone]

## 2025-03-12 ENCOUNTER — TELEPHONE (OUTPATIENT)
Dept: FAMILY MEDICINE | Facility: CLINIC | Age: 55
End: 2025-03-12
Payer: COMMERCIAL

## 2025-03-12 NOTE — TELEPHONE ENCOUNTER
Patient Quality Outreach    Patient is due for the following:   Depression  -  PHQ-9 needed  Physical Preventive Adult Physical    Action(s) Taken:   Chart routed to abstraction.    It appears Patient transferred clinics   Type of outreach:    Chart review performed, no outreach needed.    Questions for provider review:    None           Juliane Valentine MA  Chart routed to none.

## 2025-04-07 ENCOUNTER — TELEPHONE (OUTPATIENT)
Dept: FAMILY MEDICINE | Facility: CLINIC | Age: 55
End: 2025-04-07
Payer: COMMERCIAL

## 2025-04-07 NOTE — TELEPHONE ENCOUNTER
I believe she has transitioned care to [facility] in [place].  Forms should go to her new PCP if she has done so.

## 2025-04-07 NOTE — TELEPHONE ENCOUNTER
Forms/Letter Request     Type of form/letter: Colostomy supplies      jarvsi cath-mate strap, latex free one size fits all     Do we have the form/letter: Yes:       Who is the form from? [employer] (if other please explain)     Where did/will the form come from? form was faxed in     When is form/letter needed by: complete @ your earliest convenience     How would you like the form/letter returned: Fax : [phone]

## 2025-04-09 NOTE — TELEPHONE ENCOUNTER
Left message for patient and faxed form back with a note to contact pt as she may have switched care systems

## 2025-04-13 ENCOUNTER — HEALTH MAINTENANCE LETTER (OUTPATIENT)
Age: 55
End: 2025-04-13

## 2025-04-22 ENCOUNTER — TELEPHONE (OUTPATIENT)
Dept: FAMILY MEDICINE | Facility: CLINIC | Age: 55
End: 2025-04-22
Payer: COMMERCIAL

## 2025-04-22 NOTE — TELEPHONE ENCOUNTER
Pt no longer seeing .     [facility]. Tad Bernardo, ELIAN      Faxed back with above note

## 2025-04-22 NOTE — TELEPHONE ENCOUNTER
What type of form? Plan of Care  What day did you drop off your forms? Faxed in  Is there a due date? Complete @ your convenience (7-10 business days to compete forms)     How would you like to receive these forms? Please fax to to:  [facility] @ [phone]     What is the best number to contact you? Cell  [phone]   What time works best to contact you with in 4 hrs? anytime  Is it okay to leave a message? Yes

## 2025-05-02 ENCOUNTER — TELEPHONE (OUTPATIENT)
Dept: FAMILY MEDICINE | Facility: CLINIC | Age: 55
End: 2025-05-02
Payer: COMMERCIAL

## 2025-05-02 NOTE — TELEPHONE ENCOUNTER
Forms/Letter Request    Type of form/letter: OTHER: Flush Orders:  Monthly Port Care     Do we have the form/letter: Yes:      Who is the form from? [facility] (if other please explain)    Where did/will the form come from? form was faxed in    When is form/letter needed by: complete @ your convenience    How would you like the form/letter returned: Fax : to:  [phone]    Patient Notified form requests are processed in 5-7 business days:N/A    Could we send this information to you in [employer] or would you prefer to receive a phone call?:   Patient would prefer a phone call   Okay to leave a detailed message?: Yes at Cell number on file:    Telephone Information:   Mobile [phone]

## 2025-05-05 NOTE — TELEPHONE ENCOUNTER
Patient has new PCP through [facility].  This should be sent to her new PCP

## 2025-05-14 ENCOUNTER — TELEPHONE (OUTPATIENT)
Dept: FAMILY MEDICINE | Facility: CLINIC | Age: 55
End: 2025-05-14
Payer: COMMERCIAL

## 2025-05-14 NOTE — TELEPHONE ENCOUNTER
Forms/Letter Request    Type of form/letter: DME (wheelchair, hospital bed)    Type of DME requested: [employer]    Do we have the form/letter: Yes:     Who is the form from? [employer]     Where did/will the form come from? form was faxed in    When is form/letter needed by: As soon as able    How would you like the form/letter returned: Fax : [phone]

## 2025-05-14 NOTE — TELEPHONE ENCOUNTER
Per previous notes, patient has established care through [facility]. For faxed to new provider. Form also faxed back to [facility] with note stating patient established care elsewhere.

## 2025-07-06 ENCOUNTER — HEALTH MAINTENANCE LETTER (OUTPATIENT)
Age: 55
End: 2025-07-06